# Patient Record
Sex: FEMALE | Race: BLACK OR AFRICAN AMERICAN | Employment: OTHER | URBAN - METROPOLITAN AREA
[De-identification: names, ages, dates, MRNs, and addresses within clinical notes are randomized per-mention and may not be internally consistent; named-entity substitution may affect disease eponyms.]

---

## 2017-02-06 ENCOUNTER — HOSPITAL ENCOUNTER (INPATIENT)
Age: 62
LOS: 8 days | Discharge: HOME | DRG: 885 | End: 2017-02-14
Attending: EMERGENCY MEDICINE | Admitting: PSYCHIATRY & NEUROLOGY
Payer: MEDICARE

## 2017-02-06 DIAGNOSIS — R44.0 AUDITORY HALLUCINATIONS: ICD-10-CM

## 2017-02-06 DIAGNOSIS — R44.1 HALLUCINATION, VISUAL: Primary | ICD-10-CM

## 2017-02-06 LAB
CREAT SERPL-MCNC: 0.97 MG/DL (ref 0.5–0.9)
GFR AFRICAN AMERICAN: >60 ML/MIN
GFR NON-AFRICAN AMERICAN: 58 ML/MIN
GFR SERPL CREATININE-BSD FRML MDRD: ABNORMAL ML/MIN/{1.73_M2}
GFR SERPL CREATININE-BSD FRML MDRD: ABNORMAL ML/MIN/{1.73_M2}
GLUCOSE BLD-MCNC: 106 MG/DL (ref 65–105)
GLUCOSE BLD-MCNC: 108 MG/DL (ref 65–105)
GLUCOSE BLD-MCNC: 119 MG/DL (ref 65–105)
GLUCOSE BLD-MCNC: 184 MG/DL (ref 65–105)

## 2017-02-06 PROCEDURE — 6370000000 HC RX 637 (ALT 250 FOR IP): Performed by: PSYCHIATRY & NEUROLOGY

## 2017-02-06 PROCEDURE — 36415 COLL VENOUS BLD VENIPUNCTURE: CPT

## 2017-02-06 PROCEDURE — 99285 EMERGENCY DEPT VISIT HI MDM: CPT

## 2017-02-06 PROCEDURE — 94664 DEMO&/EVAL PT USE INHALER: CPT

## 2017-02-06 PROCEDURE — 82565 ASSAY OF CREATININE: CPT

## 2017-02-06 PROCEDURE — 82947 ASSAY GLUCOSE BLOOD QUANT: CPT

## 2017-02-06 PROCEDURE — 1240000000 HC EMOTIONAL WELLNESS R&B

## 2017-02-06 RX ORDER — IBUPROFEN 800 MG/1
800 TABLET ORAL EVERY 8 HOURS PRN
Status: DISCONTINUED | OUTPATIENT
Start: 2017-02-06 | End: 2017-02-14 | Stop reason: HOSPADM

## 2017-02-06 RX ORDER — ALBUTEROL SULFATE 90 UG/1
1 AEROSOL, METERED RESPIRATORY (INHALATION) 4 TIMES DAILY PRN
Status: DISCONTINUED | OUTPATIENT
Start: 2017-02-06 | End: 2017-02-14 | Stop reason: HOSPADM

## 2017-02-06 RX ORDER — NICOTINE 21 MG/24HR
1 PATCH, TRANSDERMAL 24 HOURS TRANSDERMAL DAILY
Status: DISCONTINUED | OUTPATIENT
Start: 2017-02-06 | End: 2017-02-06

## 2017-02-06 RX ORDER — HYDROCHLOROTHIAZIDE 25 MG/1
12.5 TABLET ORAL DAILY
Status: DISCONTINUED | OUTPATIENT
Start: 2017-02-06 | End: 2017-02-14 | Stop reason: HOSPADM

## 2017-02-06 RX ORDER — PANTOPRAZOLE SODIUM 40 MG/1
40 TABLET, DELAYED RELEASE ORAL
Status: DISCONTINUED | OUTPATIENT
Start: 2017-02-07 | End: 2017-02-08

## 2017-02-06 RX ORDER — OXYBUTYNIN CHLORIDE 5 MG/1
5 TABLET, EXTENDED RELEASE ORAL NIGHTLY
Status: DISCONTINUED | OUTPATIENT
Start: 2017-02-06 | End: 2017-02-07

## 2017-02-06 RX ORDER — POLYVINYL ALCOHOL 14 MG/ML
1 SOLUTION/ DROPS OPHTHALMIC
Status: DISCONTINUED | OUTPATIENT
Start: 2017-02-06 | End: 2017-02-14 | Stop reason: HOSPADM

## 2017-02-06 RX ORDER — QUETIAPINE FUMARATE 200 MG/1
200 TABLET, FILM COATED ORAL NIGHTLY
Status: DISCONTINUED | OUTPATIENT
Start: 2017-02-06 | End: 2017-02-09 | Stop reason: SDUPTHER

## 2017-02-06 RX ORDER — QUETIAPINE FUMARATE 300 MG/1
300 TABLET, FILM COATED ORAL NIGHTLY
Status: DISCONTINUED | OUTPATIENT
Start: 2017-02-06 | End: 2017-02-09

## 2017-02-06 RX ORDER — LISINOPRIL 20 MG/1
20 TABLET ORAL DAILY
Status: DISCONTINUED | OUTPATIENT
Start: 2017-02-06 | End: 2017-02-14 | Stop reason: HOSPADM

## 2017-02-06 RX ORDER — INSULIN GLARGINE 100 [IU]/ML
80 INJECTION, SOLUTION SUBCUTANEOUS NIGHTLY
Status: DISCONTINUED | OUTPATIENT
Start: 2017-02-06 | End: 2017-02-14 | Stop reason: HOSPADM

## 2017-02-06 RX ORDER — TRAZODONE HYDROCHLORIDE 50 MG/1
50 TABLET ORAL NIGHTLY PRN
Status: DISCONTINUED | OUTPATIENT
Start: 2017-02-06 | End: 2017-02-14 | Stop reason: HOSPADM

## 2017-02-06 RX ORDER — NICOTINE 21 MG/24HR
1 PATCH, TRANSDERMAL 24 HOURS TRANSDERMAL DAILY
Status: DISCONTINUED | OUTPATIENT
Start: 2017-02-06 | End: 2017-02-14 | Stop reason: HOSPADM

## 2017-02-06 RX ORDER — ASPIRIN 81 MG/1
81 TABLET ORAL DAILY
Status: DISCONTINUED | OUTPATIENT
Start: 2017-02-06 | End: 2017-02-14 | Stop reason: HOSPADM

## 2017-02-06 RX ORDER — SIMVASTATIN 10 MG
10 TABLET ORAL NIGHTLY
Status: DISCONTINUED | OUTPATIENT
Start: 2017-02-06 | End: 2017-02-14 | Stop reason: HOSPADM

## 2017-02-06 RX ORDER — ALPRAZOLAM 0.25 MG/1
0.25 TABLET ORAL 2 TIMES DAILY PRN
Status: DISCONTINUED | OUTPATIENT
Start: 2017-02-06 | End: 2017-02-14 | Stop reason: HOSPADM

## 2017-02-06 RX ORDER — QUETIAPINE FUMARATE 300 MG/1
300 TABLET, FILM COATED ORAL 2 TIMES DAILY
Status: DISCONTINUED | OUTPATIENT
Start: 2017-02-06 | End: 2017-02-14 | Stop reason: HOSPADM

## 2017-02-06 RX ORDER — LISINOPRIL AND HYDROCHLOROTHIAZIDE 20; 12.5 MG/1; MG/1
1 TABLET ORAL DAILY
Status: DISCONTINUED | OUTPATIENT
Start: 2017-02-06 | End: 2017-02-06 | Stop reason: CLARIF

## 2017-02-06 RX ORDER — CELECOXIB 200 MG/1
200 CAPSULE ORAL DAILY
Status: DISCONTINUED | OUTPATIENT
Start: 2017-02-06 | End: 2017-02-14 | Stop reason: HOSPADM

## 2017-02-06 RX ADMIN — ASPIRIN 81 MG: 81 TABLET, COATED ORAL at 13:46

## 2017-02-06 RX ADMIN — QUETIAPINE FUMARATE 300 MG: 300 TABLET, FILM COATED ORAL at 13:46

## 2017-02-06 RX ADMIN — ALPRAZOLAM 0.25 MG: 0.25 TABLET ORAL at 13:51

## 2017-02-06 RX ADMIN — LISINOPRIL 20 MG: 20 TABLET ORAL at 13:47

## 2017-02-06 RX ADMIN — HYDROCHLOROTHIAZIDE 12.5 MG: 25 TABLET ORAL at 13:46

## 2017-02-06 RX ADMIN — METFORMIN HYDROCHLORIDE 1000 MG: 500 TABLET, FILM COATED ORAL at 17:20

## 2017-02-06 RX ADMIN — SERTRALINE 150 MG: 50 TABLET, FILM COATED ORAL at 13:47

## 2017-02-06 RX ADMIN — QUETIAPINE FUMARATE 300 MG: 300 TABLET, FILM COATED ORAL at 21:21

## 2017-02-06 RX ADMIN — ALPRAZOLAM 0.25 MG: 0.25 TABLET ORAL at 21:22

## 2017-02-06 RX ADMIN — Medication 80 UNITS: at 21:19

## 2017-02-06 RX ADMIN — Medication: at 17:38

## 2017-02-06 RX ADMIN — CELECOXIB 200 MG: 200 CAPSULE ORAL at 13:46

## 2017-02-06 RX ADMIN — OXYBUTYNIN CHLORIDE 5 MG: 5 TABLET, EXTENDED RELEASE ORAL at 21:20

## 2017-02-06 RX ADMIN — Medication 2 PUFF: at 13:46

## 2017-02-06 RX ADMIN — Medication 5 ML: at 17:36

## 2017-02-06 RX ADMIN — Medication 2 PUFF: at 21:20

## 2017-02-06 RX ADMIN — QUETIAPINE FUMARATE 200 MG: 200 TABLET, FILM COATED ORAL at 21:21

## 2017-02-06 RX ADMIN — SIMVASTATIN 10 MG: 10 TABLET, FILM COATED ORAL at 21:21

## 2017-02-06 ASSESSMENT — LIFESTYLE VARIABLES
HISTORY_ALCOHOL_USE: NO
HISTORY_ALCOHOL_USE: NO

## 2017-02-06 ASSESSMENT — PAIN DESCRIPTION - PROGRESSION
CLINICAL_PROGRESSION: GRADUALLY WORSENING
CLINICAL_PROGRESSION: GRADUALLY WORSENING

## 2017-02-06 ASSESSMENT — PAIN SCALES - WONG BAKER
WONGBAKER_NUMERICALRESPONSE: 8
WONGBAKER_NUMERICALRESPONSE: 8

## 2017-02-06 ASSESSMENT — PAIN DESCRIPTION - DESCRIPTORS
DESCRIPTORS: SORE
DESCRIPTORS: SORE

## 2017-02-06 ASSESSMENT — ENCOUNTER SYMPTOMS
NAUSEA: 0
SHORTNESS OF BREATH: 0
DIARRHEA: 0
EYE PAIN: 0
VOMITING: 0
SORE THROAT: 0
ABDOMINAL PAIN: 0
EYE DISCHARGE: 0
TROUBLE SWALLOWING: 0
COUGH: 0
PHOTOPHOBIA: 0
RHINORRHEA: 0

## 2017-02-06 ASSESSMENT — PAIN SCALES - GENERAL
PAINLEVEL_OUTOF10: 8
PAINLEVEL_OUTOF10: 8
PAINLEVEL_OUTOF10: 0
PAINLEVEL_OUTOF10: 8
PAINLEVEL_OUTOF10: 8

## 2017-02-06 ASSESSMENT — PAIN DESCRIPTION - LOCATION
LOCATION: MOUTH

## 2017-02-06 ASSESSMENT — SLEEP AND FATIGUE QUESTIONNAIRES
RESTFUL SLEEP: NO
DIFFICULTY FALLING ASLEEP: YES
DO YOU USE A SLEEP AID: YES
DO YOU HAVE DIFFICULTY SLEEPING: NO
SLEEP PATTERN: DIFFICULTY FALLING ASLEEP;DISTURBED/INTERRUPTED SLEEP;INSOMNIA
DIFFICULTY STAYING ASLEEP: NO
DIFFICULTY ARISING: NO
AVERAGE NUMBER OF SLEEP HOURS: 6

## 2017-02-06 ASSESSMENT — PAIN DESCRIPTION - PAIN TYPE
TYPE: ACUTE PAIN

## 2017-02-06 ASSESSMENT — PAIN DESCRIPTION - ONSET
ONSET: ON-GOING
ONSET: ON-GOING

## 2017-02-06 ASSESSMENT — PAIN DESCRIPTION - FREQUENCY
FREQUENCY: INTERMITTENT
FREQUENCY: INTERMITTENT

## 2017-02-06 ASSESSMENT — PATIENT HEALTH QUESTIONNAIRE - PHQ9: SUM OF ALL RESPONSES TO PHQ QUESTIONS 1-9: 0

## 2017-02-07 LAB — GLUCOSE BLD-MCNC: 131 MG/DL (ref 65–105)

## 2017-02-07 PROCEDURE — 82947 ASSAY GLUCOSE BLOOD QUANT: CPT

## 2017-02-07 PROCEDURE — 1240000000 HC EMOTIONAL WELLNESS R&B

## 2017-02-07 PROCEDURE — 6370000000 HC RX 637 (ALT 250 FOR IP): Performed by: PSYCHIATRY & NEUROLOGY

## 2017-02-07 RX ADMIN — POLYVINYL ALCOHOL 1 DROP: 14 SOLUTION/ DROPS OPHTHALMIC at 07:05

## 2017-02-07 RX ADMIN — TIOTROPIUM BROMIDE 18 MCG: 18 CAPSULE ORAL; RESPIRATORY (INHALATION) at 09:40

## 2017-02-07 RX ADMIN — IBUPROFEN 800 MG: 800 TABLET, FILM COATED ORAL at 21:35

## 2017-02-07 RX ADMIN — QUETIAPINE FUMARATE 300 MG: 300 TABLET, FILM COATED ORAL at 14:00

## 2017-02-07 RX ADMIN — PANTOPRAZOLE SODIUM 40 MG: 40 TABLET, DELAYED RELEASE ORAL at 07:04

## 2017-02-07 RX ADMIN — IBUPROFEN 800 MG: 800 TABLET, FILM COATED ORAL at 13:59

## 2017-02-07 RX ADMIN — Medication 2 PUFF: at 09:38

## 2017-02-07 RX ADMIN — ALPRAZOLAM 0.25 MG: 0.25 TABLET ORAL at 09:43

## 2017-02-07 RX ADMIN — Medication 80 UNITS: at 22:01

## 2017-02-07 RX ADMIN — POLYVINYL ALCOHOL 1 DROP: 14 SOLUTION/ DROPS OPHTHALMIC at 13:09

## 2017-02-07 RX ADMIN — QUETIAPINE FUMARATE 300 MG: 300 TABLET, FILM COATED ORAL at 09:42

## 2017-02-07 RX ADMIN — SERTRALINE 150 MG: 50 TABLET, FILM COATED ORAL at 09:42

## 2017-02-07 RX ADMIN — Medication 2 PUFF: at 21:35

## 2017-02-07 RX ADMIN — SIMVASTATIN 10 MG: 10 TABLET, FILM COATED ORAL at 21:36

## 2017-02-07 RX ADMIN — ASPIRIN 81 MG: 81 TABLET, COATED ORAL at 09:43

## 2017-02-07 RX ADMIN — TRAZODONE HYDROCHLORIDE 50 MG: 50 TABLET ORAL at 21:37

## 2017-02-07 RX ADMIN — CELECOXIB 200 MG: 200 CAPSULE ORAL at 09:41

## 2017-02-07 RX ADMIN — ALPRAZOLAM 0.25 MG: 0.25 TABLET ORAL at 21:36

## 2017-02-07 RX ADMIN — METFORMIN HYDROCHLORIDE 1000 MG: 500 TABLET, FILM COATED ORAL at 17:23

## 2017-02-07 RX ADMIN — POLYVINYL ALCOHOL 1 DROP: 14 SOLUTION/ DROPS OPHTHALMIC at 09:44

## 2017-02-07 RX ADMIN — POLYVINYL ALCOHOL 1 DROP: 14 SOLUTION/ DROPS OPHTHALMIC at 21:34

## 2017-02-07 RX ADMIN — QUETIAPINE FUMARATE 200 MG: 200 TABLET, FILM COATED ORAL at 21:38

## 2017-02-07 RX ADMIN — QUETIAPINE FUMARATE 300 MG: 300 TABLET, FILM COATED ORAL at 21:38

## 2017-02-07 RX ADMIN — METFORMIN HYDROCHLORIDE 1000 MG: 500 TABLET, FILM COATED ORAL at 09:43

## 2017-02-07 ASSESSMENT — PAIN DESCRIPTION - DESCRIPTORS
DESCRIPTORS: ACHING;TENDER;THROBBING
DESCRIPTORS: ACHING;TENDER;THROBBING

## 2017-02-07 ASSESSMENT — PAIN DESCRIPTION - LOCATION
LOCATION: BACK
LOCATION: BACK;KNEE

## 2017-02-07 ASSESSMENT — PAIN DESCRIPTION - ORIENTATION
ORIENTATION: RIGHT;LOWER
ORIENTATION: LOWER

## 2017-02-07 ASSESSMENT — PAIN DESCRIPTION - PAIN TYPE
TYPE: CHRONIC PAIN
TYPE: CHRONIC PAIN

## 2017-02-07 ASSESSMENT — PAIN DESCRIPTION - FREQUENCY
FREQUENCY: CONTINUOUS
FREQUENCY: CONTINUOUS

## 2017-02-07 ASSESSMENT — PAIN DESCRIPTION - ONSET
ONSET: ON-GOING
ONSET: ON-GOING

## 2017-02-07 ASSESSMENT — PAIN SCALES - GENERAL
PAINLEVEL_OUTOF10: 6
PAINLEVEL_OUTOF10: 6
PAINLEVEL_OUTOF10: 2
PAINLEVEL_OUTOF10: 7

## 2017-02-07 ASSESSMENT — PAIN DESCRIPTION - PROGRESSION
CLINICAL_PROGRESSION: NOT CHANGED
CLINICAL_PROGRESSION: NOT CHANGED

## 2017-02-08 LAB — GLUCOSE BLD-MCNC: 94 MG/DL (ref 65–105)

## 2017-02-08 PROCEDURE — 82947 ASSAY GLUCOSE BLOOD QUANT: CPT

## 2017-02-08 PROCEDURE — 6370000000 HC RX 637 (ALT 250 FOR IP): Performed by: PSYCHIATRY & NEUROLOGY

## 2017-02-08 PROCEDURE — 1240000000 HC EMOTIONAL WELLNESS R&B

## 2017-02-08 RX ORDER — PANTOPRAZOLE SODIUM 40 MG/1
40 TABLET, DELAYED RELEASE ORAL
Status: DISCONTINUED | OUTPATIENT
Start: 2017-02-08 | End: 2017-02-14 | Stop reason: HOSPADM

## 2017-02-08 RX ADMIN — METFORMIN HYDROCHLORIDE 1000 MG: 500 TABLET, FILM COATED ORAL at 09:04

## 2017-02-08 RX ADMIN — QUETIAPINE FUMARATE 300 MG: 300 TABLET, FILM COATED ORAL at 14:50

## 2017-02-08 RX ADMIN — QUETIAPINE FUMARATE 200 MG: 200 TABLET, FILM COATED ORAL at 20:47

## 2017-02-08 RX ADMIN — CELECOXIB 200 MG: 200 CAPSULE ORAL at 09:05

## 2017-02-08 RX ADMIN — QUETIAPINE FUMARATE 300 MG: 300 TABLET, FILM COATED ORAL at 09:06

## 2017-02-08 RX ADMIN — PANTOPRAZOLE SODIUM 40 MG: 40 TABLET, DELAYED RELEASE ORAL at 09:07

## 2017-02-08 RX ADMIN — Medication 5 ML: at 09:07

## 2017-02-08 RX ADMIN — ASPIRIN 81 MG: 81 TABLET, COATED ORAL at 09:06

## 2017-02-08 RX ADMIN — POLYVINYL ALCOHOL 1 DROP: 14 SOLUTION/ DROPS OPHTHALMIC at 17:36

## 2017-02-08 RX ADMIN — Medication 2 PUFF: at 09:01

## 2017-02-08 RX ADMIN — Medication: at 09:08

## 2017-02-08 RX ADMIN — METFORMIN HYDROCHLORIDE 1000 MG: 500 TABLET, FILM COATED ORAL at 17:35

## 2017-02-08 RX ADMIN — POLYVINYL ALCOHOL 1 DROP: 14 SOLUTION/ DROPS OPHTHALMIC at 09:02

## 2017-02-08 RX ADMIN — HYDROCHLOROTHIAZIDE 12.5 MG: 25 TABLET ORAL at 09:06

## 2017-02-08 RX ADMIN — TRAZODONE HYDROCHLORIDE 50 MG: 50 TABLET ORAL at 20:47

## 2017-02-08 RX ADMIN — Medication 2 PUFF: at 20:47

## 2017-02-08 RX ADMIN — POLYVINYL ALCOHOL 1 DROP: 14 SOLUTION/ DROPS OPHTHALMIC at 13:10

## 2017-02-08 RX ADMIN — SIMVASTATIN 10 MG: 10 TABLET, FILM COATED ORAL at 20:47

## 2017-02-08 RX ADMIN — Medication 80 UNITS: at 20:44

## 2017-02-08 RX ADMIN — QUETIAPINE FUMARATE 300 MG: 300 TABLET, FILM COATED ORAL at 20:50

## 2017-02-08 RX ADMIN — LISINOPRIL 20 MG: 20 TABLET ORAL at 09:06

## 2017-02-08 RX ADMIN — POLYVINYL ALCOHOL 1 DROP: 14 SOLUTION/ DROPS OPHTHALMIC at 11:18

## 2017-02-08 RX ADMIN — ALPRAZOLAM 0.25 MG: 0.25 TABLET ORAL at 09:04

## 2017-02-08 RX ADMIN — IBUPROFEN 800 MG: 800 TABLET, FILM COATED ORAL at 09:07

## 2017-02-08 RX ADMIN — SERTRALINE 150 MG: 50 TABLET, FILM COATED ORAL at 09:06

## 2017-02-08 RX ADMIN — TIOTROPIUM BROMIDE 18 MCG: 18 CAPSULE ORAL; RESPIRATORY (INHALATION) at 09:02

## 2017-02-08 ASSESSMENT — PAIN SCALES - GENERAL
PAINLEVEL_OUTOF10: 2
PAINLEVEL_OUTOF10: 7
PAINLEVEL_OUTOF10: 7

## 2017-02-08 ASSESSMENT — PAIN DESCRIPTION - LOCATION: LOCATION: BACK;LEG

## 2017-02-08 ASSESSMENT — PAIN DESCRIPTION - DESCRIPTORS: DESCRIPTORS: ACHING;SORE;THROBBING

## 2017-02-08 ASSESSMENT — PAIN DESCRIPTION - PAIN TYPE: TYPE: CHRONIC PAIN

## 2017-02-08 ASSESSMENT — PAIN DESCRIPTION - FREQUENCY: FREQUENCY: CONTINUOUS

## 2017-02-08 ASSESSMENT — PAIN DESCRIPTION - PROGRESSION: CLINICAL_PROGRESSION: NOT CHANGED

## 2017-02-08 ASSESSMENT — PAIN DESCRIPTION - ORIENTATION: ORIENTATION: RIGHT;LOWER

## 2017-02-08 ASSESSMENT — PAIN DESCRIPTION - ONSET: ONSET: ON-GOING

## 2017-02-09 PROCEDURE — 1240000000 HC EMOTIONAL WELLNESS R&B

## 2017-02-09 PROCEDURE — 6370000000 HC RX 637 (ALT 250 FOR IP): Performed by: PSYCHIATRY & NEUROLOGY

## 2017-02-09 PROCEDURE — 82947 ASSAY GLUCOSE BLOOD QUANT: CPT

## 2017-02-09 RX ORDER — QUETIAPINE FUMARATE 200 MG/1
400 TABLET, FILM COATED ORAL NIGHTLY
Status: DISCONTINUED | OUTPATIENT
Start: 2017-02-09 | End: 2017-02-14 | Stop reason: HOSPADM

## 2017-02-09 RX ADMIN — METFORMIN HYDROCHLORIDE 1000 MG: 500 TABLET, FILM COATED ORAL at 08:52

## 2017-02-09 RX ADMIN — ASPIRIN 81 MG: 81 TABLET, COATED ORAL at 09:43

## 2017-02-09 RX ADMIN — QUETIAPINE FUMARATE 300 MG: 300 TABLET, FILM COATED ORAL at 09:42

## 2017-02-09 RX ADMIN — PANTOPRAZOLE SODIUM 40 MG: 40 TABLET, DELAYED RELEASE ORAL at 09:43

## 2017-02-09 RX ADMIN — POLYVINYL ALCOHOL 1 DROP: 14 SOLUTION/ DROPS OPHTHALMIC at 17:51

## 2017-02-09 RX ADMIN — QUETIAPINE FUMARATE 400 MG: 200 TABLET, FILM COATED ORAL at 21:14

## 2017-02-09 RX ADMIN — QUETIAPINE FUMARATE 300 MG: 300 TABLET, FILM COATED ORAL at 15:36

## 2017-02-09 RX ADMIN — POLYVINYL ALCOHOL 1 DROP: 14 SOLUTION/ DROPS OPHTHALMIC at 21:13

## 2017-02-09 RX ADMIN — CELECOXIB 200 MG: 200 CAPSULE ORAL at 08:52

## 2017-02-09 RX ADMIN — LISINOPRIL 20 MG: 20 TABLET ORAL at 08:52

## 2017-02-09 RX ADMIN — TIOTROPIUM BROMIDE 18 MCG: 18 CAPSULE ORAL; RESPIRATORY (INHALATION) at 09:43

## 2017-02-09 RX ADMIN — SERTRALINE 150 MG: 50 TABLET, FILM COATED ORAL at 09:42

## 2017-02-09 RX ADMIN — Medication 2 PUFF: at 08:51

## 2017-02-09 RX ADMIN — TRAZODONE HYDROCHLORIDE 50 MG: 50 TABLET ORAL at 21:14

## 2017-02-09 RX ADMIN — Medication 80 UNITS: at 22:40

## 2017-02-09 RX ADMIN — ALPRAZOLAM 0.25 MG: 0.25 TABLET ORAL at 21:14

## 2017-02-09 RX ADMIN — METFORMIN HYDROCHLORIDE 1000 MG: 500 TABLET, FILM COATED ORAL at 17:51

## 2017-02-09 RX ADMIN — IBUPROFEN 800 MG: 800 TABLET, FILM COATED ORAL at 08:52

## 2017-02-09 RX ADMIN — SIMVASTATIN 10 MG: 10 TABLET, FILM COATED ORAL at 21:14

## 2017-02-09 RX ADMIN — Medication: at 17:55

## 2017-02-09 RX ADMIN — HYDROCHLOROTHIAZIDE 12.5 MG: 25 TABLET ORAL at 09:42

## 2017-02-09 RX ADMIN — POLYVINYL ALCOHOL 1 DROP: 14 SOLUTION/ DROPS OPHTHALMIC at 10:11

## 2017-02-09 RX ADMIN — Medication 2 PUFF: at 21:13

## 2017-02-09 ASSESSMENT — PAIN SCALES - GENERAL: PAINLEVEL_OUTOF10: 6

## 2017-02-10 LAB
GLUCOSE BLD-MCNC: 362 MG/DL (ref 65–105)
GLUCOSE BLD-MCNC: 79 MG/DL (ref 65–105)

## 2017-02-10 PROCEDURE — 1240000000 HC EMOTIONAL WELLNESS R&B

## 2017-02-10 PROCEDURE — 82947 ASSAY GLUCOSE BLOOD QUANT: CPT

## 2017-02-10 PROCEDURE — 6370000000 HC RX 637 (ALT 250 FOR IP): Performed by: PSYCHIATRY & NEUROLOGY

## 2017-02-10 RX ADMIN — CELECOXIB 200 MG: 200 CAPSULE ORAL at 08:33

## 2017-02-10 RX ADMIN — Medication 2 PUFF: at 21:21

## 2017-02-10 RX ADMIN — HYDROCHLOROTHIAZIDE 12.5 MG: 25 TABLET ORAL at 08:33

## 2017-02-10 RX ADMIN — SIMVASTATIN 10 MG: 10 TABLET, FILM COATED ORAL at 21:21

## 2017-02-10 RX ADMIN — QUETIAPINE FUMARATE 300 MG: 300 TABLET, FILM COATED ORAL at 16:02

## 2017-02-10 RX ADMIN — QUETIAPINE FUMARATE 400 MG: 200 TABLET, FILM COATED ORAL at 21:21

## 2017-02-10 RX ADMIN — ALPRAZOLAM 0.25 MG: 0.25 TABLET ORAL at 21:22

## 2017-02-10 RX ADMIN — METFORMIN HYDROCHLORIDE 1000 MG: 500 TABLET, FILM COATED ORAL at 17:31

## 2017-02-10 RX ADMIN — IBUPROFEN 800 MG: 800 TABLET, FILM COATED ORAL at 08:32

## 2017-02-10 RX ADMIN — Medication 2 PUFF: at 08:34

## 2017-02-10 RX ADMIN — TRAZODONE HYDROCHLORIDE 50 MG: 50 TABLET ORAL at 21:21

## 2017-02-10 RX ADMIN — ASPIRIN 81 MG: 81 TABLET, COATED ORAL at 08:33

## 2017-02-10 RX ADMIN — POLYVINYL ALCOHOL 1 DROP: 14 SOLUTION/ DROPS OPHTHALMIC at 21:20

## 2017-02-10 RX ADMIN — METFORMIN HYDROCHLORIDE 1000 MG: 500 TABLET, FILM COATED ORAL at 08:33

## 2017-02-10 RX ADMIN — Medication 80 UNITS: at 21:21

## 2017-02-10 RX ADMIN — QUETIAPINE FUMARATE 300 MG: 300 TABLET, FILM COATED ORAL at 08:33

## 2017-02-10 RX ADMIN — PANTOPRAZOLE SODIUM 40 MG: 40 TABLET, DELAYED RELEASE ORAL at 08:33

## 2017-02-10 RX ADMIN — SERTRALINE 150 MG: 50 TABLET, FILM COATED ORAL at 08:32

## 2017-02-10 RX ADMIN — TIOTROPIUM BROMIDE 18 MCG: 18 CAPSULE ORAL; RESPIRATORY (INHALATION) at 08:33

## 2017-02-10 RX ADMIN — LISINOPRIL 20 MG: 20 TABLET ORAL at 09:37

## 2017-02-10 ASSESSMENT — PAIN SCALES - GENERAL: PAINLEVEL_OUTOF10: 8

## 2017-02-11 LAB — GLUCOSE BLD-MCNC: 99 MG/DL (ref 65–105)

## 2017-02-11 PROCEDURE — 6370000000 HC RX 637 (ALT 250 FOR IP): Performed by: PSYCHIATRY & NEUROLOGY

## 2017-02-11 PROCEDURE — 1240000000 HC EMOTIONAL WELLNESS R&B

## 2017-02-11 PROCEDURE — 82947 ASSAY GLUCOSE BLOOD QUANT: CPT

## 2017-02-11 RX ADMIN — TIOTROPIUM BROMIDE 18 MCG: 18 CAPSULE ORAL; RESPIRATORY (INHALATION) at 09:36

## 2017-02-11 RX ADMIN — IBUPROFEN 800 MG: 800 TABLET, FILM COATED ORAL at 09:33

## 2017-02-11 RX ADMIN — POLYVINYL ALCOHOL 1 DROP: 14 SOLUTION/ DROPS OPHTHALMIC at 21:34

## 2017-02-11 RX ADMIN — QUETIAPINE FUMARATE 300 MG: 300 TABLET, FILM COATED ORAL at 09:32

## 2017-02-11 RX ADMIN — POLYVINYL ALCOHOL 1 DROP: 14 SOLUTION/ DROPS OPHTHALMIC at 13:36

## 2017-02-11 RX ADMIN — SERTRALINE 150 MG: 50 TABLET, FILM COATED ORAL at 09:32

## 2017-02-11 RX ADMIN — POLYVINYL ALCOHOL 1 DROP: 14 SOLUTION/ DROPS OPHTHALMIC at 16:49

## 2017-02-11 RX ADMIN — IBUPROFEN 800 MG: 800 TABLET, FILM COATED ORAL at 16:21

## 2017-02-11 RX ADMIN — QUETIAPINE FUMARATE 400 MG: 200 TABLET, FILM COATED ORAL at 21:35

## 2017-02-11 RX ADMIN — METFORMIN HYDROCHLORIDE 1000 MG: 500 TABLET, FILM COATED ORAL at 16:21

## 2017-02-11 RX ADMIN — QUETIAPINE FUMARATE 300 MG: 300 TABLET, FILM COATED ORAL at 15:00

## 2017-02-11 RX ADMIN — TRAZODONE HYDROCHLORIDE 50 MG: 50 TABLET ORAL at 21:35

## 2017-02-11 RX ADMIN — CELECOXIB 200 MG: 200 CAPSULE ORAL at 09:33

## 2017-02-11 RX ADMIN — Medication 2 PUFF: at 09:32

## 2017-02-11 RX ADMIN — ALPRAZOLAM 0.25 MG: 0.25 TABLET ORAL at 21:35

## 2017-02-11 RX ADMIN — PANTOPRAZOLE SODIUM 40 MG: 40 TABLET, DELAYED RELEASE ORAL at 09:33

## 2017-02-11 RX ADMIN — METFORMIN HYDROCHLORIDE 1000 MG: 500 TABLET, FILM COATED ORAL at 09:33

## 2017-02-11 RX ADMIN — POLYVINYL ALCOHOL 1 DROP: 14 SOLUTION/ DROPS OPHTHALMIC at 09:35

## 2017-02-11 RX ADMIN — SIMVASTATIN 10 MG: 10 TABLET, FILM COATED ORAL at 21:35

## 2017-02-11 RX ADMIN — LISINOPRIL 20 MG: 20 TABLET ORAL at 09:33

## 2017-02-11 RX ADMIN — Medication 2 PUFF: at 21:34

## 2017-02-11 RX ADMIN — HYDROCHLOROTHIAZIDE 12.5 MG: 25 TABLET ORAL at 09:33

## 2017-02-11 RX ADMIN — ASPIRIN 81 MG: 81 TABLET, COATED ORAL at 09:33

## 2017-02-11 ASSESSMENT — PAIN SCALES - GENERAL
PAINLEVEL_OUTOF10: 8
PAINLEVEL_OUTOF10: 8

## 2017-02-12 PROCEDURE — 1240000000 HC EMOTIONAL WELLNESS R&B

## 2017-02-12 PROCEDURE — 82947 ASSAY GLUCOSE BLOOD QUANT: CPT

## 2017-02-12 PROCEDURE — 6370000000 HC RX 637 (ALT 250 FOR IP): Performed by: PSYCHIATRY & NEUROLOGY

## 2017-02-12 RX ADMIN — QUETIAPINE FUMARATE 400 MG: 200 TABLET, FILM COATED ORAL at 22:03

## 2017-02-12 RX ADMIN — ALPRAZOLAM 0.25 MG: 0.25 TABLET ORAL at 09:25

## 2017-02-12 RX ADMIN — POLYVINYL ALCOHOL 1 DROP: 14 SOLUTION/ DROPS OPHTHALMIC at 09:31

## 2017-02-12 RX ADMIN — TIOTROPIUM BROMIDE 18 MCG: 18 CAPSULE ORAL; RESPIRATORY (INHALATION) at 09:26

## 2017-02-12 RX ADMIN — Medication 2 PUFF: at 22:02

## 2017-02-12 RX ADMIN — PANTOPRAZOLE SODIUM 40 MG: 40 TABLET, DELAYED RELEASE ORAL at 09:24

## 2017-02-12 RX ADMIN — CELECOXIB 200 MG: 200 CAPSULE ORAL at 09:23

## 2017-02-12 RX ADMIN — SERTRALINE 150 MG: 50 TABLET, FILM COATED ORAL at 09:24

## 2017-02-12 RX ADMIN — QUETIAPINE FUMARATE 300 MG: 300 TABLET, FILM COATED ORAL at 09:23

## 2017-02-12 RX ADMIN — ALPRAZOLAM 0.25 MG: 0.25 TABLET ORAL at 22:03

## 2017-02-12 RX ADMIN — POLYVINYL ALCOHOL 1 DROP: 14 SOLUTION/ DROPS OPHTHALMIC at 07:30

## 2017-02-12 RX ADMIN — METFORMIN HYDROCHLORIDE 1000 MG: 500 TABLET, FILM COATED ORAL at 17:24

## 2017-02-12 RX ADMIN — Medication 2 PUFF: at 09:22

## 2017-02-12 RX ADMIN — QUETIAPINE FUMARATE 300 MG: 300 TABLET, FILM COATED ORAL at 14:27

## 2017-02-12 RX ADMIN — ASPIRIN 81 MG: 81 TABLET, COATED ORAL at 09:25

## 2017-02-12 RX ADMIN — TRAZODONE HYDROCHLORIDE 50 MG: 50 TABLET ORAL at 22:03

## 2017-02-12 RX ADMIN — METFORMIN HYDROCHLORIDE 1000 MG: 500 TABLET, FILM COATED ORAL at 09:23

## 2017-02-12 RX ADMIN — SIMVASTATIN 10 MG: 10 TABLET, FILM COATED ORAL at 22:03

## 2017-02-12 RX ADMIN — POLYVINYL ALCOHOL 1 DROP: 14 SOLUTION/ DROPS OPHTHALMIC at 22:02

## 2017-02-12 ASSESSMENT — PAIN SCALES - GENERAL: PAINLEVEL_OUTOF10: 5

## 2017-02-13 LAB — GLUCOSE BLD-MCNC: 93 MG/DL (ref 65–105)

## 2017-02-13 PROCEDURE — 1240000000 HC EMOTIONAL WELLNESS R&B

## 2017-02-13 PROCEDURE — 6370000000 HC RX 637 (ALT 250 FOR IP): Performed by: PSYCHIATRY & NEUROLOGY

## 2017-02-13 PROCEDURE — 82947 ASSAY GLUCOSE BLOOD QUANT: CPT

## 2017-02-13 RX ORDER — QUETIAPINE FUMARATE 400 MG/1
400 TABLET, FILM COATED ORAL NIGHTLY
Qty: 60 TABLET | Refills: 0 | Status: SHIPPED | OUTPATIENT
Start: 2017-02-13 | End: 2017-06-12

## 2017-02-13 RX ORDER — QUETIAPINE FUMARATE 300 MG/1
300 TABLET, FILM COATED ORAL 2 TIMES DAILY
Qty: 60 TABLET | Refills: 0 | Status: SHIPPED | OUTPATIENT
Start: 2017-02-13 | End: 2017-06-12

## 2017-02-13 RX ORDER — TRAZODONE HYDROCHLORIDE 50 MG/1
50 TABLET ORAL NIGHTLY PRN
Qty: 30 TABLET | Refills: 0 | Status: ON HOLD | OUTPATIENT
Start: 2017-02-13 | End: 2018-07-23

## 2017-02-13 RX ORDER — NICOTINE 21 MG/24HR
1 PATCH, TRANSDERMAL 24 HOURS TRANSDERMAL DAILY
Qty: 30 PATCH | Refills: 0
Start: 2017-02-13 | End: 2017-05-04

## 2017-02-13 RX ADMIN — Medication 2 PUFF: at 09:35

## 2017-02-13 RX ADMIN — TIOTROPIUM BROMIDE 18 MCG: 18 CAPSULE ORAL; RESPIRATORY (INHALATION) at 09:37

## 2017-02-13 RX ADMIN — ASPIRIN 81 MG: 81 TABLET, COATED ORAL at 09:36

## 2017-02-13 RX ADMIN — QUETIAPINE FUMARATE 400 MG: 200 TABLET, FILM COATED ORAL at 21:12

## 2017-02-13 RX ADMIN — METFORMIN HYDROCHLORIDE 1000 MG: 500 TABLET, FILM COATED ORAL at 18:16

## 2017-02-13 RX ADMIN — QUETIAPINE FUMARATE 300 MG: 300 TABLET, FILM COATED ORAL at 09:37

## 2017-02-13 RX ADMIN — CELECOXIB 200 MG: 200 CAPSULE ORAL at 09:36

## 2017-02-13 RX ADMIN — POLYVINYL ALCOHOL 1 DROP: 14 SOLUTION/ DROPS OPHTHALMIC at 13:41

## 2017-02-13 RX ADMIN — POLYVINYL ALCOHOL 1 DROP: 14 SOLUTION/ DROPS OPHTHALMIC at 21:58

## 2017-02-13 RX ADMIN — QUETIAPINE FUMARATE 300 MG: 300 TABLET, FILM COATED ORAL at 15:26

## 2017-02-13 RX ADMIN — SERTRALINE 150 MG: 50 TABLET, FILM COATED ORAL at 09:36

## 2017-02-13 RX ADMIN — POLYVINYL ALCOHOL 1 DROP: 14 SOLUTION/ DROPS OPHTHALMIC at 10:39

## 2017-02-13 RX ADMIN — LISINOPRIL 20 MG: 20 TABLET ORAL at 11:53

## 2017-02-13 RX ADMIN — IBUPROFEN 800 MG: 800 TABLET, FILM COATED ORAL at 18:16

## 2017-02-13 RX ADMIN — TRAZODONE HYDROCHLORIDE 50 MG: 50 TABLET ORAL at 21:12

## 2017-02-13 RX ADMIN — SIMVASTATIN 10 MG: 10 TABLET, FILM COATED ORAL at 21:12

## 2017-02-13 RX ADMIN — METFORMIN HYDROCHLORIDE 1000 MG: 500 TABLET, FILM COATED ORAL at 09:36

## 2017-02-13 RX ADMIN — ALPRAZOLAM 0.25 MG: 0.25 TABLET ORAL at 21:12

## 2017-02-13 RX ADMIN — HYDROCHLOROTHIAZIDE 12.5 MG: 25 TABLET ORAL at 09:49

## 2017-02-13 RX ADMIN — PANTOPRAZOLE SODIUM 40 MG: 40 TABLET, DELAYED RELEASE ORAL at 09:36

## 2017-02-13 RX ADMIN — Medication 2 PUFF: at 21:12

## 2017-02-13 ASSESSMENT — PAIN SCALES - GENERAL
PAINLEVEL_OUTOF10: 4
PAINLEVEL_OUTOF10: 3

## 2017-02-14 VITALS
HEART RATE: 83 BPM | OXYGEN SATURATION: 99 % | BODY MASS INDEX: 31.18 KG/M2 | DIASTOLIC BLOOD PRESSURE: 52 MMHG | TEMPERATURE: 98.8 F | RESPIRATION RATE: 14 BRPM | SYSTOLIC BLOOD PRESSURE: 113 MMHG | WEIGHT: 194 LBS | HEIGHT: 66 IN

## 2017-02-14 LAB — GLUCOSE BLD-MCNC: 116 MG/DL (ref 65–105)

## 2017-02-14 PROCEDURE — 6370000000 HC RX 637 (ALT 250 FOR IP): Performed by: PSYCHIATRY & NEUROLOGY

## 2017-02-14 RX ADMIN — LISINOPRIL 20 MG: 20 TABLET ORAL at 09:44

## 2017-02-14 RX ADMIN — Medication 5 ML: at 09:40

## 2017-02-14 RX ADMIN — METFORMIN HYDROCHLORIDE 1000 MG: 500 TABLET, FILM COATED ORAL at 09:44

## 2017-02-14 RX ADMIN — Medication: at 09:48

## 2017-02-14 RX ADMIN — CELECOXIB 200 MG: 200 CAPSULE ORAL at 09:43

## 2017-02-14 RX ADMIN — PANTOPRAZOLE SODIUM 40 MG: 40 TABLET, DELAYED RELEASE ORAL at 09:45

## 2017-02-14 RX ADMIN — SERTRALINE 150 MG: 50 TABLET, FILM COATED ORAL at 09:43

## 2017-02-14 RX ADMIN — TIOTROPIUM BROMIDE 18 MCG: 18 CAPSULE ORAL; RESPIRATORY (INHALATION) at 09:42

## 2017-02-14 RX ADMIN — POLYVINYL ALCOHOL 1 DROP: 14 SOLUTION/ DROPS OPHTHALMIC at 09:42

## 2017-02-14 RX ADMIN — ALPRAZOLAM 0.25 MG: 0.25 TABLET ORAL at 09:43

## 2017-02-14 RX ADMIN — HYDROCHLOROTHIAZIDE 12.5 MG: 25 TABLET ORAL at 09:45

## 2017-02-14 RX ADMIN — IBUPROFEN 800 MG: 800 TABLET, FILM COATED ORAL at 09:44

## 2017-02-14 RX ADMIN — ASPIRIN 81 MG: 81 TABLET, COATED ORAL at 09:44

## 2017-02-14 RX ADMIN — Medication 2 PUFF: at 09:40

## 2017-02-14 RX ADMIN — QUETIAPINE FUMARATE 300 MG: 300 TABLET, FILM COATED ORAL at 09:44

## 2017-02-14 ASSESSMENT — PAIN DESCRIPTION - ONSET: ONSET: ON-GOING

## 2017-02-14 ASSESSMENT — PAIN DESCRIPTION - DESCRIPTORS: DESCRIPTORS: ACHING;SORE;THROBBING

## 2017-02-14 ASSESSMENT — PAIN SCALES - GENERAL: PAINLEVEL_OUTOF10: 6

## 2017-02-14 ASSESSMENT — PAIN DESCRIPTION - PROGRESSION: CLINICAL_PROGRESSION: NOT CHANGED

## 2017-02-14 ASSESSMENT — PAIN DESCRIPTION - FREQUENCY: FREQUENCY: CONTINUOUS

## 2017-02-14 ASSESSMENT — PAIN DESCRIPTION - PAIN TYPE: TYPE: CHRONIC PAIN

## 2017-02-14 ASSESSMENT — PAIN DESCRIPTION - LOCATION: LOCATION: BACK;LEG

## 2017-02-14 ASSESSMENT — PAIN DESCRIPTION - ORIENTATION: ORIENTATION: LOWER;OTHER (COMMENT)

## 2017-05-04 ENCOUNTER — OFFICE VISIT (OUTPATIENT)
Dept: FAMILY MEDICINE CLINIC | Age: 62
End: 2017-05-04
Payer: MEDICARE

## 2017-05-04 VITALS
HEART RATE: 79 BPM | HEIGHT: 66 IN | BODY MASS INDEX: 30.31 KG/M2 | TEMPERATURE: 98.3 F | RESPIRATION RATE: 16 BRPM | WEIGHT: 188.6 LBS | DIASTOLIC BLOOD PRESSURE: 68 MMHG | SYSTOLIC BLOOD PRESSURE: 116 MMHG

## 2017-05-04 DIAGNOSIS — M54.41 CHRONIC BILATERAL LOW BACK PAIN WITH BILATERAL SCIATICA: Primary | ICD-10-CM

## 2017-05-04 DIAGNOSIS — M16.10 HIP ARTHRITIS: ICD-10-CM

## 2017-05-04 DIAGNOSIS — Z23 NEED FOR TETANUS BOOSTER: ICD-10-CM

## 2017-05-04 DIAGNOSIS — E11.9 TYPE 2 DIABETES MELLITUS WITHOUT COMPLICATION, WITH LONG-TERM CURRENT USE OF INSULIN (HCC): ICD-10-CM

## 2017-05-04 DIAGNOSIS — Z12.11 COLON CANCER SCREENING: ICD-10-CM

## 2017-05-04 DIAGNOSIS — Z23 NEED FOR PNEUMOCOCCAL VACCINATION: ICD-10-CM

## 2017-05-04 DIAGNOSIS — M54.42 CHRONIC BILATERAL LOW BACK PAIN WITH BILATERAL SCIATICA: Primary | ICD-10-CM

## 2017-05-04 DIAGNOSIS — G89.29 CHRONIC BILATERAL LOW BACK PAIN WITH BILATERAL SCIATICA: Primary | ICD-10-CM

## 2017-05-04 DIAGNOSIS — Z79.4 TYPE 2 DIABETES MELLITUS WITHOUT COMPLICATION, WITH LONG-TERM CURRENT USE OF INSULIN (HCC): ICD-10-CM

## 2017-05-04 DIAGNOSIS — I10 ESSENTIAL HYPERTENSION: ICD-10-CM

## 2017-05-04 LAB — HBA1C MFR BLD: 5.4 %

## 2017-05-04 PROCEDURE — 90732 PPSV23 VACC 2 YRS+ SUBQ/IM: CPT | Performed by: INTERNAL MEDICINE

## 2017-05-04 PROCEDURE — 99204 OFFICE O/P NEW MOD 45 MIN: CPT | Performed by: INTERNAL MEDICINE

## 2017-05-04 PROCEDURE — 83036 HEMOGLOBIN GLYCOSYLATED A1C: CPT | Performed by: INTERNAL MEDICINE

## 2017-05-04 PROCEDURE — 90715 TDAP VACCINE 7 YRS/> IM: CPT | Performed by: INTERNAL MEDICINE

## 2017-05-04 PROCEDURE — 90471 IMMUNIZATION ADMIN: CPT | Performed by: INTERNAL MEDICINE

## 2017-05-04 PROCEDURE — G0009 ADMIN PNEUMOCOCCAL VACCINE: HCPCS | Performed by: INTERNAL MEDICINE

## 2017-05-04 RX ORDER — IBUPROFEN 400 MG/1
400 TABLET ORAL EVERY 8 HOURS PRN
Qty: 90 TABLET | Refills: 1 | Status: SHIPPED | OUTPATIENT
Start: 2017-05-04 | End: 2017-07-24 | Stop reason: SDUPTHER

## 2017-05-04 RX ORDER — LISINOPRIL AND HYDROCHLOROTHIAZIDE 20; 12.5 MG/1; MG/1
1 TABLET ORAL DAILY
Qty: 30 TABLET | Refills: 2
Start: 2017-05-04 | End: 2017-09-21

## 2017-05-04 RX ORDER — INSULIN GLARGINE 100 [IU]/ML
74 INJECTION, SOLUTION SUBCUTANEOUS NIGHTLY
Qty: 1 VIAL | Refills: 2
Start: 2017-05-04 | End: 2017-05-04 | Stop reason: DRUGHIGH

## 2017-05-04 RX ORDER — INSULIN GLARGINE 100 [IU]/ML
68 INJECTION, SOLUTION SUBCUTANEOUS NIGHTLY
Qty: 1 VIAL | Refills: 2 | Status: SHIPPED
Start: 2017-05-04 | End: 2017-08-03

## 2017-05-04 ASSESSMENT — PATIENT HEALTH QUESTIONNAIRE - PHQ9
1. LITTLE INTEREST OR PLEASURE IN DOING THINGS: 1
SUM OF ALL RESPONSES TO PHQ9 QUESTIONS 1 & 2: 2
SUM OF ALL RESPONSES TO PHQ QUESTIONS 1-9: 2
2. FEELING DOWN, DEPRESSED OR HOPELESS: 1

## 2017-06-12 ENCOUNTER — APPOINTMENT (OUTPATIENT)
Dept: GENERAL RADIOLOGY | Age: 62
End: 2017-06-12
Payer: MEDICARE

## 2017-06-12 ENCOUNTER — HOSPITAL ENCOUNTER (EMERGENCY)
Age: 62
Discharge: HOME | End: 2017-06-12
Attending: EMERGENCY MEDICINE
Payer: MEDICARE

## 2017-06-12 VITALS
TEMPERATURE: 99.5 F | HEART RATE: 99 BPM | RESPIRATION RATE: 22 BRPM | OXYGEN SATURATION: 100 % | HEIGHT: 66 IN | DIASTOLIC BLOOD PRESSURE: 73 MMHG | SYSTOLIC BLOOD PRESSURE: 130 MMHG | BODY MASS INDEX: 31.18 KG/M2 | WEIGHT: 194 LBS

## 2017-06-12 DIAGNOSIS — R19.7 DIARRHEA, UNSPECIFIED TYPE: ICD-10-CM

## 2017-06-12 DIAGNOSIS — Z76.0 ENCOUNTER FOR MEDICATION REFILL: ICD-10-CM

## 2017-06-12 DIAGNOSIS — R10.84 GENERALIZED ABDOMINAL PAIN: Primary | ICD-10-CM

## 2017-06-12 DIAGNOSIS — M25.562 CHRONIC PAIN OF BOTH KNEES: ICD-10-CM

## 2017-06-12 DIAGNOSIS — R11.2 NON-INTRACTABLE VOMITING WITH NAUSEA, UNSPECIFIED VOMITING TYPE: ICD-10-CM

## 2017-06-12 DIAGNOSIS — M25.561 CHRONIC PAIN OF BOTH KNEES: ICD-10-CM

## 2017-06-12 DIAGNOSIS — G89.29 CHRONIC PAIN OF BOTH KNEES: ICD-10-CM

## 2017-06-12 LAB
ABSOLUTE EOS #: 0.2 K/UL (ref 0–0.4)
ABSOLUTE LYMPH #: 4.3 K/UL (ref 1–4.8)
ABSOLUTE MONO #: 0.6 K/UL (ref 0.1–1.2)
ALBUMIN SERPL-MCNC: 4 G/DL (ref 3.5–5.2)
ALBUMIN/GLOBULIN RATIO: 1.2 (ref 1–2.5)
ALP BLD-CCNC: 87 U/L (ref 35–104)
ALT SERPL-CCNC: 13 U/L (ref 5–33)
ANION GAP SERPL CALCULATED.3IONS-SCNC: 13 MMOL/L (ref 9–17)
AST SERPL-CCNC: 19 U/L
BASOPHILS # BLD: 2 %
BASOPHILS ABSOLUTE: 0.2 K/UL (ref 0–0.2)
BILIRUB SERPL-MCNC: 0.16 MG/DL (ref 0.3–1.2)
BUN BLDV-MCNC: 12 MG/DL (ref 8–23)
BUN/CREAT BLD: ABNORMAL (ref 9–20)
CALCIUM SERPL-MCNC: 9.1 MG/DL (ref 8.6–10.4)
CHLORIDE BLD-SCNC: 106 MMOL/L (ref 98–107)
CO2: 21 MMOL/L (ref 20–31)
CREAT SERPL-MCNC: 0.81 MG/DL (ref 0.5–0.9)
DIFFERENTIAL TYPE: NORMAL
EOSINOPHILS RELATIVE PERCENT: 3 %
GFR AFRICAN AMERICAN: >60 ML/MIN
GFR NON-AFRICAN AMERICAN: >60 ML/MIN
GFR SERPL CREATININE-BSD FRML MDRD: ABNORMAL ML/MIN/{1.73_M2}
GFR SERPL CREATININE-BSD FRML MDRD: ABNORMAL ML/MIN/{1.73_M2}
GLUCOSE BLD-MCNC: 88 MG/DL (ref 70–99)
HCT VFR BLD CALC: 39.2 % (ref 36–46)
HEMOGLOBIN: 12.8 G/DL (ref 12–16)
LACTIC ACID, WHOLE BLOOD: 1.8 MMOL/L (ref 0.7–2.1)
LIPASE: 22 U/L (ref 13–60)
LYMPHOCYTES # BLD: 50 %
MCH RBC QN AUTO: 29.9 PG (ref 26–34)
MCHC RBC AUTO-ENTMCNC: 32.7 G/DL (ref 31–37)
MCV RBC AUTO: 91.6 FL (ref 80–100)
MONOCYTES # BLD: 7 %
PDW BLD-RTO: 14.5 % (ref 12.5–15.4)
PLATELET # BLD: 190 K/UL (ref 140–450)
PLATELET ESTIMATE: NORMAL
PMV BLD AUTO: 8 FL (ref 6–12)
POTASSIUM SERPL-SCNC: 4.2 MMOL/L (ref 3.7–5.3)
RBC # BLD: 4.28 M/UL (ref 4–5.2)
RBC # BLD: NORMAL 10*6/UL
SEG NEUTROPHILS: 38 %
SEGMENTED NEUTROPHILS ABSOLUTE COUNT: 3.3 K/UL (ref 1.8–7.7)
SODIUM BLD-SCNC: 140 MMOL/L (ref 135–144)
TOTAL PROTEIN: 7.3 G/DL (ref 6.4–8.3)
WBC # BLD: 8.6 K/UL (ref 3.5–11)
WBC # BLD: NORMAL 10*3/UL

## 2017-06-12 PROCEDURE — 96374 THER/PROPH/DIAG INJ IV PUSH: CPT

## 2017-06-12 PROCEDURE — 6360000002 HC RX W HCPCS: Performed by: EMERGENCY MEDICINE

## 2017-06-12 PROCEDURE — 2580000003 HC RX 258: Performed by: EMERGENCY MEDICINE

## 2017-06-12 PROCEDURE — 80053 COMPREHEN METABOLIC PANEL: CPT

## 2017-06-12 PROCEDURE — 96361 HYDRATE IV INFUSION ADD-ON: CPT

## 2017-06-12 PROCEDURE — 99284 EMERGENCY DEPT VISIT MOD MDM: CPT

## 2017-06-12 PROCEDURE — 83605 ASSAY OF LACTIC ACID: CPT

## 2017-06-12 PROCEDURE — 73562 X-RAY EXAM OF KNEE 3: CPT

## 2017-06-12 PROCEDURE — 85025 COMPLETE CBC W/AUTO DIFF WBC: CPT

## 2017-06-12 PROCEDURE — 83690 ASSAY OF LIPASE: CPT

## 2017-06-12 RX ORDER — ONDANSETRON 2 MG/ML
4 INJECTION INTRAMUSCULAR; INTRAVENOUS ONCE
Status: COMPLETED | OUTPATIENT
Start: 2017-06-12 | End: 2017-06-12

## 2017-06-12 RX ORDER — QUETIAPINE FUMARATE 300 MG/1
300 TABLET, FILM COATED ORAL 2 TIMES DAILY
Qty: 60 TABLET | Refills: 0 | Status: SHIPPED | OUTPATIENT
Start: 2017-06-12 | End: 2017-09-13

## 2017-06-12 RX ORDER — QUETIAPINE FUMARATE 400 MG/1
400 TABLET, FILM COATED ORAL NIGHTLY
Qty: 30 TABLET | Refills: 0 | Status: SHIPPED | OUTPATIENT
Start: 2017-06-12 | End: 2018-02-17 | Stop reason: SDUPTHER

## 2017-06-12 RX ORDER — ACETAMINOPHEN 325 MG/1
650 TABLET ORAL EVERY 6 HOURS PRN
Qty: 50 TABLET | Refills: 0 | Status: SHIPPED | OUTPATIENT
Start: 2017-06-12 | End: 2017-10-02

## 2017-06-12 RX ORDER — ONDANSETRON 4 MG/1
4 TABLET, FILM COATED ORAL EVERY 8 HOURS PRN
Qty: 10 TABLET | Refills: 0 | Status: SHIPPED | OUTPATIENT
Start: 2017-06-12 | End: 2018-07-18

## 2017-06-12 RX ORDER — 0.9 % SODIUM CHLORIDE 0.9 %
1000 INTRAVENOUS SOLUTION INTRAVENOUS ONCE
Status: COMPLETED | OUTPATIENT
Start: 2017-06-12 | End: 2017-06-12

## 2017-06-12 RX ADMIN — ONDANSETRON 4 MG: 2 INJECTION INTRAMUSCULAR; INTRAVENOUS at 17:53

## 2017-06-12 RX ADMIN — SODIUM CHLORIDE 1000 ML: 9 INJECTION, SOLUTION INTRAVENOUS at 17:53

## 2017-06-12 ASSESSMENT — ENCOUNTER SYMPTOMS
CONSTIPATION: 0
BACK PAIN: 0
VOMITING: 1
BLOOD IN STOOL: 0
COUGH: 0
DIARRHEA: 1
NAUSEA: 1
ABDOMINAL PAIN: 1
SORE THROAT: 0
SHORTNESS OF BREATH: 0

## 2017-06-12 ASSESSMENT — PAIN DESCRIPTION - ORIENTATION: ORIENTATION: RIGHT;LEFT

## 2017-06-12 ASSESSMENT — PAIN DESCRIPTION - PAIN TYPE: TYPE: CHRONIC PAIN

## 2017-06-12 ASSESSMENT — PAIN DESCRIPTION - LOCATION: LOCATION: KNEE

## 2017-07-13 ENCOUNTER — OFFICE VISIT (OUTPATIENT)
Dept: FAMILY MEDICINE CLINIC | Age: 62
End: 2017-07-13
Payer: MEDICARE

## 2017-07-13 VITALS
SYSTOLIC BLOOD PRESSURE: 112 MMHG | OXYGEN SATURATION: 97 % | TEMPERATURE: 99.4 F | RESPIRATION RATE: 16 BRPM | DIASTOLIC BLOOD PRESSURE: 62 MMHG | WEIGHT: 194.8 LBS | BODY MASS INDEX: 31.31 KG/M2 | HEART RATE: 82 BPM | HEIGHT: 66 IN

## 2017-07-13 DIAGNOSIS — I10 ESSENTIAL HYPERTENSION: ICD-10-CM

## 2017-07-13 DIAGNOSIS — I20.8 ANGINA EFFORT: Primary | ICD-10-CM

## 2017-07-13 DIAGNOSIS — I20.8 ANGINA EFFORT: ICD-10-CM

## 2017-07-13 DIAGNOSIS — J44.9 CHRONIC OBSTRUCTIVE PULMONARY DISEASE, UNSPECIFIED COPD TYPE (HCC): ICD-10-CM

## 2017-07-13 DIAGNOSIS — E11.9 TYPE 2 DIABETES MELLITUS WITHOUT COMPLICATION, WITH LONG-TERM CURRENT USE OF INSULIN (HCC): ICD-10-CM

## 2017-07-13 DIAGNOSIS — Z79.4 TYPE 2 DIABETES MELLITUS WITHOUT COMPLICATION, WITH LONG-TERM CURRENT USE OF INSULIN (HCC): ICD-10-CM

## 2017-07-13 DIAGNOSIS — M17.0 PRIMARY OSTEOARTHRITIS OF BOTH KNEES: ICD-10-CM

## 2017-07-13 PROCEDURE — 93000 ELECTROCARDIOGRAM COMPLETE: CPT | Performed by: INTERNAL MEDICINE

## 2017-07-13 PROCEDURE — 99214 OFFICE O/P EST MOD 30 MIN: CPT | Performed by: INTERNAL MEDICINE

## 2017-07-13 RX ORDER — ATORVASTATIN CALCIUM 20 MG/1
40 TABLET, FILM COATED ORAL DAILY
Qty: 30 TABLET | Refills: 3 | Status: SHIPPED | OUTPATIENT
Start: 2017-07-13 | End: 2018-07-18

## 2017-07-14 ENCOUNTER — TELEPHONE (OUTPATIENT)
Dept: FAMILY MEDICINE CLINIC | Age: 62
End: 2017-07-14

## 2017-07-24 DIAGNOSIS — M16.10 HIP ARTHRITIS: ICD-10-CM

## 2017-07-24 DIAGNOSIS — M54.42 CHRONIC BILATERAL LOW BACK PAIN WITH BILATERAL SCIATICA: ICD-10-CM

## 2017-07-24 DIAGNOSIS — G89.29 CHRONIC BILATERAL LOW BACK PAIN WITH BILATERAL SCIATICA: ICD-10-CM

## 2017-07-24 DIAGNOSIS — M54.41 CHRONIC BILATERAL LOW BACK PAIN WITH BILATERAL SCIATICA: ICD-10-CM

## 2017-07-24 RX ORDER — IBUPROFEN 400 MG/1
TABLET ORAL
Qty: 90 TABLET | Refills: 0 | Status: SHIPPED | OUTPATIENT
Start: 2017-07-24 | End: 2017-09-08 | Stop reason: SDUPTHER

## 2017-07-25 ENCOUNTER — TELEPHONE (OUTPATIENT)
Dept: FAMILY MEDICINE CLINIC | Age: 62
End: 2017-07-25

## 2017-08-03 ENCOUNTER — OFFICE VISIT (OUTPATIENT)
Dept: FAMILY MEDICINE CLINIC | Age: 62
End: 2017-08-03
Payer: MEDICARE

## 2017-08-03 VITALS
DIASTOLIC BLOOD PRESSURE: 75 MMHG | TEMPERATURE: 99.8 F | RESPIRATION RATE: 16 BRPM | SYSTOLIC BLOOD PRESSURE: 123 MMHG | WEIGHT: 197 LBS | HEIGHT: 66 IN | BODY MASS INDEX: 31.66 KG/M2 | HEART RATE: 89 BPM

## 2017-08-03 DIAGNOSIS — Z13.220 LIPID SCREENING: ICD-10-CM

## 2017-08-03 DIAGNOSIS — M54.42 CHRONIC BILATERAL LOW BACK PAIN WITH BILATERAL SCIATICA: ICD-10-CM

## 2017-08-03 DIAGNOSIS — I10 ESSENTIAL HYPERTENSION: ICD-10-CM

## 2017-08-03 DIAGNOSIS — J44.9 CHRONIC OBSTRUCTIVE PULMONARY DISEASE, UNSPECIFIED COPD TYPE (HCC): ICD-10-CM

## 2017-08-03 DIAGNOSIS — M47.816 LUMBAR FACET ARTHROPATHY: ICD-10-CM

## 2017-08-03 DIAGNOSIS — M54.41 CHRONIC BILATERAL LOW BACK PAIN WITH BILATERAL SCIATICA: ICD-10-CM

## 2017-08-03 DIAGNOSIS — E11.9 TYPE 2 DIABETES MELLITUS WITHOUT COMPLICATION, WITH LONG-TERM CURRENT USE OF INSULIN (HCC): Primary | ICD-10-CM

## 2017-08-03 DIAGNOSIS — M17.12 ARTHRITIS OF KNEE, LEFT: ICD-10-CM

## 2017-08-03 DIAGNOSIS — Z79.4 TYPE 2 DIABETES MELLITUS WITHOUT COMPLICATION, WITH LONG-TERM CURRENT USE OF INSULIN (HCC): Primary | ICD-10-CM

## 2017-08-03 DIAGNOSIS — G89.29 CHRONIC BILATERAL LOW BACK PAIN WITH BILATERAL SCIATICA: ICD-10-CM

## 2017-08-03 DIAGNOSIS — T84.84XD PAIN DUE TO TOTAL RIGHT KNEE REPLACEMENT, SUBSEQUENT ENCOUNTER: ICD-10-CM

## 2017-08-03 DIAGNOSIS — Z12.11 SCREEN FOR COLON CANCER: ICD-10-CM

## 2017-08-03 DIAGNOSIS — R06.09 EXERTIONAL DYSPNEA: ICD-10-CM

## 2017-08-03 DIAGNOSIS — M17.12 ARTHRITIS OF LEFT KNEE: ICD-10-CM

## 2017-08-03 DIAGNOSIS — E55.9 VITAMIN D DEFICIENCY: ICD-10-CM

## 2017-08-03 DIAGNOSIS — Z96.651 PAIN DUE TO TOTAL RIGHT KNEE REPLACEMENT, SUBSEQUENT ENCOUNTER: ICD-10-CM

## 2017-08-03 LAB
GLUCOSE BLD-MCNC: 88 MG/DL
HBA1C MFR BLD: 5.5 %

## 2017-08-03 PROCEDURE — 99214 OFFICE O/P EST MOD 30 MIN: CPT | Performed by: INTERNAL MEDICINE

## 2017-08-03 PROCEDURE — 82962 GLUCOSE BLOOD TEST: CPT | Performed by: INTERNAL MEDICINE

## 2017-08-03 PROCEDURE — 83036 HEMOGLOBIN GLYCOSYLATED A1C: CPT | Performed by: INTERNAL MEDICINE

## 2017-08-17 ENCOUNTER — TELEPHONE (OUTPATIENT)
Dept: FAMILY MEDICINE CLINIC | Age: 62
End: 2017-08-17

## 2017-08-22 ENCOUNTER — TELEPHONE (OUTPATIENT)
Dept: FAMILY MEDICINE CLINIC | Age: 62
End: 2017-08-22

## 2017-08-22 DIAGNOSIS — K12.30 ORAL MUCOSITIS: Primary | ICD-10-CM

## 2017-09-05 DIAGNOSIS — Z12.11 COLON CANCER SCREENING: ICD-10-CM

## 2017-09-05 LAB
CONTROL: PRESENT
HEMOCCULT STL QL: NEGATIVE

## 2017-09-05 PROCEDURE — 82274 ASSAY TEST FOR BLOOD FECAL: CPT | Performed by: INTERNAL MEDICINE

## 2017-09-06 DIAGNOSIS — I10 ESSENTIAL HYPERTENSION: ICD-10-CM

## 2017-09-06 DIAGNOSIS — E55.9 VITAMIN D DEFICIENCY: ICD-10-CM

## 2017-09-06 DIAGNOSIS — Z79.4 TYPE 2 DIABETES MELLITUS WITHOUT COMPLICATION, WITH LONG-TERM CURRENT USE OF INSULIN (HCC): ICD-10-CM

## 2017-09-06 DIAGNOSIS — Z13.220 LIPID SCREENING: ICD-10-CM

## 2017-09-06 DIAGNOSIS — E11.9 TYPE 2 DIABETES MELLITUS WITHOUT COMPLICATION, WITH LONG-TERM CURRENT USE OF INSULIN (HCC): ICD-10-CM

## 2017-09-06 LAB
CHOLESTEROL, TOTAL: 137 MG/DL
CHOLESTEROL/HDL RATIO: 2.1
CREATININE URINE: 124.87 MG/DL
HDLC SERPL-MCNC: 65 MG/DL (ref 35–70)
LDL CHOLESTEROL CALCULATED: 52 MG/DL (ref 0–160)
MICROALBUMIN/CREAT 24H UR: <0.7 MG/G{CREAT}
TRIGL SERPL-MCNC: 102 MG/DL
TSH SERPL DL<=0.05 MIU/L-ACNC: 2.16 UIU/ML
VITAMIN D 25-HYDROXY: 20.4
VITAMIN D2, 25 HYDROXY: NORMAL
VITAMIN D3,25 HYDROXY: NORMAL
VLDLC SERPL CALC-MCNC: 20 MG/DL

## 2017-09-07 RX ORDER — DIPHENHYDRAMINE HYDROCHLORIDE AND LIDOCAINE HYDROCHLORIDE AND ALUMINUM HYDROXIDE AND MAGNESIUM HYDRO
5 KIT 4 TIMES DAILY PRN
Qty: 500 ML | Refills: 1 | Status: ON HOLD | OUTPATIENT
Start: 2017-09-07 | End: 2018-07-23 | Stop reason: HOSPADM

## 2017-09-07 RX ORDER — DIPHENHYDRAMINE HYDROCHLORIDE AND LIDOCAINE HYDROCHLORIDE AND ALUMINUM HYDROXIDE AND MAGNESIUM HYDRO
5 KIT 4 TIMES DAILY PRN
Qty: 250 ML | Refills: 2 | OUTPATIENT
Start: 2017-09-07 | End: 2017-09-07 | Stop reason: SDUPTHER

## 2017-09-08 DIAGNOSIS — M54.42 CHRONIC BILATERAL LOW BACK PAIN WITH BILATERAL SCIATICA: ICD-10-CM

## 2017-09-08 DIAGNOSIS — G89.29 CHRONIC BILATERAL LOW BACK PAIN WITH BILATERAL SCIATICA: ICD-10-CM

## 2017-09-08 DIAGNOSIS — M16.10 HIP ARTHRITIS: ICD-10-CM

## 2017-09-08 DIAGNOSIS — M54.41 CHRONIC BILATERAL LOW BACK PAIN WITH BILATERAL SCIATICA: ICD-10-CM

## 2017-09-11 RX ORDER — IBUPROFEN 400 MG/1
TABLET ORAL
Qty: 90 TABLET | Refills: 0 | Status: SHIPPED | OUTPATIENT
Start: 2017-09-11 | End: 2017-09-21

## 2017-09-13 ENCOUNTER — HOSPITAL ENCOUNTER (EMERGENCY)
Age: 62
Discharge: HOME | End: 2017-09-13
Attending: EMERGENCY MEDICINE
Payer: MEDICARE

## 2017-09-13 VITALS
HEART RATE: 94 BPM | WEIGHT: 197 LBS | TEMPERATURE: 99.5 F | DIASTOLIC BLOOD PRESSURE: 68 MMHG | RESPIRATION RATE: 16 BRPM | SYSTOLIC BLOOD PRESSURE: 131 MMHG | OXYGEN SATURATION: 97 % | BODY MASS INDEX: 31.66 KG/M2

## 2017-09-13 DIAGNOSIS — R42 LIGHTHEADEDNESS: ICD-10-CM

## 2017-09-13 DIAGNOSIS — Z76.0 ENCOUNTER FOR MEDICATION REFILL: Primary | ICD-10-CM

## 2017-09-13 DIAGNOSIS — J44.9 CHRONIC OBSTRUCTIVE PULMONARY DISEASE, UNSPECIFIED COPD TYPE (HCC): ICD-10-CM

## 2017-09-13 LAB
-: NORMAL
ABSOLUTE EOS #: 0.2 K/UL (ref 0–0.4)
ABSOLUTE LYMPH #: 4 K/UL (ref 1–4.8)
ABSOLUTE MONO #: 0.8 K/UL (ref 0.1–1.2)
AMORPHOUS: NORMAL
ANION GAP SERPL CALCULATED.3IONS-SCNC: 16 MMOL/L (ref 9–17)
BACTERIA: NORMAL
BASOPHILS # BLD: 1 %
BASOPHILS ABSOLUTE: 0.1 K/UL (ref 0–0.2)
BILIRUBIN URINE: NEGATIVE
BUN BLDV-MCNC: 20 MG/DL (ref 8–23)
BUN/CREAT BLD: ABNORMAL (ref 9–20)
CALCIUM SERPL-MCNC: 9.3 MG/DL (ref 8.6–10.4)
CASTS UA: NORMAL /LPF (ref 0–8)
CHLORIDE BLD-SCNC: 102 MMOL/L (ref 98–107)
CHP ED QC CHECK: YES
CO2: 23 MMOL/L (ref 20–31)
COLOR: YELLOW
COMMENT UA: ABNORMAL
CREAT SERPL-MCNC: 0.89 MG/DL (ref 0.5–0.9)
CRYSTALS, UA: NORMAL /HPF
DIFFERENTIAL TYPE: NORMAL
EOSINOPHILS RELATIVE PERCENT: 2 %
EPITHELIAL CELLS UA: NORMAL /HPF (ref 0–5)
GFR AFRICAN AMERICAN: >60 ML/MIN
GFR NON-AFRICAN AMERICAN: >60 ML/MIN
GFR SERPL CREATININE-BSD FRML MDRD: ABNORMAL ML/MIN/{1.73_M2}
GFR SERPL CREATININE-BSD FRML MDRD: ABNORMAL ML/MIN/{1.73_M2}
GLUCOSE BLD-MCNC: 101 MG/DL (ref 70–99)
GLUCOSE BLD-MCNC: 107 MG/DL
GLUCOSE BLD-MCNC: 107 MG/DL (ref 65–105)
GLUCOSE URINE: NEGATIVE
HCT VFR BLD CALC: 40.1 % (ref 36–46)
HEMOGLOBIN: 13.1 G/DL (ref 12–16)
KETONES, URINE: NEGATIVE
LEUKOCYTE ESTERASE, URINE: ABNORMAL
LYMPHOCYTES # BLD: 44 %
MCH RBC QN AUTO: 30.3 PG (ref 26–34)
MCHC RBC AUTO-ENTMCNC: 32.7 G/DL (ref 31–37)
MCV RBC AUTO: 92.6 FL (ref 80–100)
MONOCYTES # BLD: 8 %
MUCUS: NORMAL
NITRITE, URINE: NEGATIVE
OTHER OBSERVATIONS UA: NORMAL
PDW BLD-RTO: 15.2 % (ref 12.5–15.4)
PH UA: 6 (ref 5–8)
PLATELET # BLD: 196 K/UL (ref 140–450)
PLATELET ESTIMATE: NORMAL
PMV BLD AUTO: 8.4 FL (ref 6–12)
POTASSIUM SERPL-SCNC: 4.2 MMOL/L (ref 3.7–5.3)
PROTEIN UA: NEGATIVE
RBC # BLD: 4.33 M/UL (ref 4–5.2)
RBC # BLD: NORMAL 10*6/UL
RBC UA: NORMAL /HPF (ref 0–4)
RENAL EPITHELIAL, UA: NORMAL /HPF
SEG NEUTROPHILS: 45 %
SEGMENTED NEUTROPHILS ABSOLUTE COUNT: 4.2 K/UL (ref 1.8–7.7)
SODIUM BLD-SCNC: 141 MMOL/L (ref 135–144)
SPECIFIC GRAVITY UA: 1.02 (ref 1–1.03)
TRICHOMONAS: NORMAL
TURBIDITY: CLEAR
URINE HGB: NEGATIVE
UROBILINOGEN, URINE: NORMAL
WBC # BLD: 9.3 K/UL (ref 3.5–11)
WBC # BLD: NORMAL 10*3/UL
WBC UA: NORMAL /HPF (ref 0–5)
YEAST: NORMAL

## 2017-09-13 PROCEDURE — 93005 ELECTROCARDIOGRAM TRACING: CPT

## 2017-09-13 PROCEDURE — 2580000003 HC RX 258: Performed by: EMERGENCY MEDICINE

## 2017-09-13 PROCEDURE — 96361 HYDRATE IV INFUSION ADD-ON: CPT

## 2017-09-13 PROCEDURE — 6360000002 HC RX W HCPCS: Performed by: EMERGENCY MEDICINE

## 2017-09-13 PROCEDURE — 99285 EMERGENCY DEPT VISIT HI MDM: CPT

## 2017-09-13 PROCEDURE — 81001 URINALYSIS AUTO W/SCOPE: CPT

## 2017-09-13 PROCEDURE — 6370000000 HC RX 637 (ALT 250 FOR IP): Performed by: EMERGENCY MEDICINE

## 2017-09-13 PROCEDURE — 82947 ASSAY GLUCOSE BLOOD QUANT: CPT

## 2017-09-13 PROCEDURE — 96374 THER/PROPH/DIAG INJ IV PUSH: CPT

## 2017-09-13 PROCEDURE — 87086 URINE CULTURE/COLONY COUNT: CPT

## 2017-09-13 PROCEDURE — 85025 COMPLETE CBC W/AUTO DIFF WBC: CPT

## 2017-09-13 PROCEDURE — 80048 BASIC METABOLIC PNL TOTAL CA: CPT

## 2017-09-13 RX ORDER — ASPIRIN 81 MG/1
81 TABLET ORAL DAILY
Qty: 30 TABLET | Refills: 0 | Status: ON HOLD | OUTPATIENT
Start: 2017-09-13 | End: 2020-04-13 | Stop reason: HOSPADM

## 2017-09-13 RX ORDER — ONDANSETRON 2 MG/ML
4 INJECTION INTRAMUSCULAR; INTRAVENOUS ONCE
Status: COMPLETED | OUTPATIENT
Start: 2017-09-13 | End: 2017-09-13

## 2017-09-13 RX ORDER — QUETIAPINE 200 MG/1
200 TABLET, FILM COATED, EXTENDED RELEASE ORAL NIGHTLY
Status: DISCONTINUED | OUTPATIENT
Start: 2017-09-13 | End: 2017-09-13 | Stop reason: HOSPADM

## 2017-09-13 RX ORDER — QUETIAPINE FUMARATE 300 MG/1
300 TABLET, FILM COATED ORAL 2 TIMES DAILY
Qty: 60 TABLET | Refills: 0 | Status: SHIPPED | OUTPATIENT
Start: 2017-09-13 | End: 2018-02-17 | Stop reason: SDUPTHER

## 2017-09-13 RX ORDER — 0.9 % SODIUM CHLORIDE 0.9 %
1000 INTRAVENOUS SOLUTION INTRAVENOUS ONCE
Status: COMPLETED | OUTPATIENT
Start: 2017-09-13 | End: 2017-09-13

## 2017-09-13 RX ADMIN — ONDANSETRON 4 MG: 2 INJECTION INTRAMUSCULAR; INTRAVENOUS at 14:02

## 2017-09-13 RX ADMIN — QUETIAPINE 200 MG: 200 TABLET, EXTENDED RELEASE ORAL at 15:18

## 2017-09-13 RX ADMIN — SODIUM CHLORIDE 1000 ML: 9 INJECTION, SOLUTION INTRAVENOUS at 14:01

## 2017-09-13 ASSESSMENT — ENCOUNTER SYMPTOMS
ABDOMINAL PAIN: 0
CONSTIPATION: 0
NAUSEA: 1
DIARRHEA: 1
VOMITING: 1
BLOOD IN STOOL: 0
COLOR CHANGE: 0
SHORTNESS OF BREATH: 0
WHEEZING: 0

## 2017-09-14 LAB
CULTURE: NORMAL
CULTURE: NORMAL
EKG ATRIAL RATE: 78 BPM
EKG P AXIS: 64 DEGREES
EKG P-R INTERVAL: 144 MS
EKG Q-T INTERVAL: 376 MS
EKG QRS DURATION: 74 MS
EKG QTC CALCULATION (BAZETT): 428 MS
EKG R AXIS: 53 DEGREES
EKG T AXIS: 45 DEGREES
EKG VENTRICULAR RATE: 78 BPM
Lab: NORMAL
SPECIMEN DESCRIPTION: NORMAL
STATUS: NORMAL

## 2017-09-18 ENCOUNTER — HOSPITAL ENCOUNTER (EMERGENCY)
Age: 62
Discharge: HOME | End: 2017-09-18
Attending: EMERGENCY MEDICINE
Payer: MEDICARE

## 2017-09-18 VITALS
HEIGHT: 66 IN | TEMPERATURE: 99.1 F | HEART RATE: 90 BPM | OXYGEN SATURATION: 99 % | DIASTOLIC BLOOD PRESSURE: 60 MMHG | BODY MASS INDEX: 31.18 KG/M2 | SYSTOLIC BLOOD PRESSURE: 93 MMHG | WEIGHT: 194 LBS | RESPIRATION RATE: 16 BRPM

## 2017-09-18 DIAGNOSIS — N17.9 AKI (ACUTE KIDNEY INJURY) (HCC): Primary | ICD-10-CM

## 2017-09-18 DIAGNOSIS — R11.2 NAUSEA AND VOMITING, INTRACTABILITY OF VOMITING NOT SPECIFIED, UNSPECIFIED VOMITING TYPE: ICD-10-CM

## 2017-09-18 LAB
-: NORMAL
ABSOLUTE EOS #: 0.3 K/UL (ref 0–0.4)
ABSOLUTE LYMPH #: 3.1 K/UL (ref 1–4.8)
ABSOLUTE MONO #: 0.6 K/UL (ref 0.1–1.2)
ALBUMIN SERPL-MCNC: 4.3 G/DL (ref 3.5–5.2)
ALBUMIN/GLOBULIN RATIO: 1.4 (ref 1–2.5)
ALP BLD-CCNC: 85 U/L (ref 35–104)
ALT SERPL-CCNC: 15 U/L (ref 5–33)
AMORPHOUS: NORMAL
ANION GAP SERPL CALCULATED.3IONS-SCNC: 14 MMOL/L (ref 9–17)
AST SERPL-CCNC: 18 U/L
BACTERIA: NORMAL
BASOPHILS # BLD: 1 %
BASOPHILS ABSOLUTE: 0.1 K/UL (ref 0–0.2)
BILIRUB SERPL-MCNC: 0.16 MG/DL (ref 0.3–1.2)
BILIRUBIN URINE: NEGATIVE
BUN BLDV-MCNC: 29 MG/DL (ref 8–23)
BUN/CREAT BLD: ABNORMAL (ref 9–20)
CALCIUM SERPL-MCNC: 8.8 MG/DL (ref 8.6–10.4)
CASTS UA: NORMAL /LPF (ref 0–8)
CHLORIDE BLD-SCNC: 107 MMOL/L (ref 98–107)
CO2: 20 MMOL/L (ref 20–31)
COLOR: YELLOW
COMMENT UA: ABNORMAL
CREAT SERPL-MCNC: 1.21 MG/DL (ref 0.5–0.9)
CRYSTALS, UA: NORMAL /HPF
DIFFERENTIAL TYPE: NORMAL
EOSINOPHILS RELATIVE PERCENT: 4 %
EPITHELIAL CELLS UA: NORMAL /HPF (ref 0–5)
GFR AFRICAN AMERICAN: 54 ML/MIN
GFR NON-AFRICAN AMERICAN: 45 ML/MIN
GFR SERPL CREATININE-BSD FRML MDRD: ABNORMAL ML/MIN/{1.73_M2}
GFR SERPL CREATININE-BSD FRML MDRD: ABNORMAL ML/MIN/{1.73_M2}
GLUCOSE BLD-MCNC: 107 MG/DL (ref 70–99)
GLUCOSE URINE: NEGATIVE
HCT VFR BLD CALC: 37.3 % (ref 36–46)
HEMOGLOBIN: 12.2 G/DL (ref 12–16)
KETONES, URINE: NEGATIVE
LEUKOCYTE ESTERASE, URINE: ABNORMAL
LIPASE: 43 U/L (ref 13–60)
LYMPHOCYTES # BLD: 39 %
MCH RBC QN AUTO: 30.4 PG (ref 26–34)
MCHC RBC AUTO-ENTMCNC: 32.8 G/DL (ref 31–37)
MCV RBC AUTO: 92.9 FL (ref 80–100)
MONOCYTES # BLD: 8 %
MUCUS: NORMAL
NITRITE, URINE: NEGATIVE
OTHER OBSERVATIONS UA: NORMAL
PDW BLD-RTO: 15.4 % (ref 12.5–15.4)
PH UA: 5.5 (ref 5–8)
PLATELET # BLD: 189 K/UL (ref 140–450)
PLATELET ESTIMATE: NORMAL
PMV BLD AUTO: 7.9 FL (ref 6–12)
POTASSIUM SERPL-SCNC: 4.2 MMOL/L (ref 3.7–5.3)
PROTEIN UA: NEGATIVE
RBC # BLD: 4.01 M/UL (ref 4–5.2)
RBC # BLD: NORMAL 10*6/UL
RBC UA: NORMAL /HPF (ref 0–4)
RENAL EPITHELIAL, UA: NORMAL /HPF
SEG NEUTROPHILS: 48 %
SEGMENTED NEUTROPHILS ABSOLUTE COUNT: 3.9 K/UL (ref 1.8–7.7)
SODIUM BLD-SCNC: 141 MMOL/L (ref 135–144)
SPECIFIC GRAVITY UA: 1.02 (ref 1–1.03)
TOTAL PROTEIN: 7.3 G/DL (ref 6.4–8.3)
TRICHOMONAS: NORMAL
TURBIDITY: CLEAR
URINE HGB: NEGATIVE
UROBILINOGEN, URINE: NORMAL
WBC # BLD: 8.1 K/UL (ref 3.5–11)
WBC # BLD: NORMAL 10*3/UL
WBC UA: NORMAL /HPF (ref 0–5)
YEAST: NORMAL

## 2017-09-18 PROCEDURE — 99284 EMERGENCY DEPT VISIT MOD MDM: CPT

## 2017-09-18 PROCEDURE — 6360000002 HC RX W HCPCS: Performed by: EMERGENCY MEDICINE

## 2017-09-18 PROCEDURE — 96374 THER/PROPH/DIAG INJ IV PUSH: CPT

## 2017-09-18 PROCEDURE — 85025 COMPLETE CBC W/AUTO DIFF WBC: CPT

## 2017-09-18 PROCEDURE — 96361 HYDRATE IV INFUSION ADD-ON: CPT

## 2017-09-18 PROCEDURE — 81001 URINALYSIS AUTO W/SCOPE: CPT

## 2017-09-18 PROCEDURE — 87086 URINE CULTURE/COLONY COUNT: CPT

## 2017-09-18 PROCEDURE — 2580000003 HC RX 258: Performed by: EMERGENCY MEDICINE

## 2017-09-18 PROCEDURE — 80053 COMPREHEN METABOLIC PANEL: CPT

## 2017-09-18 PROCEDURE — 83690 ASSAY OF LIPASE: CPT

## 2017-09-18 RX ORDER — ONDANSETRON 2 MG/ML
4 INJECTION INTRAMUSCULAR; INTRAVENOUS ONCE
Status: COMPLETED | OUTPATIENT
Start: 2017-09-18 | End: 2017-09-18

## 2017-09-18 RX ORDER — 0.9 % SODIUM CHLORIDE 0.9 %
1000 INTRAVENOUS SOLUTION INTRAVENOUS ONCE
Status: COMPLETED | OUTPATIENT
Start: 2017-09-18 | End: 2017-09-18

## 2017-09-18 RX ADMIN — ONDANSETRON 4 MG: 2 INJECTION, SOLUTION INTRAMUSCULAR; INTRAVENOUS at 12:55

## 2017-09-18 RX ADMIN — SODIUM CHLORIDE 1000 ML: 9 INJECTION, SOLUTION INTRAVENOUS at 12:54

## 2017-09-18 ASSESSMENT — ENCOUNTER SYMPTOMS
SHORTNESS OF BREATH: 0
DIARRHEA: 0
NAUSEA: 1
COLOR CHANGE: 0
CONSTIPATION: 0
WHEEZING: 0
VOMITING: 1
CHOKING: 0
ABDOMINAL PAIN: 1
COUGH: 0
BACK PAIN: 0

## 2017-09-18 ASSESSMENT — PAIN SCALES - GENERAL: PAINLEVEL_OUTOF10: 9

## 2017-09-19 DIAGNOSIS — M17.12 ARTHRITIS OF KNEE, LEFT: ICD-10-CM

## 2017-09-19 DIAGNOSIS — M54.42 CHRONIC BILATERAL LOW BACK PAIN WITH BILATERAL SCIATICA: ICD-10-CM

## 2017-09-19 DIAGNOSIS — J44.9 CHRONIC OBSTRUCTIVE PULMONARY DISEASE, UNSPECIFIED COPD TYPE (HCC): ICD-10-CM

## 2017-09-19 DIAGNOSIS — M17.12 ARTHRITIS OF LEFT KNEE: ICD-10-CM

## 2017-09-19 DIAGNOSIS — Z96.651 PAIN DUE TO TOTAL RIGHT KNEE REPLACEMENT, SUBSEQUENT ENCOUNTER: ICD-10-CM

## 2017-09-19 DIAGNOSIS — M54.41 CHRONIC BILATERAL LOW BACK PAIN WITH BILATERAL SCIATICA: ICD-10-CM

## 2017-09-19 DIAGNOSIS — T84.84XD PAIN DUE TO TOTAL RIGHT KNEE REPLACEMENT, SUBSEQUENT ENCOUNTER: ICD-10-CM

## 2017-09-19 DIAGNOSIS — G89.29 CHRONIC BILATERAL LOW BACK PAIN WITH BILATERAL SCIATICA: ICD-10-CM

## 2017-09-19 LAB
CULTURE: NORMAL
CULTURE: NORMAL
Lab: NORMAL
SPECIMEN DESCRIPTION: NORMAL
STATUS: NORMAL

## 2017-09-19 RX ORDER — WHEELCHAIR
EACH MISCELLANEOUS
Qty: 1 EACH | Refills: 0 | Status: SHIPPED | OUTPATIENT
Start: 2017-09-19 | End: 2017-10-17

## 2017-09-21 ENCOUNTER — OFFICE VISIT (OUTPATIENT)
Dept: FAMILY MEDICINE CLINIC | Age: 62
End: 2017-09-21
Payer: MEDICARE

## 2017-09-21 VITALS
BODY MASS INDEX: 31.88 KG/M2 | TEMPERATURE: 99.5 F | SYSTOLIC BLOOD PRESSURE: 93 MMHG | WEIGHT: 198.4 LBS | HEART RATE: 97 BPM | RESPIRATION RATE: 16 BRPM | HEIGHT: 66 IN | DIASTOLIC BLOOD PRESSURE: 55 MMHG

## 2017-09-21 DIAGNOSIS — E11.9 TYPE 2 DIABETES MELLITUS WITHOUT COMPLICATION, WITH LONG-TERM CURRENT USE OF INSULIN (HCC): ICD-10-CM

## 2017-09-21 DIAGNOSIS — Z79.4 TYPE 2 DIABETES MELLITUS WITHOUT COMPLICATION, WITH LONG-TERM CURRENT USE OF INSULIN (HCC): ICD-10-CM

## 2017-09-21 DIAGNOSIS — I10 ESSENTIAL HYPERTENSION: ICD-10-CM

## 2017-09-21 DIAGNOSIS — N17.9 AKI (ACUTE KIDNEY INJURY) (HCC): Primary | ICD-10-CM

## 2017-09-21 DIAGNOSIS — R10.11 RIGHT UPPER QUADRANT ABDOMINAL PAIN: ICD-10-CM

## 2017-09-21 PROCEDURE — 99213 OFFICE O/P EST LOW 20 MIN: CPT | Performed by: INTERNAL MEDICINE

## 2017-09-21 RX ORDER — AMLODIPINE BESYLATE 5 MG/1
5 TABLET ORAL DAILY
Qty: 30 TABLET | Refills: 3 | Status: SHIPPED | OUTPATIENT
Start: 2017-09-21 | End: 2018-04-27

## 2017-09-21 RX ORDER — LANCETS 30 GAUGE
EACH MISCELLANEOUS
Qty: 100 EACH | Refills: 10 | Status: ON HOLD | OUTPATIENT
Start: 2017-09-21 | End: 2020-04-13 | Stop reason: HOSPADM

## 2017-09-21 RX ORDER — GLUCOSAMINE HCL/CHONDROITIN SU 500-400 MG
CAPSULE ORAL
Qty: 100 STRIP | Refills: 10 | Status: SHIPPED | OUTPATIENT
Start: 2017-09-21 | End: 2017-10-24 | Stop reason: SDUPTHER

## 2017-09-21 RX ORDER — BLOOD-GLUCOSE METER
KIT MISCELLANEOUS
Qty: 1 KIT | Refills: 0 | Status: ON HOLD | OUTPATIENT
Start: 2017-09-21 | End: 2020-04-13 | Stop reason: HOSPADM

## 2017-09-21 RX ORDER — SOLIFENACIN SUCCINATE 10 MG/1
10 TABLET, FILM COATED ORAL
COMMUNITY
End: 2017-10-24 | Stop reason: SDUPTHER

## 2017-09-21 RX ORDER — TRAMADOL HYDROCHLORIDE 50 MG/1
50 TABLET ORAL EVERY 6 HOURS PRN
Qty: 20 TABLET | Refills: 0 | Status: SHIPPED | OUTPATIENT
Start: 2017-09-21 | End: 2017-09-29 | Stop reason: SDUPTHER

## 2017-09-21 RX ORDER — BLOOD SUGAR DIAGNOSTIC
STRIP MISCELLANEOUS
Qty: 100 EACH | Refills: 6 | Status: SHIPPED | OUTPATIENT
Start: 2017-09-21 | End: 2019-02-04

## 2017-09-25 ENCOUNTER — TELEPHONE (OUTPATIENT)
Dept: FAMILY MEDICINE CLINIC | Age: 62
End: 2017-09-25

## 2017-09-25 DIAGNOSIS — I20.8 ANGINA EFFORT: ICD-10-CM

## 2017-09-25 NOTE — TELEPHONE ENCOUNTER
of knee, left     Arthritis of knee, right     Pain due to total right knee replacement (HCC)     Paranoid schizophrenia (formerly [facility])     S/P total knee arthroplasty     Chronic pain syndrome     Osteoarthritis, knee     Arthritis of knee     Lumbar facet arthropathy (formerly [facility])     Essential hypertension     Hallucination, visual     Chronic obstructive pulmonary disease ([facility] [address]

## 2017-09-29 ENCOUNTER — OFFICE VISIT (OUTPATIENT)
Dept: INTERNAL MEDICINE | Age: 62
End: 2017-09-29
Payer: MEDICARE

## 2017-09-29 VITALS
WEIGHT: 199 LBS | SYSTOLIC BLOOD PRESSURE: 142 MMHG | DIASTOLIC BLOOD PRESSURE: 79 MMHG | TEMPERATURE: 98 F | HEART RATE: 93 BPM | BODY MASS INDEX: 31.23 KG/M2 | HEIGHT: 67 IN

## 2017-09-29 DIAGNOSIS — R10.32 LEFT LOWER QUADRANT PAIN: Primary | ICD-10-CM

## 2017-09-29 DIAGNOSIS — J44.9 CHRONIC OBSTRUCTIVE PULMONARY DISEASE, UNSPECIFIED COPD TYPE (HCC): ICD-10-CM

## 2017-09-29 DIAGNOSIS — K57.32 DIVERTICULITIS OF LARGE INTESTINE WITHOUT PERFORATION OR ABSCESS WITHOUT BLEEDING: ICD-10-CM

## 2017-09-29 PROCEDURE — 99213 OFFICE O/P EST LOW 20 MIN: CPT | Performed by: STUDENT IN AN ORGANIZED HEALTH CARE EDUCATION/TRAINING PROGRAM

## 2017-09-29 RX ORDER — TRAMADOL HYDROCHLORIDE 50 MG/1
50 TABLET ORAL EVERY 6 HOURS PRN
Qty: 20 TABLET | Refills: 0 | Status: SHIPPED | OUTPATIENT
Start: 2017-09-29 | End: 2018-04-27

## 2017-10-02 ENCOUNTER — HOSPITAL ENCOUNTER (EMERGENCY)
Age: 62
Discharge: HOME | End: 2017-10-02
Attending: EMERGENCY MEDICINE
Payer: MEDICARE

## 2017-10-02 ENCOUNTER — APPOINTMENT (OUTPATIENT)
Dept: CT IMAGING | Age: 62
End: 2017-10-02
Payer: MEDICARE

## 2017-10-02 VITALS
HEART RATE: 86 BPM | TEMPERATURE: 99.1 F | HEIGHT: 66 IN | DIASTOLIC BLOOD PRESSURE: 88 MMHG | WEIGHT: 194 LBS | BODY MASS INDEX: 31.18 KG/M2 | OXYGEN SATURATION: 98 % | RESPIRATION RATE: 16 BRPM | SYSTOLIC BLOOD PRESSURE: 138 MMHG

## 2017-10-02 DIAGNOSIS — R10.9 ABDOMINAL PAIN, UNSPECIFIED LOCATION: Primary | ICD-10-CM

## 2017-10-02 LAB
-: ABNORMAL
ABSOLUTE EOS #: 0.4 K/UL (ref 0–0.4)
ABSOLUTE LYMPH #: 3.7 K/UL (ref 1–4.8)
ABSOLUTE MONO #: 0.6 K/UL (ref 0.1–1.3)
ALBUMIN SERPL-MCNC: 4.3 G/DL (ref 3.5–5.2)
ALBUMIN/GLOBULIN RATIO: ABNORMAL (ref 1–2.5)
ALP BLD-CCNC: 102 U/L (ref 35–104)
ALT SERPL-CCNC: 14 U/L (ref 5–33)
AMORPHOUS: ABNORMAL
ANION GAP SERPL CALCULATED.3IONS-SCNC: 14 MMOL/L (ref 9–17)
AST SERPL-CCNC: 19 U/L
BACTERIA: ABNORMAL
BASOPHILS # BLD: 1 %
BASOPHILS ABSOLUTE: 0.1 K/UL (ref 0–0.2)
BILIRUB SERPL-MCNC: 0.17 MG/DL (ref 0.3–1.2)
BILIRUBIN DIRECT: <0.08 MG/DL
BILIRUBIN URINE: NEGATIVE
BILIRUBIN, INDIRECT: ABNORMAL MG/DL (ref 0–1)
BUN BLDV-MCNC: 9 MG/DL (ref 8–23)
BUN/CREAT BLD: NORMAL (ref 9–20)
CALCIUM SERPL-MCNC: 9.4 MG/DL (ref 8.6–10.4)
CASTS UA: ABNORMAL /LPF
CHLORIDE BLD-SCNC: 104 MMOL/L (ref 98–107)
CO2: 23 MMOL/L (ref 20–31)
COLOR: YELLOW
COMMENT UA: ABNORMAL
CREAT SERPL-MCNC: 0.74 MG/DL (ref 0.5–0.9)
CRYSTALS, UA: ABNORMAL /HPF
DIFFERENTIAL TYPE: NORMAL
EOSINOPHILS RELATIVE PERCENT: 4 %
EPITHELIAL CELLS UA: ABNORMAL /HPF
GFR AFRICAN AMERICAN: >60 ML/MIN
GFR NON-AFRICAN AMERICAN: >60 ML/MIN
GFR SERPL CREATININE-BSD FRML MDRD: NORMAL ML/MIN/{1.73_M2}
GFR SERPL CREATININE-BSD FRML MDRD: NORMAL ML/MIN/{1.73_M2}
GLOBULIN: ABNORMAL G/DL (ref 1.5–3.8)
GLUCOSE BLD-MCNC: 86 MG/DL (ref 70–99)
GLUCOSE URINE: NEGATIVE
HCT VFR BLD CALC: 42.6 % (ref 36–46)
HEMOGLOBIN: 13.9 G/DL (ref 12–16)
KETONES, URINE: NEGATIVE
LEUKOCYTE ESTERASE, URINE: ABNORMAL
LIPASE: 20 U/L (ref 13–60)
LYMPHOCYTES # BLD: 44 %
MCH RBC QN AUTO: 30.6 PG (ref 26–34)
MCHC RBC AUTO-ENTMCNC: 32.7 G/DL (ref 31–37)
MCV RBC AUTO: 93.7 FL (ref 80–100)
MONOCYTES # BLD: 7 %
MUCUS: ABNORMAL
NITRITE, URINE: NEGATIVE
OTHER OBSERVATIONS UA: ABNORMAL
PDW BLD-RTO: 14.3 % (ref 11.5–14.9)
PH UA: 7 (ref 5–8)
PLATELET # BLD: 205 K/UL (ref 150–450)
PLATELET ESTIMATE: NORMAL
PMV BLD AUTO: 8.4 FL (ref 6–12)
POTASSIUM SERPL-SCNC: 4.2 MMOL/L (ref 3.7–5.3)
PROTEIN UA: NEGATIVE
RBC # BLD: 4.55 M/UL (ref 4–5.2)
RBC # BLD: NORMAL 10*6/UL
RBC UA: ABNORMAL /HPF
RENAL EPITHELIAL, UA: ABNORMAL /HPF
SEG NEUTROPHILS: 44 %
SEGMENTED NEUTROPHILS ABSOLUTE COUNT: 3.7 K/UL (ref 1.3–9.1)
SODIUM BLD-SCNC: 141 MMOL/L (ref 135–144)
SPECIFIC GRAVITY UA: 1.01 (ref 1–1.03)
TOTAL PROTEIN: 8 G/DL (ref 6.4–8.3)
TRICHOMONAS: ABNORMAL
TURBIDITY: CLEAR
URINE HGB: NEGATIVE
UROBILINOGEN, URINE: NORMAL
WBC # BLD: 8.5 K/UL (ref 3.5–11)
WBC # BLD: NORMAL 10*3/UL
WBC UA: ABNORMAL /HPF
YEAST: ABNORMAL

## 2017-10-02 PROCEDURE — 80076 HEPATIC FUNCTION PANEL: CPT

## 2017-10-02 PROCEDURE — 74176 CT ABD & PELVIS W/O CONTRAST: CPT

## 2017-10-02 PROCEDURE — 80048 BASIC METABOLIC PNL TOTAL CA: CPT

## 2017-10-02 PROCEDURE — 6360000002 HC RX W HCPCS: Performed by: EMERGENCY MEDICINE

## 2017-10-02 PROCEDURE — 85025 COMPLETE CBC W/AUTO DIFF WBC: CPT

## 2017-10-02 PROCEDURE — 83690 ASSAY OF LIPASE: CPT

## 2017-10-02 PROCEDURE — 99284 EMERGENCY DEPT VISIT MOD MDM: CPT

## 2017-10-02 PROCEDURE — 81001 URINALYSIS AUTO W/SCOPE: CPT

## 2017-10-02 PROCEDURE — 36415 COLL VENOUS BLD VENIPUNCTURE: CPT

## 2017-10-02 RX ORDER — 0.9 % SODIUM CHLORIDE 0.9 %
1000 INTRAVENOUS SOLUTION INTRAVENOUS ONCE
Status: DISCONTINUED | OUTPATIENT
Start: 2017-10-02 | End: 2017-10-03 | Stop reason: HOSPADM

## 2017-10-02 RX ORDER — ACETAMINOPHEN 325 MG/1
325 TABLET ORAL EVERY 6 HOURS PRN
Qty: 120 TABLET | Refills: 3 | Status: SHIPPED | OUTPATIENT
Start: 2017-10-02 | End: 2018-04-27

## 2017-10-02 RX ORDER — ONDANSETRON 2 MG/ML
4 INJECTION INTRAMUSCULAR; INTRAVENOUS ONCE
Status: DISCONTINUED | OUTPATIENT
Start: 2017-10-02 | End: 2017-10-02

## 2017-10-02 RX ORDER — ONDANSETRON 4 MG/1
4 TABLET, ORALLY DISINTEGRATING ORAL ONCE
Status: COMPLETED | OUTPATIENT
Start: 2017-10-02 | End: 2017-10-02

## 2017-10-02 RX ADMIN — ONDANSETRON 4 MG: 4 TABLET, ORALLY DISINTEGRATING ORAL at 23:11

## 2017-10-02 ASSESSMENT — ENCOUNTER SYMPTOMS
EYE DISCHARGE: 0
SHORTNESS OF BREATH: 0
VOMITING: 0
RHINORRHEA: 0
COUGH: 0
ABDOMINAL PAIN: 1
COLOR CHANGE: 0
NAUSEA: 0
EYE REDNESS: 0
SORE THROAT: 0
DIARRHEA: 0

## 2017-10-02 ASSESSMENT — PAIN DESCRIPTION - PAIN TYPE: TYPE: CHRONIC PAIN

## 2017-10-02 ASSESSMENT — PAIN DESCRIPTION - LOCATION: LOCATION: ABDOMEN

## 2017-10-02 ASSESSMENT — PAIN SCALES - GENERAL: PAINLEVEL_OUTOF10: 9

## 2017-10-02 NOTE — ED PROVIDER NOTES
knee surgery (Right); Dilation and curettage of uterus; and Nerve Block (Left, 12/8/14). CURRENT MEDICATIONS       Previous Medications    ACETAMINOPHEN (TYLENOL) 325 MG TABLET    Take 2 tablets by mouth every 6 hours as needed for Pain    ALBUTEROL (PROVENTIL HFA;VENTOLIN HFA) 108 (90 BASE) MCG/ACT INHALER    Inhale 1 puff into the lungs 4 times daily as needed for Wheezing     ALCOHOL SWABS (ALCOHOL PADS) 70 % PADS    Use as directed. Dx  Insulin dependent DM    AMLODIPINE (NORVASC) 5 MG TABLET    Take 1 tablet by mouth daily    ASPIRIN 81 MG EC TABLET    Take 1 tablet by mouth daily    ATORVASTATIN (LIPITOR) 20 MG TABLET    Take 2 tablets by mouth daily    BENZOCAINE (ORAL ANALGESIC MAX ST) 20 % GEL MUCOSAL GEL    Apply topically to affected area up to four times a day as needed for pain relief    DPH-LIDO-ALHYDR-MGHYDR-SIMETH (MAGIC MOUTHWASH) SUSP    Swish and spit 5 mLs 4 times daily as needed for Irritation    FLUTICASONE-SALMETEROL (ADVAIR) 250-50 MCG/DOSE AEPB    Inhale 1 puff into the lungs 2 times daily    GLUCOSE BLOOD (BLOOD GLUCOSE TEST STRIPS) STRP    Check Blood sugar 3 times/day , Dx: Insulin dependent DM Please substitute for brand compatible with patients glucometer    GLUCOSE MONITORING KIT (FREESTYLE) MONITORING KIT    Use as directed. Dx  Insulin dependent DM, Please substitute it with Glucometer covered by patients insurance or patients choice. INSULIN GLARGINE (LANTUS SOLOSTAR) 100 UNIT/ML INJECTION PEN    Inject 60 Units into the skin nightly    INSULIN PEN NEEDLE 31G X 5 MM MISC    Use as directed. Dx Insulin dependent DM    LANCETS MISC    Use as directed to test 2-3 times/day, Dx Insulin dependent DM    MAGIC MOUTHWASH (MIRACLE MOUTHWASH)    Swish and spit 5 mLs 4 times daily as needed for Irritation    METFORMIN (GLUCOPHAGE) 500 MG TABLET    Take 2 tablets by mouth 2 times daily (with meals)    MISC. DEVICES (ROLLER WALKER) MISC    Roller walker with seat.  Use as directed, Dx Back and reactive to light. Neck: Trachea normal and normal range of motion. Neck supple. Cardiovascular: Normal rate, regular rhythm, S1 normal and S2 normal.    No murmur heard. Pulmonary/Chest: Effort normal and breath sounds normal. No accessory muscle usage. No respiratory distress. She exhibits no tenderness and no deformity. Abdominal: Normal appearance and bowel sounds are normal. She exhibits distension. She exhibits no abdominal bruit and no ascites. There is tenderness in the left upper quadrant. There is no rigidity, no rebound, no guarding, no tenderness at McBurney's point and negative Patiño's sign. Neurological: She is alert and oriented to person, place, and time. GCS eye subscore is 4. GCS verbal subscore is 5. GCS motor subscore is 6. Skin: Skin is warm. No rash noted. Psychiatric: She has a normal mood and affect. Her speech is normal.       DIFFERENTIAL DIAGNOSIS/MDM:   78-year-old female presents with continued abdominal pain, plan is basic labs, CT scan and reevaluation. DIAGNOSTIC RESULTS     EKG: All EKG's are interpreted by the Emergency Department Physician who either signs or Co-signs this chart in the absence of a cardiologist.    Not indicated  RADIOLOGY:   I directly visualized the following  images and reviewed the radiologist interpretations:  CT ABDOMEN PELVIS WO IV CONTRAST Additional Contrast? None   Preliminary Result   1. Limited study due to lack of IV contrast.   2. No acute findings within the abdomen or pelvis. 3. Trace bilateral pleural effusions. 4. No nephrolithiasis or hydronephrosis.    5. No evidence of acute appendicitis                 LABS:  Labs Reviewed   HEPATIC FUNCTION PANEL - Abnormal; Notable for the following:        Result Value    Total Bilirubin 0.17 (*)     All other components within normal limits   URINALYSIS - Abnormal; Notable for the following:     Leukocyte Esterase, Urine TRACE (*)     All other components within normal limits MICROSCOPIC URINALYSIS - Abnormal; Notable for the following:     Bacteria, UA FEW (*)     All other components within normal limits   BASIC METABOLIC PANEL   CBC WITH AUTO DIFFERENTIAL   LIPASE       Unremarkable      EMERGENCY DEPARTMENT COURSE:   Vitals:    Vitals:    10/02/17 1714   BP: (!) 140/81   Pulse: 96   Resp: 14   Temp: 99.1 °F (37.3 °C)   TempSrc: Oral   SpO2: 96%   Weight: 194 lb (88 kg)   Height: 5' 6\" (1.676 m)     10:20 PM  Patient's laboratory studies unremarkable, patient has a normal CT scan with no evidence of acute surgical pathology. Plan is discharge with outpatient follow-up, return if symptoms worsen or change. CRITICAL CARE:      CONSULTS:  None      PROCEDURES:      FINAL IMPRESSION      1.  Abdominal pain, unspecified location          DISPOSITION/PLAN   DISPOSITION Decision to Discharge        PATIENT REFERRED TO:  Alondra Tobin MD  [address] [phone]    Schedule an appointment as soon as possible for a visit in 1 day        DISCHARGE MEDICATIONS:  New Prescriptions    No medications on file       (Please note that portions of this note were completed with a voice recognition program.  Efforts were made to edit the dictations but occasionally words are mis-transcribed.)    Justyn Banks  Emergency Medicine                 Justyn Banks MD  10/02/17 0405

## 2017-10-02 NOTE — ED AVS SNAPSHOT
Use as directed. Dx Insulin dependent DM       Lancets Misc   Use as directed to test 2-3 times/day, Dx Insulin dependent DM       Magic Mouthwash   Commonly known as:  Miracle Mouthwash   Swish and spit 5 mLs 4 times daily as needed for Irritation       magic mouthwash Susp   Swish and spit 5 mLs 4 times daily as needed for Irritation       metFORMIN 500 MG tablet   Commonly known as:  GLUCOPHAGE   Take 2 tablets by mouth 2 times daily (with meals)       omeprazole 20 MG delayed release capsule   Commonly known as:  PRILOSEC   Take 20 mg by mouth daily       ondansetron 4 MG tablet   Commonly known as:  ZOFRAN   Take 1 tablet by mouth every 8 hours as needed for Nausea       * QUEtiapine 400 MG tablet   Commonly known as:  SEROQUEL   Take 1 tablet by mouth nightly       * QUEtiapine 300 MG tablet   Commonly known as:  SEROQUEL   Take 1 tablet by mouth 2 times daily       * Roller [employer] walker with seat. Use as directed, Dx Back pain , COPD       * Wheelchair Misc   Power wheel chair , Use as directed       Scooter Misc   Use as directed       sertraline 50 MG tablet   Commonly known as:  ZOLOFT   Take 3 tablets by mouth daily       solifenacin 10 MG tablet   Commonly known as:  VESICARE   Take 10 mg by mouth       tiotropium 18 MCG inhalation capsule   Commonly known as:  SPIRIVA   Inhale 18 mcg into the lungs daily       traMADol 50 MG tablet   Commonly known as:  ULTRAM   Take 1 tablet by mouth every 6 hours as needed for Pain       traZODone 50 MG tablet   Commonly known as:  DESYREL   Take 1 tablet by mouth nightly as needed for Sleep       * Notice: This list has 4 medication(s) that are the same as other medications prescribed for you. Read the directions carefully, and ask your doctor or other care provider to review them with you.             Allergies as of 10/2/2017        Reactions    Flexeril [Cyclobenzaprine]     Prochlorperazine Edisylate     Thorazine [Chlorpromazine] Below is a list of your follow-up and future appointments. This may not be a complete list as you may have made appointments directly with providers that we are not aware of or your providers may have made some for you. Please call your providers to confirm appointments. It is important to keep your appointments. Please bring your current insurance card, photo ID, co-pay, and all medication bottles to your appointment. If self-pay, payment is expected at the time of service. Follow-up Information     Follow up with Maribel Groves MD. Schedule an appointment as soon as possible for a visit in 1 day. Specialty:  Internal Medicine    Contact information:    [address]  [phone]        Future Appointments     10/5/2017 2:30 PM     Appointment with Maribel Groves MD at [facility] ([phone])   Please arrive 15 minutes prior to appointment, bring photo ID and insurance card. [address] [facility]        Date Due    Eye Exam By An Eye Doctor 11/15/1963    Pap Smear 11/15/1974    Zoster Vaccine 11/15/2013    Yearly Flu Vaccine (1) 9/1/2017    Hepatitis C screening is recommended for all adults regardless of risk factors born between [facility] at least once (lifetime) who have never been tested. 5/4/2018 (Originally 1955)    HIV screening is recommended for all people regardless of risk factors  aged 15-65 years at least once (lifetime) who have never been HIV tested.  5/4/2018 (Originally 11/15/1968)    Diabetic Foot Exam 8/3/2018    Hemoglobin A1C (Test For Long-Term Glucose Control) 8/3/2018    Urine Check For Kidney Problems 8/31/2018    Cholesterol Screening 8/31/2018    Colon Cancer Stool Test 9/5/2018    Mammograms are recommended every 2 years for low/average risk patients aged 48 - 69, and every year for high risk patients per updated national

## 2017-10-03 NOTE — ED NOTES
Pt presents to ED c/o LUQ and LLQ pain x 2 weeks. Pt reports some nausea that occurs mostly at night. Pt denies any diarrhea or difficulty urinating. Pt Is A&O x4, PWD, eupneic, and no distress noted.       Eliel Peck RN  10/02/17 7526

## 2017-10-03 NOTE — ED NOTES
Writer to bedside to attempt IV. Blood obtained. IV attempt unsuccessful.       Jermaine Graf, JULIANO  10/02/17 2024

## 2017-10-03 NOTE — ED NOTES
Pt given instructions for discharge and follow-up. Pt given education on Tylenol. Pt verbalizes understanding. Pt is A&O x4, PWD, eupneic, and ambulatory with steady, even gait upon discharge. Pt to waiting room to call for cab.       Nisreen Werner RN  10/02/17 5731

## 2017-10-05 ENCOUNTER — OFFICE VISIT (OUTPATIENT)
Dept: FAMILY MEDICINE CLINIC | Age: 62
End: 2017-10-05
Payer: MEDICARE

## 2017-10-05 VITALS
SYSTOLIC BLOOD PRESSURE: 148 MMHG | DIASTOLIC BLOOD PRESSURE: 85 MMHG | BODY MASS INDEX: 32.43 KG/M2 | RESPIRATION RATE: 16 BRPM | HEIGHT: 66 IN | WEIGHT: 201.8 LBS | HEART RATE: 90 BPM | TEMPERATURE: 98.9 F

## 2017-10-05 DIAGNOSIS — R10.9 ABDOMINAL PAIN, LEFT LATERAL: ICD-10-CM

## 2017-10-05 DIAGNOSIS — Z79.4 TYPE 2 DIABETES MELLITUS WITHOUT COMPLICATION, WITH LONG-TERM CURRENT USE OF INSULIN (HCC): ICD-10-CM

## 2017-10-05 DIAGNOSIS — Z23 NEED FOR ZOSTAVAX ADMINISTRATION: ICD-10-CM

## 2017-10-05 DIAGNOSIS — Z23 NEEDS FLU SHOT: ICD-10-CM

## 2017-10-05 DIAGNOSIS — E11.9 TYPE 2 DIABETES MELLITUS WITHOUT COMPLICATION, WITH LONG-TERM CURRENT USE OF INSULIN (HCC): ICD-10-CM

## 2017-10-05 DIAGNOSIS — I10 ESSENTIAL HYPERTENSION: Primary | ICD-10-CM

## 2017-10-05 PROCEDURE — G0008 ADMIN INFLUENZA VIRUS VAC: HCPCS | Performed by: INTERNAL MEDICINE

## 2017-10-05 PROCEDURE — 90686 IIV4 VACC NO PRSV 0.5 ML IM: CPT | Performed by: INTERNAL MEDICINE

## 2017-10-05 PROCEDURE — 99214 OFFICE O/P EST MOD 30 MIN: CPT | Performed by: INTERNAL MEDICINE

## 2017-10-05 RX ORDER — OXYCODONE HYDROCHLORIDE AND ACETAMINOPHEN 5; 325 MG/1; MG/1
1 TABLET ORAL 2 TIMES DAILY PRN
Qty: 10 TABLET | Refills: 0 | Status: SHIPPED | OUTPATIENT
Start: 2017-10-05 | End: 2018-04-27

## 2017-10-05 RX ORDER — BLOOD-GLUCOSE METER
KIT MISCELLANEOUS
Refills: 0 | COMMUNITY
Start: 2017-09-21 | End: 2019-02-04 | Stop reason: SDUPTHER

## 2017-10-05 RX ORDER — LISINOPRIL 5 MG/1
5 TABLET ORAL DAILY
Qty: 30 TABLET | Refills: 3 | Status: SHIPPED | OUTPATIENT
Start: 2017-10-05 | End: 2021-05-25 | Stop reason: SDUPTHER

## 2017-10-05 NOTE — MR AVS SNAPSHOT
After Visit Summary             Gadiel Boykin   10/5/2017 2:30 PM   Office Visit    Description:  Female : 1955   Provider:  Ignacia Mckee MD   Department:  [facility] [address] and Future Appointments         Below is a list of your follow-up and future appointments. This may not be a complete list as you may have made appointments directly with providers that we are not aware of or your providers may have made some for you. Please call your providers to confirm appointments. It is important to keep your appointments. Please bring your current insurance card, photo ID, co-pay, and all medication bottles to your appointment. If self-pay, payment is expected at the time of service. Your To-Do List     Future Appointments Provider Department Dept Phone    2018 1:15 PM Chris Ling MD [facility] [place] [phone]    Please arrive 15 minutes prior to appointment, bring photo ID and insurance card. Follow-Up    Return in about 3 months (around 2018) for HTN, DM. Information from Your Visit        Department     Name Address Phone Fax    [facility] [address] [place] [id number] [phone]      You Were Seen for:         Comments    Essential hypertension   [387588]         Vital Signs     Blood Pressure Pulse Temperature Respirations Height Weight    148/85 (Site: Right Arm, Position: Sitting, Cuff Size: Large Adult) 90 98.9 °F (37.2 °C) (Oral) 16 5' 6.14\" (1.68 m) 201 lb 12.8 oz (91.5 kg)    Body Mass Index Smoking Status                32.43 kg/m2 Current Every Day Smoker          Additional Information about your Body Mass Index (BMI)           Your BMI as listed above is considered obese (30 or more). BMI is an estimate of body fat, calculated from your height and weight.   The higher your BMI, the greater your risk of heart disease, high blood pressure, type 2 diabetes, stroke, gallstones, arthritis, sleep apnea, and certain cancers. BMI is not perfect. It may overestimate body fat in athletes and people who are more muscular. Even a small weight loss (between 5 and 10 percent of your current weight) by decreasing your calorie intake and becoming more physically active will help lower your risk of developing or worsening diseases associated with obesity. Learn more at: [email or web address]             Today's Medication Changes          These changes are accurate as of: 10/5/17  3:18 PM.  If you have any questions, ask your nurse or doctor. START taking these medications           lisinopril 5 MG tablet   Commonly known as:  PRINIVIL;ZESTRIL   Instructions: Take 1 tablet by mouth daily   Quantity:  30 tablet   Refills:  3   Started by:  Sea Culver MD       oxyCODONE-acetaminophen 5-325 MG per tablet   Commonly known as:  PERCOCET   Instructions: Take 1 tablet by mouth 2 times daily as needed for Pain .    Quantity:  10 tablet   Refills:  0   Started by:  Sea Culver MD       zoster vaccine live (PF) 05721 UNT/0.65ML injection   Commonly known as:  ZOSTAVAX   Instructions:  Inject 0.65 mLs into the skin once for 1 dose   Quantity:  0.65 mL   Refills:  0   Started by:  Sea Culver MD         CHANGE how you take these medications           insulin glargine 100 UNIT/ML injection pen   Commonly known as:  LANTUS SOLOSTAR   Instructions:  Inject 55 Units into the skin nightly   Quantity:  10 Pen   Refills:  2   What changed:  how much to take   Changed by:  Sea Culver MD         STOP taking these medications           Magic Mouthwash   Commonly known as:  Miracle Mouthwash   Stopped by:  Sea Culver MD            Where to Get Your Medications Lancets MISC Use as directed to test 2-3 times/day, Dx Insulin dependent DM    Misc. Devices [facility] [address] wheel chair , Use as directed    QUEtiapine (SEROQUEL) 300 MG tablet Take 1 tablet by mouth 2 times daily    aspirin 81 MG EC tablet Take 1 tablet by mouth daily    DPH-Lido-AlHydr-MgHydr-Simeth (MAGIC MOUTHWASH) SUSP Swish and spit 5 mLs 4 times daily as needed for Irritation    Scooter MISC Use as directed    Insulin Pen Needle 31G X 5 MM MISC Use as directed. Dx Insulin dependent DM    Misc. Devices (ROLLER WALKER) MISC Roller walker with seat.  Use as directed, Dx Back pain , COPD    atorvastatin (LIPITOR) 20 MG tablet Take 2 tablets by mouth daily    QUEtiapine (SEROQUEL) 400 MG tablet Take 1 tablet by mouth nightly    ondansetron (ZOFRAN) 4 MG tablet Take 1 tablet by mouth every 8 hours as needed for Nausea    metFORMIN (GLUCOPHAGE) 500 MG tablet Take 2 tablets by mouth 2 times daily (with meals)    sertraline (ZOLOFT) 50 MG tablet Take 3 tablets by mouth daily    traZODone (DESYREL) 50 MG tablet Take 1 tablet by mouth nightly as needed for Sleep    benzocaine (ORAL ANALGESIC MAX ST) 20 % GEL mucosal gel Apply topically to affected area up to four times a day as needed for pain relief    omeprazole (PRILOSEC) 20 MG delayed release capsule Take 20 mg by mouth daily    tiotropium (SPIRIVA) 18 MCG inhalation capsule Inhale 18 mcg into the lungs daily    fluticasone-salmeterol (ADVAIR) 250-50 MCG/DOSE AEPB Inhale 1 puff into the lungs 2 times daily    albuterol (PROVENTIL HFA;VENTOLIN HFA) 108 (90 BASE) MCG/ACT inhaler Inhale 1 puff into the lungs 4 times daily as needed for Wheezing       Allergies              Flexeril [Cyclobenzaprine]     Prochlorperazine Edisylate     Thorazine [Chlorpromazine]       We Ordered/Performed the following           INFLUENZA, QUADV, 3 YRS AND OLDER, IM PF, PREFILL SYR OR SDV, 0.5ML (FLUARIX QUADV, PF)          Additional Information        Basic Information Date Of Birth Sex Race Ethnicity Preferred Language    1955 Female Black Non-/Non  English      Problem List as of 10/5/2017  Date Reviewed: 2/6/2017                Chronic obstructive pulmonary disease ([facility] [address]    Hallucination, visual    Essential hypertension    Arthritis of knee    Lumbar facet arthropathy (HCC)    Osteoarthritis, knee    S/P total knee arthroplasty    Chronic pain syndrome    Paranoid schizophrenia ([facility] [address]    Pain due to total right knee replacement ([facility] [address]    Arthritis of knee, right    Arthritis of knee, left      Immunizations as of 10/5/2017     Name Date    Pneumococcal Polysaccharide ([id number]) 5/4/2017    Tdap (Boostrix, Adacel) 5/4/2017      Preventive Care        Date Due    Eye Exam By An Eye Doctor 11/15/1963    Pap Smear 11/15/1974    Zoster Vaccine 11/15/2013    Yearly Flu Vaccine (1) 9/1/2017    Hepatitis C screening is recommended for all adults regardless of risk factors born between [facility] at least once (lifetime) who have never been tested. 5/4/2018 (Originally 1955)    HIV screening is recommended for all people regardless of risk factors  aged 15-65 years at least once (lifetime) who have never been HIV tested. 5/4/2018 (Originally 11/15/1968)    Diabetic Foot Exam 8/3/2018    Hemoglobin A1C (Test For Long-Term Glucose Control) 8/3/2018    Urine Check For Kidney Problems 8/31/2018    Cholesterol Screening 8/31/2018    Colon Cancer Stool Test 9/5/2018    Mammograms are recommended every 2 years for low/average risk patients aged 48 - 69, and every year for high risk patients per updated national guidelines. However these guidelines can be individualized by your provider. 4/18/2019    Tetanus Combination Vaccine (2 - Td) 5/4/2027            MyChart Signup           Our records indicate that you have declined MyChart signup.

## 2017-10-05 NOTE — PROGRESS NOTES
Diabetic foot exam  08/03/2018    Diabetic hemoglobin A1C test  08/03/2018    Diabetic microalbuminuria test  08/31/2018    Lipid screen  08/31/2018    Colon Cancer Screen FIT/FOBT  09/05/2018    Breast cancer screen  04/18/2019    DTaP/Tdap/Td vaccine (2 - Td) 05/04/2027    Pneumococcal med risk  Completed
fatigue. HENT: Negative for congestion and sore throat. Eyes: Negative for eye pain and visual disturbance. Respiratory: Negative for chest tightness and shortness of breath. Cardiovascular: Negative for chest pain and orthopnea . Gastrointestinal: Negative for vomiting,  constipation and diarrhea. Endocrine: Negative for cold intolerance, heat intolerance, polydipsia and polyuria. Genitourinary: Negative for dysuria and frequency. Musculoskeletal: Negative for  Myalgia,  Neurological: Negative for dizziness, weakness and headaches. PHYSICAL EXAM:        BP (!) 148/85 (Site: Right Arm, Position: Sitting, Cuff Size: Large Adult)  Pulse 90  Temp 98.9 °F (37.2 °C) (Oral)   Resp 16  Ht 5' 6.14\" (1.68 m)  Wt 201 lb 12.8 oz (91.5 kg)  BMI 32.43 kg/m2     General appearance - well appearing, not in  distress. Mental status - alert and cooperative . Head: Normocephalic, without obvious abnormality, atraumatic. Eye: PERRL, conjunctiva/corneas clear, EOM's intact. Neck - Supple, no significant adenopathy . Chest - Clear to auscultation, no wheezes, rales or rhonchi, symmetric air entry. Heart -  regular rhythm, normal S1, S2, no murmurs. Abdomen - Soft, nontender, nondistended, no masses or organomegaly. Neurological - Alert, oriented, normal speech, no focal findings or movement disorder noted. Musculoskeletal - No joint tenderness, deformity or swelling. Extremities -  No pedal edema, no clubbing or cyanosis.       Labs:       Chemistry        Component Value Date/Time     10/02/2017 2020    K 4.2 10/02/2017 2020     10/02/2017 2020    CO2 23 10/02/2017 2020    BUN 9 10/02/2017 2020    CREATININE 0.74 10/02/2017 2020        Component Value Date/Time    CALCIUM 9.4 10/02/2017 2020    ALKPHOS 102 10/02/2017 2020    AST 19 10/02/2017 2020    ALT 14 10/02/2017 2020    BILITOT 0.17 (L) 10/02/2017 2020          Recent Labs      10/02/17   2020   GLUCOSE  86     Lab Results

## 2017-10-11 DIAGNOSIS — G89.29 CHRONIC BILATERAL LOW BACK PAIN WITH BILATERAL SCIATICA: ICD-10-CM

## 2017-10-11 DIAGNOSIS — M54.42 CHRONIC BILATERAL LOW BACK PAIN WITH BILATERAL SCIATICA: ICD-10-CM

## 2017-10-11 DIAGNOSIS — M54.41 CHRONIC BILATERAL LOW BACK PAIN WITH BILATERAL SCIATICA: ICD-10-CM

## 2017-10-11 DIAGNOSIS — M16.10 HIP ARTHRITIS: ICD-10-CM

## 2017-10-11 NOTE — TELEPHONE ENCOUNTER
Electronic medication refill request. Pharmacy on file. Please advise. Health Maintenance   Topic Date Due    Diabetic retinal exam  11/15/1963    Cervical cancer screen  11/15/1974    Hepatitis C screen  05/04/2018 (Originally 1955)    HIV screen  05/04/2018 (Originally 11/15/1968)    Diabetic foot exam  08/03/2018    Diabetic hemoglobin A1C test  08/03/2018    Diabetic microalbuminuria test  08/31/2018    Lipid screen  08/31/2018    Colon Cancer Screen FIT/FOBT  09/05/2018    Breast cancer screen  04/18/2019    DTaP/Tdap/Td vaccine (2 - Td) 05/04/2027    Zostavax vaccine  Completed    Flu vaccine  Completed    Pneumococcal med risk  Completed             (applicable per patient's age: Cancer Screenings, Depression Screening, Fall Risk Screening, Immunizations)    Hemoglobin A1C (%)   Date Value   08/03/2017 5.5   05/04/2017 5.4   11/08/2016 5.4     Microalb/Crt. Ratio (mcg/mg creat)   Date Value   02/23/2013 29     LDL Cholesterol (mg/dL)   Date Value   02/22/2013 Unable to calc.  as TRIG>400     LDL Calculated (mg/dL)   Date Value   08/31/2017 52     AST (U/L)   Date Value   10/02/2017 19     ALT (U/L)   Date Value   10/02/2017 14     BUN (mg/dL)   Date Value   10/02/2017 9      (goal A1C is < 7)   (goal LDL is <100) need 30-50% reduction from baseline     BP Readings from Last 3 Encounters:   10/05/17 (!) 148/85   10/02/17 138/88   09/29/17 (!) 142/79    (goal /80)      All Future Testing planned in [facility]:  Lab Frequency Next Occurrence   CBC Auto Differential Once 10/01/2017   Comprehensive Metabolic Panel Once 44/72/7267   TSH with Reflex Once 10/01/2017   FULL PFT STUDY WITH PRE AND POST Once 09/13/2017   CT ABDOMEN PELVIS WO IV CONTRAST Additional Contrast? None Once 91/96/7319   Basic Metabolic Panel Once 60/71/1229   CT ABDOMEN WO IV CONTRAST Additional Contrast? None Once 09/29/2017   CT ABDOMEN WO IV CONTRAST Additional Contrast? None Once 09/29/2017       Next Visit Date:  Future Appointments  Date Time Provider Jeanine Rideri   1/11/2018 1:15 PM Serene Mcmullen MD [facility] [address] [facility]         last ov 10/5/17    Patient Active Problem List:     Arthritis of knee, left     Arthritis of knee, right     Pain due to total right knee replacement (HCC)     Paranoid schizophrenia (HCC)     S/P total knee arthroplasty     Chronic pain syndrome     Osteoarthritis, knee     Arthritis of knee     Lumbar facet arthropathy ([facility] [address]     Essential hypertension     Hallucination, visual     Chronic obstructive pulmonary disease ([facility] [address]

## 2017-10-12 RX ORDER — IBUPROFEN 400 MG/1
TABLET ORAL
Qty: 90 TABLET | Refills: 0 | Status: SHIPPED | OUTPATIENT
Start: 2017-10-12 | End: 2020-02-26 | Stop reason: SDUPTHER

## 2017-10-16 ENCOUNTER — TELEPHONE (OUTPATIENT)
Dept: INTERNAL MEDICINE | Age: 62
End: 2017-10-16

## 2017-10-16 NOTE — LETTER
[address]  [address] [facility] [address] [facility]  Phone: [phone]  Fax: [phone]    Tono Newberry MD        October 16, 2017    [address] [facility] [address] [facility] [address]      Dear Leonie Koehler: We are sending this letter because your PCP ordered CT for you to have done at your last visit here and they have not yet been completed. If you can please come to our office on the 2nd floor to  your orders and have your labs completed at our [facility] lab. If you do not have a follow-up appointment scheduled you can either contact the office to make an appointment with us or you can make one when you come in to pick-up your orders. If you have any questions or concerns, please don't hesitate to call.     Sincerely,        Tono Newberry MD

## 2017-10-16 NOTE — TELEPHONE ENCOUNTER
Letter to patient to inform them of CT that need to be completed before next visit. Orders reprinted and mailed to patient.

## 2017-10-16 NOTE — TELEPHONE ENCOUNTER
Received [employer] request for refill of patient's  medication. Medication pended for physician review, please advise. Thank you! Pt last OV 10/5/2017, pt has upcoming OV 1/11/2018. Health Maintenance   Topic Date Due    Diabetic retinal exam  11/15/1963    Cervical cancer screen  11/15/1974    Hepatitis C screen  05/04/2018 (Originally 1955)    HIV screen  05/04/2018 (Originally 11/15/1968)    Diabetic foot exam  08/03/2018    Diabetic hemoglobin A1C test  08/03/2018    Diabetic microalbuminuria test  08/31/2018    Lipid screen  08/31/2018    Colon Cancer Screen FIT/FOBT  09/05/2018    Breast cancer screen  04/18/2019    DTaP/Tdap/Td vaccine (2 - Td) 05/04/2027    Zostavax vaccine  Completed    Flu vaccine  Completed    Pneumococcal med risk  Completed             (applicable per patient's age: Cancer Screenings, Depression Screening, Fall Risk Screening, Immunizations)    Hemoglobin A1C (%)   Date Value   08/03/2017 5.5   05/04/2017 5.4   11/08/2016 5.4     Microalb/Crt. Ratio (mcg/mg creat)   Date Value   02/23/2013 29     LDL Cholesterol (mg/dL)   Date Value   02/22/2013 Unable to calc.  as TRIG>400     LDL Calculated (mg/dL)   Date Value   08/31/2017 52     AST (U/L)   Date Value   10/02/2017 19     ALT (U/L)   Date Value   10/02/2017 14     BUN (mg/dL)   Date Value   10/02/2017 9      (goal A1C is < 7)   (goal LDL is <100) need 30-50% reduction from baseline     BP Readings from Last 3 Encounters:   10/05/17 (!) 148/85   10/02/17 138/88   09/29/17 (!) 142/79    (goal /80)      All Future Testing planned in [facility]:  Lab Frequency Next Occurrence   CBC Auto Differential Once 10/01/2017   Comprehensive Metabolic Panel Once 88/50/4496   TSH with Reflex Once 10/01/2017   FULL PFT STUDY WITH PRE AND POST Once 09/13/2017   CT ABDOMEN PELVIS WO IV CONTRAST Additional Contrast? None Once 06/06/0095   Basic Metabolic Panel Once 39/28/0572   CT ABDOMEN WO IV CONTRAST Additional Contrast?

## 2017-10-24 DIAGNOSIS — Z79.4 TYPE 2 DIABETES MELLITUS WITHOUT COMPLICATION, WITH LONG-TERM CURRENT USE OF INSULIN (HCC): ICD-10-CM

## 2017-10-24 DIAGNOSIS — E11.9 TYPE 2 DIABETES MELLITUS WITHOUT COMPLICATION, WITH LONG-TERM CURRENT USE OF INSULIN (HCC): ICD-10-CM

## 2017-10-24 NOTE — TELEPHONE ENCOUNTER
Health Maintenance   Topic Date Due    Diabetic retinal exam  11/15/1963    Cervical cancer screen  11/15/1974    Hepatitis C screen  05/04/2018 (Originally 1955)    HIV screen  05/04/2018 (Originally 11/15/1968)    Diabetic foot exam  08/03/2018    Diabetic hemoglobin A1C test  08/03/2018    Diabetic microalbuminuria test  08/31/2018    Lipid screen  08/31/2018    Colon Cancer Screen FIT/FOBT  09/05/2018    Breast cancer screen  04/18/2019    DTaP/Tdap/Td vaccine (2 - Td) 05/04/2027    Zostavax vaccine  Completed    Flu vaccine  Completed    Pneumococcal med risk  Completed             (applicable per patient's age: Cancer Screenings, Depression Screening, Fall Risk Screening, Immunizations)    Hemoglobin A1C (%)   Date Value   08/03/2017 5.5   05/04/2017 5.4   11/08/2016 5.4     Microalb/Crt. Ratio (mcg/mg creat)   Date Value   02/23/2013 29     LDL Cholesterol (mg/dL)   Date Value   02/22/2013 Unable to calc.  as TRIG>400     LDL Calculated (mg/dL)   Date Value   08/31/2017 52     AST (U/L)   Date Value   10/02/2017 19     ALT (U/L)   Date Value   10/02/2017 14     BUN (mg/dL)   Date Value   10/02/2017 9      (goal A1C is < 7)   (goal LDL is <100) need 30-50% reduction from baseline     BP Readings from Last 3 Encounters:   10/05/17 (!) 148/85   10/02/17 138/88   09/29/17 (!) 142/79    (goal /80)      All Future Testing planned in [facility]:  Lab Frequency Next Occurrence   CBC Auto Differential Once 10/01/2017   Comprehensive Metabolic Panel Once 61/26/8309   TSH with Reflex Once 10/01/2017   FULL PFT STUDY WITH PRE AND POST Once 09/13/2017   CT ABDOMEN PELVIS WO IV CONTRAST Additional Contrast? None Once 02/68/1877   Basic Metabolic Panel Once 67/49/9846   CT ABDOMEN WO IV CONTRAST Additional Contrast? None Once 01/16/2018   CT ABDOMEN WO IV CONTRAST Additional Contrast? None Once 11/16/2017       Next Visit Date:  Future Appointments  Date Time Provider Jeanine Monahan   1/11/2018 1:15 PM MD Violetta Elder [facility]            Patient Active Problem List:     Arthritis of knee, left     Arthritis of knee, right     Pain due to total right knee replacement (HCC)     Paranoid schizophrenia (HCC)     S/P total knee arthroplasty     Chronic pain syndrome     Osteoarthritis, knee     Arthritis of knee     Lumbar facet arthropathy     Essential hypertension     Hallucination, visual     Chronic obstructive pulmonary disease ([facility] [address]

## 2017-10-25 RX ORDER — GLUCOSAMINE HCL/CHONDROITIN SU 500-400 MG
CAPSULE ORAL
Qty: 100 STRIP | Refills: 10 | Status: SHIPPED | OUTPATIENT
Start: 2017-10-25 | End: 2019-02-04 | Stop reason: SDUPTHER

## 2017-10-30 ENCOUNTER — TELEPHONE (OUTPATIENT)
Dept: FAMILY MEDICINE CLINIC | Age: 62
End: 2017-10-30

## 2017-10-30 NOTE — TELEPHONE ENCOUNTER
Received fax from [facility] requesting prescription change. (scanned into chart) please advise thank you! Next Visit Date:    Last ov 10/5/17    Future Appointments  Date Time Provider Jeanine Monahan   11/9/2017 3:00 PM MD Violetta Sagastume [facility]   1/11/2018 1:15 PM MD Violetta Cantrell Ped [address] Maintenance   Topic Date Due    Diabetic retinal exam  11/15/1963    Cervical cancer screen  11/15/1974    Hepatitis C screen  05/04/2018 (Originally 1955)    HIV screen  05/04/2018 (Originally 11/15/1968)    Diabetic foot exam  08/03/2018    Diabetic hemoglobin A1C test  08/03/2018    Diabetic microalbuminuria test  08/31/2018    Lipid screen  08/31/2018    Colon Cancer Screen FIT/FOBT  09/05/2018    Breast cancer screen  04/18/2019    DTaP/Tdap/Td vaccine (2 - Td) 05/04/2027    Zostavax vaccine  Completed    Flu vaccine  Completed    Pneumococcal med risk  Completed       Hemoglobin A1C (%)   Date Value   08/03/2017 5.5   05/04/2017 5.4   11/08/2016 5.4             ( goal A1C is < 7)   Microalb/Crt. Ratio (mcg/mg creat)   Date Value   02/23/2013 29     LDL Cholesterol (mg/dL)   Date Value   02/22/2013 Unable to calc.  as TRIG>400     LDL Calculated (mg/dL)   Date Value   08/31/2017 52       (goal LDL is <100)   AST (U/L)   Date Value   10/02/2017 19     ALT (U/L)   Date Value   10/02/2017 14     BUN (mg/dL)   Date Value   10/02/2017 9     BP Readings from Last 3 Encounters:   10/05/17 (!) 148/85   10/02/17 138/88   09/29/17 (!) 142/79          (goal 120/80)    All Future Testing planned in [facility]  Lab Frequency Next Occurrence   CBC Auto Differential Once 10/01/2017   Comprehensive Metabolic Panel Once 47/48/8463   TSH with Reflex Once 10/01/2017   FULL PFT STUDY WITH PRE AND POST Once 09/13/2017   CT ABDOMEN PELVIS WO IV CONTRAST Additional Contrast? None Once 99/80/2527   Basic Metabolic Panel Once 44/43/7114   CT ABDOMEN WO IV CONTRAST Additional

## 2017-11-20 ENCOUNTER — HOSPITAL ENCOUNTER (EMERGENCY)
Age: 62
Discharge: HOME | End: 2017-11-20
Attending: EMERGENCY MEDICINE
Payer: MEDICARE

## 2017-11-20 VITALS
BODY MASS INDEX: 32.79 KG/M2 | SYSTOLIC BLOOD PRESSURE: 158 MMHG | HEART RATE: 90 BPM | TEMPERATURE: 98.8 F | OXYGEN SATURATION: 98 % | DIASTOLIC BLOOD PRESSURE: 88 MMHG | WEIGHT: 204 LBS

## 2017-11-20 DIAGNOSIS — R44.0 AUDITORY HALLUCINATIONS: Primary | ICD-10-CM

## 2017-11-20 PROCEDURE — 99284 EMERGENCY DEPT VISIT MOD MDM: CPT

## 2017-11-20 RX ORDER — QUETIAPINE FUMARATE 300 MG/1
300 TABLET, FILM COATED ORAL 2 TIMES DAILY
Qty: 28 TABLET | Refills: 0 | Status: SHIPPED | OUTPATIENT
Start: 2017-11-20 | End: 2019-02-26

## 2017-11-20 RX ORDER — QUETIAPINE FUMARATE 400 MG/1
400 TABLET, FILM COATED ORAL NIGHTLY
Qty: 14 TABLET | Refills: 0 | Status: SHIPPED | OUTPATIENT
Start: 2017-11-20 | End: 2019-02-04

## 2017-11-20 ASSESSMENT — ENCOUNTER SYMPTOMS
ABDOMINAL DISTENTION: 0
COLOR CHANGE: 0
CONSTIPATION: 0
EYE REDNESS: 0
EYE DISCHARGE: 0
NAUSEA: 0
VOMITING: 0
CHEST TIGHTNESS: 0
PHOTOPHOBIA: 0
SHORTNESS OF BREATH: 0
COUGH: 0
RHINORRHEA: 0
EYE PAIN: 0
ABDOMINAL PAIN: 0
SINUS PRESSURE: 0
DIARRHEA: 0
ALLERGIC/IMMUNOLOGIC NEGATIVE: 1
BACK PAIN: 0

## 2017-11-20 NOTE — ED PROVIDER NOTES
[facility] ED  eMERGENCY dEPARTMENT eNCOUnter  Resident    Pt Name: Brown Vinson  MRN: [id number]  [place] 1955  Date of evaluation: 11/20/2017  PCP:  Nanda Martinez MD    [address] [place] [address]       Chief Complaint   Patient presents with    Hallucinations     hearing voices denies SI and HI    Medication Refill     out of meds for unknown time         HISTORY OF PRESENT ILLNESS      Brown Vinson is a 57 y.o. female who presents with auditory hallucinations that has been going on since Friday. She has a H/O paranoid schizophrenia and takes Seroquel for it. She states that she ran out of her Seroquel on Thursday, states she probably dropped her tablets somewhere. She is not clinically intoxicated. Denies suicidal or homicidal ideations. denies any visual hallucinations. Also states has been feeling lightheaded since Friday , her appetite is reduced since then and says that her medication usually helps with her appetite. No loss of consciousness, vertigo or fall. REVIEW OF SYSTEMS       Review of Systems   Constitutional: Positive for appetite change. Negative for activity change, chills, fatigue and fever. HENT: Negative for congestion, ear discharge, ear pain, hearing loss, rhinorrhea and sinus pressure. Eyes: Negative for photophobia, pain, discharge, redness and visual disturbance. Respiratory: Negative for cough, chest tightness and shortness of breath. Cardiovascular: Negative for chest pain and palpitations. Gastrointestinal: Negative for abdominal distention, abdominal pain, constipation, diarrhea, nausea and vomiting. Endocrine: Negative. Genitourinary: Negative for difficulty urinating, dysuria, frequency, pelvic pain and urgency. Musculoskeletal: Negative for arthralgias, back pain, joint swelling, myalgias, neck pain and neck stiffness. Skin: Negative for color change, pallor and rash. Allergic/Immunologic: Negative.     Neurological: Positive for light-headedness. Negative for dizziness, tremors, speech difficulty, weakness, numbness and headaches. Psychiatric/Behavioral: Positive for hallucinations. Negative for agitation, confusion, self-injury and suicidal ideas. The patient is not nervous/anxious. PAST MEDICAL HISTORY    has a past medical history of Bipolar 1 disorder ([facility] [address] Breast lump; Chronic obstructive pulmonary disease ([facility] [address] Depression; Hyperlipidemia; Hypertension; Osteoarthritis; and Type II or unspecified type diabetes mellitus without mention of complication, not stated as uncontrolled. SURGICAL HISTORY      has a past surgical history that includes Ectopic pregnancy surgery; knee surgery (Right); Dilation and curettage of uterus; and Nerve Block (Left, 12/8/14). CURRENT MEDICATIONS       Previous Medications    ACETAMINOPHEN (TYLENOL) 325 MG TABLET    Take 1 tablet by mouth every 6 hours as needed for Pain    ADVAIR DISKUS 250-50 MCG/DOSE AEPB    INHALE 1 PUFF TWICE A DAY    ALBUTEROL (PROVENTIL HFA;VENTOLIN HFA) 108 (90 BASE) MCG/ACT INHALER    Inhale 1 puff into the lungs 4 times daily as needed for Wheezing     ALCOHOL SWABS (ALCOHOL PADS) 70 % PADS    Use as directed. Dx  Insulin dependent DM    AMLODIPINE (NORVASC) 5 MG TABLET    Take 1 tablet by mouth daily    ASPIRIN 81 MG EC TABLET    Take 1 tablet by mouth daily    ATORVASTATIN (LIPITOR) 20 MG TABLET    Take 2 tablets by mouth daily    BENZOCAINE (ORAL ANALGESIC MAX ST) 20 % GEL MUCOSAL GEL    Apply topically to affected area up to four times a day as needed for pain relief    BLOOD GLUCOSE MONITORING SUPPL (ONE TOUCH ULTRA MINI) W/DEVICE KIT    use as directed    DPH-LIDO-ALHYDR-MGHYDR-SIMETH (MAGIC MOUTHWASH) SUSP    Swish and spit 5 mLs 4 times daily as needed for Irritation    GLUCOSE BLOOD (BLOOD GLUCOSE TEST STRIPS) STRP    Check Blood sugar 3 times/day , Dx:  Insulin dependent DM Please substitute for brand compatible with patients glucometer    GLUCOSE MONITORING KIT (FREESTYLE) MONITORING KIT    Use as directed. Dx  Insulin dependent DM, Please substitute it with Glucometer covered by patients insurance or patients choice. IBUPROFEN (ADVIL;MOTRIN) 400 MG TABLET    TAKE 1 TABLET EVERY 8 HOURS AS NEEDED FOR PAIN WITH FOOD    INSULIN GLARGINE (LANTUS SOLOSTAR) 100 UNIT/ML INJECTION PEN    Inject 55 Units into the skin nightly    INSULIN PEN NEEDLE 31G X 5 MM MISC    Use as directed. Dx Insulin dependent DM    LANCETS MISC    Use as directed to test 2-3 times/day, Dx Insulin dependent DM    LISINOPRIL (PRINIVIL;ZESTRIL) 5 MG TABLET    Take 1 tablet by mouth daily    METFORMIN (GLUCOPHAGE) 500 MG TABLET    Take 2 tablets by mouth 2 times daily (with meals)    OMEPRAZOLE (PRILOSEC) 20 MG DELAYED RELEASE CAPSULE    Take 20 mg by mouth daily    ONDANSETRON (ZOFRAN) 4 MG TABLET    Take 1 tablet by mouth every 8 hours as needed for Nausea    OXYCODONE-ACETAMINOPHEN (PERCOCET) 5-325 MG PER TABLET    Take 1 tablet by mouth 2 times daily as needed for Pain . QUETIAPINE (SEROQUEL) 300 MG TABLET    Take 1 tablet by mouth 2 times daily    QUETIAPINE (SEROQUEL) 400 MG TABLET    Take 1 tablet by mouth nightly    SERTRALINE (ZOLOFT) 50 MG TABLET    Take 3 tablets by mouth daily    TIOTROPIUM (SPIRIVA) 18 MCG INHALATION CAPSULE    Inhale 18 mcg into the lungs daily    TRAMADOL (ULTRAM) 50 MG TABLET    Take 1 tablet by mouth every 6 hours as needed for Pain    TRAZODONE (DESYREL) 50 MG TABLET    Take 1 tablet by mouth nightly as needed for Sleep    VESICARE 5 MG TABLET    TAKE 1 TABLET BY MOUTH DAILY       ALLERGIES     is allergic to flexeril [cyclobenzaprine]; prochlorperazine edisylate; and thorazine [chlorpromazine]. FAMILY HISTORY     indicated that her mother is . She indicated that her father is . family history includes Diabetes in her father; High Blood Pressure in her father; Stroke in her father.     SOCIAL HISTORY      reports that she has been smoking Cigarettes. She has a 2.50 pack-year smoking history. She has never used smokeless tobacco. She reports that she does not drink alcohol or use drugs. PHYSICAL EXAM     INITIAL VITALS:  weight is 204 lb (92.5 kg). Her oral temperature is 98.8 °F (37.1 °C). Her blood pressure is 158/88 (abnormal) and her pulse is 90. Her oxygen saturation is 98%. Physical Exam   Constitutional: She is oriented to person, place, and time. She appears well-developed and well-nourished. HENT:   Head: Normocephalic and atraumatic. Eyes: Conjunctivae and EOM are normal. Pupils are equal, round, and reactive to light. Neck: Normal range of motion. Neck supple. Cardiovascular: Normal rate, regular rhythm, normal heart sounds and intact distal pulses. Exam reveals no friction rub. No murmur heard. Pulmonary/Chest: Effort normal and breath sounds normal. No respiratory distress. Abdominal: Soft. Bowel sounds are normal. She exhibits no distension. There is no tenderness. Musculoskeletal: Normal range of motion. She exhibits no edema or deformity. Neurological: She is alert and oriented to person, place, and time. She has normal reflexes. No cranial nerve deficit. Coordination normal.   Skin: Skin is warm and dry. No rash noted. No erythema. Psychiatric: She has a normal mood and affect. Her speech is normal and behavior is normal. Her mood appears not anxious. Her affect is not blunt. She expresses no homicidal and no suicidal ideation. She expresses no suicidal plans and no homicidal plans. Has auditory hallucinations. DIFFERENTIAL DIAGNOSIS/MDM:     1.  Auditory hallucinations    DIAGNOSTIC RESULTS     EKG: All EKG's are interpreted by the Emergency Department Physician who either signs or Co-signs this chart in the absence of a cardiologist.        RADIOLOGY:   I directly visualized the following  images and reviewed the radiologist interpretations:  No orders to display           ED BEDSIDE

## 2017-11-20 NOTE — ED PROVIDER NOTES
I performed a history and physical examination of the patient and discussed management with the resident. I reviewed the residents note and agree with the documented findings and plan of care. Any areas of disagreement are noted on the chart. I was personally present for the key portions of any procedures. I have documented in the chart those procedures where I was not present during the key portions. I have reviewed the emergency nurses triage note. I agree with the chief complaint, past medical history, past surgical history, allergies, medications, social and family history as documented unless otherwise noted below. Documentation of the HPI, Physical Exam and Medical Decision Making performed by medical students or scribes is based on my personal performance of the HPI, PE and MDM. For Phys Assistant/ Nurse Practitioner cases/documentation I have personally evaluated this patient and have completed at least one if not all key elements of the E/M (history, physical exam, and MDM). I find the patient's history and physical exam are consistent with the NP/PA documentation. I agree with the care provided, treatment rendered, disposition and followup plan. Additional findings are as noted. Adalberto Lamar. Colette Monroy MD  Attending Emergency  Physician    C/O AUDITORY COMMAND HALLUCINATIONS FOR SEV DAYS SINCE RUNNING OUT OF SEROQUEL. NO SUICIDAL OR HOMICIDAL IDEATION OR COMMANDS. HX OF SCHIZOPHRENIA. NEXT APPT WITH [facility] FOR REFILL ON 11/30/17. AWAKE, ALERT, COOP, RESPONSIVE. ORIENTED X4, SPEECH AND COMPREHENSION NORMAL. GAIT NORMAL. GOOD EYE CONTACT. NORMAL AFFECT. IMP-RX REFILL  PLAN-RX SEROQUEL FOR SEV WEEKS. F/U WITH [facility] AS SCHEDULED. RETURN IF SX WORSEN OR PROGRESS.             Sheri Veloz MD  11/20/17 1707

## 2018-02-17 ENCOUNTER — HOSPITAL ENCOUNTER (EMERGENCY)
Age: 63
Discharge: HOME | End: 2018-02-17
Attending: EMERGENCY MEDICINE
Payer: MEDICARE

## 2018-02-17 VITALS
RESPIRATION RATE: 18 BRPM | SYSTOLIC BLOOD PRESSURE: 138 MMHG | OXYGEN SATURATION: 97 % | WEIGHT: 205 LBS | BODY MASS INDEX: 32.18 KG/M2 | TEMPERATURE: 99.9 F | HEIGHT: 67 IN | DIASTOLIC BLOOD PRESSURE: 84 MMHG | HEART RATE: 92 BPM

## 2018-02-17 DIAGNOSIS — F20.9 SCHIZOPHRENIA, UNSPECIFIED TYPE (HCC): Primary | ICD-10-CM

## 2018-02-17 PROCEDURE — 6370000000 HC RX 637 (ALT 250 FOR IP): Performed by: STUDENT IN AN ORGANIZED HEALTH CARE EDUCATION/TRAINING PROGRAM

## 2018-02-17 PROCEDURE — 99284 EMERGENCY DEPT VISIT MOD MDM: CPT

## 2018-02-17 RX ORDER — QUETIAPINE FUMARATE 300 MG/1
TABLET, FILM COATED ORAL
Qty: 20 TABLET | Refills: 0 | Status: SHIPPED | OUTPATIENT
Start: 2018-02-17 | End: 2019-05-11 | Stop reason: SDUPTHER

## 2018-02-17 RX ORDER — QUETIAPINE 200 MG/1
400 TABLET, FILM COATED, EXTENDED RELEASE ORAL ONCE
Status: COMPLETED | OUTPATIENT
Start: 2018-02-17 | End: 2018-02-17

## 2018-02-17 RX ORDER — QUETIAPINE FUMARATE 400 MG/1
400 TABLET, FILM COATED ORAL NIGHTLY
Qty: 10 TABLET | Refills: 0 | Status: ON HOLD | OUTPATIENT
Start: 2018-02-17 | End: 2018-07-23

## 2018-02-17 RX ADMIN — QUETIAPINE FUMARATE 400 MG: 200 TABLET, FILM COATED, EXTENDED RELEASE ORAL at 21:02

## 2018-02-17 ASSESSMENT — ENCOUNTER SYMPTOMS
VOMITING: 0
NAUSEA: 0
WHEEZING: 0
SHORTNESS OF BREATH: 0
PHOTOPHOBIA: 0
ABDOMINAL PAIN: 0

## 2018-02-18 NOTE — ED PROVIDER NOTES
[facility] ED     Emergency Department     Faculty Attestation    I performed a history and physical examination of the patient and discussed management with the resident. I reviewed the residents note and agree with the documented findings and plan of care. Any areas of disagreement are noted on the chart. I was personally present for the key portions of any procedures. I have documented in the chart those procedures where I was not present during the key portions. I have reviewed the emergency nurses triage note. I agree with the chief complaint, past medical history, past surgical history, allergies, medications, social and family history as documented unless otherwise noted below. For Physician Assistant/ Nurse Practitioner cases/documentation I have personally evaluated this patient and have completed at least one if not all key elements of the E/M (history, physical exam, and MDM). Additional findings are as noted. Attestation for 2 days, states her home health aide was cleaning and actually threw away her dentures in her Seroquel bottle. Won't be asif to get in until Monday. No suicidal homicidal thoughts.   Well-appearing reading magazine, watching t.v.  Will discharge with rx    Critical Care     none    Laura Coates MD, Tereso Barrios  Attending Emergency  Physician             Laura Coates MD  02/17/18 [id number]
occasionally words are mis-transcribed.)    Elizabeth Briggs  Emergency Medicine Resident             Elizabeth Briggs MD  Resident  02/17/18 [id number]       Elizabeth Briggs MD  Resident  02/17/18 [id number]

## 2018-04-23 RX ORDER — ONDANSETRON 4 MG/1
TABLET, FILM COATED ORAL
Qty: 30 TABLET | Refills: 0 | OUTPATIENT
Start: 2018-04-23

## 2018-04-27 ENCOUNTER — HOSPITAL ENCOUNTER (OUTPATIENT)
Dept: GENERAL RADIOLOGY | Age: 63
Discharge: HOME | End: 2018-04-29
Payer: MEDICARE

## 2018-04-27 ENCOUNTER — HOSPITAL ENCOUNTER (OUTPATIENT)
Age: 63
Discharge: HOME | End: 2018-04-29
Payer: MEDICARE

## 2018-04-27 ENCOUNTER — HOSPITAL ENCOUNTER (OUTPATIENT)
Dept: PAIN MANAGEMENT | Age: 63
Discharge: HOME | End: 2018-04-27
Payer: MEDICARE

## 2018-04-27 VITALS
HEIGHT: 66 IN | HEART RATE: 90 BPM | BODY MASS INDEX: 31.82 KG/M2 | RESPIRATION RATE: 16 BRPM | WEIGHT: 198 LBS | SYSTOLIC BLOOD PRESSURE: 113 MMHG | DIASTOLIC BLOOD PRESSURE: 82 MMHG | TEMPERATURE: 98.8 F

## 2018-04-27 DIAGNOSIS — M17.12 ARTHRITIS OF KNEE, LEFT: ICD-10-CM

## 2018-04-27 DIAGNOSIS — M47.816 LUMBAR FACET ARTHROPATHY: ICD-10-CM

## 2018-04-27 DIAGNOSIS — M17.11 ARTHRITIS OF KNEE, RIGHT: ICD-10-CM

## 2018-04-27 DIAGNOSIS — M17.0 PRIMARY OSTEOARTHRITIS OF BOTH KNEES: ICD-10-CM

## 2018-04-27 DIAGNOSIS — M47.816 LUMBAR FACET ARTHROPATHY: Primary | ICD-10-CM

## 2018-04-27 PROCEDURE — 73560 X-RAY EXAM OF KNEE 1 OR 2: CPT

## 2018-04-27 PROCEDURE — 99203 OFFICE O/P NEW LOW 30 MIN: CPT

## 2018-04-27 PROCEDURE — 99204 OFFICE O/P NEW MOD 45 MIN: CPT | Performed by: PAIN MEDICINE

## 2018-04-27 PROCEDURE — 72100 X-RAY EXAM L-S SPINE 2/3 VWS: CPT

## 2018-04-27 ASSESSMENT — ENCOUNTER SYMPTOMS
JAUNDICE: 0
STRIDOR: 0
BOWEL INCONTINENCE: 0
SUSPICIOUS LESIONS: 0
HEMOPTYSIS: 0
SPUTUM PRODUCTION: 0
UNUSUAL HAIR DISTRIBUTION: 0
EYE DISCHARGE: 0
BACK PAIN: 1

## 2018-05-02 ENCOUNTER — TELEPHONE (OUTPATIENT)
Dept: FAMILY MEDICINE CLINIC | Age: 63
End: 2018-05-02

## 2018-05-28 ENCOUNTER — HOSPITAL ENCOUNTER (EMERGENCY)
Age: 63
Discharge: HOME | End: 2018-05-28
Attending: EMERGENCY MEDICINE
Payer: MEDICARE

## 2018-05-28 VITALS
TEMPERATURE: 99 F | SYSTOLIC BLOOD PRESSURE: 125 MMHG | WEIGHT: 203 LBS | RESPIRATION RATE: 18 BRPM | DIASTOLIC BLOOD PRESSURE: 76 MMHG | OXYGEN SATURATION: 95 % | BODY MASS INDEX: 32.62 KG/M2 | HEIGHT: 66 IN | HEART RATE: 83 BPM

## 2018-05-28 DIAGNOSIS — Z78.9 HAS RUN OUT OF MEDICATIONS: Primary | ICD-10-CM

## 2018-05-28 DIAGNOSIS — B34.9 VIRAL SYNDROME: ICD-10-CM

## 2018-05-28 DIAGNOSIS — G24.01 TARDIVE DYSKINESIA: ICD-10-CM

## 2018-05-28 PROCEDURE — 99284 EMERGENCY DEPT VISIT MOD MDM: CPT

## 2018-05-28 PROCEDURE — 6370000000 HC RX 637 (ALT 250 FOR IP): Performed by: EMERGENCY MEDICINE

## 2018-05-28 RX ORDER — DIPHENHYDRAMINE HCL 25 MG
25 TABLET ORAL ONCE
Status: COMPLETED | OUTPATIENT
Start: 2018-05-28 | End: 2018-05-28

## 2018-05-28 RX ORDER — QUETIAPINE FUMARATE 300 MG/1
300 TABLET, FILM COATED ORAL ONCE
Status: COMPLETED | OUTPATIENT
Start: 2018-05-28 | End: 2018-05-28

## 2018-05-28 RX ORDER — QUETIAPINE FUMARATE 300 MG/1
300 TABLET, FILM COATED ORAL 2 TIMES DAILY
Qty: 10 TABLET | Refills: 0 | Status: ON HOLD | OUTPATIENT
Start: 2018-05-28 | End: 2018-07-23

## 2018-05-28 RX ORDER — DIPHENHYDRAMINE HCL 25 MG
25 CAPSULE ORAL EVERY 4 HOURS PRN
Qty: 30 CAPSULE | Refills: 0 | Status: SHIPPED | OUTPATIENT
Start: 2018-05-28 | End: 2018-06-07

## 2018-05-28 RX ORDER — BENZONATATE 100 MG/1
100 CAPSULE ORAL 3 TIMES DAILY PRN
Qty: 30 CAPSULE | Refills: 0 | Status: SHIPPED | OUTPATIENT
Start: 2018-05-28 | End: 2018-06-04

## 2018-05-28 RX ORDER — BENZONATATE 100 MG/1
100 CAPSULE ORAL ONCE
Status: COMPLETED | OUTPATIENT
Start: 2018-05-28 | End: 2018-05-28

## 2018-05-28 RX ADMIN — BENZONATATE 100 MG: 100 CAPSULE ORAL at 17:49

## 2018-05-28 RX ADMIN — DIPHENHYDRAMINE HCL 25 MG: 25 TABLET ORAL at 17:49

## 2018-05-28 RX ADMIN — QUETIAPINE FUMARATE 300 MG: 300 TABLET ORAL at 17:49

## 2018-05-28 ASSESSMENT — ENCOUNTER SYMPTOMS
VOMITING: 0
SHORTNESS OF BREATH: 0
CHEST TIGHTNESS: 0
COUGH: 1
EYE REDNESS: 0
DIARRHEA: 1
SORE THROAT: 0
ABDOMINAL PAIN: 0
RHINORRHEA: 0
BLOOD IN STOOL: 0
NAUSEA: 0
EYE DISCHARGE: 0

## 2018-07-10 ENCOUNTER — HOSPITAL ENCOUNTER (EMERGENCY)
Age: 63
Discharge: HOME | End: 2018-07-10
Attending: EMERGENCY MEDICINE
Payer: MEDICARE

## 2018-07-10 VITALS
OXYGEN SATURATION: 97 % | DIASTOLIC BLOOD PRESSURE: 83 MMHG | SYSTOLIC BLOOD PRESSURE: 138 MMHG | BODY MASS INDEX: 32.6 KG/M2 | WEIGHT: 202 LBS | RESPIRATION RATE: 14 BRPM | HEART RATE: 94 BPM | TEMPERATURE: 98.8 F

## 2018-07-10 DIAGNOSIS — M25.561 CHRONIC PAIN OF RIGHT KNEE: Primary | ICD-10-CM

## 2018-07-10 DIAGNOSIS — M54.40 BILATERAL LOW BACK PAIN WITH SCIATICA, SCIATICA LATERALITY UNSPECIFIED, UNSPECIFIED CHRONICITY: ICD-10-CM

## 2018-07-10 DIAGNOSIS — G89.29 CHRONIC PAIN OF RIGHT KNEE: Primary | ICD-10-CM

## 2018-07-10 PROCEDURE — 99283 EMERGENCY DEPT VISIT LOW MDM: CPT

## 2018-07-10 PROCEDURE — 6370000000 HC RX 637 (ALT 250 FOR IP): Performed by: STUDENT IN AN ORGANIZED HEALTH CARE EDUCATION/TRAINING PROGRAM

## 2018-07-10 RX ORDER — METAXALONE 800 MG/1
800 TABLET ORAL 3 TIMES DAILY PRN
Qty: 12 TABLET | Refills: 0 | Status: SHIPPED | OUTPATIENT
Start: 2018-07-10 | End: 2018-07-17

## 2018-07-10 RX ORDER — IBUPROFEN 800 MG/1
800 TABLET ORAL ONCE
Status: COMPLETED | OUTPATIENT
Start: 2018-07-10 | End: 2018-07-10

## 2018-07-10 RX ADMIN — IBUPROFEN 800 MG: 800 TABLET ORAL at 17:51

## 2018-07-10 ASSESSMENT — PAIN SCALES - GENERAL
PAINLEVEL_OUTOF10: 8
PAINLEVEL_OUTOF10: 8

## 2018-07-10 ASSESSMENT — PAIN DESCRIPTION - LOCATION: LOCATION: KNEE;BACK

## 2018-07-10 ASSESSMENT — PAIN DESCRIPTION - DESCRIPTORS: DESCRIPTORS: DISCOMFORT

## 2018-07-10 ASSESSMENT — ENCOUNTER SYMPTOMS
VOMITING: 0
NAUSEA: 0
BACK PAIN: 1
SHORTNESS OF BREATH: 0

## 2018-07-10 ASSESSMENT — PAIN DESCRIPTION - ORIENTATION: ORIENTATION: RIGHT

## 2018-07-10 ASSESSMENT — PAIN DESCRIPTION - PAIN TYPE: TYPE: CHRONIC PAIN

## 2018-07-10 NOTE — ED NOTES
Pt presents to ED w/ c/o right knee and low back chronic pain which pt rated at 8/10. Pt states she fell today, denies hitting her head or any LOC.  Speech is clear and appropriate, pt is A&OX4     Naty Becerra RN  07/10/18 1913

## 2018-07-10 NOTE — ED PROVIDER NOTES
[facility]  ED  Emergency Department Encounter  Emergency Medicine Resident     Pt Name: Ministerio Veloz  MRN: [id number]  [place] 1955  Date of evaluation: 7/10/18  PCP:  Ramez Verma MD    [address] [place] [address]       Chief Complaint   Patient presents with    Knee Pain     right chronic knee pain since 2012    Back Pain     low       HISTORY OF PRESENT ILLNESS  (Location/Symptom, Timing/Onset, Context/Setting, Quality, Duration, Modifying Factors, Severity.)      Ministerio Veloz is a 57 y.o. female who presents with Right knee pain, low back pain. Patient reports chronic history of bilateral knee and back pain. Does note that she fell again 3 days ago as she was leaning over onto her right knee. Denies head injury, loss of consciousness. He does have a history of hardware in the right knee. States that she's had worsening pain since her fall. Of note patient was able to ambulate with the assistance of a walker into the emergency department. She denies change in back pain, stating that she is at baseline. Denies fever, chills, nausea, vomiting, shortness of breath or chest pain or abdominal pain. Patient denies significant swelling or redness to the right knee. PAST MEDICAL / SURGICAL / SOCIAL / FAMILY HISTORY      has a past medical history of Bipolar 1 disorder ([address] Breast lump; Chronic obstructive pulmonary disease ([address] Depression; Hyperlipidemia; Hypertension; Osteoarthritis; and Type II or unspecified type diabetes mellitus without mention of complication, not stated as uncontrolled. has a past surgical history that includes Ectopic pregnancy surgery; knee surgery (Right); Dilation and curettage of uterus; and Nerve Block (Left, 12/8/14).     Social History     Social History    Marital status:      Spouse name: N/A    Number of children: N/A    Years of education: N/A     Occupational History     N/A     Social History Main Topics    Smoking status: Current

## 2018-07-18 ENCOUNTER — HOSPITAL ENCOUNTER (INPATIENT)
Age: 63
LOS: 6 days | Discharge: HOME | DRG: 885 | End: 2018-07-24
Attending: EMERGENCY MEDICINE | Admitting: PSYCHIATRY & NEUROLOGY
Payer: MEDICARE

## 2018-07-18 DIAGNOSIS — F31.9 BIPOLAR 1 DISORDER (HCC): Primary | ICD-10-CM

## 2018-07-18 DIAGNOSIS — F32.A DEPRESSION WITH SUICIDAL IDEATION: ICD-10-CM

## 2018-07-18 DIAGNOSIS — R45.851 SUICIDAL IDEATION: ICD-10-CM

## 2018-07-18 DIAGNOSIS — Z79.4 TYPE 2 DIABETES MELLITUS WITHOUT COMPLICATION, WITH LONG-TERM CURRENT USE OF INSULIN (HCC): ICD-10-CM

## 2018-07-18 DIAGNOSIS — R45.851 DEPRESSION WITH SUICIDAL IDEATION: ICD-10-CM

## 2018-07-18 DIAGNOSIS — E11.9 TYPE 2 DIABETES MELLITUS WITHOUT COMPLICATION, WITH LONG-TERM CURRENT USE OF INSULIN (HCC): ICD-10-CM

## 2018-07-18 PROBLEM — F20.9 SCHIZOPHRENIA (HCC): Status: ACTIVE | Noted: 2018-07-18

## 2018-07-18 LAB
ABSOLUTE EOS #: 0.5 K/UL (ref 0–0.4)
ABSOLUTE IMMATURE GRANULOCYTE: ABNORMAL K/UL (ref 0–0.3)
ABSOLUTE LYMPH #: 3.8 K/UL (ref 1–4.8)
ABSOLUTE MONO #: 0.6 K/UL (ref 0.1–1.3)
ALBUMIN SERPL-MCNC: 4.6 G/DL (ref 3.5–5.2)
ALBUMIN/GLOBULIN RATIO: ABNORMAL (ref 1–2.5)
ALP BLD-CCNC: 121 U/L (ref 35–104)
ALT SERPL-CCNC: 13 U/L (ref 5–33)
ANION GAP SERPL CALCULATED.3IONS-SCNC: 17 MMOL/L (ref 9–17)
AST SERPL-CCNC: 16 U/L
BASOPHILS # BLD: 1 % (ref 0–2)
BASOPHILS ABSOLUTE: 0.1 K/UL (ref 0–0.2)
BILIRUB SERPL-MCNC: 0.25 MG/DL (ref 0.3–1.2)
BUN BLDV-MCNC: 11 MG/DL (ref 8–23)
BUN/CREAT BLD: ABNORMAL (ref 9–20)
CALCIUM SERPL-MCNC: 9.7 MG/DL (ref 8.6–10.4)
CHLORIDE BLD-SCNC: 103 MMOL/L (ref 98–107)
CHP ED QC CHECK: YES
CO2: 19 MMOL/L (ref 20–31)
CREAT SERPL-MCNC: 0.98 MG/DL (ref 0.5–0.9)
DIFFERENTIAL TYPE: ABNORMAL
EOSINOPHILS RELATIVE PERCENT: 5 % (ref 0–4)
ETHANOL PERCENT: <0.01 %
ETHANOL: <10 MG/DL
GFR AFRICAN AMERICAN: >60 ML/MIN
GFR NON-AFRICAN AMERICAN: 57 ML/MIN
GFR SERPL CREATININE-BSD FRML MDRD: ABNORMAL ML/MIN/{1.73_M2}
GFR SERPL CREATININE-BSD FRML MDRD: ABNORMAL ML/MIN/{1.73_M2}
GLUCOSE BLD-MCNC: 65 MG/DL
GLUCOSE BLD-MCNC: 65 MG/DL (ref 65–105)
GLUCOSE BLD-MCNC: 72 MG/DL (ref 65–105)
GLUCOSE BLD-MCNC: 89 MG/DL (ref 70–99)
HCT VFR BLD CALC: 41.9 % (ref 36–46)
HEMOGLOBIN: 13.5 G/DL (ref 12–16)
IMMATURE GRANULOCYTES: ABNORMAL %
LYMPHOCYTES # BLD: 40 % (ref 24–44)
MCH RBC QN AUTO: 30.2 PG (ref 26–34)
MCHC RBC AUTO-ENTMCNC: 32.2 G/DL (ref 31–37)
MCV RBC AUTO: 93.8 FL (ref 80–100)
MONOCYTES # BLD: 7 % (ref 1–7)
NRBC AUTOMATED: ABNORMAL PER 100 WBC
PDW BLD-RTO: 14.3 % (ref 11.5–14.9)
PLATELET # BLD: 274 K/UL (ref 150–450)
PLATELET ESTIMATE: ABNORMAL
PMV BLD AUTO: 7.5 FL (ref 6–12)
POTASSIUM SERPL-SCNC: 4 MMOL/L (ref 3.7–5.3)
RBC # BLD: 4.46 M/UL (ref 4–5.2)
RBC # BLD: ABNORMAL 10*6/UL
SEG NEUTROPHILS: 47 % (ref 36–66)
SEGMENTED NEUTROPHILS ABSOLUTE COUNT: 4.6 K/UL (ref 1.3–9.1)
SODIUM BLD-SCNC: 139 MMOL/L (ref 135–144)
TOTAL PROTEIN: 8.8 G/DL (ref 6.4–8.3)
WBC # BLD: 9.6 K/UL (ref 3.5–11)
WBC # BLD: ABNORMAL 10*3/UL

## 2018-07-18 PROCEDURE — G0480 DRUG TEST DEF 1-7 CLASSES: HCPCS

## 2018-07-18 PROCEDURE — 6370000000 HC RX 637 (ALT 250 FOR IP): Performed by: EMERGENCY MEDICINE

## 2018-07-18 PROCEDURE — 36415 COLL VENOUS BLD VENIPUNCTURE: CPT

## 2018-07-18 PROCEDURE — 1240000000 HC EMOTIONAL WELLNESS R&B

## 2018-07-18 PROCEDURE — 85025 COMPLETE CBC W/AUTO DIFF WBC: CPT

## 2018-07-18 PROCEDURE — 82947 ASSAY GLUCOSE BLOOD QUANT: CPT

## 2018-07-18 PROCEDURE — 80053 COMPREHEN METABOLIC PANEL: CPT

## 2018-07-18 PROCEDURE — 99285 EMERGENCY DEPT VISIT HI MDM: CPT

## 2018-07-18 PROCEDURE — 80307 DRUG TEST PRSMV CHEM ANLYZR: CPT

## 2018-07-18 RX ORDER — ALPRAZOLAM 0.5 MG/1
0.25 TABLET ORAL 2 TIMES DAILY
Status: ON HOLD | COMMUNITY
End: 2021-10-10 | Stop reason: HOSPADM

## 2018-07-18 RX ORDER — ALBUTEROL SULFATE 90 UG/1
1 AEROSOL, METERED RESPIRATORY (INHALATION) 4 TIMES DAILY PRN
Status: CANCELLED | OUTPATIENT
Start: 2018-07-18

## 2018-07-18 RX ORDER — OMEPRAZOLE 20 MG/1
20 CAPSULE, DELAYED RELEASE ORAL DAILY
Status: CANCELLED | OUTPATIENT
Start: 2018-07-19

## 2018-07-18 RX ORDER — LISINOPRIL 5 MG/1
5 TABLET ORAL DAILY
Status: CANCELLED | OUTPATIENT
Start: 2018-07-19

## 2018-07-18 RX ORDER — ACETAMINOPHEN 325 MG/1
650 TABLET ORAL EVERY 4 HOURS PRN
Status: CANCELLED | OUTPATIENT
Start: 2018-07-18

## 2018-07-18 RX ORDER — HYDROXYZINE PAMOATE 50 MG/1
50 CAPSULE ORAL 3 TIMES DAILY PRN
Status: CANCELLED | OUTPATIENT
Start: 2018-07-18

## 2018-07-18 RX ORDER — QUETIAPINE FUMARATE 200 MG/1
400 TABLET, FILM COATED ORAL ONCE
Status: COMPLETED | OUTPATIENT
Start: 2018-07-18 | End: 2018-07-18

## 2018-07-18 RX ORDER — TRAZODONE HYDROCHLORIDE 50 MG/1
50 TABLET ORAL NIGHTLY PRN
Status: CANCELLED | OUTPATIENT
Start: 2018-07-19

## 2018-07-18 RX ORDER — ASPIRIN 81 MG/1
81 TABLET ORAL DAILY
Status: CANCELLED | OUTPATIENT
Start: 2018-07-19

## 2018-07-18 RX ORDER — BENZTROPINE MESYLATE 1 MG/ML
2 INJECTION INTRAMUSCULAR; INTRAVENOUS 2 TIMES DAILY PRN
Status: CANCELLED | OUTPATIENT
Start: 2018-07-18

## 2018-07-18 RX ORDER — NICOTINE 21 MG/24HR
1 PATCH, TRANSDERMAL 24 HOURS TRANSDERMAL DAILY
Status: CANCELLED | OUTPATIENT
Start: 2018-07-19

## 2018-07-18 RX ORDER — MAGNESIUM HYDROXIDE/ALUMINUM HYDROXICE/SIMETHICONE 120; 1200; 1200 MG/30ML; MG/30ML; MG/30ML
30 SUSPENSION ORAL EVERY 6 HOURS PRN
Status: CANCELLED | OUTPATIENT
Start: 2018-07-18

## 2018-07-18 RX ADMIN — QUETIAPINE FUMARATE 400 MG: 200 TABLET ORAL at 22:11

## 2018-07-18 ASSESSMENT — PAIN SCALES - GENERAL: PAINLEVEL_OUTOF10: 0

## 2018-07-18 ASSESSMENT — SLEEP AND FATIGUE QUESTIONNAIRES
AVERAGE NUMBER OF SLEEP HOURS: 4
DO YOU HAVE DIFFICULTY SLEEPING: YES
DIFFICULTY STAYING ASLEEP: YES
DIFFICULTY ARISING: NO
SLEEP PATTERN: RESTLESSNESS
DIFFICULTY FALLING ASLEEP: YES
RESTFUL SLEEP: NO
DO YOU USE A SLEEP AID: YES

## 2018-07-18 ASSESSMENT — LIFESTYLE VARIABLES: HISTORY_ALCOHOL_USE: NO

## 2018-07-19 LAB
GLUCOSE BLD-MCNC: 107 MG/DL (ref 65–105)
GLUCOSE BLD-MCNC: 111 MG/DL (ref 65–105)
GLUCOSE BLD-MCNC: 118 MG/DL (ref 65–105)
GLUCOSE BLD-MCNC: 71 MG/DL (ref 65–105)

## 2018-07-19 PROCEDURE — 94664 DEMO&/EVAL PT USE INHALER: CPT

## 2018-07-19 PROCEDURE — 82947 ASSAY GLUCOSE BLOOD QUANT: CPT

## 2018-07-19 PROCEDURE — 6370000000 HC RX 637 (ALT 250 FOR IP): Performed by: PSYCHIATRY & NEUROLOGY

## 2018-07-19 PROCEDURE — 1240000000 HC EMOTIONAL WELLNESS R&B

## 2018-07-19 RX ORDER — QUETIAPINE FUMARATE 200 MG/1
400 TABLET, FILM COATED ORAL NIGHTLY
Status: DISCONTINUED | OUTPATIENT
Start: 2018-07-19 | End: 2018-07-24 | Stop reason: HOSPADM

## 2018-07-19 RX ORDER — ALPRAZOLAM 0.5 MG/1
0.5 TABLET ORAL 2 TIMES DAILY
Status: DISCONTINUED | OUTPATIENT
Start: 2018-07-20 | End: 2018-07-19

## 2018-07-19 RX ORDER — INSULIN GLARGINE 100 [IU]/ML
50 INJECTION, SOLUTION SUBCUTANEOUS NIGHTLY
Status: DISCONTINUED | OUTPATIENT
Start: 2018-07-20 | End: 2018-07-20

## 2018-07-19 RX ORDER — ASPIRIN 81 MG/1
81 TABLET ORAL DAILY
Status: DISCONTINUED | OUTPATIENT
Start: 2018-07-19 | End: 2018-07-24 | Stop reason: HOSPADM

## 2018-07-19 RX ORDER — MAGNESIUM HYDROXIDE/ALUMINUM HYDROXICE/SIMETHICONE 120; 1200; 1200 MG/30ML; MG/30ML; MG/30ML
20 SUSPENSION ORAL 3 TIMES DAILY PRN
Status: DISCONTINUED | OUTPATIENT
Start: 2018-07-19 | End: 2018-07-24 | Stop reason: HOSPADM

## 2018-07-19 RX ORDER — ACETAMINOPHEN 325 MG/1
650 TABLET ORAL EVERY 4 HOURS PRN
Status: DISCONTINUED | OUTPATIENT
Start: 2018-07-19 | End: 2018-07-24 | Stop reason: HOSPADM

## 2018-07-19 RX ORDER — LISINOPRIL 5 MG/1
5 TABLET ORAL DAILY
Status: DISCONTINUED | OUTPATIENT
Start: 2018-07-19 | End: 2018-07-24 | Stop reason: HOSPADM

## 2018-07-19 RX ORDER — HYDROXYZINE HYDROCHLORIDE 25 MG/1
25 TABLET, FILM COATED ORAL 3 TIMES DAILY PRN
Status: DISCONTINUED | OUTPATIENT
Start: 2018-07-19 | End: 2018-07-24 | Stop reason: HOSPADM

## 2018-07-19 RX ORDER — TRAZODONE HYDROCHLORIDE 50 MG/1
50 TABLET ORAL NIGHTLY PRN
Status: DISCONTINUED | OUTPATIENT
Start: 2018-07-19 | End: 2018-07-24 | Stop reason: HOSPADM

## 2018-07-19 RX ORDER — BENZTROPINE MESYLATE 1 MG/ML
2 INJECTION INTRAMUSCULAR; INTRAVENOUS DAILY PRN
Status: DISCONTINUED | OUTPATIENT
Start: 2018-07-19 | End: 2018-07-24 | Stop reason: HOSPADM

## 2018-07-19 RX ORDER — OMEPRAZOLE 20 MG/1
20 CAPSULE, DELAYED RELEASE ORAL DAILY
Status: DISCONTINUED | OUTPATIENT
Start: 2018-07-19 | End: 2018-07-24 | Stop reason: HOSPADM

## 2018-07-19 RX ORDER — SOLIFENACIN SUCCINATE 5 MG/1
5 TABLET, FILM COATED ORAL DAILY
Status: DISCONTINUED | OUTPATIENT
Start: 2018-07-20 | End: 2018-07-19 | Stop reason: CLARIF

## 2018-07-19 RX ORDER — ALPRAZOLAM 0.5 MG/1
0.5 TABLET ORAL 2 TIMES DAILY
Status: DISCONTINUED | OUTPATIENT
Start: 2018-07-19 | End: 2018-07-19

## 2018-07-19 RX ORDER — QUETIAPINE FUMARATE 200 MG/1
400 TABLET, FILM COATED ORAL NIGHTLY
Status: DISCONTINUED | OUTPATIENT
Start: 2018-07-19 | End: 2018-07-19

## 2018-07-19 RX ORDER — IBUPROFEN 400 MG/1
400 TABLET ORAL 3 TIMES DAILY PRN
Status: DISCONTINUED | OUTPATIENT
Start: 2018-07-19 | End: 2018-07-24 | Stop reason: HOSPADM

## 2018-07-19 RX ORDER — TROSPIUM CHLORIDE 20 MG/1
20 TABLET, FILM COATED ORAL NIGHTLY
Status: DISCONTINUED | OUTPATIENT
Start: 2018-07-19 | End: 2018-07-24 | Stop reason: HOSPADM

## 2018-07-19 RX ORDER — QUETIAPINE FUMARATE 300 MG/1
300 TABLET, FILM COATED ORAL 2 TIMES DAILY
Status: DISCONTINUED | OUTPATIENT
Start: 2018-07-20 | End: 2018-07-19

## 2018-07-19 RX ORDER — NICOTINE 21 MG/24HR
1 PATCH, TRANSDERMAL 24 HOURS TRANSDERMAL DAILY
Status: DISCONTINUED | OUTPATIENT
Start: 2018-07-19 | End: 2018-07-24 | Stop reason: HOSPADM

## 2018-07-19 RX ORDER — SERTRALINE HYDROCHLORIDE 100 MG/1
200 TABLET, FILM COATED ORAL DAILY
Status: DISCONTINUED | OUTPATIENT
Start: 2018-07-20 | End: 2018-07-24 | Stop reason: HOSPADM

## 2018-07-19 RX ORDER — QUETIAPINE FUMARATE 300 MG/1
300 TABLET, FILM COATED ORAL 2 TIMES DAILY
Status: DISCONTINUED | OUTPATIENT
Start: 2018-07-19 | End: 2018-07-24 | Stop reason: HOSPADM

## 2018-07-19 RX ORDER — ALPRAZOLAM 0.5 MG/1
0.5 TABLET ORAL 2 TIMES DAILY PRN
Status: DISCONTINUED | OUTPATIENT
Start: 2018-07-19 | End: 2018-07-24 | Stop reason: HOSPADM

## 2018-07-19 RX ADMIN — QUETIAPINE FUMARATE 400 MG: 200 TABLET ORAL at 20:58

## 2018-07-19 RX ADMIN — SERTRALINE HYDROCHLORIDE 150 MG: 100 TABLET ORAL at 08:59

## 2018-07-19 RX ADMIN — MOMETASONE FUROATE AND FORMOTEROL FUMARATE DIHYDRATE 2 PUFF: 200; 5 AEROSOL RESPIRATORY (INHALATION) at 20:57

## 2018-07-19 RX ADMIN — OMEPRAZOLE 20 MG: 20 CAPSULE, DELAYED RELEASE ORAL at 08:59

## 2018-07-19 RX ADMIN — QUETIAPINE FUMARATE 300 MG: 300 TABLET ORAL at 10:34

## 2018-07-19 RX ADMIN — TRAZODONE HYDROCHLORIDE 50 MG: 50 TABLET ORAL at 20:58

## 2018-07-19 RX ADMIN — LISINOPRIL 5 MG: 5 TABLET ORAL at 08:59

## 2018-07-19 RX ADMIN — MOMETASONE FUROATE AND FORMOTEROL FUMARATE DIHYDRATE 2 PUFF: 200; 5 AEROSOL RESPIRATORY (INHALATION) at 08:57

## 2018-07-19 RX ADMIN — IBUPROFEN 400 MG: 400 TABLET ORAL at 09:05

## 2018-07-19 RX ADMIN — IBUPROFEN 400 MG: 400 TABLET ORAL at 20:58

## 2018-07-19 RX ADMIN — ASPIRIN 81 MG: 81 TABLET, COATED ORAL at 08:59

## 2018-07-19 RX ADMIN — TIOTROPIUM BROMIDE 18 MCG: 18 CAPSULE ORAL; RESPIRATORY (INHALATION) at 08:59

## 2018-07-19 RX ADMIN — TROSPIUM CHLORIDE 20 MG: 20 TABLET ORAL at 21:03

## 2018-07-19 RX ADMIN — TROSPIUM CHLORIDE 20 MG: 20 TABLET ORAL at 00:58

## 2018-07-19 ASSESSMENT — PAIN DESCRIPTION - LOCATION: LOCATION: BACK

## 2018-07-19 ASSESSMENT — PAIN SCALES - GENERAL
PAINLEVEL_OUTOF10: 8
PAINLEVEL_OUTOF10: 8
PAINLEVEL_OUTOF10: 6
PAINLEVEL_OUTOF10: 0

## 2018-07-19 NOTE — PLAN OF CARE
Problem: Altered Mood, Psychotic Behavior:  Goal: Able to demonstrate trust by eating, participating in treatment and following staff's direction  Able to demonstrate trust by eating, participating in treatment and following staff's direction   Outcome: Ongoing  Pt did not attend Therapeutic Recreation at 1430 d/t resting in room despite staff invitation to attend.

## 2018-07-19 NOTE — PLAN OF CARE
Problem: Altered Mood, Psychotic Behavior:  Goal: Able to demonstrate trust by eating, participating in treatment and following staff's direction  Able to demonstrate trust by eating, participating in treatment and following staff's direction   Outcome: Ongoing  Pt did not attend Community Meeting at 0900 d/t resting in room despite staff invitation to attend.

## 2018-07-19 NOTE — BH NOTE
[facility]  Admission Note     Admission Type:   Admission Type: Voluntary ([facility] AN AFFILIATE OF [facility])    Reason for admission:  Reason for Admission: patient having SI with no plan, hearing voices right outside of her apartment door stating \"go in and get her\"    PATIENT STRENGTHS:  Strengths:  (goes to [facility])    Patient Strengths and Limitations:  Limitations: Perceives need for assistance with self-care    Addictive Behavior:   Addictive Behavior  In the past 3 months, have you felt or has someone told you that you have a problem with:  : None  Do you have a history of Chemical Use?: No  Do you have a history of Alcohol Use?: No  Do you have a history of Street Drug Abuse?: No  Histroy of Prescripton Drug Abuse?: No    Medical Problems:   Past Medical History:   Diagnosis Date    [facility] 1 disorder ([facility] [address]     Breast lump     [facility] 8/3/2017    Depression     Hyperlipidemia     Hypertension     Osteoarthritis     Type II or unspecified type diabetes mellitus without mention of complication, not stated as uncontrolled        Status EXAM:  Status and Exam  Normal: No  Facial Expression: Brightened  Affect: Appropriate  Level of Consciousness: Alert  Mood:Normal: No  Mood: Anxious  Motor Activity:Normal: No  Motor Activity: Decreased  Interview Behavior: Cooperative  Preception: Terry to Person, Terry to Time, Terry to Place, Terry to Situation  Attention:Normal: No  Attention: Distractible  Thought Processes: Blocking  Thought Content:Normal: No  Thought Content: Paranoia  Hallucinations:  Auditory (Comment) (\"go in and get her\")  Delusions: No  Memory:Normal: Yes  Insight and Judgment: No  Insight and Judgment: Poor Judgment, Poor Insight  Present Suicidal Ideation: Yes (no plan stated at this time)  Present Homicidal Ideation: No    Tobacco Screening:  Practical Counseling, on admission, noa X, if applicable and completed (first 3 are required if patient

## 2018-07-19 NOTE — H&P
HISTORY and Nisha Landis [id number]       NAME:  Drew Fulton  MRN: [id number]   YOB: 1955   Date: 7/19/2018   Age: 57 y.o. Gender: female     COMPLAINT AND PRESENT HISTORY:      Drew Fulton is 57 y.o.,  female, admitted because of Schizoaffective Disorder/ Schizophrenia. Pt states she came to ED at [facility] via taxi after she had been hearing voices, chattering, but when she looked no one was there. Patient denies visual or tactile hallucinations. Pt denies current SI/HI. Pt denies hx of previous suicide attempts. Pt has fair insight. Pt has poor sleep, fair appetite, poor concentration and memory. Patient denies any current alcohol or substance abuse. Patient lives alone. Pt has been non compliant with her medications recently and takes her psych meds sporadically. She states she has arthritis and has back and right knee pain. Uses walker and/or motorized wheelchair at home.      DIAGNOSTIC RESULTS   Labs:  CBC:   Recent Labs      07/18/18 2007   WBC  9.6   HGB  13.5   PLT  274     BMP:    Recent Labs      07/18/18 1950 07/18/18 1959   NA  139   --    K  4.0   --    CL  103   --    CO2  19*   --    BUN  11   --    CREATININE  0.98*   --    GLUCOSE  89  65     Hepatic:   Recent Labs      07/18/18 1950   AST  16   ALT  13   BILITOT  0.25*   ALKPHOS  121*     PAST MEDICAL HISTORY     Past Medical History:   Diagnosis Date    B[facility] 1 [facility] ([facility]     Breast lump     Chronic obstructive pulmonary disease ([facility] 8/3/2017    Depression     GERD (gastroesophageal reflux disease)     Hyperlipidemia     Hypertension     Osteoarthritis     Type II or unspecified type diabetes mellitus without mention of complication, not stated as uncontrolled      Pt denies any history of stroke, heart disease, Asthma, Cancer, Seizures,Thyroid disease, Kidney Disease, Hepatitis, TB.    SURGICAL HISTORY       Past Surgical History:   Procedure Laterality Date    into the skin nightly (Patient taking differently: Inject 50 Units into the skin nightly ) 10 Pen 2    aspirin 81 MG EC tablet Take 1 tablet by mouth daily 30 tablet 0    sertraline (ZOLOFT) 50 MG tablet Take 3 tablets by mouth daily 90 tablet 0    traZODone (DESYREL) 50 MG tablet Take 1 tablet by mouth nightly as needed for Sleep 30 tablet 0    omeprazole (PRILOSEC) 20 MG delayed release capsule Take 20 mg by mouth daily      tiotropium (SPIRIVA) 18 MCG inhalation capsule Inhale 18 mcg into the lungs daily      QUEtiapine (SEROQUEL) 300 MG tablet Take 1 tablet by mouth 2 times daily for 14 days 28 tablet 0    QUEtiapine (SEROQUEL) 400 MG tablet Take 1 tablet by mouth nightly for 14 days 14 tablet 0    Glucose Blood (BLOOD GLUCOSE TEST STRIPS) STRP Check Blood sugar 3 times/day , Dx: Insulin dependent DM Please substitute for brand compatible with patients glucometer 100 strip 10    Blood Glucose Monitoring Suppl (ONE TOUCH ULTRA MINI) w/Device KIT use as directed  0    Alcohol Swabs (ALCOHOL PADS) 70 % PADS Use as directed. Dx  Insulin dependent  each 6    glucose monitoring kit (FREESTYLE) monitoring kit Use as directed. Dx  Insulin dependent DM, Please substitute it with Glucometer covered by patients insurance or patients choice. 1 kit 0    Lancets MISC Use as directed to test 2-3 times/day, Dx Insulin dependent  each 10    DPH-Lido-AlHydr-MgHydr-Simeth (MAGIC MOUTHWASH) SUSP Swish and spit 5 mLs 4 times daily as needed for Irritation 500 mL 1    Insulin Pen Needle 31G X 5 MM MISC Use as directed. Dx Insulin dependent  each 3    benzocaine (ORAL ANALGESIC MAX ST) 20 % GEL mucosal gel Apply topically to affected area up to four times a day as needed for pain relief 1 Bottle 0    albuterol (PROVENTIL HFA;VENTOLIN HFA) 108 (90 BASE) MCG/ACT inhaler Inhale 1 puff into the lungs 4 times daily as needed for Wheezing                       General health:  Fairly good.   No fever or

## 2018-07-19 NOTE — ED NOTES
Provisional Diagnosis:   Schizophrenia    Psychosocial and Contextual Factors: Pt has issues with social enviroment. Pt has issues with relationships. C-SSRS Summary:    Patient: X    Family:     Agency: X (EPIC)    Present Suicidal Behavior:     Verbal: Pt is suicidal. Pt has been thinking of ways that wouldn't physically hurt pt to kill self. Attempt:     Past Suicidal Behavior: Pt denies    Verbal:     Attempt:     Self- Injurious/ Self-Mutilation:  Pt denies    Trauma History: Pt denies    Protective Factors: Pt has housing. Pt has support. Pt has insurance. Pt has an income. Pt is compliant with medications. Risk Factors: Pt has poor judgement and coping skills. Clinical Summary:  Radha Bazan is a 57year old female who presents to the ED via a taxi cab. Pt is suicidal. Pt denies HI. Pt denies a history of suicide attempts. Pt has been \"hearing voices of people outside of pt's door saying to get her but when pt goes outside no one is there. \" Pt denies alcohol and illegal drugs. Pt has been diagnosed with Paranoid Schizophrenia. Pt reports pt is med compliant. Pt is linked with [facility]. Pt was last admitted to the [facility] 2/6/17. Pt reports poor sleep the past couple nights and a good appetite. Level of Care Disposition:.SW consulted Dr.S Ankit Vásquez. Pt accepted for an inpatient admission to the [facility] for safety and stabilization.

## 2018-07-19 NOTE — PLAN OF CARE
Problem: Altered Mood, Psychotic Behavior:  Goal: Able to demonstrate trust by eating, participating in treatment and following staff's direction  Able to demonstrate trust by eating, participating in treatment and following staff's direction   Outcome: Ongoing  [facility]  Initial Interdisciplinary Treatment Plan NO      Original treatment plan Date & Time: 7/19/2018 0738    Admission Type:  Admission Type: Voluntary ([facility])    Reason for admission:   Reason for Admission: patient having SI with no plan, hearing voices right outside of her apartment door stating \"go in and get her\"    Estimated Length of Stay:  5-7days  Estimated Discharge Date: to be determined by physician    PATIENT STRENGTHS:  Patient Strengths:Strengths:  (goes to [facility])  Patient Strengths and Limitations:Limitations: Perceives need for assistance with self-care  Addictive Behavior: Addictive Behavior  In the past 3 months, have you felt or has someone told you that you have a problem with:  : None  Do you have a history of Chemical Use?: No  Do you have a history of Alcohol Use?: No  Do you have a history of Street Drug Abuse?: No  Histroy of Prescripton Drug Abuse?: No  Medical Problems:  Past Medical History:   Diagnosis Date    Bipolar 1 disorder ([facility]     Breast lump     Chronic obstructive pulmonary disease ([facility] 8/3/2017    Depression     Hyperlipidemia     Hypertension     Osteoarthritis     Type II or unspecified type diabetes mellitus without mention of complication, not stated as uncontrolled      Status EXAM:Status and Exam  Normal: No  Facial Expression: Brightened  Affect: Appropriate  Level of Consciousness: Alert  Mood:Normal: No  Mood: Anxious  Motor Activity:Normal: No  Motor Activity: Decreased  Interview Behavior: Cooperative  Preception: Nixon to Person, Nixon to Time, Nixon to Place, Nixon to Situation  Attention:Normal: No  Attention: Distractible  Thought Processes: Blocking  Thought Content:Normal: No  Thought Content: Paranoia  Hallucinations:  Auditory (Comment) (\"go in and get her\")  Delusions: No  Memory:Normal: Yes  Insight and Judgment: No  Insight and Judgment: Poor Judgment, Poor Insight  Present Suicidal Ideation: Yes (no plan stated at this time)  Present Homicidal Ideation: No    EDUCATION:   Learner Progress Toward Treatment Goals: reviewed group plans and strategies for care    Method:group therapy, medication compliance, individualized assessments and care planning    Outcome: needs reinforcement    PATIENT GOALS: to be discussed with patient within 72 hours    PLAN/TREATMENT RECOMMENDATIONS:     continue group therapy , medications compliance, goal setting, individualized assessments and care, continue to monitor pt on unit      SHORT-TERM GOALS:   Time frame for Short-Term Goals: 5-7 days    LONG-TERM GOALS:  Time frame for Long-Term Goals: 6 months  Members Present in Team Meeting: See Signature Sheet    Alecia Davalos  Goal: Able to verbalize decrease in frequency and intensity of hallucinations  Able to verbalize decrease in frequency and intensity of hallucinations   Outcome: Ongoing

## 2018-07-19 NOTE — PLAN OF CARE
Problem: Altered Mood, Psychotic Behavior:  Goal: Able to demonstrate trust by eating, participating in treatment and following staff's direction  Able to demonstrate trust by eating, participating in treatment and following staff's direction   Outcome: Ongoing  Pt declined to attend psychotherapy at 1000 am despite encouragement. Pt offered 1:1 and accepted.

## 2018-07-20 LAB
GLUCOSE BLD-MCNC: 104 MG/DL (ref 65–105)
GLUCOSE BLD-MCNC: 110 MG/DL (ref 65–105)
GLUCOSE BLD-MCNC: 115 MG/DL (ref 65–105)
GLUCOSE BLD-MCNC: 95 MG/DL (ref 65–105)

## 2018-07-20 PROCEDURE — 6370000000 HC RX 637 (ALT 250 FOR IP): Performed by: PSYCHIATRY & NEUROLOGY

## 2018-07-20 PROCEDURE — 82947 ASSAY GLUCOSE BLOOD QUANT: CPT

## 2018-07-20 PROCEDURE — 1240000000 HC EMOTIONAL WELLNESS R&B

## 2018-07-20 PROCEDURE — 99222 1ST HOSP IP/OBS MODERATE 55: CPT | Performed by: INTERNAL MEDICINE

## 2018-07-20 RX ORDER — DEXTROSE MONOHYDRATE 25 G/50ML
12.5 INJECTION, SOLUTION INTRAVENOUS PRN
Status: DISCONTINUED | OUTPATIENT
Start: 2018-07-20 | End: 2018-07-24 | Stop reason: HOSPADM

## 2018-07-20 RX ORDER — INSULIN GLARGINE 100 [IU]/ML
40 INJECTION, SOLUTION SUBCUTANEOUS NIGHTLY
Status: DISCONTINUED | OUTPATIENT
Start: 2018-07-21 | End: 2018-07-24 | Stop reason: HOSPADM

## 2018-07-20 RX ORDER — NICOTINE POLACRILEX 4 MG
15 LOZENGE BUCCAL PRN
Status: DISCONTINUED | OUTPATIENT
Start: 2018-07-20 | End: 2018-07-24 | Stop reason: HOSPADM

## 2018-07-20 RX ORDER — DEXTROSE MONOHYDRATE 50 MG/ML
100 INJECTION, SOLUTION INTRAVENOUS PRN
Status: DISCONTINUED | OUTPATIENT
Start: 2018-07-20 | End: 2018-07-24 | Stop reason: HOSPADM

## 2018-07-20 RX ORDER — TIZANIDINE 4 MG/1
4 TABLET ORAL EVERY 6 HOURS PRN
Status: DISCONTINUED | OUTPATIENT
Start: 2018-07-20 | End: 2018-07-24 | Stop reason: HOSPADM

## 2018-07-20 RX ADMIN — TIZANIDINE 4 MG: 4 TABLET ORAL at 16:24

## 2018-07-20 RX ADMIN — ASPIRIN 81 MG: 81 TABLET, COATED ORAL at 09:38

## 2018-07-20 RX ADMIN — LISINOPRIL 5 MG: 5 TABLET ORAL at 09:39

## 2018-07-20 RX ADMIN — INSULIN GLARGINE 50 UNITS: 100 INJECTION, SOLUTION SUBCUTANEOUS at 21:13

## 2018-07-20 RX ADMIN — TRAZODONE HYDROCHLORIDE 50 MG: 50 TABLET ORAL at 21:12

## 2018-07-20 RX ADMIN — SERTRALINE HYDROCHLORIDE 200 MG: 100 TABLET ORAL at 09:38

## 2018-07-20 RX ADMIN — MOMETASONE FUROATE AND FORMOTEROL FUMARATE DIHYDRATE 2 PUFF: 200; 5 AEROSOL RESPIRATORY (INHALATION) at 09:37

## 2018-07-20 RX ADMIN — QUETIAPINE FUMARATE 400 MG: 200 TABLET ORAL at 21:12

## 2018-07-20 RX ADMIN — TIZANIDINE 4 MG: 4 TABLET ORAL at 21:12

## 2018-07-20 RX ADMIN — HYDROXYZINE HYDROCHLORIDE 25 MG: 25 TABLET, FILM COATED ORAL at 21:12

## 2018-07-20 RX ADMIN — IBUPROFEN 400 MG: 400 TABLET ORAL at 09:44

## 2018-07-20 RX ADMIN — QUETIAPINE FUMARATE 300 MG: 300 TABLET ORAL at 09:39

## 2018-07-20 RX ADMIN — QUETIAPINE FUMARATE 300 MG: 300 TABLET ORAL at 14:38

## 2018-07-20 RX ADMIN — OMEPRAZOLE 20 MG: 20 CAPSULE, DELAYED RELEASE ORAL at 09:38

## 2018-07-20 RX ADMIN — MOMETASONE FUROATE AND FORMOTEROL FUMARATE DIHYDRATE 2 PUFF: 200; 5 AEROSOL RESPIRATORY (INHALATION) at 21:12

## 2018-07-20 RX ADMIN — TIOTROPIUM BROMIDE 18 MCG: 18 CAPSULE ORAL; RESPIRATORY (INHALATION) at 09:38

## 2018-07-20 RX ADMIN — TROSPIUM CHLORIDE 20 MG: 20 TABLET ORAL at 21:12

## 2018-07-20 ASSESSMENT — LIFESTYLE VARIABLES: HISTORY_ALCOHOL_USE: NO

## 2018-07-20 ASSESSMENT — PAIN SCALES - GENERAL: PAINLEVEL_OUTOF10: 10

## 2018-07-20 ASSESSMENT — PAIN SCALES - WONG BAKER: WONGBAKER_NUMERICALRESPONSE: 4

## 2018-07-20 NOTE — PLAN OF CARE
Problem: Altered Mood, Psychotic Behavior:  Goal: Able to verbalize decrease in frequency and intensity of hallucinations  Able to verbalize decrease in frequency and intensity of hallucinations   Outcome: Ongoing  During my conversation with patient this evening patient admitted to hearing chatters, writer encouraged patient to continue taking prescribed medications and report any side effects to staff and go to all her community mental health appointments.

## 2018-07-20 NOTE — CARE COORDINATION
BHI Biopsychosocial Assessment    Current Level of Psychosocial Functioning     Independent: x  Dependent:    Minimal Assist:      Comments: Pt has own apartment in [address] [place] [address] High Risk Factors (check all that apply)    Unable to obtain meds: x  Chronic illness/pain:     Substance abuse:   Lack of Family Support:   Financial stress:  x  Isolation: x  Inadequate Community Resources: x  Suicide attempt(s):    Not taking medications: x   Victim of crime:    Developmental Delay:    Unable to manage personal needs: x  Age 72 or older:    Homeless:    No transportation:    Readmission within 30 days:    Unemployment:    Traumatic Event:      Comments:      Psychiatric Advanced Directives: None    Family to Rajinder Slaughter in Treatment: Son, Ric Falcon, Rich, Daughter in law,     Sexual Orientation: JAYLYN    Patient Strengths: Legal income, stable housing, linked to [place], no AoD use    Patient Barriers: Not medication compliant    Opiate Education: N/a    CMHC/mental health history: [facility]   medication management, group/individual therapies, family meetings, psycho -education, treatment team meetings to assist with stabilization    Initial Discharge Plan: to become stable on medications, return home, follow up with [facility]      Clinical Summary:      Pt is a 57year old anda American female who presented to the ED with SI, however pt did not have a plan. Pt states that she is not medication compliant at this time. Pt is linked to [address] [place] [address], [address] [place] [address] on file, and does have a CM. Pt states that she lives in her own apartment. Pt's son, Shae Marvin, is her emergency contact, [place] on file. Pt states that she does have a form of legal income and receives $1079/Month in SSDI. Pt states that she does not have a Hx of AoD use. Pt states that she does not have abuse issues from her past. Pt states that she does not have MI in her family.  Pt states that she has [employer] and [employer]. Pt states that she has graduated from [employer] and has some college. Pt states that she is currently having AH and SI, but denies VH and HI. Pt states that she has not attempted suicide in the past, but has been thinking about it lately.

## 2018-07-20 NOTE — PLAN OF CARE
Problem: Altered Mood, Psychotic Behavior:  Goal: Able to demonstrate trust by eating, participating in treatment and following staff's direction  Able to demonstrate trust by eating, participating in treatment and following staff's direction   Outcome: Ongoing  Pt did not attend RT group at 1100 d/t resting in room despite staff invitation to attend.

## 2018-07-20 NOTE — PLAN OF CARE
Problem: Altered Mood, Psychotic Behavior:  Goal: Able to demonstrate trust by eating, participating in treatment and following staff's direction  Able to demonstrate trust by eating, participating in treatment and following staff's direction   Outcome: Ongoing  Pt did not attend RT group at 1430 d/t resting in room despite staff invitation to attend.

## 2018-07-20 NOTE — PLAN OF CARE
Problem: Altered Mood, Psychotic Behavior:  Goal: Able to demonstrate trust by eating, participating in treatment and following staff's direction  Able to demonstrate trust by eating, participating in treatment and following staff's direction   Outcome: Ongoing  Patient has eaten breakfast and lunch in the day room area. Goal: Able to verbalize decrease in frequency and intensity of hallucinations  Able to verbalize decrease in frequency and intensity of hallucinations   Outcome: Ongoing  Patient reports hearing \"muffled voices\" all the time.

## 2018-07-21 LAB
GLUCOSE BLD-MCNC: 120 MG/DL (ref 65–105)
GLUCOSE BLD-MCNC: 87 MG/DL (ref 65–105)

## 2018-07-21 PROCEDURE — 82947 ASSAY GLUCOSE BLOOD QUANT: CPT

## 2018-07-21 PROCEDURE — 1240000000 HC EMOTIONAL WELLNESS R&B

## 2018-07-21 PROCEDURE — 6370000000 HC RX 637 (ALT 250 FOR IP): Performed by: INTERNAL MEDICINE

## 2018-07-21 PROCEDURE — 6370000000 HC RX 637 (ALT 250 FOR IP): Performed by: PSYCHIATRY & NEUROLOGY

## 2018-07-21 RX ADMIN — MOMETASONE FUROATE AND FORMOTEROL FUMARATE DIHYDRATE 2 PUFF: 200; 5 AEROSOL RESPIRATORY (INHALATION) at 21:41

## 2018-07-21 RX ADMIN — IBUPROFEN 400 MG: 400 TABLET ORAL at 15:50

## 2018-07-21 RX ADMIN — HYDROXYZINE HYDROCHLORIDE 25 MG: 25 TABLET, FILM COATED ORAL at 21:33

## 2018-07-21 RX ADMIN — TIZANIDINE 4 MG: 4 TABLET ORAL at 08:48

## 2018-07-21 RX ADMIN — MOMETASONE FUROATE AND FORMOTEROL FUMARATE DIHYDRATE 2 PUFF: 200; 5 AEROSOL RESPIRATORY (INHALATION) at 08:46

## 2018-07-21 RX ADMIN — QUETIAPINE FUMARATE 300 MG: 300 TABLET ORAL at 14:55

## 2018-07-21 RX ADMIN — QUETIAPINE FUMARATE 400 MG: 200 TABLET ORAL at 21:33

## 2018-07-21 RX ADMIN — TIZANIDINE 4 MG: 4 TABLET ORAL at 15:50

## 2018-07-21 RX ADMIN — OMEPRAZOLE 20 MG: 20 CAPSULE, DELAYED RELEASE ORAL at 08:49

## 2018-07-21 RX ADMIN — TROSPIUM CHLORIDE 20 MG: 20 TABLET ORAL at 21:33

## 2018-07-21 RX ADMIN — ASPIRIN 81 MG: 81 TABLET, COATED ORAL at 08:49

## 2018-07-21 RX ADMIN — SERTRALINE HYDROCHLORIDE 200 MG: 100 TABLET ORAL at 08:49

## 2018-07-21 RX ADMIN — INSULIN GLARGINE 40 UNITS: 100 INJECTION, SOLUTION SUBCUTANEOUS at 21:34

## 2018-07-21 RX ADMIN — TIOTROPIUM BROMIDE 18 MCG: 18 CAPSULE ORAL; RESPIRATORY (INHALATION) at 08:46

## 2018-07-21 RX ADMIN — QUETIAPINE FUMARATE 300 MG: 300 TABLET ORAL at 08:49

## 2018-07-21 RX ADMIN — LISINOPRIL 5 MG: 5 TABLET ORAL at 08:49

## 2018-07-21 RX ADMIN — TRAZODONE HYDROCHLORIDE 50 MG: 50 TABLET ORAL at 21:33

## 2018-07-21 RX ADMIN — IBUPROFEN 400 MG: 400 TABLET ORAL at 08:48

## 2018-07-21 ASSESSMENT — PAIN SCALES - GENERAL
PAINLEVEL_OUTOF10: 7
PAINLEVEL_OUTOF10: 0
PAINLEVEL_OUTOF10: 7
PAINLEVEL_OUTOF10: 7

## 2018-07-21 ASSESSMENT — PAIN DESCRIPTION - LOCATION: LOCATION: BACK

## 2018-07-21 NOTE — BH NOTE
PSYCHOEDUCATION GROUP NOTE       Date: 7/21/18             Start Time:    4:00                     End Time: 4:30      Number Participants in Group: 8      Name of group: [facility]         RT  SW  Nsg  LPN  x BHTII  Other       Participation Level:     None  Minimal   x Active Listener x Interactive    Monopolizing         Participation Quality:  x Appropriate  Inappropriate   x  Attentive   Intrusive   x  Sharing   Resistant     Supportive    Lethargic       Affective:   x Congruent  Incongruent  Blunted  Flat    Constricted  Anxious  Elated  Angry    Labile  Depressed  Other         Cognitive:  x Alert  Oriented PPTS     Concentration G x F  P    Attention Span G  F  P    Short-Term Memory G  F  P    Long-Term Memory G  F  P    ProblemSolving/  Decision Making G x F  P    Ability to Process  Information G  F  P       Contributing Factors             Delusional             Hallucinating             Flight of Ideas             Other: poor concentration       Modes of Intervention:  x Education x Support  Exploration    Clarifying x Problem Solving  Confrontation    Socialization  Limit Setting  Reality Testing    Activity  Movement  Media    Other:          Response to Learning:  x Able to verbalize current knowledge/experience    Able to verbalize/acknowledge new learning    Able to retain information   x Capable of insight    Able to change behavior    Progressing to goal    Other:        Comments:

## 2018-07-21 NOTE — CONSULTS
Yes Shai Rae DO   QUEtiapine (SEROQUEL) 300 MG tablet Take 1 tablet in the morning and 1 tablet in the afternoon as directed by [id number] [place] 2/17/18  Yes Rossy Hunt MD   QUEtiapine (SEROQUEL) 400 MG tablet Take 1 tablet by mouth nightly As directed by [id number] [place] 2/17/18  Yes Rossy Hunt MD   VESICARE 5 MG tablet TAKE 1 TABLET BY MOUTH DAILY 10/24/17  Yes Amos Chao MD   ADVAIR DISKUS 250-50 MCG/DOSE AEPB INHALE 1 PUFF TWICE A DAY 10/17/17  Yes Amos Chao MD   ibuprofen (ADVIL;MOTRIN) 400 MG tablet TAKE 1 TABLET EVERY 8 HOURS AS NEEDED FOR PAIN WITH FOOD 10/12/17  Yes Amos Chao MD   lisinopril (PRINIVIL;ZESTRIL) 5 MG tablet Take 1 tablet by mouth daily 10/5/17  Yes Amos Chao MD   insulin glargine (LANTUS SOLOSTAR) 100 UNIT/ML injection pen Inject 55 Units into the skin nightly  Patient taking differently: Inject 50 Units into the skin nightly  10/5/17  Yes Amos Chao MD   aspirin 81 MG EC tablet Take 1 tablet by mouth daily 9/13/17  Yes Audrey Hunt MD   sertraline (ZOLOFT) 50 MG tablet Take 3 tablets by mouth daily 2/13/17  Yes Daisha Murrieta MD   traZODone (DESYREL) 50 MG tablet Take 1 tablet by mouth nightly as needed for Sleep 2/13/17  Yes Daisha Murrieta MD   omeprazole (PRILOSEC) 20 MG delayed release capsule Take 20 mg by mouth daily   Yes Historical Provider, MD   tiotropium (SPIRIVA) 18 MCG inhalation capsule Inhale 18 mcg into the lungs daily   Yes Historical Provider, MD   QUEtiapine (SEROQUEL) 300 MG tablet Take 1 tablet by mouth 2 times daily for 14 days 11/20/17 12/4/17  Dot Rojas MD   QUEtiapine (SEROQUEL) 400 MG tablet Take 1 tablet by mouth nightly for 14 days 11/20/17 12/4/17  Dot Rojas MD   Glucose Blood (BLOOD GLUCOSE TEST STRIPS) STRP Check Blood sugar 3 times/day , Dx:  Insulin dependent DM Please substitute for brand compatible with patients glucometer 10/25/17   Amos Chao MD   Blood Glucose Monitoring Suppl (ONE TOUCH ULTRA MINI) w/Device KIT use as directed 9/21/17   Historical Provider, MD   Alcohol Swabs (ALCOHOL PADS) 70 % PADS Use as directed. Dx  Insulin dependent DM 9/21/17   Amos Chao MD   glucose monitoring kit (FREESTYLE) monitoring kit Use as directed. Dx  Insulin dependent DM, Please substitute it with Glucometer covered by patients insurance or patients choice. 9/21/17   Amos Chao MD   Lancets MISC Use as directed to test 2-3 times/day, Dx Insulin dependent DM 9/21/17   Amos Chao MD   DPH-Lido-AlHydr-MgHydr-Simeth (MAGIC MOUTHWASH) SUSP Swish and spit 5 mLs 4 times daily as needed for Irritation 9/7/17   Amos Chao MD   Insulin Pen Needle 31G X 5 MM MISC Use as directed. Dx Insulin dependent DM 8/3/17   Chris Ariza MD   benzocaine (ORAL ANALGESIC MAX ST) 20 % GEL mucosal gel Apply topically to affected area up to four times a day as needed for pain relief 12/5/16   Leonid Padron PA-C   albuterol (PROVENTIL HFA;VENTOLIN HFA) 108 (90 BASE) MCG/ACT inhaler Inhale 1 puff into the lungs 4 times daily as needed for Wheezing     Historical Provider, MD        Allergies:     Flexeril [cyclobenzaprine]; Prochlorperazine edisylate; and Thorazine [chlorpromazine]    Social History:     Tobacco:    reports that she has been smoking Cigarettes. She has a 2.50 pack-year smoking history. She has never used smokeless tobacco.  Alcohol:      reports that she does not drink alcohol. Drug Use:  reports that she does not use drugs. Family History:     Family History   Problem Relation Age of Onset    Diabetes Father     Stroke Father     High Blood Pressure Father        Review of Systems:     Positive and Negative as described in HPI. CONSTITUTIONAL:  negative for fevers, chills, sweats, fatigue, weight loss  HEENT:  negative for vision, hearing changes, runny nose, throat pain  RESPIRATORY:  negative for shortness of breath, cough, congestion, wheezing.   CARDIOVASCULAR:  negative for chest pain, palpitations. GASTROINTESTINAL:  negative for nausea, vomiting, diarrhea, constipation, change in bowel habits, abdominal pain   GENITOURINARY:  negative for difficulty of urination, burning with urination, frequency   INTEGUMENT:  negative for rash, skin lesions, easy bruising   HEMATOLOGIC/LYMPHATIC:  negative for swelling/edema   ALLERGIC/IMMUNOLOGIC:  negative for urticaria , itching  ENDOCRINE:  negative increase in drinking, increase in urination, hot or cold intolerance  MUSCULOSKELETAL:  negative joint pains, muscle aches, swelling of joints  NEUROLOGICAL:  negative for headaches, dizziness, lightheadedness, numbness, pain, tingling extremities      Physical Exam:     /62   Pulse 75   Temp 98.3 °F (36.8 °C) (Oral)   Resp 14   Ht 5' 6\" (1.676 m)   Wt 202 lb (91.6 kg)   SpO2 97%   BMI 32.60 kg/m²   Temp (24hrs), Av.9 °F (36.6 °C), Min:97.5 °F (36.4 °C), Max:98.3 °F (36.8 °C)    Recent Labs      18   0754  18   1147  18   1707  18   2035   POCGLU  95  115*  104  110*     No intake or output data in the 24 hours ending 18 2349    General Appearance:  alert, well appearing, and in no acute distress  Mental status: oriented to person, place, and time with normal affect  Head:  normocephalic, atraumatic. Eye: no icterus, redness, pupils equal and reactive, extraocular eye movements intact, conjunctiva clear  Ear: normal external ear, no discharge, hearing intact  Nose:  no drainage noted  Mouth: mucous membranes moist  Neck: supple, no carotid bruits, thyroid not palpable  Lungs: Bilateral equal air entry, clear to ausculation, no wheezing, rales or rhonchi, normal effort  Cardiovascular: normal rate, regular rhythm, no murmur, gallop, rub.   Abdomen: Soft, nontender, nondistended, normal bowel sounds, no hepatomegaly or splenomegaly  Neurologic: There are no new focal motor or sensory deficits, normal muscle tone and bulk, no abnormal sensation, normal speech, cranial nerves II through XII grossly intact  Skin: No gross lesions, rashes, bruising or bleeding on exposed skin area  Extremities:  peripheral pulses palpable, no pedal edema or calf pain with palpation      Investigations:      Laboratory Testing:  Recent Results (from the past 24 hour(s))   POC Glucose Fingerstick    Collection Time: 07/20/18  7:54 AM   Result Value Ref Range    POC Glucose 95 65 - 105 mg/dL   POC Glucose Fingerstick    Collection Time: 07/20/18 11:47 AM   Result Value Ref Range    POC Glucose 115 (H) 65 - 105 mg/dL   POC Glucose Fingerstick    Collection Time: 07/20/18  5:07 PM   Result Value Ref Range    POC Glucose 104 65 - 105 mg/dL   POC Glucose Fingerstick    Collection Time: 07/20/18  8:35 PM   Result Value Ref Range    POC Glucose 110 (H) 65 - 105 mg/dL       Imaging/Diagonstics:      Assessment :      Primary Problem  <principal problem not specified>    Active Hospital Problems    Diagnosis Date Noted    [facility] [F20.9[facility] 07/18/2018       Plan:   Dm ,htn  pnd antoinette do flonase  bordeline low sugar  antoinette cut back lantus to 40 form  50  Glucose log    1. Consultations:   IP CONSULT TO HISTORY AND PHYSICAL  IP CONSULT TO INTERNAL MEDICINE      Anne Alvarez MD  7/20/2018  11:49 PM    Copy sent to Dr. Dontae Martinez MD    Please note that this chart was generated using voice recognition Dragon dictation software. Although every effort was made to ensure the accuracy of this automated transcription, some errors in transcription may have occurred.

## 2018-07-21 NOTE — PLAN OF CARE
Problem: Altered Mood, Psychotic Behavior:  Goal: Able to demonstrate trust by eating, participating in treatment and following staff's direction  Able to demonstrate trust by eating, participating in treatment and following staff's direction   Outcome: Ongoing  PSYCHOEDUCATION GROUP NOTE    Date: 7/21/18  Start Time: 1000  End Time: 1045    Number Participants in Group:  7/14    Goal:  Patient will demonstrate increased interpersonal interaction   Topic: Leisure Awareness/Socialization/Problem Solving    Discipline Responsible:   OT  AT  [facility]. x RT MHP Other       Participation Level:     None  Minimal   x Active Listener x Interactive    Monopolizing         Participation Quality:  x Appropriate  Inappropriate   x       Attentive        Intrusive          Sharing        Resistant          Supportive        Lethargic       Affective:    Congruent  Incongruent  Blunted  Flat   x Constricted  Anxious  Elated  Angry    Labile  Depressed  Other         Cognitive:  x Alert x Oriented PPTP     Concentration x G  F  P   Attention Span x G  F  P   Short-Term Memory x G  F  P   Long-Term Memory x G  F  P   ProblemSolving/  Decision Making x G  F  P   Ability to Process  Information x G  F  P      Contributing Factors             Delusional             Hallucinating             Flight of Ideas             Other:       Modes of Intervention:   Education x Support x Exploration    Clarifying x Problem Solving  Confrontation   x Socialization  Limit Setting x Reality Testing   x Activity  Movement  Media    Other:            Response to Learning:   Able to verbalize current knowledge/experience    Able to verbalize/acknowledge new learning   x Able to retain information    Capable of insight    Able to change behavior   x Progressing to goal    Other:        Comments:

## 2018-07-21 NOTE — PLAN OF CARE
Problem: Altered Mood, Psychotic Behavior:  Goal: Able to demonstrate trust by eating, participating in treatment and following staff's direction  Able to demonstrate trust by eating, participating in treatment and following staff's direction   Outcome: Ongoing  Patient ate snack in dayroom with other patient. Patient took all medications as prescribed. Patient was behavior compliant this shift. Goal: Able to verbalize decrease in frequency and intensity of hallucinations  Able to verbalize decrease in frequency and intensity of hallucinations   Outcome: Ongoing  Patient reports hearing muffled voices all the time. Patient does not report any other hallucinations. Patient is fully oriented.

## 2018-07-22 LAB — GLUCOSE BLD-MCNC: 77 MG/DL (ref 65–105)

## 2018-07-22 PROCEDURE — 1240000000 HC EMOTIONAL WELLNESS R&B

## 2018-07-22 PROCEDURE — 82947 ASSAY GLUCOSE BLOOD QUANT: CPT

## 2018-07-22 PROCEDURE — 6370000000 HC RX 637 (ALT 250 FOR IP): Performed by: INTERNAL MEDICINE

## 2018-07-22 PROCEDURE — 6370000000 HC RX 637 (ALT 250 FOR IP): Performed by: PSYCHIATRY & NEUROLOGY

## 2018-07-22 RX ADMIN — QUETIAPINE FUMARATE 400 MG: 200 TABLET ORAL at 21:07

## 2018-07-22 RX ADMIN — TROSPIUM CHLORIDE 20 MG: 20 TABLET ORAL at 21:08

## 2018-07-22 RX ADMIN — HYDROXYZINE HYDROCHLORIDE 25 MG: 25 TABLET, FILM COATED ORAL at 21:07

## 2018-07-22 RX ADMIN — TIOTROPIUM BROMIDE 18 MCG: 18 CAPSULE ORAL; RESPIRATORY (INHALATION) at 08:33

## 2018-07-22 RX ADMIN — ASPIRIN 81 MG: 81 TABLET, COATED ORAL at 08:35

## 2018-07-22 RX ADMIN — IBUPROFEN 400 MG: 400 TABLET ORAL at 22:58

## 2018-07-22 RX ADMIN — MAGNESIUM HYDROXIDE 30 ML: 400 SUSPENSION ORAL at 21:35

## 2018-07-22 RX ADMIN — LISINOPRIL 5 MG: 5 TABLET ORAL at 08:35

## 2018-07-22 RX ADMIN — OMEPRAZOLE 20 MG: 20 CAPSULE, DELAYED RELEASE ORAL at 08:35

## 2018-07-22 RX ADMIN — MOMETASONE FUROATE AND FORMOTEROL FUMARATE DIHYDRATE 2 PUFF: 200; 5 AEROSOL RESPIRATORY (INHALATION) at 08:33

## 2018-07-22 RX ADMIN — TIZANIDINE 4 MG: 4 TABLET ORAL at 21:07

## 2018-07-22 RX ADMIN — TRAZODONE HYDROCHLORIDE 50 MG: 50 TABLET ORAL at 21:07

## 2018-07-22 RX ADMIN — INSULIN GLARGINE 40 UNITS: 100 INJECTION, SOLUTION SUBCUTANEOUS at 21:09

## 2018-07-22 RX ADMIN — MOMETASONE FUROATE AND FORMOTEROL FUMARATE DIHYDRATE 2 PUFF: 200; 5 AEROSOL RESPIRATORY (INHALATION) at 21:08

## 2018-07-22 RX ADMIN — TIZANIDINE 4 MG: 4 TABLET ORAL at 11:51

## 2018-07-22 RX ADMIN — IBUPROFEN 400 MG: 400 TABLET ORAL at 11:51

## 2018-07-22 RX ADMIN — QUETIAPINE FUMARATE 300 MG: 300 TABLET ORAL at 15:17

## 2018-07-22 RX ADMIN — QUETIAPINE FUMARATE 300 MG: 300 TABLET ORAL at 08:35

## 2018-07-22 RX ADMIN — SERTRALINE HYDROCHLORIDE 200 MG: 100 TABLET ORAL at 08:35

## 2018-07-22 ASSESSMENT — PAIN SCALES - GENERAL
PAINLEVEL_OUTOF10: 8
PAINLEVEL_OUTOF10: 6

## 2018-07-22 NOTE — BH NOTE
All assessments and charting done by LPN reviewed.     Electronically signed by Gus Henriquez RN on 7/22/2018 at 5:44 AM

## 2018-07-22 NOTE — PLAN OF CARE
Problem: Falls - Risk of:  Goal: Will remain free from falls  Will remain free from falls   Outcome: Ongoing  Pt remains free of falls and verbalizes understanding of individual fall risks. Pt wearing non skid footwear and encouraged to seek out staff for any assistance needed. Goal: Absence of physical injury  Absence of physical injury   Outcome: Ongoing      Problem: Altered Mood, Psychotic Behavior:  Goal: Able to demonstrate trust by eating, participating in treatment and following staff's direction  Able to demonstrate trust by eating, participating in treatment and following staff's direction   Outcome: Ongoing  Pt continues to have auditory hallucinations. Denies suicidal or homicidal ideations. No self harm . Pt is compliant with medications. Attends select groups. Social with select peers. Goal: Able to verbalize decrease in frequency and intensity of hallucinations  Able to verbalize decrease in frequency and intensity of hallucinations   Outcome: Ongoing      Problem: Pain:  Goal: Pain level will decrease  Pain level will decrease   Outcome: Ongoing  Utilizing ordered medications for complaints of chronic pain.    Goal: Control of chronic pain  Control of chronic pain   Outcome: Ongoing

## 2018-07-22 NOTE — PLAN OF CARE
Psychoeducation Group Note    Date: 7/22/18  Start Time: 10am  End Time: 1045am    Number Participants in Group:  6    Goal:  Patient will demonstrate increased interpersonal interaction   Topic: Healthy Boundaries    Discipline Responsible:   OT  AT X SW  Nsg.  RT  Other       Participation Level:     None  Minimal   X Active Listener  Interactive    Monopolizing         Participation Quality:  X Appropriate  Inappropriate   X       Attentive        Intrusive   X       Sharing        Resistant   X       Supportive        Lethargic       Affective:   X Congruent  Incongruent  Blunted  Flat    Constricted  Anxious  Elated  Angry    Labile  Depressed  Other         Cognitive:  X Alert X Oriented PPTP     Concentration X G  F  P   Attention Span X G  F  P   Short-Term Memory X G  F  P   Long-Term Memory X G  F  P   ProblemSolving/  Decision Making X G  F  P   Ability to Process  Information X G  F  P     N/A Contributing Factors             Delusional             Hallucinating             Flight of Ideas             Other:       Modes of Intervention:  X Education X Support X Exploration   X Clarifying X Problem Solving X Confrontation   X Socialization X Limit Setting  Reality Testing    Activity  Movement  Media    Other:            Response to Learning:  X Able to verbalize current knowledge/experience   X Able to verbalize/acknowledge new learning   X Able to retain information   X Capable of insight   X Able to change behavior   X Progressing to goal    Other:        Comments:

## 2018-07-22 NOTE — PLAN OF CARE
Problem: Altered Mood, Psychotic Behavior:  Goal: Able to demonstrate trust by eating, participating in treatment and following staff's direction  Able to demonstrate trust by eating, participating in treatment and following staff's direction   Outcome: Ongoing  [id number] [address]  Day 3 Interdisciplinary Treatment Plan NOTE    Review Date & Time: 7/21/18 0930    Admission Type:   Admission Type: Voluntary ([facility] AN AFFILIATE OF [facility])    Reason for admission:  Reason for Admission: patient having SI with no plan, hearing voices right outside of her apartment door stating \"go in and get her\"  Estimated Length of Stay: 5-7 days  Estimated Discharge Date Update: to be determined by physician    PATIENT STRENGTHS:  Patient Strengths Strengths: Connection to output provider  Patient Strengths and Limitations:Limitations: Perceives need for assistance with self-care  Addictive Behavior:Addictive Behavior  In the past 3 months, have you felt or has someone told you that you have a problem with:  : None  Do you have a history of Chemical Use?: No  Do you have a history of Alcohol Use?: No  Do you have a history of Street Drug Abuse?: No  Histroy of Prescripton Drug Abuse?: No  Medical Problems:  Past Medical History:   Diagnosis Date    [facility] [address] [facility]     Breast lump     Chronic obstructive pulmonary disease ([facility] [address] 8/3/2017    Depression     GERD (gastroesophageal reflux disease)     Hyperlipidemia     Hypertension     Osteoarthritis     Type II or unspecified type diabetes mellitus without mention of complication, not stated as uncontrolled        Risk:  Fall RiskTotal: 99  Pollo Scale Pollo Scale Score: 19  BVC Total: 0  Change in scores no Changes to plan of Care no    Status EXAM:   Status and Exam  Normal: No  Facial Expression: Flat  Affect: Blunt  Level of Consciousness: Alert  Mood:Normal: No  Mood: Depressed  Motor Activity:Normal: No  Motor Activity: Decreased  Interview Behavior: Cooperative  Preception: Liberty to Person, Liberty to Time, Liberty to Place, Liberty to Situation  Attention:Normal: No  Attention: Distractible  Thought Processes: Blocking  Thought Content:Normal: No  Thought Content: Preoccupations, Poverty of Content  Hallucinations: Auditory (Comment)  Delusions: No  Memory:Normal: No  Memory: Poor Recent  Insight and Judgment: No  Insight and Judgment: Poor Insight, Unmotivated  Present Suicidal Ideation: No  Present Homicidal Ideation: No    Daily Assessment Last Entry:   Daily Sleep (WDL): Within Defined Limits         Patient Currently in Pain: Denies  Daily Nutrition (WDL): Within Defined Limits    Patient Monitoring:  Frequency of Checks: 4 times per hour, close    Psychiatric Symptoms:   Depression Symptoms  Depression Symptoms: Loss of interest, Isolative  Anxiety Symptoms  Anxiety Symptoms: Generalized  Bernadette Symptoms  Bernadette Symptoms: Poor judgment     Psychosis Symptoms  Delusion Type: No problems reported or observed. Suicide Risk CSSR-S:  1) Within the past month, have you wished you were dead or wished you could go to sleep and not wake up? : YES  2) Within the past month, have you actually had any thoughts of killing yourself? : YES  3) Within the past month, have you been thinking about how you might kill yourself? : NO  5) Within the past month, have you started to work out or worked out the details of how to kill yourself?  Do you intend to carry out this plan? : NO  6)  Have you ever done anything, started to do anything, or prepared to do anything to end your life?: NO  Change in Result no Change in Plan of care no      EDUCATION:   EDUCATION:   Learner Progress Toward Treatment Goals: Reviewed results and recommendations of this team, Reviewed group plan and strategies, Reviewed signs, symptoms and risk of self harm and violent behavior, Reviewed goals and plan of care    Method:small group, individual verbal education    Outcome:verbalized by patient, but needs reinforcement to obtain goals    PATIENT GOALS:  Short term: To continue to talk to the doctor about her medications   Long term: To follow up with [facility] and continue to take medications     PLAN/TREATMENT RECOMMENDATIONS UPDATE: continue with group therapies, increased socialization, continue planning for after discharge goals, continue with medication compliance    SHORT-TERM GOALS UPDATE:   Time frame for Short-Term Goals: 5-7 days    LONG-TERM GOALS UPDATE:   Time frame for Long-Term Goals: 6 months  Members Present in Team Meeting: See Signature Sheet    ALEXUS Harmon  Goal: Able to verbalize decrease in frequency and intensity of hallucinations  Able to verbalize decrease in frequency and intensity of hallucinations   Outcome: Ongoing

## 2018-07-23 LAB
GLUCOSE BLD-MCNC: 88 MG/DL (ref 65–105)
GLUCOSE BLD-MCNC: 97 MG/DL (ref 65–105)

## 2018-07-23 PROCEDURE — 6370000000 HC RX 637 (ALT 250 FOR IP): Performed by: INTERNAL MEDICINE

## 2018-07-23 PROCEDURE — 1240000000 HC EMOTIONAL WELLNESS R&B

## 2018-07-23 PROCEDURE — 82947 ASSAY GLUCOSE BLOOD QUANT: CPT

## 2018-07-23 PROCEDURE — 97535 SELF CARE MNGMENT TRAINING: CPT

## 2018-07-23 PROCEDURE — 97165 OT EVAL LOW COMPLEX 30 MIN: CPT

## 2018-07-23 PROCEDURE — 6370000000 HC RX 637 (ALT 250 FOR IP): Performed by: PSYCHIATRY & NEUROLOGY

## 2018-07-23 RX ORDER — QUETIAPINE FUMARATE 300 MG/1
300 TABLET, FILM COATED ORAL 2 TIMES DAILY
Qty: 60 TABLET | Refills: 3 | Status: SHIPPED | OUTPATIENT
Start: 2018-07-24 | End: 2019-02-26

## 2018-07-23 RX ORDER — TRAZODONE HYDROCHLORIDE 50 MG/1
50 TABLET ORAL NIGHTLY PRN
Qty: 30 TABLET | Refills: 0 | Status: SHIPPED | OUTPATIENT
Start: 2018-07-23 | End: 2019-02-04

## 2018-07-23 RX ORDER — SERTRALINE HYDROCHLORIDE 100 MG/1
200 TABLET, FILM COATED ORAL DAILY
Qty: 60 TABLET | Refills: 0 | Status: SHIPPED | OUTPATIENT
Start: 2018-07-23 | End: 2019-02-04

## 2018-07-23 RX ORDER — NICOTINE 21 MG/24HR
1 PATCH, TRANSDERMAL 24 HOURS TRANSDERMAL DAILY
Qty: 30 PATCH | Refills: 0 | Status: SHIPPED | OUTPATIENT
Start: 2018-07-24 | End: 2019-02-04

## 2018-07-23 RX ORDER — QUETIAPINE FUMARATE 400 MG/1
400 TABLET, FILM COATED ORAL NIGHTLY
Qty: 30 TABLET | Refills: 0 | Status: SHIPPED | OUTPATIENT
Start: 2018-07-23 | End: 2019-02-04

## 2018-07-23 RX ORDER — HYDROXYZINE HYDROCHLORIDE 25 MG/1
25 TABLET, FILM COATED ORAL 2 TIMES DAILY PRN
Qty: 60 TABLET | Refills: 0 | Status: SHIPPED | OUTPATIENT
Start: 2018-07-23 | End: 2019-02-04

## 2018-07-23 RX ORDER — TIZANIDINE 4 MG/1
4 TABLET ORAL 2 TIMES DAILY PRN
Qty: 60 TABLET | Refills: 0 | Status: SHIPPED | OUTPATIENT
Start: 2018-07-23 | End: 2019-02-04

## 2018-07-23 RX ORDER — QUETIAPINE FUMARATE 300 MG/1
300 TABLET, FILM COATED ORAL 2 TIMES DAILY
Qty: 60 TABLET | Refills: 0 | Status: SHIPPED | OUTPATIENT
Start: 2018-07-23 | End: 2019-02-04

## 2018-07-23 RX ADMIN — TIZANIDINE 4 MG: 4 TABLET ORAL at 08:36

## 2018-07-23 RX ADMIN — IBUPROFEN 400 MG: 400 TABLET ORAL at 08:36

## 2018-07-23 RX ADMIN — HYDROXYZINE HYDROCHLORIDE 25 MG: 25 TABLET, FILM COATED ORAL at 21:18

## 2018-07-23 RX ADMIN — OMEPRAZOLE 20 MG: 20 CAPSULE, DELAYED RELEASE ORAL at 08:33

## 2018-07-23 RX ADMIN — INSULIN GLARGINE 40 UNITS: 100 INJECTION, SOLUTION SUBCUTANEOUS at 21:19

## 2018-07-23 RX ADMIN — QUETIAPINE FUMARATE 300 MG: 300 TABLET ORAL at 14:11

## 2018-07-23 RX ADMIN — MOMETASONE FUROATE AND FORMOTEROL FUMARATE DIHYDRATE 2 PUFF: 200; 5 AEROSOL RESPIRATORY (INHALATION) at 08:31

## 2018-07-23 RX ADMIN — QUETIAPINE FUMARATE 400 MG: 200 TABLET ORAL at 21:18

## 2018-07-23 RX ADMIN — SERTRALINE HYDROCHLORIDE 200 MG: 100 TABLET ORAL at 08:33

## 2018-07-23 RX ADMIN — TROSPIUM CHLORIDE 20 MG: 20 TABLET ORAL at 21:18

## 2018-07-23 RX ADMIN — TIZANIDINE 4 MG: 4 TABLET ORAL at 21:18

## 2018-07-23 RX ADMIN — QUETIAPINE FUMARATE 300 MG: 300 TABLET ORAL at 08:33

## 2018-07-23 RX ADMIN — TIOTROPIUM BROMIDE 18 MCG: 18 CAPSULE ORAL; RESPIRATORY (INHALATION) at 08:32

## 2018-07-23 RX ADMIN — ASPIRIN 81 MG: 81 TABLET, COATED ORAL at 08:33

## 2018-07-23 RX ADMIN — TRAZODONE HYDROCHLORIDE 50 MG: 50 TABLET ORAL at 21:18

## 2018-07-23 RX ADMIN — IBUPROFEN 400 MG: 400 TABLET ORAL at 21:18

## 2018-07-23 RX ADMIN — MOMETASONE FUROATE AND FORMOTEROL FUMARATE DIHYDRATE 2 PUFF: 200; 5 AEROSOL RESPIRATORY (INHALATION) at 21:17

## 2018-07-23 RX ADMIN — LISINOPRIL 5 MG: 5 TABLET ORAL at 08:33

## 2018-07-23 ASSESSMENT — PAIN SCALES - GENERAL
PAINLEVEL_OUTOF10: 6
PAINLEVEL_OUTOF10: 3

## 2018-07-23 NOTE — BH NOTE
All assessments and charting done by LPN reviewed.     Electronically signed by Dmitri Pablo RN on 7/23/2018 at 6:36 AM

## 2018-07-23 NOTE — PLAN OF CARE
Problem: Altered Mood, Psychotic Behavior:  Goal: Able to demonstrate trust by eating, participating in treatment and following staff's direction  Able to demonstrate trust by eating, participating in treatment and following staff's direction   Outcome: Ongoing  PSYCHOEDUCATION GROUP NOTE    Date: July 23, 2018  Start Time: 0900  End Time: 0920    Number Participants in Group:  6/14    Goal:  Patient will demonstrate increased interpersonal interaction   Topic: [employer] and Goal Setting     Discipline Responsible:   OT  AT  [facility]. x RT MHP Other       Participation Level:     None  Minimal   x Active Listener x Interactive    Monopolizing         Participation Quality:  x Appropriate  Inappropriate   x       Attentive        Intrusive   x       Sharing        Resistant          Supportive        Lethargic       Affective:   x Congruent  Incongruent  Blunted  Flat    Constricted  Anxious  Elated  Angry    Labile  Depressed  Other         Cognitive:  x Alert x Oriented PPTP     Concentration x G  F  P   Attention Span x G  F  P   Short-Term Memory x G  F  P   Long-Term Memory x G  F  P   ProblemSolving/  Decision Making x G  F  P   Ability to Process  Information x G  F  P      Contributing Factors             Delusional             Hallucinating             Flight of Ideas             Other:       Modes of Intervention:  x Education  Support  Exploration   x Clarifying x Problem Solving  Confrontation   x Socialization  Limit Setting x Reality Testing   x Activity  Movement  Media    Other:            Response to Learning:   Able to verbalize current knowledge/experience    Able to verbalize/acknowledge new learning    Able to retain information    Capable of insight    Able to change behavior   x Progressing to goal    Other:        Comments:

## 2018-07-23 NOTE — BH NOTE
Wellness Group Note    Date: 6/23/18  Start Time: 1600  End Time: 8787    Number Participants in Group:  6/13    Goal:  Patient will demonstrate increased interpersonal interaction   Topic:     Discipline Responsible: BHT      Participation Level:      X Interactive   Monopolizing    Minimal   None    Active Listener  Left Early       Participation Quality:    X Appropriate  Inappropriate     X       Attentive        Intrusive     X       Sharing        Resistant     X       Supportive        Lethargic       Affective:     X Congruent  Incongruent  Blunted  Flat    Constricted  Anxious  Elated  Angry    Labile  Depressed  Other         Cognitive:    X  Alert   X  Oriented PPTP     Concentration   X   G    F    P   Attention Span   X   G    F    P   Short-Term Memory   X   G    F    P   Long-Term Memory   X   G    F    P   ProblemSolving   X   G    F    P   Ability to Process Info   X   G    F    P      Contributing Factors             Delusional             Hallucinating             Flight of Ideas             Other:       Modes of Intervention:    X Education    Support   X Exploration    Clarifying  Problem Solving  Confrontation     X Socialization  Limit Setting  Reality Testing     X Activity  Movement  Media    Other:            Response to Learning:    X  Able to verbalize current knowledge/experience     Able to verbalize/acknowledge new learning     X  Able to retain information     X  Capable of insight     Able to change behavior     X  Progressing to goal     Other:        Comments:

## 2018-07-23 NOTE — PLAN OF CARE
Problem: Altered Mood, Psychotic Behavior:  Goal: Able to demonstrate trust by eating, participating in treatment and following staff's direction  Able to demonstrate trust by eating, participating in treatment and following staff's direction   Outcome: Ongoing  PSYCHOEDUCATION GROUP NOTE    Date: 7/23/18  Start Time: 1100  End Time: 1130    Number Participants in Group:  7/14    Goal:  Patient will demonstrate increased interpersonal interaction   Topic: Leisure Awareness/Problem Solving/Socialization    Discipline Responsible:   OT  AT  [facility]. x RT MHP Other       Participation Level:     None  Minimal   x Active Listener x Interactive    Monopolizing         Participation Quality:  x Appropriate  Inappropriate   x       Attentive        Intrusive          Sharing        Resistant          Supportive        Lethargic       Affective:   x Congruent  Incongruent  Blunted  Flat    Constricted  Anxious  Elated  Angry    Labile  Depressed  Other         Cognitive:  x Alert x Oriented PPTP     Concentration x G  F  P   Attention Span x G  F  P   Short-Term Memory x G  F  P   Long-Term Memory x G  F  P   ProblemSolving/  Decision Making x G  F  P   Ability to Process  Information x G  F  P      Contributing Factors             Delusional             Hallucinating             Flight of Ideas             Other:       Modes of Intervention:   Education x Support x Exploration    Clarifying x Problem Solving  Confrontation   x Socialization  Limit Setting x Reality Testing   x Activity  Movement  Media    Other:            Response to Learning:   Able to verbalize current knowledge/experience   x Able to verbalize/acknowledge new learning   x Able to retain information    Capable of insight    Able to change behavior   x Progressing to goal    Other:        Comments:

## 2018-07-23 NOTE — PLAN OF CARE
Problem: Altered Mood, Psychotic Behavior:  Goal: Able to demonstrate trust by eating, participating in treatment and following staff's direction  Able to demonstrate trust by eating, participating in treatment and following staff's direction   Outcome: Ongoing  Patient denies any thoughts of self harm. Denies hallucinations. Takes medications as prescribed. Attends select groups. 15 minute checks maintained at irregular intervals for safety.

## 2018-07-23 NOTE — PLAN OF CARE
Problem: Altered Mood, Psychotic Behavior:  Goal: Able to demonstrate trust by eating, participating in treatment and following staff's direction  Able to demonstrate trust by eating, participating in treatment and following staff's direction   Outcome: Ongoing  PSYCHOEDUCATION GROUP NOTE    Date: July 23, 2018  Start Time: 1330  End Time: 1415    Number Participants in Group:  3/14    Goal:  Patient will demonstrate increased interpersonal interaction   Topic: Cognitive Skills Group     Discipline Responsible:   OT  AT  [facility]. x RT MHP Other       Participation Level:     None  Minimal   x Active Listener x Interactive    Monopolizing         Participation Quality:  x Appropriate  Inappropriate   x       Attentive        Intrusive   x       Sharing        Resistant          Supportive        Lethargic       Affective:   x Congruent  Incongruent  Blunted  Flat    Constricted  Anxious  Elated  Angry    Labile  Depressed  Other         Cognitive:  x Alert x Oriented PPTP     Concentration x G  F  P   Attention Span x G  F  P   Short-Term Memory x G  F  P   Long-Term Memory x G  F  P   ProblemSolving/  Decision Making x G  F  P   Ability to Process  Information x G  F  P      Contributing Factors             Delusional             Hallucinating             Flight of Ideas             Other:       Modes of Intervention:  x Education  Support  Exploration    Clarifying x Problem Solving  Confrontation   x Socialization  Limit Setting x Reality Testing   x Activity  Movement  Media    Other:            Response to Learning:   Able to verbalize current knowledge/experience    Able to verbalize/acknowledge new learning    Able to retain information    Capable of insight    Able to change behavior   x Progressing to goal    Other:        Comments:

## 2018-07-23 NOTE — BH NOTE
Date:  7/22/18 Start Time: 8:00pm  End Time: 8:45pm    Number Participants in Group:  6/13    Goal:  Patient will demonstrate increased interpersonal interaction   Topic:  Wrap Up and Relaxation Groups    Discipline Responsible:   OT  AT   x Nsg.  RT  Other       Participation Level:     None  Minimal   x Active Listener x Interactive    Monopolizing         Participation Quality:  x Appropriate  Inappropriate   x       Attentive        Intrusive   x       Sharing        Resistant   x       Supportive        Lethargic       Affective:   x Congruent  Incongruent  Blunted  Flat    Constricted  Anxious  Elated  Angry    Labile  Depressed  Other         Cognitive:  x Alert  Oriented PPTP     Concentration x G  F  P   Attention Span x G  F  P   Short-Term Memory x G  F  P   Long-Term Memory x G  F  P   ProblemSolving/  Decision Making x G  F  P   Ability to Process  Information x G  F  P      Contributing Factors             Delusional             Hallucinating             Flight of Ideas             Other:       Modes of Intervention:  x Education  Support  Exploration   x Clarifying  Problem Solving  Confrontation    Socialization  Limit Setting  Reality Testing    Activity  Movement  Media    Other:            Response to Learning:  x Able to verbalize current knowledge/experience   x Able to verbalize/acknowledge new learning    Able to retain information    Capable of insight    Able to change behavior    Progressing to goal    Other:        Comments:  Pt attended and participated in  Wrap Up and Relaxation Groups this evening.

## 2018-07-23 NOTE — PLAN OF CARE
Problem: Altered Mood, Psychotic Behavior:  Goal: Able to demonstrate trust by eating, participating in treatment and following staff's direction  Able to demonstrate trust by eating, participating in treatment and following staff's direction   Outcome: Ongoing  PSYCHOEDUCATION GROUP NOTE    Date: 07/23/1813  Start Time: 1330  End Time: 4535    Number Participants in Group:  2/13    Goal:  Patient will demonstrate increased interpersonal interaction   Topic: Self-Awareness/Socialization/Problem Solving    Discipline Responsible:   OT  AT  [facility]. x RT MHP Other       Participation Level:     None  Minimal   x Active Listener x Interactive    Monopolizing         Participation Quality:  x Appropriate  Inappropriate   x       Attentive        Intrusive   x       Sharing        Resistant          Supportive        Lethargic       Affective:   x Congruent  Incongruent  Blunted  Flat    Constricted  Anxious  Elated  Angry    Labile  Depressed  Other         Cognitive:  x Alert x Oriented PPTP     Concentration x G  F  P   Attention Span x G  F  P   Short-Term Memory x G  F  P   Long-Term Memory x G  F  P   ProblemSolving/  Decision Making x G  F  P   Ability to Process  Information x G  F  P      Contributing Factors             Delusional             Hallucinating             Flight of Ideas             Other:       Modes of Intervention:   Education  Support x Exploration    Clarifying x Problem Solving  Confrontation   x Socialization  Limit Setting x Reality Testing   x Activity  Movement  Media    Other:            Response to Learning:   Able to verbalize current knowledge/experience    Able to verbalize/acknowledge new learning    Able to retain information    Capable of insight    Able to change behavior   x Progressing to goal    Other:        Comments:

## 2018-07-24 VITALS
HEART RATE: 78 BPM | BODY MASS INDEX: 32.47 KG/M2 | DIASTOLIC BLOOD PRESSURE: 72 MMHG | RESPIRATION RATE: 14 BRPM | WEIGHT: 202 LBS | HEIGHT: 66 IN | SYSTOLIC BLOOD PRESSURE: 131 MMHG | OXYGEN SATURATION: 95 % | TEMPERATURE: 97.6 F

## 2018-07-24 PROCEDURE — 5130000000 HC BRIDGE APPOINTMENT

## 2018-07-24 PROCEDURE — 6370000000 HC RX 637 (ALT 250 FOR IP): Performed by: PSYCHIATRY & NEUROLOGY

## 2018-07-24 RX ADMIN — TIOTROPIUM BROMIDE 18 MCG: 18 CAPSULE ORAL; RESPIRATORY (INHALATION) at 08:22

## 2018-07-24 RX ADMIN — SERTRALINE HYDROCHLORIDE 200 MG: 100 TABLET ORAL at 08:23

## 2018-07-24 RX ADMIN — IBUPROFEN 400 MG: 400 TABLET ORAL at 08:27

## 2018-07-24 RX ADMIN — MOMETASONE FUROATE AND FORMOTEROL FUMARATE DIHYDRATE 2 PUFF: 200; 5 AEROSOL RESPIRATORY (INHALATION) at 08:22

## 2018-07-24 RX ADMIN — QUETIAPINE FUMARATE 300 MG: 300 TABLET ORAL at 08:23

## 2018-07-24 RX ADMIN — LISINOPRIL 5 MG: 5 TABLET ORAL at 08:23

## 2018-07-24 RX ADMIN — ASPIRIN 81 MG: 81 TABLET, COATED ORAL at 08:23

## 2018-07-24 RX ADMIN — OMEPRAZOLE 20 MG: 20 CAPSULE, DELAYED RELEASE ORAL at 08:23

## 2018-07-24 RX ADMIN — HYDROXYZINE HYDROCHLORIDE 25 MG: 25 TABLET, FILM COATED ORAL at 08:27

## 2018-07-24 RX ADMIN — TIZANIDINE 4 MG: 4 TABLET ORAL at 08:27

## 2018-07-24 ASSESSMENT — PAIN SCALES - GENERAL
PAINLEVEL_OUTOF10: 7
PAINLEVEL_OUTOF10: 0
PAINLEVEL_OUTOF10: 4

## 2018-07-24 ASSESSMENT — PAIN DESCRIPTION - LOCATION: LOCATION: BACK

## 2018-07-24 NOTE — CARE COORDINATION
· QUEtiapine 300 MG tablet     You can get these medications from any pharmacy    Bring a paper prescription for each of these medications  · hydrOXYzine 25 MG tablet  · nicotine 21 MG/24HR  · QUEtiapine 300 MG tablet  · QUEtiapine 400 MG tablet  · sertraline 100 MG tablet  · tiZANidine 4 MG tablet  · traZODone 50 MG tablet     Information about where to get these medications is not yet available    Ask your nurse or doctor about these medications  · insulin glargine 100 UNIT/ML injection pen         Follow Up Appointment: Dontae Martinez MD  [facility] [address]  [phone]          [address] [place] [address]  [phone]    On 7/26/2018  Kenrick Sinha @ 2:40 pm for [facility] management

## 2018-07-24 NOTE — PROGRESS NOTES
Inhaler instruct.
Patient reports reduction in intensity and frequency of auditory hallucinations. She does appear to be less paranoid today. She reports better control of thought process, less racing thoughts causing feelings of anxiety. She reports improvement in subjective ability to initiate and maintain sleep. She does display improved energy, has been out of room more, attending to ADLs. She denies any adverse side effects to medication. She reports some improvement in mood with time back on medications. Charting and medications reviewed. Therapeutic support provided. Will continue Seroquel, Zoloft.
Patient states that she  is feeling much better in terms of her mood. Patient reports that she has not been experiencing any feelings of self-harm or thoughts of suicide. Affect is brighter and more reactive. Denies any side effects to medication regimen. Explored her  concerns and feelings. Reassurance and support provided. Charting and medications reviewed. Spent time discharge planning. Plan will be for discharge tomorrow as long as patient feels better overnight and there are no safety concerns.
RT ASSESSMENT TREATMENT GOALS    [x]Pt Goal:  Pt will identify 1-2 positive coping skills by time of discharge. []Pt Goal:  Pt will identify 1-2 positive aspects of self by time of discharge. []Pt Goal:  Pt will remain on task/topic for 15-30 minutes during group by time of discharge. [x]Pt Goal:  Pt will identify 1-2 aspects of relapse prevention plan by time of discharge. []Pt Goal:  Pt will join in conversation with peers 1-2 times per group by time of discharge. []Pt Goal:  Pt will identify 1-2 new leisure interests by time of discharge. []Pt Goal:  Pt will not voice any delusional content by time of discharge.
[employer] mcleod  Receives Help From: Home health  ADL Assistance: Independent  Homemaking Assistance: Needs assistance  Homemaking Responsibilities: Yes  Ambulation Assistance: Independent  Transfer Assistance: Independent  Active : No  Mode of Transportation: Bus, Cab  IADL Comments:  knee and low back pain limits stand juan a to 4 min, pt uses scooter and RW in apt and scooter out of apt  Additional Comments: pt plans to move this fall to be near son in [place], pt has aide 4 x / week x 3 hr / day, they are present when pt showers, assist with laundry , clean and pack, pt indep cooks and has meals delivered, pt  does some laundry, goes to store with friend, uses medical cab for MD appts, has decon from her [employer] assist with finances    Objective      Cognition  Overall Cognitive Status: [facility]  Cognition Comment: pt demo good thought processes, verbal expression of thoughts with good attention to topic, problem solving and memory       ADL  Feeding: Independent  Grooming: Independent  UE Bathing: Independent  LE Bathing: Independent  UE Dressing: Independent  LE Dressing: Independent  Toileting: Independent  Additional Comments: pt demo indep cross to reach BLE for self care, toileting, toilet transfer    UE Function           LUE Strength  Gross LUE Strength: WFL           LUE AROM (degrees)  LUE AROM : WFL  LUE General AROM: B shld AROM 140 due to pain        RUE Strength  Gross RUE Strength: WFL            RUE AROM (degrees)  RUE AROM : WFL  RUE General AROM: B shld AROM 140 due to pain          Fine Motor Skills  Coordination  Movements Are Fluid And Coordinated: Yes                           Mobility          Balance  Sitting Balance: Independent  Standing Balance: Independent  Standing Balance  Time: 2-4 min x 3  Activity: amb chair by tv to room, access bathroom x 2, amb back to chair (40 ft)  Sit to stand:  (from bed, chair, wheelchair and toilet)  Stand to sit:  Independent  Functional
functions were impaired. Insight and judgement were poor. Both recent and remote memory were impaired. Psychomotor status was slowed     Diagnostic Impression    Schizoaffective disorder, bipolar type. Medications   lisinopril  5 mg Oral Daily    omeprazole  20 mg Oral Daily    tiotropium  18 mcg Inhalation Daily    aspirin  81 mg Oral Daily    insulin glargine  50 Units Subcutaneous Nightly    trospium  20 mg Oral Nightly    mometasone-formoterol  2 puff Inhalation BID    nicotine  1 patch Transdermal Daily    nicotine  1 patch Transdermal Daily    QUEtiapine  400 mg Oral Nightly    QUEtiapine  300 mg Oral BID- 8&2    sertraline  200 mg Oral Daily     traZODone, ibuprofen, acetaminophen, magnesium hydroxide, aluminum & magnesium hydroxide-simethicone, benztropine mesylate, hydrOXYzine, ALPRAZolam    Treatment Plan:    1. Admit to inpatient psychiatric treatment  2. Supportive therapy with medication management. Reviewed risks and benefits as well as potential side effects with patient. Will restart Seroquel, Zoloft  3. Therapeutic activities and groups  4.  Follow up at [facility] after symptoms stabilized    Estimated length of stay: 5-7 days    Katelyn Rangel MD  Psychiatrist.

## 2018-07-24 NOTE — BH NOTE
Patient given tobacco quitline number 39496001756 at this time, refusing to call at this time, states \" I just dont want to quit now\"- patient given information as to the dangers of long term tobacco use. Continue to reinforce the importance of tobacco cessation.

## 2018-07-24 NOTE — BH NOTE
EVENING GROUP NOTES    Date:  7/23/18 Start Time: 8:00pm  End Time: 8:45pm    Number Participants in Group:  8/14    Goal:  Patient will demonstrate increased interpersonal interaction   Topic:  Wrap Up and Relaxation Groups    Discipline Responsible:   OT  AT   x Nsg.  RT  Other       Participation Level:     None  Minimal   x Active Listener x Interactive    Monopolizing         Participation Quality:  x Appropriate  Inappropriate   x       Attentive        Intrusive   x       Sharing        Resistant   x       Supportive        Lethargic       Affective:   x Congruent  Incongruent  Blunted  Flat    Constricted  Anxious  Elated  Angry    Labile  Depressed  Other         Cognitive:  x Alert  Oriented PPTP     Concentration x G  F  P   Attention Span x G  F  P   Short-Term Memory x G  F  P   Long-Term Memory x G  F  P   ProblemSolving/  Decision Making x G  F  P   Ability to Process  Information x G  F  P      Contributing Factors             Delusional             Hallucinating             Flight of Ideas             Other:       Modes of Intervention:  x Education  Support  Exploration   x Clarifying  Problem Solving  Confrontation    Socialization  Limit Setting  Reality Testing    Activity  Movement  Media    Other:            Response to Learning:  x Able to verbalize current knowledge/experience   x Able to verbalize/acknowledge new learning    Able to retain information    Capable of insight    Able to change behavior    Progressing to goal    Other:        Comments:  Pt attended and participated in  Wrap Up and Relaxation Groups this evening.

## 2018-08-21 NOTE — DISCHARGE SUMMARY
Presenting Evaluation:  Matthew Lepe is a 57 y.o. female who was admitted   From the Emergency Department where she was brought by cab. Patient reports she is feeling suicidal with a plan to walk into traffic. Patient reports she has been noncompliant with medications for the most part. She states she has been experiencing auditory hallucinations of a negative nature. She reports strong paranoid feelings. She reports poor sleep and appetite. She reports feelings of depression secondary his psychotic symptoms. Affect is flat and poorly reactive. Patient reports struggling with physical pain as well. Discussed her previous experience with psychiatric medications.      Diagnostic Impression     Schizoaffective disorder, bipolar type      After discussion with patient about potential risks, benefits, side effects, decided to restart Seroquel, Zoloft titrated to final dosing listed below. She was doing fairly well at the time of discharge and was not in any distress. Thought process was organized and showed insight into compliance with treatment. Patient had been making rational and realistic plans so she was discharged. Patient had been bright, reactive, and interacting appropriately with staff and peers. There had been multiple consecutive days without any thoughts of suicide or other safety concerns. Patient was tolerating medication changes without any adverse side effects. She will follow up at [facility].        Medication List      START taking these medications    hydrOXYzine 25 MG tablet  Commonly known as:  ATARAX  Take 1 tablet by mouth 2 times daily as needed for Itching or Anxiety  Notes to patient:  Anxiety      nicotine 21 MG/24HR  Commonly known as:  [address] [facility] 1 patch onto the skin daily  Notes to patient:  Nicotine replacement      tiZANidine 4 MG tablet  Commonly known as:  ZANAFLEX  Take 1 tablet by mouth 2 times daily as needed (Muscle spasms)  Notes to patient: same name, and this prescription was added. Make sure you understand how and when to take each. Notes to patient:  Clears thoughts      sertraline 100 MG tablet  Commonly known as:  ZOLOFT  Take 2 tablets by mouth daily  What changed:  · medication strength  · how much to take  Notes to patient:  Improves mood         * This list has 6 medication(s) that are the same as other medications prescribed for you. Read the directions carefully, and ask your doctor or other care provider to review them with you. CONTINUE taking these medications    ADVAIR DISKUS 250-50 MCG/DOSE Aepb  Generic drug:  fluticasone-salmeterol  INHALE 1 PUFF TWICE A DAY  Notes to patient:  Respiratory health      albuterol sulfate  (90 Base) MCG/ACT inhaler  Notes to patient:  Wheezing      Alcohol Pads 70 % Pads  Use as directed. Dx  Insulin dependent DM  Notes to patient:  Blood sugar control      ALPRAZolam 0.5 MG tablet  Commonly known as:  Margret Ortiz  Notes to patient:  Anxiety control      aspirin 81 MG EC tablet  Take 1 tablet by mouth daily  Notes to patient:  Heart healthy dose     blood glucose test strips  Check Blood sugar 3 times/day , Dx: Insulin dependent DM Please substitute for brand compatible with patients glucometer  Notes to patient:  Blood sugar control      * glucose monitoring kit monitoring kit  Use as directed. Dx  Insulin dependent DM, Please substitute it with Glucometer covered by patients insurance or patients choice. Notes to patient:  Blood sugar control      * ONE TOUCH ULTRA MINI w/Device Kit  Notes to patient:  Blood sugar control      ibuprofen 400 MG tablet  Commonly known as:  ADVIL;MOTRIN  TAKE 1 TABLET EVERY 8 HOURS AS NEEDED FOR PAIN WITH FOOD  Notes to patient:  Pain control      Insulin Pen Needle 31G X 5 MM Misc  Use as directed.  Dx Insulin dependent DM  Notes to patient:  Blood sugar control      Lancets Misc  Use as directed to test 2-3 times/day, Dx Insulin dependent DM  Notes to

## 2018-09-25 ENCOUNTER — HOSPITAL ENCOUNTER (OUTPATIENT)
Dept: PHYSICAL THERAPY | Age: 63
Setting detail: THERAPIES SERIES
Discharge: HOME | End: 2018-09-25
Payer: MEDICARE

## 2018-09-25 NOTE — FLOWSHEET NOTE
[x] Judi Gilbert        Outpatient Physical                Therapy       [address]       Phone: [phone]       Fax: [phone]        Physical Therapy Cancel/No Show note    Date: 2018  Patient: Jocelin Bueno  : 1955  MRN: [id number]    Cancels/No Shows to date:     For today's appointment patient:  [x]  Cancelled  []  Rescheduled appointment  []  No-show     Reason given by patient:  []  Patient ill  []  Conflicting appointment  []  No transportation    []  Conflict with work  []  No reason given  []  Weather related  [x]  Other:      Comments: This clinic cancelled Pt's appt as this facility is not a provider for this Pt's insurance.      []  Next appointment was confirmed    Electronically signed by: Saba Garza, PT

## 2019-01-17 ENCOUNTER — APPOINTMENT (OUTPATIENT)
Dept: GENERAL RADIOLOGY | Age: 64
End: 2019-01-17
Payer: MEDICARE

## 2019-01-17 ENCOUNTER — HOSPITAL ENCOUNTER (EMERGENCY)
Age: 64
Discharge: HOME | End: 2019-01-17
Attending: EMERGENCY MEDICINE
Payer: MEDICARE

## 2019-01-17 VITALS
HEART RATE: 95 BPM | OXYGEN SATURATION: 95 % | TEMPERATURE: 98.5 F | RESPIRATION RATE: 18 BRPM | WEIGHT: 202 LBS | BODY MASS INDEX: 32.47 KG/M2 | DIASTOLIC BLOOD PRESSURE: 101 MMHG | SYSTOLIC BLOOD PRESSURE: 145 MMHG | HEIGHT: 66 IN

## 2019-01-17 DIAGNOSIS — M25.562 CHRONIC PAIN OF BOTH KNEES: Primary | ICD-10-CM

## 2019-01-17 DIAGNOSIS — G89.29 CHRONIC PAIN OF BOTH KNEES: Primary | ICD-10-CM

## 2019-01-17 DIAGNOSIS — M25.561 CHRONIC PAIN OF BOTH KNEES: Primary | ICD-10-CM

## 2019-01-17 PROCEDURE — 73562 X-RAY EXAM OF KNEE 3: CPT

## 2019-01-17 PROCEDURE — 6360000002 HC RX W HCPCS: Performed by: EMERGENCY MEDICINE

## 2019-01-17 PROCEDURE — 99283 EMERGENCY DEPT VISIT LOW MDM: CPT

## 2019-01-17 PROCEDURE — 96372 THER/PROPH/DIAG INJ SC/IM: CPT

## 2019-01-17 RX ORDER — KETOROLAC TROMETHAMINE 15 MG/ML
30 INJECTION, SOLUTION INTRAMUSCULAR; INTRAVENOUS ONCE
Status: COMPLETED | OUTPATIENT
Start: 2019-01-17 | End: 2019-01-17

## 2019-01-17 RX ORDER — KETOROLAC TROMETHAMINE 15 MG/ML
15 INJECTION, SOLUTION INTRAMUSCULAR; INTRAVENOUS ONCE
Status: DISCONTINUED | OUTPATIENT
Start: 2019-01-17 | End: 2019-01-17

## 2019-01-17 RX ADMIN — KETOROLAC TROMETHAMINE 30 MG: 15 INJECTION, SOLUTION INTRAMUSCULAR; INTRAVENOUS at 11:11

## 2019-01-17 ASSESSMENT — PAIN SCALES - GENERAL: PAINLEVEL_OUTOF10: 10

## 2019-01-17 ASSESSMENT — ENCOUNTER SYMPTOMS
BACK PAIN: 1
COLOR CHANGE: 0
NAUSEA: 0
TROUBLE SWALLOWING: 0
BLOOD IN STOOL: 0
DIARRHEA: 0
FACIAL SWELLING: 0
CHOKING: 0
WHEEZING: 0
VOMITING: 0
PHOTOPHOBIA: 0
ABDOMINAL PAIN: 0
SHORTNESS OF BREATH: 0
STRIDOR: 0
CHEST TIGHTNESS: 0

## 2019-01-17 ASSESSMENT — PAIN DESCRIPTION - PAIN TYPE: TYPE: CHRONIC PAIN

## 2019-01-17 ASSESSMENT — PAIN DESCRIPTION - ORIENTATION: ORIENTATION: RIGHT;LEFT

## 2019-01-17 ASSESSMENT — PAIN DESCRIPTION - LOCATION: LOCATION: BACK;LEG

## 2019-02-04 PROBLEM — Z79.4 TYPE 2 DIABETES MELLITUS WITHOUT COMPLICATION, WITH LONG-TERM CURRENT USE OF INSULIN (HCC): Status: ACTIVE | Noted: 2019-02-04

## 2019-02-04 PROBLEM — E11.9 TYPE 2 DIABETES MELLITUS WITHOUT COMPLICATION, WITH LONG-TERM CURRENT USE OF INSULIN (HCC): Status: ACTIVE | Noted: 2019-02-04

## 2019-02-04 PROBLEM — Z72.0 TOBACCO ABUSE DISORDER: Status: ACTIVE | Noted: 2019-02-04

## 2019-02-04 PROBLEM — F20.9 SCHIZOPHRENIA (HCC): Status: RESOLVED | Noted: 2018-07-18 | Resolved: 2019-02-04

## 2019-02-18 ENCOUNTER — TELEPHONE (OUTPATIENT)
Dept: OTHER | Age: 64
End: 2019-02-18

## 2019-02-19 RX ORDER — INSULIN GLARGINE 100 [IU]/ML
50 INJECTION, SOLUTION SUBCUTANEOUS NIGHTLY
Qty: 1 VIAL | Refills: 5 | Status: SHIPPED | OUTPATIENT
Start: 2019-02-19 | End: 2020-01-03

## 2019-03-12 DIAGNOSIS — M17.0 PRIMARY OSTEOARTHRITIS OF BOTH KNEES: ICD-10-CM

## 2019-03-12 DIAGNOSIS — M47.816 LUMBAR FACET ARTHROPATHY: ICD-10-CM

## 2019-03-13 RX ORDER — HYDROCODONE BITARTRATE AND ACETAMINOPHEN 5; 325 MG/1; MG/1
1 TABLET ORAL EVERY 8 HOURS PRN
Qty: 20 TABLET | Refills: 0 | Status: SHIPPED | OUTPATIENT
Start: 2019-03-13 | End: 2019-03-13 | Stop reason: SDUPTHER

## 2019-03-27 ENCOUNTER — HOSPITAL ENCOUNTER (EMERGENCY)
Age: 64
Discharge: HOME | End: 2019-03-27
Attending: EMERGENCY MEDICINE
Payer: MEDICARE

## 2019-03-27 VITALS
HEIGHT: 67 IN | OXYGEN SATURATION: 97 % | RESPIRATION RATE: 14 BRPM | TEMPERATURE: 100 F | DIASTOLIC BLOOD PRESSURE: 83 MMHG | SYSTOLIC BLOOD PRESSURE: 142 MMHG | HEART RATE: 95 BPM | WEIGHT: 205 LBS | BODY MASS INDEX: 32.18 KG/M2

## 2019-03-27 DIAGNOSIS — M79.605 BILATERAL LEG PAIN: Primary | ICD-10-CM

## 2019-03-27 DIAGNOSIS — M79.604 BILATERAL LEG PAIN: Primary | ICD-10-CM

## 2019-03-27 PROCEDURE — 6370000000 HC RX 637 (ALT 250 FOR IP): Performed by: STUDENT IN AN ORGANIZED HEALTH CARE EDUCATION/TRAINING PROGRAM

## 2019-03-27 PROCEDURE — 99282 EMERGENCY DEPT VISIT SF MDM: CPT

## 2019-03-27 RX ORDER — IBUPROFEN 800 MG/1
800 TABLET ORAL ONCE
Status: COMPLETED | OUTPATIENT
Start: 2019-03-27 | End: 2019-03-27

## 2019-03-27 RX ADMIN — IBUPROFEN 800 MG: 800 TABLET, FILM COATED ORAL at 17:10

## 2019-03-27 ASSESSMENT — ENCOUNTER SYMPTOMS
ABDOMINAL PAIN: 0
NAUSEA: 0
BACK PAIN: 0
DIARRHEA: 0
VOMITING: 0
RHINORRHEA: 0
SHORTNESS OF BREATH: 0
SORE THROAT: 0
EYE DISCHARGE: 0
COUGH: 0

## 2019-03-27 ASSESSMENT — PAIN SCALES - GENERAL: PAINLEVEL_OUTOF10: 9

## 2019-03-27 ASSESSMENT — PAIN DESCRIPTION - ORIENTATION: ORIENTATION: RIGHT;LEFT;POSTERIOR;LOWER

## 2019-03-27 ASSESSMENT — PAIN DESCRIPTION - DESCRIPTORS: DESCRIPTORS: ACHING

## 2019-03-27 ASSESSMENT — PAIN DESCRIPTION - LOCATION: LOCATION: LEG

## 2019-03-27 ASSESSMENT — PAIN DESCRIPTION - PAIN TYPE: TYPE: CHRONIC PAIN;ACUTE PAIN

## 2019-03-27 ASSESSMENT — PAIN DESCRIPTION - FREQUENCY: FREQUENCY: CONTINUOUS

## 2019-03-27 NOTE — ED PROVIDER NOTES
[facility]     Emergency Department     Faculty Attestation    I performed a history and physical examination of the patient and discussed management with the resident. I reviewed the residents note and agree with the documented findings and plan of care. Any areas of disagreement are noted on the chart. I was personally present for the key portions of any procedures. I have documented in the chart those procedures where I was not present during the key portions. I have reviewed the emergency nurses triage note. I agree with the chief complaint, past medical history, past surgical history, allergies, medications, social and family history as documented unless otherwise noted below. Documentation of the HPI, Physical Exam and Medical Decision Making performed by medical students or scribes is based on my personal performance of the HPI, PE and MDM. For Physician Assistant/ Nurse Practitioner cases/documentation I have personally evaluated this patient and have completed at least one if not all key elements of the E/M (history, physical exam, and MDM). Additional findings are as noted. Vital signs:   Vitals:    03/27/19 1612   BP: (!) 142/83   Pulse: 95   Resp: 14   Temp: 100 °F (37.8 °C)   SpO2: 80      68-year-old female here with chronic leg pain. Her pain is completely unchanged from her usual presentation. No acute injury. No evidence of any deformity. No contusions or abrasions. Distal pulses are intact. I discussed with the patient that we do not give prescription is for chronic pain in the emergency Department. We will treat her pain here with Motrin and discharge her.             Cora Gilman M.D,  Attending Emergency  Physician           Emy Whiting MD  03/27/19 1406

## 2019-03-27 NOTE — ED PROVIDER NOTES
[facility]  ED  Emergency Department Encounter  Emergency Medicine Attending     Pt Name: Cari Xie  GUQ:[id number]  Carmengfdana 1955  Date of evaluation: 3/27/19  PCP:  Lula Monteiro [address] [place] [address] [place]       Chief Complaint   Patient presents with    Leg Pain     pt c/o bilateral lower leg pain, pain in calves also       HISTORY OF PRESENT ILLNESS  (Location/Symptom, Timing/Onset, Context/Setting, Quality, Duration, ModifyingFactors, Severity.)      Cari Xie is a 70 y.o. female  with PMH of COPD, hypertension, type 2 diabetes, paranoid schizophrenia, chronic pain, DJD, presents with Diffuse bilateral leg pain that has been ongoing for several years. Pt states pain feels like a throbbing pain with intermittent shooting and lightening sensation down both of her legs, starting in lower back and hips and going down diffusely to b/l feet. She denies change with pain with walking. Patient denies any new changes with pain, has not taken any medications for. Recently was seen by PCP and referred to pain clinic which she has not seen yet, appointment scheduled for next month. Patient denies fever, chills, headache, dizziness, lightheadedness, chest pain, shortness of breath, abdominal pain, nausea, vomiting, urinary or vaginal complaints, diarrhea, loss of bowel or bladder, numbness, weakness. Patient denies new trauma    PAST MEDICAL / SURGICAL / SOCIAL /FAMILY HISTORY      has a past medical history of Bipolar 1 disorder ([address] Breast lump, Chronic obstructive pulmonary disease ([address] Depression, GERD (gastroesophageal reflux disease), Hyperlipidemia, Hypertension, Osteoarthritis, Type 2 diabetes mellitus without complication, with long-term current use of insulin ([address] and Type II or unspecified type diabetes mellitus without mention of complication, not stated as uncontrolled.      has a past surgical history that includes Ectopic pregnancy surgery; knee surgery [chlorpromazine]    Home Medications:  Prior to Admission medications    Medication Sig Start Date End Date Taking? Authorizing Provider   HYDROcodone-acetaminophen (LORTAB) 5-325 MG per tablet Take 1 tablet by mouth every 12 hours as needed for Pain for up to 10 days. 3/22/19 4/1/19 Yes JEANNETTE Sanchez CNP   Insulin Syringes, Disposable, U-100 1 ML MISC 1 each by Does not apply route daily 2/26/19  Yes JEANNETTE Sanchez CNP   hydrOXYzine (ATARAX) 10 MG tablet 1 to 2 po TID prn anxiety 2/26/19  Yes JEANNETTE Sanchez CNP   nicotine (NICODERM CQ) 14 MG/24HR Place 1 patch onto the skin daily for 14 days 2/26/19 3/27/19 Yes JEANNETTE Sanchez CNP   insulin glargine (LANTUS) 100 UNIT/ML injection vial Inject 50 Units into the skin nightly 2/19/19  Yes JEANNETTE Sanchez CNP   blood glucose monitor strips Check Blood sugar 3 times/day , Dx: Insulin dependent DM Please substitute for brand compatible with patients glucometer 2/4/19  Yes JEANNETTE Sanchez CNP   Blood Glucose Monitoring Suppl (ONE TOUCH ULTRA MINI) w/Device KIT use as directed 2/4/19  Yes JEANNETTE Sanchez CNP   ALPRAZolam Brandi Bard) 0.5 MG tablet Take 0.5 mg by mouth 2 times daily. .   Yes Historical Provider, MD   QUEtiapine (SEROQUEL) 300 MG tablet Take 1 tablet in the morning and 1 tablet in the afternoon as directed by Jose Angel 2/17/18  Yes Lori Cartagena MD   lisinopril (PRINIVIL;ZESTRIL) 5 MG tablet Take 1 tablet by mouth daily 10/5/17  Yes Jann Moore MD   glucose monitoring kit (FREESTYLE) monitoring kit Use as directed. Dx  Insulin dependent DM, Please substitute it with Glucometer covered by patients insurance or patients choice.  9/21/17  Yes Jann Moore MD   Lancets MISC Use as directed to test 2-3 times/day, Dx Insulin dependent DM 9/21/17  Yes Jann Moore MD   aspirin 81 MG EC tablet Take 1 tablet by mouth daily 9/13/17  Yes Elizabeth Mari MD   Insulin Pen Needle 31G X 5 MM MISC Use as directed. Dx Insulin dependent DM 8/3/17  Yes Christiane Farooq MD   omeprazole (PRILOSEC) 20 MG delayed release capsule Take 20 mg by mouth daily   Yes Historical Provider, MD   tiotropium (SPIRIVA) 18 MCG inhalation capsule Inhale 18 mcg into the lungs daily   Yes Historical Provider, MD   albuterol (PROVENTIL HFA;VENTOLIN HFA) 108 (90 BASE) MCG/ACT inhaler Inhale 1 puff into the lungs 4 times daily as needed for Wheezing    Yes Historical Provider, MD   pregabalin (LYRICA) 50 MG capsule Take 1 capsule by mouth 2 times daily for 30 days. . 2/8/19 3/10/19  Jeanne Chavezr, APRN - CNP   ibuprofen (ADVIL;MOTRIN) 400 MG tablet TAKE 1 TABLET EVERY 8 HOURS AS NEEDED FOR PAIN WITH FOOD 10/12/17   Christiane Farooq MD       REVIEW OF SYSTEMS    (2-9 systems for level 4, 10 ormore for level 5)      Review of Systems   Constitutional: Negative for chills and fever. HENT: Negative for rhinorrhea and sore throat. Eyes: Negative for discharge. Respiratory: Negative for cough and shortness of breath. Cardiovascular: Negative for chest pain, palpitations and leg swelling. Gastrointestinal: Negative for abdominal pain, diarrhea, nausea and vomiting. Genitourinary: Negative for dysuria, hematuria, vaginal bleeding and vaginal discharge. Musculoskeletal: Positive for arthralgias. Negative for back pain and neck pain. Bilateral leg pain diffusely   Skin: Negative for rash. Neurological: Negative for dizziness, weakness, light-headedness, numbness and headaches. Hematological: Does not bruise/bleed easily. PHYSICAL EXAM   (up to 7 for level 4, 8 or more for level 5)      INITIAL VITALS:   BP (!) 142/83   Pulse 95   Temp 100 °F (37.8 °C) (Oral)   Resp 14   Ht 5' 6.75\" (1.695 m)   Wt 205 lb (93 kg)   LMP  (LMP Unknown)   SpO2 97%   BMI 32.35 kg/m²     Physical Exam   Constitutional: She is oriented to person, place, and time. She appears well-developed and well-nourished. No distress.    HENT: Head: Normocephalic and atraumatic. Mouth/Throat: Oropharynx is clear and moist. No oropharyngeal exudate. Eyes: Conjunctivae are normal. Right eye exhibits no discharge. Left eye exhibits no discharge. Neck: Normal range of motion. Neck supple. Cardiovascular: Normal rate, regular rhythm, normal heart sounds and intact distal pulses. Exam reveals no gallop and no friction rub. No murmur heard. Pulmonary/Chest: Effort normal and breath sounds normal. She has no wheezes. She has no rales. Abdominal: Soft. There is no tenderness. There is no rebound and no guarding. Musculoskeletal: Normal range of motion. She exhibits no edema. Positive straight leg raise bilaterally, pain with leg flexion to 45° bilaterally. No midline spinal tenderness. No calf pain bilaterally, no calf swelling. 2+ DP and PT pulses bilaterally. Pelvis stable   Neurological: She is alert and oriented to person, place, and time. Skin: Skin is warm and dry. No rash noted. DIFFERENTIAL  DIAGNOSIS     PLAN (LABS / IMAGING / EKG):  No orders of the defined types were placed in this encounter. MEDICATIONS ORDERED:  Orders Placed This Encounter   Medications    ibuprofen (ADVIL;MOTRIN) tablet 800 mg       DDX: Vertebral fracture, epidural abscess, sprain, strain, contusion, muscle spasm, osteomyelitis, cauda equina, herniated disc, AAA rupture, pyelonephritis, kidney stone, pancreatitis, PUD, aortic dissection    DIAGNOSTIC RESULTS / EMERGENCY DEPARTMENT COURSE / MDM     LABS:  No results found for this visit on 03/27/19. IMPRESSION:  70 y.o. female presented with bilateral lower extremity pain that has been ongoing for several years, described as a throbbing and shooting pain to her posterior legs. Patient denies any new trauma, she has not taken any medications PTA.   On exam vital signs are normal, patient with positive straight leg raise test bilaterally, no signs or symptoms concerning for PE or peripheral

## 2019-04-11 ENCOUNTER — APPOINTMENT (OUTPATIENT)
Dept: GENERAL RADIOLOGY | Age: 64
End: 2019-04-11
Payer: MEDICARE

## 2019-04-11 ENCOUNTER — HOSPITAL ENCOUNTER (EMERGENCY)
Age: 64
Discharge: HOME | End: 2019-04-11
Attending: EMERGENCY MEDICINE
Payer: MEDICARE

## 2019-04-11 VITALS
HEART RATE: 94 BPM | DIASTOLIC BLOOD PRESSURE: 64 MMHG | RESPIRATION RATE: 18 BRPM | OXYGEN SATURATION: 93 % | TEMPERATURE: 98.4 F | SYSTOLIC BLOOD PRESSURE: 134 MMHG

## 2019-04-11 DIAGNOSIS — M54.50 CHRONIC BILATERAL LOW BACK PAIN WITHOUT SCIATICA: Primary | ICD-10-CM

## 2019-04-11 DIAGNOSIS — G89.29 CHRONIC BILATERAL LOW BACK PAIN WITHOUT SCIATICA: Primary | ICD-10-CM

## 2019-04-11 PROCEDURE — 6370000000 HC RX 637 (ALT 250 FOR IP): Performed by: STUDENT IN AN ORGANIZED HEALTH CARE EDUCATION/TRAINING PROGRAM

## 2019-04-11 PROCEDURE — 73502 X-RAY EXAM HIP UNI 2-3 VIEWS: CPT

## 2019-04-11 PROCEDURE — 99283 EMERGENCY DEPT VISIT LOW MDM: CPT

## 2019-04-11 PROCEDURE — 72100 X-RAY EXAM L-S SPINE 2/3 VWS: CPT

## 2019-04-11 RX ORDER — IBUPROFEN 800 MG/1
800 TABLET ORAL ONCE
Status: COMPLETED | OUTPATIENT
Start: 2019-04-11 | End: 2019-04-11

## 2019-04-11 RX ADMIN — IBUPROFEN 800 MG: 800 TABLET, FILM COATED ORAL at 15:27

## 2019-04-11 ASSESSMENT — ENCOUNTER SYMPTOMS
CONSTIPATION: 0
VOMITING: 0
WHEEZING: 0
ABDOMINAL PAIN: 0
COUGH: 0
BACK PAIN: 1
DIARRHEA: 0
SHORTNESS OF BREATH: 0
NAUSEA: 0

## 2019-04-11 ASSESSMENT — PAIN DESCRIPTION - ORIENTATION: ORIENTATION: LOWER

## 2019-04-11 ASSESSMENT — PAIN SCALES - GENERAL
PAINLEVEL_OUTOF10: 10
PAINLEVEL_OUTOF10: 10

## 2019-04-11 ASSESSMENT — PAIN DESCRIPTION - DESCRIPTORS: DESCRIPTORS: DISCOMFORT;ACHING;SHOOTING;SHARP

## 2019-04-11 ASSESSMENT — PAIN DESCRIPTION - LOCATION: LOCATION: BACK;LEG

## 2019-04-11 NOTE — ED PROVIDER NOTES
[facility]  ED  Emergency Department Encounter  Emergency Medicine Resident     Pt Name: Inez Whitaker  MRN: [id number]  [place] 1955  Date of evaluation: 4/11/19  PCP:  JEANNETTE Velásquez [id number]       Chief Complaint   Patient presents with    Back Pain       HISTORY OFPRESENT ILLNESS  (Location/Symptom, Timing/Onset, Context/Setting, Quality, Duration, Modifying Katherene Medal.)      Inez Whitaker is a 70 y.o. female who presents with complaints of acute on chronic back pain. Patient states that she fell approximately 10 days ago began having worsening back pain approximately 2 days ago. When she fell patient denies hitting her head, loss of consciousness. She denies blood thinners except aspirin. Patient states that she has been able to ambulate with her walker since the episode. She is complaining of some bilateral lower extremity weakness, which she states is chronic but slightly worsened. She also states that she has been going to the bathroom on herself, because she cannot get to the bathroom in time due to pain not that she does not have control of her bladder. She denies any fevers, IV drug abuse. She denies any saddle anesthesia, numbness, neck pain, vision changes, headache, chest pain, shortness of breath, abdominal pain, nausea, vomiting, constipation, diarrhea. PAST MEDICAL / SURGICAL / SOCIAL / FAMILY HISTORY      has a past medical history of Bipolar 1 disorder ([address] Breast lump, Chronic obstructive pulmonary disease ([address] Depression, GERD (gastroesophageal reflux disease), Hyperlipidemia, Hypertension, Osteoarthritis, Type 2 diabetes mellitus without complication, with long-term current use of insulin ([address] and Type II or unspecified type diabetes mellitus without mention of complication, not stated as uncontrolled.      has a past surgical history that includes Ectopic pregnancy surgery; knee surgery (Right); Dilation and curettage of uterus; Nerve Block (Left, 12/8/14); and Knee Arthroplasty (2017).      Social History     Socioeconomic History    Marital status:      Spouse name: Not on file    Number of children: Not on file    Years of education: Not on file    Highest education level: Not on file   Occupational History     Employer: N/A   Social Needs    Financial resource strain: Not on file    Food insecurity:     Worry: Not on file     Inability: Not on file    Transportation needs:     Medical: Not on file     Non-medical: Not on file   Tobacco Use    Smoking status: Current Every Day Smoker     Packs/day: 0.25     Years: 10.00     Pack years: 2.50     Types: Cigarettes    Smokeless tobacco: Never Used    Tobacco comment: 3 cigarettes per day   Substance and Sexual Activity    Alcohol use: Yes     Comment: 02/06/17 patient denies    Drug use: No     Comment: 02/06/17 patient denies    Sexual activity: Never   Lifestyle    Physical activity:     Days per week: Not on file     Minutes per session: Not on file    Stress: Not on file   Relationships    Social connections:     Talks on phone: Not on file     Gets together: Not on file     Attends Christianity service: Not on file     Active member of club or organization: Not on file     Attends meetings of clubs or organizations: Not on file     Relationship status: Not on file    Intimate partner violence:     Fear of current or ex partner: Not on file     Emotionally abused: Not on file     Physically abused: Not on file     Forced sexual activity: Not on file   Other Topics Concern    Not on file   Social History Narrative    Not on file       Family History   Problem Relation Age of Onset    Diabetes Father     Stroke Father     High Blood Pressure Father         Allergies:  Flexeril [cyclobenzaprine]; Gabapentin; Prochlorperazine edisylate; Promethazine; and Thorazine [chlorpromazine]    Home Medications:  Prior to Admission medications    Medication Sig Start Date End Date Taking? Authorizing Provider   Insulin Syringes, Disposable, U-100 1 ML MISC 1 each by Does not apply route daily 2/26/19   JEANNETTE Claros CNP   hydrOXYzine (ATARAX) 10 MG tablet 1 to 2 po TID prn anxiety 2/26/19   JEANNETTE Claros CNP   nicotine (NICODERM CQ) 14 MG/24HR Place 1 patch onto the skin daily for 14 days 2/26/19 3/27/19  JEANNETTE Claros CNP   insulin glargine (LANTUS) 100 UNIT/ML injection vial Inject 50 Units into the skin nightly 2/19/19   JEANNETTE Claros CNP   pregabalin (LYRICA) 50 MG capsule Take 1 capsule by mouth 2 times daily for 30 days. . 2/8/19 3/10/19  JEANNETTE Claros CNP   blood glucose monitor strips Check Blood sugar 3 times/day , Dx: Insulin dependent DM Please substitute for brand compatible with patients glucometer 2/4/19   JEANNETTE Claros CNP   Blood Glucose Monitoring Suppl (ONE TOUCH ULTRA MINI) w/Device KIT use as directed 2/4/19   JEANNETTE Claros CNP   ALPRAZolam Ahmed Cordon) 0.5 MG tablet Take 0.5 mg by mouth 2 times daily. Lexie Valentin Historical Provider, MD   QUEtiapine (SEROQUEL) 300 MG tablet Take 1 tablet in the morning and 1 tablet in the afternoon as directed by [facility] 2/17/18   Yuli Berkowitz MD   ibuprofen (ADVIL;MOTRIN) 400 MG tablet TAKE 1 TABLET EVERY 8 HOURS AS NEEDED FOR PAIN WITH FOOD 10/12/17   Chris Bishop MD   lisinopril (PRINIVIL;ZESTRIL) 5 MG tablet Take 1 tablet by mouth daily 10/5/17   Laila Gaffney MD   glucose monitoring kit (FREESTYLE) monitoring kit Use as directed. Dx  Insulin dependent DM, Please substitute it with Glucometer covered by patients insurance or patients choice. 9/21/17   Laila Gaffney MD   Lancets MISC Use as directed to test 2-3 times/day, Dx Insulin dependent DM 9/21/17   Laila Gaffney MD   aspirin 81 MG EC tablet Take 1 tablet by mouth daily 9/13/17   Deepa Perez MD   Insulin Pen Needle 31G X 5 MM MISC Use as directed.  Dx Insulin dependent DM 8/3/17   Chris iGgi Hernandez MD   omeprazole (PRILOSEC) 20 MG delayed release capsule Take 20 mg by mouth daily    Historical Provider, MD   tiotropium (SPIRIVA) 18 MCG inhalation capsule Inhale 18 mcg into the lungs daily    Historical Provider, MD   albuterol (PROVENTIL HFA;VENTOLIN HFA) 108 (90 BASE) MCG/ACT inhaler Inhale 1 puff into the lungs 4 times daily as needed for Wheezing     Historical Provider, MD       REVIEW OFSYSTEMS    (2-9 systems for level 4, 10 or more for level 5)      Review of Systems   Constitutional: Negative for activity change, appetite change, chills, fatigue and fever. Respiratory: Negative for cough, shortness of breath and wheezing. Cardiovascular: Negative for chest pain and leg swelling. Gastrointestinal: Negative for abdominal pain, constipation, diarrhea, nausea and vomiting. Genitourinary: Negative for decreased urine volume, difficulty urinating, dysuria, flank pain, frequency, hematuria, urgency, vaginal bleeding and vaginal discharge. Musculoskeletal: Positive for arthralgias and back pain. Negative for gait problem, joint swelling, myalgias and neck pain. Skin: Negative for rash and wound. Neurological: Negative for dizziness, syncope, weakness, light-headedness, numbness and headaches. PHYSICAL EXAM   (up to 7 for level 4, 8 or more forlevel 5)      INITIAL VITALS:   ED Triage Vitals [04/11/19 1443]   BP Temp Temp Source Pulse Resp SpO2 Height Weight   -- 98.4 °F (36.9 °C) Oral 94 18 94 % -- --       Physical Exam   Constitutional: She is oriented to person, place, and time. She appears well-developed and well-nourished. No distress. HENT:   Head: Normocephalic and atraumatic. Nose: Nose normal.   Eyes: Pupils are equal, round, and reactive to light. Conjunctivae and EOM are normal.   Neck: Normal range of motion. Cardiovascular: Normal rate, regular rhythm, normal heart sounds and intact distal pulses. Exam reveals no gallop and no friction rub.    No murmur heard.  Pulmonary/Chest: Effort normal and breath sounds normal. No stridor. No respiratory distress. She has no wheezes. She has no rales. Abdominal: Soft. Bowel sounds are normal. She exhibits no distension and no mass. There is no tenderness. There is no rebound and no guarding. Musculoskeletal: Normal range of motion. She exhibits no edema, tenderness or deformity. Neurological: She is alert and oriented to person, place, and time. She displays normal reflexes. No sensory deficit. She exhibits normal muscle tone. Coordination normal.   Skin: Skin is warm and dry. Capillary refill takes less than 2 seconds. No rash noted. She is not diaphoretic. No erythema. No pallor. Psychiatric: She has a normal mood and affect. Her behavior is normal. Thought content normal.   Nursing note and vitals reviewed. DIFFERENTIAL  DIAGNOSIS     PLAN (LABS / IMAGING / EKG):  Orders Placed This Encounter   Procedures    XR LUMBAR SPINE (2-3 VIEWS)    XR HIP 2-3 VW W PELVIS LEFT       MEDICATIONS ORDERED:  Orders Placed This Encounter   Medications    ibuprofen (ADVIL;MOTRIN) tablet 800 mg       DDX: Vertebral fracture, epidural abscess, sprain, strain, contusion, muscle spasm, osteomyelitis, cauda equina, herniated disc, pyelonephritis, kidney stone      Initial MDM/Plan/ED COURSE:    70 y.o. female who presents with complaints of acute on chronic back pain and left hip pain. On exam patient is well-appearing, nontoxic. Vital signs are within normal limits. On physical exam abdomen is soft, nontender, nondistended. Lungs are clear to auscultation bilaterally. Cardiac regular rate and rhythm. 5 out of 5 strength in bilateral lower extremities, sensation intact in bilateral lower extremities, DTRs 2+ bilaterally. Midline cervical, thoracic, lumbar tenderness. Patient does have some paraspinal lumbar tenderness. Negative straight leg. Capillary refill less than 2 seconds.   DP and PT pulses 2+ bilaterally. Although patient states that she has been urinating on herself, she states that this is due to her inability to get to the bathroom secondary to back pain and not because she does not know that she is going to the bathroom. No red flags including fever, IV drug abuse, saddle anesthesia, decreased DTRs. We'll plan for lumbar x-ray, left hip and pelvis x-ray. If negative will plan to ambulate patient and discharge home with outpatient follow-up. We'll also give Motrin for pain. Low suspicion for epidural abscess/osteomyelitis as patient is afebrile, denies history of IV drug abuse. Patient for pyelonephritis, kidney stone, UTI as patient has no  complaints including dysuria, hematuria and no CVA tenderness. Low suspicion for herniated disc, given negative straight leg and no numbness going down the back of her leg. X-rays negative for any acute pathology. Patient ambulated without difficulty. Patient given strict return precautions including any worsening or new symptoms such as difficulty urinating, genital numbness, lower extremity numbness, weakness, telling, fevers. Instructed to call PCP and schedule a follow-up appointment in next 2-3 days for reevaluation. Okay to take Tylenol and Motrin as needed for pain. Patient agreeable for discharge at this time, all questions answered. Patient discharged home in stable condition.     ED Course as of Apr 11 1803   Thu Apr 11, 2019   1738 Will discharge if patient able to ambulate.    [LW]      ED Course User Index  [LW] Esteban Cea,         DIAGNOSTIC RESULTS / [facility] COURSE / MDM     LABS:  Labs Reviewed - No data to display        Xr Lumbar Spine (2-3 Views)    Result Date: 4/11/2019  EXAMINATION: 3 XRAY VIEWS OF THE LUMBAR SPINE; SINGLE XRAY VIEW OF THE PELVIS AND 2 XRAY VIEWS LEFT HIP 4/11/2019 4:06 pm COMPARISON: Lumbar spine 27 April 2018 HISTORY: ORDERING SYSTEM PROVIDED HISTORY: chronic back pain TECHNOLOGIST PROVIDED HISTORY: chronic back pain FINDINGS: Lumbar spine: Osteopenia is present. Alignment is satisfactory. No acute fracture or subluxation is seen. Severe degenerative disc disease at L5-S1 is noted. Mild spondylosis is present. Mild to moderate facet arthropathy is noted. Pedicles are intact. Mild to moderate degenerative changes are evident. SI joints are symmetrical. Pelvis and left hip: Both femoral heads are well circumscribed and situated within the acetabula. No acute fracture, dislocation, or effusion is noted. Mild degenerative changes in the SI joints, and symphysis pubis are noted. Lumbar spine: Degenerative and degenerative disc disease without significant interval difference from prior exam.  No acute fracture or subluxation. Pelvis and left hip: Mild degenerative changes. No acute osseous abnormality. Xr Hip 2-3 Vw W Pelvis Left    Result Date: 4/11/2019  EXAMINATION: 3 XRAY VIEWS OF THE LUMBAR SPINE; SINGLE XRAY VIEW OF THE PELVIS AND 2 XRAY VIEWS LEFT HIP 4/11/2019 4:06 pm COMPARISON: Lumbar spine 27 April 2018 HISTORY: ORDERING SYSTEM PROVIDED HISTORY: chronic back pain TECHNOLOGIST PROVIDED HISTORY: chronic back pain FINDINGS: Lumbar spine: Osteopenia is present. Alignment is satisfactory. No acute fracture or subluxation is seen. Severe degenerative disc disease at L5-S1 is noted. Mild spondylosis is present. Mild to moderate facet arthropathy is noted. Pedicles are intact. Mild to moderate degenerative changes are evident. SI joints are symmetrical. Pelvis and left hip: Both femoral heads are well circumscribed and situated within the acetabula. No acute fracture, dislocation, or effusion is noted. Mild degenerative changes in the SI joints, and symphysis pubis are noted. Lumbar spine: Degenerative and degenerative disc disease without significant interval difference from prior exam.  No acute fracture or subluxation.  Pelvis and left hip: Mild degenerative changes. No acute osseous abnormality. PROCEDURES:  None    CONSULTS:  None    CRITICAL CARE:  Please see attending note    FINAL IMPRESSION      1. Chronic bilateral low back pain without sciatica          DISPOSITION / PLAN     DISPOSITION Decision To Discharge 04/11/2019 05:30:26 PM      PATIENT REFERRED TO:  No follow-up provider specified.     DISCHARGE MEDICATIONS:  New Prescriptions    No medications on file       Dilcia Rick DO  Emergency Medicine Resident    (Please note that portions of this note were completed with a voice recognition program.Efforts were made to edit the dictations but occasionally words are mis-transcribed.)       Dilcia Rcik DO  Resident  04/11/19 0858

## 2019-04-11 NOTE — ED NOTES
Bed: 39  Expected date:   Expected time:   Means of arrival:   Comments:  Medic Scarlett7 Darrell Patel, JULIANO  04/11/19 [id number]

## 2019-04-11 NOTE — ED PROVIDER NOTES
[facility]     Emergency Department     Faculty Note/ Attestation      Pt Name: Dm Shook                                       MRN: [id number]  [place] 1955  Date of evaluation: 4/11/2019    Patients PCP:    JEANNETTE Valentine - [facility]      Attestation  I performed a history and physical examination of the patient and discussed management with the resident. I reviewed the residents note and agree with the documented findings and plan of care. Any areas of disagreement are noted on the chart. I was personally present for the key portions of any procedures. I have documented in the chart those procedures where I was not present during the key portions. I have reviewed the emergency nurses triage note. I agree with the chief complaint, past medical history, past surgical history, allergies, medications, social and family history as documented unless otherwise noted below. For Physician Assistant/ Nurse Practitioner cases/documentation I have personally evaluated this patient and have completed at least one if not all key elements of the E/M (history, physical exam, and MDM). Additional findings are as noted. Initial Screens:             Vitals:    Vitals:    04/11/19 1443 04/11/19 1445   BP:  134/64   Pulse: 94    Resp: 18    Temp: 98.4 °F (36.9 °C)    TempSrc: Oral    SpO2: 94% 93%       CHIEF COMPLAINT       Chief Complaint   Patient presents with    Back Pain             DIAGNOSTIC RESULTS             RADIOLOGY:   XR LUMBAR SPINE (2-3 VIEWS)   Final Result   Lumbar spine: Degenerative and degenerative disc disease without significant   interval difference from prior exam.  No acute fracture or subluxation. Pelvis and left hip: Mild degenerative changes. No acute osseous abnormality.          XR HIP 2-3 VW W PELVIS LEFT   Final Result   Lumbar spine: Degenerative and degenerative disc disease without significant   interval difference from prior exam.  No acute fracture or subluxation. Pelvis and left hip: Mild degenerative changes. No acute osseous abnormality.                LABS:  Labs Reviewed - No data to display      EMERGENCY DEPARTMENT COURSE:     -------------------------  BP: 134/64, Temp: 98.4 °F (36.9 °C), Pulse: 94, Resp: 18      Comments    Acute on chronic low back pain  No wkness or other neuro deficits  Imaging WNL  Ambulatory in ED  Will d/c with supportive care and f/u with PCP      (Please note that portions of this note were completed with a voice recognition program.  Efforts were made to edit the dictations but occasionally words are mis-transcribed.)      Woods MD  Attending Emergency Physician         Dante Charles MD  04/11/19 [id number]

## 2019-05-11 ENCOUNTER — HOSPITAL ENCOUNTER (EMERGENCY)
Age: 64
Discharge: HOME | End: 2019-05-11
Attending: EMERGENCY MEDICINE
Payer: MEDICARE

## 2019-05-11 VITALS
WEIGHT: 200 LBS | BODY MASS INDEX: 32.14 KG/M2 | DIASTOLIC BLOOD PRESSURE: 77 MMHG | SYSTOLIC BLOOD PRESSURE: 152 MMHG | OXYGEN SATURATION: 97 % | HEIGHT: 66 IN | HEART RATE: 86 BPM | TEMPERATURE: 99.2 F

## 2019-05-11 DIAGNOSIS — Z76.0 ENCOUNTER FOR MEDICATION REFILL: Primary | ICD-10-CM

## 2019-05-11 DIAGNOSIS — F31.9 BIPOLAR 1 DISORDER (HCC): ICD-10-CM

## 2019-05-11 PROCEDURE — 6370000000 HC RX 637 (ALT 250 FOR IP): Performed by: STUDENT IN AN ORGANIZED HEALTH CARE EDUCATION/TRAINING PROGRAM

## 2019-05-11 PROCEDURE — 99281 EMR DPT VST MAYX REQ PHY/QHP: CPT

## 2019-05-11 RX ORDER — QUETIAPINE FUMARATE 300 MG/1
300 TABLET, FILM COATED ORAL 2 TIMES DAILY
Qty: 20 TABLET | Refills: 0 | Status: SHIPPED | OUTPATIENT
Start: 2019-05-11 | End: 2021-07-24

## 2019-05-11 RX ORDER — QUETIAPINE 300 MG/1
300 TABLET, FILM COATED, EXTENDED RELEASE ORAL ONCE
Status: COMPLETED | OUTPATIENT
Start: 2019-05-11 | End: 2019-05-11

## 2019-05-11 RX ADMIN — QUETIAPINE FUMARATE 300 MG: 300 TABLET, EXTENDED RELEASE ORAL at 16:06

## 2019-05-11 ASSESSMENT — ENCOUNTER SYMPTOMS
EYE REDNESS: 0
SHORTNESS OF BREATH: 0
VOMITING: 0
CHEST TIGHTNESS: 0
ABDOMINAL PAIN: 0
COLOR CHANGE: 0
EYE DISCHARGE: 0
NAUSEA: 0

## 2019-05-11 NOTE — ED PROVIDER NOTES
[facility]  ED  Emergency Department Encounter  Emergency Medicine Resident     Pt Name: Adriano Rock  MRN: [id number]  [place] 1955  Date of evaluation: 5/11/19  PCP:  JEANNETTE Burton [id number]       Chief Complaint   Patient presents with    Medication Refill       HISTORY OFPRESENT ILLNESS  (Location/Symptom, Timing/Onset, Context/Setting, Quality, Duration, Modifying Venus Sinning.)      Adriano Rock is a 70 y.o. female who presents with voices and seeing shadows in the peripheral vision. Patient has a history of bipolar disorder and has been out of her Seroquel as she dropped her last 10 down the sink. She denies any chest pain, shortness of breath, abdominal pain, fever, nausea, vomiting, suicidal, homicidal ideations. She does smoke approximately one pack per day denies any alcohol or drug use. PAST MEDICAL / SURGICAL / SOCIAL / FAMILY HISTORY      has a past medical history of Bipolar 1 disorder ([address] Breast lump, Chronic obstructive pulmonary disease ([address] Depression, GERD (gastroesophageal reflux disease), Hyperlipidemia, Hypertension, Osteoarthritis, Type 2 diabetes mellitus without complication, with long-term current use of insulin ([address] and Type II or unspecified type diabetes mellitus without mention of complication, not stated as uncontrolled. has a past surgical history that includes Ectopic pregnancy surgery; knee surgery (Right); Dilation and curettage of uterus; Nerve Block (Left, 12/8/14); and Knee Arthroplasty (2017).     Social History     Socioeconomic History    Marital status:      Spouse name: Not on file    Number of children: Not on file    Years of education: Not on file    Highest education level: Not on file   Occupational History     Employer: N/A   Social Needs    Financial resource strain: Not on file    Food insecurity:     Worry: Not on file     Inability: Not on file    Transportation needs: Medical: Not on file     Non-medical: Not on file   Tobacco Use    Smoking status: Current Every Day Smoker     Packs/day: 0.25     Years: 10.00     Pack years: 2.50     Types: Cigarettes    Smokeless tobacco: Never Used    Tobacco comment: 3 cigarettes per day   Substance and Sexual Activity    Alcohol use: Yes     Comment: 02/06/17 patient denies    Drug use: No     Comment: 02/06/17 patient denies    Sexual activity: Never   Lifestyle    Physical activity:     Days per week: Not on file     Minutes per session: Not on file    Stress: Not on file   Relationships    Social connections:     Talks on phone: Not on file     Gets together: Not on file     Attends Jainism service: Not on file     Active member of club or organization: Not on file     Attends meetings of clubs or organizations: Not on file     Relationship status: Not on file    Intimate partner violence:     Fear of current or ex partner: Not on file     Emotionally abused: Not on file     Physically abused: Not on file     Forced sexual activity: Not on file   Other Topics Concern    Not on file   Social History Narrative    Not on file       Family History   Problem Relation Age of Onset    Diabetes Father     Stroke Father     High Blood Pressure Father        Allergies:  Flexeril [cyclobenzaprine]; Gabapentin; Prochlorperazine edisylate; Promethazine; and Thorazine [chlorpromazine]    Home Medications:  Prior to Admission medications    Medication Sig Start Date End Date Taking?  Authorizing Provider   QUEtiapine (SEROQUEL) 300 MG tablet Take 1 tablet by mouth 2 times daily 5/11/19  Yes Mary Crimes, DO   escitalopram (LEXAPRO) 20 MG tablet Take 1 tablet by mouth daily 3/26/19   Historical Provider, MD   Insulin Syringes, Disposable, U-100 1 ML MISC 1 each by Does not apply route daily 2/26/19   JEANNETTE Snell - CNP   hydrOXYzine (ATARAX) 10 MG tablet 1 to 2 po TID prn anxiety 2/26/19   JEANNETTE Snell CNP OF SYSTEMS    (2-9 systems for level 4, 10 or more for level 5)      Review of Systems   Constitutional: Negative for chills and fever. Eyes: Negative for discharge and redness. Respiratory: Negative for chest tightness and shortness of breath. Cardiovascular: Negative for chest pain and palpitations. Gastrointestinal: Negative for abdominal pain, nausea and vomiting. Genitourinary: Negative for dysuria and flank pain. Musculoskeletal: Negative for arthralgias and myalgias. Skin: Negative for color change and rash. Allergic/Immunologic: Positive for environmental allergies. Neurological: Negative for weakness and headaches. Psychiatric/Behavioral: Negative for agitation, confusion and suicidal ideas. PHYSICAL EXAM   (up to 7 for level 4, 8 or more for level 5)     INITIAL VITALS:    height is 5' 6\" (1.676 m) and weight is 200 lb (90.7 kg). Her oral temperature is 99.2 °F (37.3 °C). Her blood pressure is 152/77 (abnormal) and her pulse is 86. Her oxygen saturation is 97%. Physical Exam   Constitutional: She is oriented to person, place, and time. She appears well-developed and well-nourished. HENT:   Head: Normocephalic and atraumatic. Eyes: Pupils are equal, round, and reactive to light. Conjunctivae are normal. No scleral icterus. Cardiovascular: Normal rate, regular rhythm and normal heart sounds. Exam reveals no gallop and no friction rub. No murmur heard. Pulmonary/Chest: Effort normal and breath sounds normal. No respiratory distress. She has no wheezes. She has no rales. Abdominal: Soft. Bowel sounds are normal. She exhibits no distension and no mass. There is no tenderness. There is no rebound and no guarding. Musculoskeletal: Normal range of motion. Neurological: She is alert and oriented to person, place, and time. Skin: Skin is warm and dry. No rash noted. No erythema. Psychiatric: She has a normal mood and affect.  Her behavior is normal.   Nursing note and

## 2019-05-11 NOTE — ED TRIAGE NOTES
Patient states that she spilled her bottle of seroquel in the sink 4 days ago and today she started hearing voices and seeing things that are not there. States that its not time for a refill so  came her for meds.

## 2019-05-11 NOTE — ED PROVIDER NOTES
[facility]     Emergency Department     Faculty Attestation        I performed a history and physical examination of the patient and discussed management with the resident. I reviewed the residents note and agree with the documented findings and plan of care. Any areas of disagreement are noted on the chart. I was personally present for the key portions of any procedures. I have documented in the chart those procedures where I was not present during the key portions. I have reviewed the emergency nurses triage note. I agree with the chief complaint, past medical history, past surgical history, allergies, medications, social and family history as documented unless otherwise noted below. For Physician Assistant/ Nurse Practitioner cases/documentation I have personally evaluated this patient and have completed at least one if not all key elements of the E/M (history, physical exam, and MDM). Additional findings are as noted. PCP:  JEANNETTE Laura - NAS    Pertinent Comments:     Patient is a 49-year-old female with history of bipolar disease as well as schizophrenia in the past is been out of her Seroquel for the last 2 days and now beginning to have early signs of hallucinations which is typical of her stoppage of Seroquel in the past.   Patient denies any homicidal or suicidal ideation denies any chest pain/shortness breath/nausea/vomiting/fever/chills/abdominal pain. Plan: We will give brief refill of this and assure follow-up    Critical Care  None      (Please note that portions of this note were completed with a voice recognition program. Efforts were made to edit the dictations but occasionally words are mis-transcribed.  Whenever words are used in this note in any gender, they shall be construed as though they were used in the gender appropriate to the circumstances; and whenever words are used in this note in the singular or plural form, they shall be construed as though they were used in the form appropriate to the circumstances.)    MD Tung Waite  Attending Emergency Medicine Physician              Garry Stoner MD  05/11/19 4484 [facility], [place]  05/11/19 5817

## 2019-06-06 ENCOUNTER — HOSPITAL ENCOUNTER (EMERGENCY)
Age: 64
Discharge: HOME | End: 2019-06-06
Attending: EMERGENCY MEDICINE
Payer: MEDICARE

## 2019-06-06 VITALS
HEART RATE: 90 BPM | TEMPERATURE: 99.1 F | OXYGEN SATURATION: 96 % | SYSTOLIC BLOOD PRESSURE: 144 MMHG | RESPIRATION RATE: 12 BRPM | DIASTOLIC BLOOD PRESSURE: 93 MMHG

## 2019-06-06 DIAGNOSIS — Z76.0 ENCOUNTER FOR MEDICATION REFILL: Primary | ICD-10-CM

## 2019-06-06 PROCEDURE — 99281 EMR DPT VST MAYX REQ PHY/QHP: CPT

## 2019-06-06 ASSESSMENT — ENCOUNTER SYMPTOMS
DIARRHEA: 1
ABDOMINAL PAIN: 0
SHORTNESS OF BREATH: 0

## 2019-06-06 ASSESSMENT — PAIN SCALES - GENERAL: PAINLEVEL_OUTOF10: 5

## 2019-06-06 ASSESSMENT — PAIN DESCRIPTION - LOCATION: LOCATION: HEAD

## 2019-06-06 NOTE — ED NOTES
Received social work consult to meet with pt to discuss her misplacing her seroquel. Pt just recently seen in the ER for the same issue. Met with pt. She states she was in [place] visiting her son and thinks she left her medication on the bus. Writer explained to pt that the ER physicians do not typically write for psych medications and she would need to follow up at the [facility] or [place] walk in clinics for medication refills. Discussed options and pt prefers to go to [address]. Contacted [employer] and provided cab voucher to assist her in getting to [facility]. Provided her list of Walk in clinic times and locations at both [facility] and [place] for future reference. Pt states she has a follow up appointment on June 13th. Pt is seen one time a month and claims she is compliant in going to all appointments. Pt currently stating she is hearing mumbling voices but pt can not make out what voices are saying. Pt denies SI or HI. Cab at ER and pt to be transported to [facility] in hopes to get Seroquel filled.

## 2019-06-06 NOTE — ED NOTES
Pt came into ER via EMS in [facility] with steady gait, pt reports not taking her psych medications since Monday. Pt states today she started hearing mumbling voices in her head. Pt reports she wants to get checked out for this. Pt reports headache 5/10 because of the voices. pt denies CP or SOB. Pt resting in bed in NAD with even and unlabored rr.  Pt in JOSE, will continue to assess     Sharmila Aviles, JULIANO  06/06/19 [id number]

## 2019-06-06 NOTE — ED PROVIDER NOTES
[address] [facility]     Emergency Department     Faculty Attestation    I performed a history and physical examination of the patient and discussed management with the resident. I reviewed the residents note and agree with the documented findings including all diagnostic interpretations and plan of care. Any areas of disagreement are noted on the chart. I was personally present for the key portions of any procedures. I have documented in the chart those procedures where I was not present during the key portions. I have reviewed the emergency nurses triage note. I agree with the chief complaint, past medical history, past surgical history, allergies, medications, social and family history as documented unless otherwise noted below. Documentation of the HPI, Physical Exam and Medical Decision Making performed by scribstewatr is based on my personal performance of the HPI, PE and MDM. For Physician Assistant/ Nurse Practitioner cases/documentation I have personally evaluated this patient and have completed at least one if not all key elements of the E/M (history, physical exam, and MDM). Additional findings are as noted. Primary Care Physician: JEANNETTE Wiggins - CNP    History: This is a 70 y.o. female who presents to the Emergency Department with complaint of psychiatric complaint. Reports hearing voices. Reports that she lost her Seroquel prescription at that evening this week either in [place] or left at bus station. Has appointment with [facility] on the 13th, did not attempt to get in with them earlier. No suicidal or homicidal ideation. Physical:     oral temperature is 99.1 °F (37.3 °C). Her blood pressure is 144/93 (abnormal) and her pulse is 90.  Her respiration is 12 and oxygen saturation is 96%.    70 y.o. female no acute distress, easily directable and conversation, flat affect, some occasional lip smacking, cardiac exam regular rate and rhythm no

## 2019-09-02 ENCOUNTER — HOSPITAL ENCOUNTER (EMERGENCY)
Age: 64
Discharge: HOME | End: 2019-09-02
Attending: EMERGENCY MEDICINE
Payer: MEDICARE

## 2019-09-02 VITALS
OXYGEN SATURATION: 96 % | HEART RATE: 78 BPM | DIASTOLIC BLOOD PRESSURE: 76 MMHG | SYSTOLIC BLOOD PRESSURE: 142 MMHG | RESPIRATION RATE: 19 BRPM | TEMPERATURE: 98.8 F

## 2019-09-02 DIAGNOSIS — K13.79 MOUTH PAIN: Primary | ICD-10-CM

## 2019-09-02 PROCEDURE — 99283 EMERGENCY DEPT VISIT LOW MDM: CPT

## 2019-09-02 PROCEDURE — 6370000000 HC RX 637 (ALT 250 FOR IP): Performed by: STUDENT IN AN ORGANIZED HEALTH CARE EDUCATION/TRAINING PROGRAM

## 2019-09-02 RX ORDER — DEXAMETHASONE SODIUM PHOSPHATE 10 MG/ML
10 INJECTION INTRAMUSCULAR; INTRAVENOUS ONCE
Status: DISCONTINUED | OUTPATIENT
Start: 2019-09-02 | End: 2019-09-02

## 2019-09-02 RX ORDER — CETIRIZINE HYDROCHLORIDE 5 MG/1
10 TABLET ORAL DAILY
Status: DISCONTINUED | OUTPATIENT
Start: 2019-09-02 | End: 2019-09-02 | Stop reason: HOSPADM

## 2019-09-02 RX ADMIN — CETIRIZINE HYDROCHLORIDE 10 MG: 5 SOLUTION ORAL at 14:06

## 2019-09-02 RX ADMIN — Medication 5 ML: at 14:03

## 2019-09-02 ASSESSMENT — PAIN DESCRIPTION - LOCATION: LOCATION: MOUTH

## 2019-09-02 ASSESSMENT — PAIN DESCRIPTION - FREQUENCY: FREQUENCY: CONTINUOUS

## 2019-09-02 ASSESSMENT — PAIN DESCRIPTION - PAIN TYPE: TYPE: ACUTE PAIN

## 2019-09-02 ASSESSMENT — PAIN SCALES - GENERAL: PAINLEVEL_OUTOF10: 3

## 2019-09-02 ASSESSMENT — PAIN DESCRIPTION - DESCRIPTORS: DESCRIPTORS: SORE

## 2019-09-02 NOTE — ED PROVIDER NOTES
Sausaman, APRN - CNP   esomeprazole Magnesium (NEXIUM) 40 MG PACK Take 1 packet by mouth daily 7/26/19   JEANNETTE Da Silva CNP   hydrOXYzine (ATARAX) 10 MG tablet 1 to 2 po TID prn anxiety 5/31/19   JEANNETTE Da Silva CNP   solifenacin (VESICARE) 5 MG tablet Take 1 tablet by mouth daily 5/31/19   JEANNETTE Da Silva CNP   QUEtiapine (SEROQUEL) 300 MG tablet Take 1 tablet by mouth 2 times daily 5/11/19   Mcqueen Shines, DO   escitalopram (LEXAPRO) 20 MG tablet Take 1 tablet by mouth daily 3/26/19   Historical Provider, MD   Insulin Syringes, Disposable, U-100 1 ML MISC 1 each by Does not apply route daily 2/26/19   JEANNETTE Da Silva CNP   nicotine (NICODERM CQ) 14 MG/24HR Place 1 patch onto the skin daily for 14 days 2/26/19 3/27/19  JEANNETTE Da Silva CNP   insulin glargine (LANTUS) 100 UNIT/ML injection vial Inject 50 Units into the skin nightly 2/19/19   JEANNETTE Da Silva CNP   pregabalin (LYRICA) 50 MG capsule Take 1 capsule by mouth 2 times daily for 30 days. . 2/8/19 3/10/19  JEANNETTE Da Silva CNP   blood glucose monitor strips Check Blood sugar 3 times/day , Dx: Insulin dependent DM Please substitute for brand compatible with patients glucometer 2/4/19   JEANNETTE Da Silva CNP   Blood Glucose Monitoring Suppl (ONE TOUCH ULTRA MINI) w/Device KIT use as directed 2/4/19   JEANNETTE Da Silva CNP   ALPRAZolam Nancy Clements) 0.5 MG tablet Take 0.5 mg by mouth 2 times daily. Momo Leon Historical Provider, MD   ibuprofen (ADVIL;MOTRIN) 400 MG tablet TAKE 1 TABLET EVERY 8 HOURS AS NEEDED FOR PAIN WITH FOOD 10/12/17   Chris Ames MD   lisinopril (PRINIVIL;ZESTRIL) 5 MG tablet Take 1 tablet by mouth daily 10/5/17   Kinza Schofield MD   glucose monitoring kit (FREESTYLE) monitoring kit Use as directed. Dx  Insulin dependent DM, Please substitute it with Glucometer covered by patients insurance or patients choice.  9/21/17   Kinza Schofield MD   Lancets MISC Use as directed Capillary refill takes less than 2 seconds. Psychiatric: She has a normal mood and affect. Her behavior is normal.       DIFFERENTIAL  DIAGNOSIS     PLAN (LABS / IMAGING / EKG):  No orders of the defined types were placed in this encounter. MEDICATIONS ORDERED:  Orders Placed This Encounter   Medications    DISCONTD: magic (miracle) mouthwash    Magic Mouthwash (MIRACLE MOUTHWASH)     Sig: Swish and spit 5 mLs 4 times daily as needed for Irritation     Dispense:  240 mL     Refill:  0    DISCONTD: dexamethasone (DECADRON) injection 10 mg    DISCONTD: cetirizine HCl (ZYRTEC) 5 MG/5ML solution 10 mg       DDX: Hand-foot-and-mouth, canker sores, herpetic lesions, tardive dyskinesia, medication side effect    Initial MDM/Plan: 70 y.o. female who presents with concern for mouth pain and mouth sores. It is difficult to visualize any mouth sores on physical exam, however patient has a sensation that they are there and is persistently moving her tongue throughout her mouth to feel better. Patient does wear full seat at home however they are not in her mouth right now. Patient denies any other symptoms such as fever, nausea, vomiting. Patient states she has tried Orajel at home with no relief. Plan to give patient Magic mouthwash while here in the emergency department, discharge patient home with Magic mouthwash. Encourage patient to follow-up and have her dentures checked out for size fitting. Encourage patient to not wear dentures for a few days while the symptoms resolved. If patient persists with more symptoms and is unable to eat or drink encourage patient to return the emergency department and follow-up with primary care provider. DIAGNOSTIC RESULTS / EMERGENCYDEPARTMENT COURSE / MDM     LABS:  Labs Reviewed - No data to display      RADIOLOGY:  No results found.       EKG  NA     All EKG's are interpreted by the Emergency Department Physicianwho either signs or Co-signs this chart in the absence

## 2019-09-05 ENCOUNTER — HOSPITAL ENCOUNTER (EMERGENCY)
Age: 64
Discharge: HOME | End: 2019-09-05
Attending: EMERGENCY MEDICINE
Payer: MEDICARE

## 2019-09-05 VITALS
HEART RATE: 91 BPM | OXYGEN SATURATION: 99 % | TEMPERATURE: 98.2 F | DIASTOLIC BLOOD PRESSURE: 84 MMHG | SYSTOLIC BLOOD PRESSURE: 125 MMHG | RESPIRATION RATE: 18 BRPM

## 2019-09-05 DIAGNOSIS — B00.2 HERPES STOMATITIS: Primary | ICD-10-CM

## 2019-09-05 PROCEDURE — 99282 EMERGENCY DEPT VISIT SF MDM: CPT

## 2019-09-05 RX ORDER — ACYCLOVIR 400 MG/1
400 TABLET ORAL 3 TIMES DAILY
Qty: 30 TABLET | Refills: 0 | Status: SHIPPED | OUTPATIENT
Start: 2019-09-05 | End: 2019-09-15

## 2019-09-05 ASSESSMENT — PAIN DESCRIPTION - LOCATION: LOCATION: MOUTH

## 2019-09-05 ASSESSMENT — ENCOUNTER SYMPTOMS
VOICE CHANGE: 0
NAUSEA: 0
TROUBLE SWALLOWING: 0
SORE THROAT: 0
VOMITING: 0

## 2019-09-05 ASSESSMENT — PAIN DESCRIPTION - DESCRIPTORS: DESCRIPTORS: ACHING;DISCOMFORT

## 2019-09-05 ASSESSMENT — PAIN SCALES - GENERAL: PAINLEVEL_OUTOF10: 7

## 2019-09-05 NOTE — ED PROVIDER NOTES
ulcerations   Eyes: Pupils are equal, round, and reactive to light. Conjunctivae are normal.   Neck: Normal range of motion. Neck supple. Pulmonary/Chest: Effort normal. No respiratory distress. Musculoskeletal: Normal range of motion. Neurological: She is alert. Skin: Skin is warm and dry. No rash noted. She is not diaphoretic. Psychiatric: She has a normal mood and affect. Her behavior is normal.   Nursing note and vitals reviewed. DIFFERENTIAL  DIAGNOSIS   PLAN (LABS / IMAGING / EKG):  No orders of the defined types were placed in this encounter. MEDICATIONS ORDERED:  Orders Placed This Encounter   Medications    acyclovir (ZOVIRAX) 400 MG tablet     Sig: Take 1 tablet by mouth 3 times daily for 10 days     Dispense:  30 tablet     Refill:  0       DDX:   Candidiasis, kanker sores, abscess    DIAGNOSTIC RESULTS / EMERGENCYDEPARTMENT COURSE / MDM   LABS:  Labs Reviewed - No data to display    RADIOLOGY:  No results found. EMERGENCY DEPARTMENT COURSE:       MDM  Number of Diagnoses or Management Options  Herpes stomatitis:   Diagnosis management comments: Patient was in the emergency department with complaints of persistent mouth sores despite being recently seen with the same complaint and being prescribed Magic mouthwash. She states that the sores on her for mouth and make getting painful. On exam she has ulcerations on her palate that resemble herpes stomatitis. This is explained to patient and also explained that support in the shop with her primary care provider. Prescription for acyclovir was given and patient counseled how to take the medication appropriately. I encouraged patient to continue to take over-the-counter extra strength Tylenol for her pain as well as over-the-counter ibuprofen. Also counseled her on staying away from spicy and acidic foods as this would increase her pain.        Amount and/or Complexity of Data Reviewed  Review and summarize past medical records:

## 2019-09-14 ENCOUNTER — APPOINTMENT (OUTPATIENT)
Dept: GENERAL RADIOLOGY | Age: 64
End: 2019-09-14
Payer: MEDICARE

## 2019-09-14 ENCOUNTER — HOSPITAL ENCOUNTER (EMERGENCY)
Age: 64
Discharge: HOME | End: 2019-09-14
Attending: EMERGENCY MEDICINE
Payer: MEDICARE

## 2019-09-14 VITALS
BODY MASS INDEX: 31.56 KG/M2 | HEIGHT: 67 IN | HEART RATE: 91 BPM | SYSTOLIC BLOOD PRESSURE: 148 MMHG | DIASTOLIC BLOOD PRESSURE: 85 MMHG | RESPIRATION RATE: 16 BRPM | TEMPERATURE: 98.5 F | OXYGEN SATURATION: 98 %

## 2019-09-14 DIAGNOSIS — S46.812A STRAIN OF LEFT TRAPEZIUS MUSCLE, INITIAL ENCOUNTER: Primary | ICD-10-CM

## 2019-09-14 DIAGNOSIS — M25.512 ACUTE PAIN OF LEFT SHOULDER: ICD-10-CM

## 2019-09-14 PROCEDURE — 99283 EMERGENCY DEPT VISIT LOW MDM: CPT

## 2019-09-14 PROCEDURE — 73030 X-RAY EXAM OF SHOULDER: CPT

## 2019-09-14 RX ORDER — TIZANIDINE 4 MG/1
4 TABLET ORAL NIGHTLY PRN
Qty: 12 TABLET | Refills: 0 | Status: SHIPPED | OUTPATIENT
Start: 2019-09-14 | End: 2020-01-20 | Stop reason: ALTCHOICE

## 2019-09-14 RX ORDER — TIZANIDINE 4 MG/1
4 TABLET ORAL ONCE
Status: DISCONTINUED | OUTPATIENT
Start: 2019-09-14 | End: 2019-09-14 | Stop reason: HOSPADM

## 2019-09-14 ASSESSMENT — ENCOUNTER SYMPTOMS
WHEEZING: 0
DIARRHEA: 0
ABDOMINAL PAIN: 0
CONSTIPATION: 0
COUGH: 0
NAUSEA: 0
SHORTNESS OF BREATH: 0
VOMITING: 0

## 2019-09-14 ASSESSMENT — PAIN DESCRIPTION - FREQUENCY: FREQUENCY: CONTINUOUS

## 2019-09-14 ASSESSMENT — PAIN SCALES - GENERAL: PAINLEVEL_OUTOF10: 9

## 2019-09-14 ASSESSMENT — PAIN DESCRIPTION - DESCRIPTORS: DESCRIPTORS: SHARP

## 2019-09-14 ASSESSMENT — PAIN DESCRIPTION - ORIENTATION: ORIENTATION: LEFT

## 2019-09-14 ASSESSMENT — PAIN DESCRIPTION - LOCATION: LOCATION: SHOULDER

## 2019-09-14 NOTE — ED PROVIDER NOTES
[facility]  ED  Emergency Department Encounter  EmergencyMedicine Resident     Pt Verner Glance  MRN: [id number]  Carly 1955  Date of evaluation: 9/14/19  PCP:  JEANNETTE Webster [id number]       Chief Complaint   Patient presents with    Shoulder Pain     after fall two weeks ago       HISTORY OF PRESENT ILLNESS  (Location/Symptom, Timing/Onset, Context/Setting, Quality, Duration, Modifying Factors, Severity.)      Nunu Asif is a 70 y.o. female who presents with left shoulder pain after fall 2 weeks prior. Patient notes that she fell onto her left side and has been taking Motrin with little relief in her symptoms ever since. Patient notes the pain is worse with movement and deep inspiration and that it is a sharp moderate pain. Patient denies hitting her head denies any neck or back pain. Patient has no other concerns or issues at this time. PAST MEDICAL / SURGICAL / SOCIAL / FAMILY HISTORY      has a past medical history of Bipolar 1 disorder ([address] Breast lump, Chronic obstructive pulmonary disease ([address] Depression, GERD (gastroesophageal reflux disease), Hyperlipidemia, Hypertension, Osteoarthritis, Type 2 diabetes mellitus without complication, with long-term current use of insulin ([address] and Type II or unspecified type diabetes mellitus without mention of complication, not stated as uncontrolled. has a past surgical history that includes Ectopic pregnancy surgery; knee surgery (Right); Dilation and curettage of uterus; Nerve Block (Left, 12/8/14); and Knee Arthroplasty (2017).     Social History     Socioeconomic History    Marital status:      Spouse name: Not on file    Number of children: Not on file    Years of education: Not on file    Highest education level: Not on file   Occupational History     Employer: N/A   Social Needs    Financial resource strain: Not on file    Food insecurity:     Worry: Not on file     Inability: Not on file    Transportation needs:     Medical: Not on file     Non-medical: Not on file   Tobacco Use    Smoking status: Current Every Day Smoker     Packs/day: 0.25     Years: 10.00     Pack years: 2.50     Types: Cigarettes    Smokeless tobacco: Never Used    Tobacco comment: 3 cigarettes per day   Substance and Sexual Activity    Alcohol use: Yes     Comment: 02/06/17 patient denies    Drug use: No     Comment: 02/06/17 patient denies    Sexual activity: Never   Lifestyle    Physical activity:     Days per week: Not on file     Minutes per session: Not on file    Stress: Not on file   Relationships    Social connections:     Talks on phone: Not on file     Gets together: Not on file     Attends Latter day service: Not on file     Active member of club or organization: Not on file     Attends meetings of clubs or organizations: Not on file     Relationship status: Not on file    Intimate partner violence:     Fear of current or ex partner: Not on file     Emotionally abused: Not on file     Physically abused: Not on file     Forced sexual activity: Not on file   Other Topics Concern    Not on file   Social History Narrative    Not on file       Patient was advised to stop smoking or to avoid tobacco use    Family History   Problem Relation Age of Onset    Diabetes Father     Stroke Father     High Blood Pressure Father        Allergies:  Flexeril [cyclobenzaprine]; Gabapentin; Prochlorperazine edisylate; Promethazine; and Thorazine [chlorpromazine]    Home Medications:  Prior to Admission medications    Medication Sig Start Date End Date Taking?  Authorizing Provider   tiZANidine (ZANAFLEX) 4 MG tablet Take 1 tablet by mouth nightly as needed (pain) 9/14/19  Yes Lul Walters, DO   acyclovir (ZOVIRAX) 400 MG tablet Take 1 tablet by mouth 3 times daily for 10 days 9/5/19 9/15/19  Vicky Palafox, DO   Magic Mouthwash (MIRACLE MOUTHWASH) Swish and spit 5 mLs 4 times daily as needed for normal. There is no tenderness. There is no rebound. Musculoskeletal: She exhibits tenderness (Tenderness to palpation over the left trapezius muscle as well as clavicle and shoulder blade. Patient has full range of motion neurovascularly intact). Neurological: She is alert and oriented to person, place, and time. No cranial nerve deficit. Skin: Skin is warm and dry. She is not diaphoretic. Psychiatric: She has a normal mood and affect. Thought content normal.   Vitals reviewed. DIFFERENTIAL  DIAGNOSIS     PLAN (LABS / IMAGING / EKG):  Orders Placed This Encounter   Procedures    XR SHOULDER LEFT (MIN 2 VIEWS)       MEDICATIONS ORDERED:  Orders Placed This Encounter   Medications    tiZANidine (ZANAFLEX) tablet 4 mg    tiZANidine (ZANAFLEX) 4 MG tablet     Sig: Take 1 tablet by mouth nightly as needed (pain)     Dispense:  12 tablet     Refill:  0       DDX: Trapezius muscle strain, rotator cuff injury, fracture dislocation    DIAGNOSTIC RESULTS / DEPARTMENT COURSE / MDM     LABS:  No results found for this visit on 09/14/19. IMPRESSION: Patient is a 77-year-old female presents with shoulder pain as above will give tizanidine and obtain x-ray of the left shoulder. Likely discharge home. Patient requesting discharge. Patient was given written and verbalinstructions prior to discharge. Patient understood and agreed. The patient had no further questions. RADIOLOGY:  Xr Shoulder Left (min 2 Views)    Result Date: 9/14/2019  EXAMINATION: THREE XRAY VIEWS OF THE LEFT SHOULDER 9/14/2019 6:06 pm COMPARISON: None. HISTORY: ORDERING SYSTEM PROVIDED HISTORY: Left shoulder pain fall 2 weeks ago TECHNOLOGIST PROVIDED HISTORY: Left shoulder pain fall 2 weeks ago Reason for Exam: Genella Kolton onto left shoulder x2 days ago. Pain Acuity: Unknown Type of Exam: Unknown FINDINGS: Glenohumeral joint is normally aligned. No evidence of acute fracture or dislocation. No abnormal periarticular calcifications.

## 2019-12-13 ENCOUNTER — HOSPITAL ENCOUNTER (OUTPATIENT)
Age: 64
Setting detail: SPECIMEN
Discharge: HOME | End: 2019-12-13
Payer: MEDICARE

## 2019-12-13 DIAGNOSIS — I10 ESSENTIAL HYPERTENSION: ICD-10-CM

## 2019-12-13 DIAGNOSIS — Z00.00 ENCOUNTER FOR MEDICAL EXAMINATION TO ESTABLISH CARE: ICD-10-CM

## 2019-12-13 DIAGNOSIS — Z79.4 TYPE 2 DIABETES MELLITUS WITHOUT COMPLICATION, WITH LONG-TERM CURRENT USE OF INSULIN (HCC): ICD-10-CM

## 2019-12-13 DIAGNOSIS — E11.9 TYPE 2 DIABETES MELLITUS WITHOUT COMPLICATION, WITH LONG-TERM CURRENT USE OF INSULIN (HCC): ICD-10-CM

## 2019-12-13 LAB
ABSOLUTE EOS #: 0.22 K/UL (ref 0–0.44)
ABSOLUTE IMMATURE GRANULOCYTE: <0.03 K/UL (ref 0–0.3)
ABSOLUTE LYMPH #: 2.82 K/UL (ref 1.1–3.7)
ABSOLUTE MONO #: 0.72 K/UL (ref 0.1–1.2)
ALBUMIN SERPL-MCNC: 3.9 G/DL (ref 3.5–5.2)
ALBUMIN SERPL-MCNC: 3.9 G/DL (ref 3.5–5.2)
ALBUMIN/GLOBULIN RATIO: 1.1 (ref 1–2.5)
ALBUMIN/GLOBULIN RATIO: 1.1 (ref 1–2.5)
ALP BLD-CCNC: 84 U/L (ref 35–104)
ALP BLD-CCNC: 84 U/L (ref 35–104)
ALT SERPL-CCNC: 12 U/L (ref 5–33)
ALT SERPL-CCNC: 12 U/L (ref 5–33)
ANION GAP SERPL CALCULATED.3IONS-SCNC: 13 MMOL/L (ref 9–17)
ANION GAP SERPL CALCULATED.3IONS-SCNC: 13 MMOL/L (ref 9–17)
AST SERPL-CCNC: 14 U/L
AST SERPL-CCNC: 14 U/L
BASOPHILS # BLD: 1 % (ref 0–2)
BASOPHILS ABSOLUTE: 0.04 K/UL (ref 0–0.2)
BILIRUB SERPL-MCNC: 0.22 MG/DL (ref 0.3–1.2)
BILIRUB SERPL-MCNC: 0.22 MG/DL (ref 0.3–1.2)
BUN BLDV-MCNC: 14 MG/DL (ref 8–23)
BUN BLDV-MCNC: 14 MG/DL (ref 8–23)
BUN/CREAT BLD: ABNORMAL (ref 9–20)
BUN/CREAT BLD: ABNORMAL (ref 9–20)
CALCIUM SERPL-MCNC: 9.1 MG/DL (ref 8.6–10.4)
CALCIUM SERPL-MCNC: 9.1 MG/DL (ref 8.6–10.4)
CHLORIDE BLD-SCNC: 108 MMOL/L (ref 98–107)
CHLORIDE BLD-SCNC: 108 MMOL/L (ref 98–107)
CHOLESTEROL, FASTING: 155 MG/DL
CHOLESTEROL/HDL RATIO: 2.1
CHOLESTEROL/HDL RATIO: 2.1
CHOLESTEROL: 155 MG/DL
CO2: 22 MMOL/L (ref 20–31)
CO2: 22 MMOL/L (ref 20–31)
CREAT SERPL-MCNC: 1.06 MG/DL (ref 0.5–0.9)
CREAT SERPL-MCNC: 1.06 MG/DL (ref 0.5–0.9)
CREATININE URINE: 140.1 MG/DL (ref 28–217)
DIFFERENTIAL TYPE: ABNORMAL
EOSINOPHILS RELATIVE PERCENT: 3 % (ref 1–4)
GFR AFRICAN AMERICAN: >60 ML/MIN
GFR AFRICAN AMERICAN: >60 ML/MIN
GFR NON-AFRICAN AMERICAN: 52 ML/MIN
GFR NON-AFRICAN AMERICAN: 52 ML/MIN
GFR SERPL CREATININE-BSD FRML MDRD: ABNORMAL ML/MIN/{1.73_M2}
GLUCOSE BLD-MCNC: 123 MG/DL (ref 70–99)
GLUCOSE BLD-MCNC: 123 MG/DL (ref 70–99)
HCT VFR BLD CALC: 42.6 % (ref 36.3–47.1)
HDLC SERPL-MCNC: 74 MG/DL
HDLC SERPL-MCNC: 74 MG/DL
HEMOGLOBIN: 12.6 G/DL (ref 11.9–15.1)
IMMATURE GRANULOCYTES: 0 %
LDL CHOLESTEROL: 60 MG/DL (ref 0–130)
LDL CHOLESTEROL: 60 MG/DL (ref 0–130)
LYMPHOCYTES # BLD: 37 % (ref 24–43)
MCH RBC QN AUTO: 31 PG (ref 25.2–33.5)
MCHC RBC AUTO-ENTMCNC: 29.6 G/DL (ref 28.4–34.8)
MCV RBC AUTO: 104.7 FL (ref 82.6–102.9)
MICROALBUMIN/CREAT 24H UR: <12 MG/L
MICROALBUMIN/CREAT UR-RTO: NORMAL MCG/MG CREAT
MONOCYTES # BLD: 10 % (ref 3–12)
NRBC AUTOMATED: 0 PER 100 WBC
PDW BLD-RTO: 13.2 % (ref 11.8–14.4)
PLATELET # BLD: 253 K/UL (ref 138–453)
PLATELET ESTIMATE: ABNORMAL
PMV BLD AUTO: 9.9 FL (ref 8.1–13.5)
POTASSIUM SERPL-SCNC: 4.5 MMOL/L (ref 3.7–5.3)
POTASSIUM SERPL-SCNC: 4.5 MMOL/L (ref 3.7–5.3)
RBC # BLD: 4.07 M/UL (ref 3.95–5.11)
RBC # BLD: ABNORMAL 10*6/UL
SEG NEUTROPHILS: 49 % (ref 36–65)
SEGMENTED NEUTROPHILS ABSOLUTE COUNT: 3.75 K/UL (ref 1.5–8.1)
SODIUM BLD-SCNC: 143 MMOL/L (ref 135–144)
SODIUM BLD-SCNC: 143 MMOL/L (ref 135–144)
TOTAL PROTEIN: 7.3 G/DL (ref 6.4–8.3)
TOTAL PROTEIN: 7.3 G/DL (ref 6.4–8.3)
TRIGL SERPL-MCNC: 104 MG/DL
TRIGLYCERIDE, FASTING: 104 MG/DL
TSH SERPL DL<=0.05 MIU/L-ACNC: 0.94 MIU/L (ref 0.3–5)
VLDLC SERPL CALC-MCNC: NORMAL MG/DL (ref 1–30)
VLDLC SERPL CALC-MCNC: NORMAL MG/DL (ref 1–30)
WBC # BLD: 7.6 K/UL (ref 3.5–11.3)
WBC # BLD: ABNORMAL 10*3/UL

## 2019-12-16 LAB
ESTIMATED AVERAGE GLUCOSE: 111 MG/DL
ESTIMATED AVERAGE GLUCOSE: 117 MG/DL
HBA1C MFR BLD: 5.5 % (ref 4–6)
HBA1C MFR BLD: 5.7 % (ref 4–6)

## 2020-03-05 ENCOUNTER — NURSE TRIAGE (OUTPATIENT)
Dept: OTHER | Facility: CLINIC | Age: 65
End: 2020-03-05

## 2020-03-05 NOTE — TELEPHONE ENCOUNTER
Reason for Disposition   SEVERE pain (e.g., excruciating, unable to do any normal activities)    Protocols used: LEG PAIN-ADULT-[place]    Received call from [facility]. Pt calling c/o bilateral leg pain that has been going on \"forever\". She states she can feel the pain in her lower back all the way down to her toes in both legs. Rates the pain a 9/10. She doesn't want to walk due to the pain. States last week she fell a couple of times because of the pain and being weak. Has tried taking ibuprofen and using heat and it does not help. No chest pain, no difficulty breathing, no swelling. No history of blood clots. Recommend pt be seen in office today, pain med as needed, rest, elevate legs, call back if any new or worsening symptoms. Call soft transferred to [address] to schedule appointment. Please do not respond to the triage nurse through this encounter. Any subsequent communication should be directly with the patient.

## 2020-04-05 ENCOUNTER — HOSPITAL ENCOUNTER (INPATIENT)
Age: 65
LOS: 1 days | Discharge: HOME | DRG: 885 | End: 2020-04-06
Attending: EMERGENCY MEDICINE | Admitting: PSYCHIATRY & NEUROLOGY
Payer: MEDICARE

## 2020-04-05 ENCOUNTER — APPOINTMENT (OUTPATIENT)
Dept: GENERAL RADIOLOGY | Age: 65
DRG: 885 | End: 2020-04-05
Payer: MEDICARE

## 2020-04-05 PROBLEM — F25.9 SCHIZOAFFECTIVE DISORDER (HCC): Status: ACTIVE | Noted: 2020-04-05

## 2020-04-05 LAB
ABSOLUTE EOS #: 0.1 K/UL (ref 0–0.4)
ABSOLUTE IMMATURE GRANULOCYTE: ABNORMAL K/UL (ref 0–0.3)
ABSOLUTE LYMPH #: 3.1 K/UL (ref 1–4.8)
ABSOLUTE MONO #: 0.7 K/UL (ref 0.1–1.3)
ACETAMINOPHEN LEVEL: <5 UG/ML (ref 10–30)
ALBUMIN SERPL-MCNC: 4.2 G/DL (ref 3.5–5.2)
ALBUMIN/GLOBULIN RATIO: ABNORMAL (ref 1–2.5)
ALP BLD-CCNC: 87 U/L (ref 35–104)
ALT SERPL-CCNC: 13 U/L (ref 5–33)
AMPHETAMINE SCREEN URINE: NEGATIVE
ANION GAP SERPL CALCULATED.3IONS-SCNC: 15 MMOL/L (ref 9–17)
AST SERPL-CCNC: 20 U/L
BARBITURATE SCREEN URINE: NEGATIVE
BASOPHILS # BLD: 1 % (ref 0–2)
BASOPHILS ABSOLUTE: 0.1 K/UL (ref 0–0.2)
BENZODIAZEPINE SCREEN, URINE: NEGATIVE
BILIRUB SERPL-MCNC: 0.23 MG/DL (ref 0.3–1.2)
BUN BLDV-MCNC: 15 MG/DL (ref 8–23)
BUN/CREAT BLD: ABNORMAL (ref 9–20)
BUPRENORPHINE URINE: ABNORMAL
CALCIUM SERPL-MCNC: 9.2 MG/DL (ref 8.6–10.4)
CANNABINOID SCREEN URINE: NEGATIVE
CHLORIDE BLD-SCNC: 109 MMOL/L (ref 98–107)
CO2: 19 MMOL/L (ref 20–31)
COCAINE METABOLITE, URINE: POSITIVE
CREAT SERPL-MCNC: 1.18 MG/DL (ref 0.5–0.9)
DIFFERENTIAL TYPE: ABNORMAL
EOSINOPHILS RELATIVE PERCENT: 2 % (ref 0–4)
ETHANOL PERCENT: <0.01 %
ETHANOL: <10 MG/DL
GFR AFRICAN AMERICAN: 56 ML/MIN
GFR NON-AFRICAN AMERICAN: 46 ML/MIN
GFR SERPL CREATININE-BSD FRML MDRD: ABNORMAL ML/MIN/{1.73_M2}
GFR SERPL CREATININE-BSD FRML MDRD: ABNORMAL ML/MIN/{1.73_M2}
GLUCOSE BLD-MCNC: 115 MG/DL (ref 70–99)
GLUCOSE BLD-MCNC: 145 MG/DL (ref 65–105)
HCT VFR BLD CALC: 38.6 % (ref 36–46)
HEMOGLOBIN: 12.7 G/DL (ref 12–16)
IMMATURE GRANULOCYTES: ABNORMAL %
LACTATE DEHYDROGENASE: 215 U/L (ref 135–214)
LYMPHOCYTES # BLD: 41 % (ref 24–44)
MCH RBC QN AUTO: 31.6 PG (ref 26–34)
MCHC RBC AUTO-ENTMCNC: 32.8 G/DL (ref 31–37)
MCV RBC AUTO: 96.3 FL (ref 80–100)
MDMA URINE: ABNORMAL
METHADONE SCREEN, URINE: POSITIVE
METHAMPHETAMINE, URINE: ABNORMAL
MONOCYTES # BLD: 9 % (ref 1–7)
NRBC AUTOMATED: ABNORMAL PER 100 WBC
OPIATES, URINE: NEGATIVE
OXYCODONE SCREEN URINE: NEGATIVE
PDW BLD-RTO: 14.4 % (ref 11.5–14.9)
PHENCYCLIDINE, URINE: NEGATIVE
PLATELET # BLD: 219 K/UL (ref 150–450)
PLATELET ESTIMATE: ABNORMAL
PMV BLD AUTO: 7.7 FL (ref 6–12)
POTASSIUM SERPL-SCNC: 4.6 MMOL/L (ref 3.7–5.3)
PROCALCITONIN: 0.05 NG/ML
PROPOXYPHENE, URINE: ABNORMAL
RBC # BLD: 4.01 M/UL (ref 4–5.2)
RBC # BLD: ABNORMAL 10*6/UL
SALICYLATE LEVEL: <1 MG/DL (ref 3–10)
SEG NEUTROPHILS: 47 % (ref 36–66)
SEGMENTED NEUTROPHILS ABSOLUTE COUNT: 3.6 K/UL (ref 1.3–9.1)
SODIUM BLD-SCNC: 143 MMOL/L (ref 135–144)
TEST INFORMATION: ABNORMAL
TOTAL PROTEIN: 7.6 G/DL (ref 6.4–8.3)
TRICYCLIC ANTIDEPRESSANTS, UR: ABNORMAL
WBC # BLD: 7.6 K/UL (ref 3.5–11)
WBC # BLD: ABNORMAL 10*3/UL

## 2020-04-05 PROCEDURE — 80053 COMPREHEN METABOLIC PANEL: CPT

## 2020-04-05 PROCEDURE — 83615 LACTATE (LD) (LDH) ENZYME: CPT

## 2020-04-05 PROCEDURE — 1240000000 HC EMOTIONAL WELLNESS R&B

## 2020-04-05 PROCEDURE — 99285 EMERGENCY DEPT VISIT HI MDM: CPT

## 2020-04-05 PROCEDURE — 71045 X-RAY EXAM CHEST 1 VIEW: CPT

## 2020-04-05 PROCEDURE — G0480 DRUG TEST DEF 1-7 CLASSES: HCPCS

## 2020-04-05 PROCEDURE — 85025 COMPLETE CBC W/AUTO DIFF WBC: CPT

## 2020-04-05 PROCEDURE — 36415 COLL VENOUS BLD VENIPUNCTURE: CPT

## 2020-04-05 PROCEDURE — 82947 ASSAY GLUCOSE BLOOD QUANT: CPT

## 2020-04-05 PROCEDURE — 84145 PROCALCITONIN (PCT): CPT

## 2020-04-05 PROCEDURE — 80307 DRUG TEST PRSMV CHEM ANLYZR: CPT

## 2020-04-05 ASSESSMENT — ENCOUNTER SYMPTOMS
ABDOMINAL PAIN: 0
BACK PAIN: 0

## 2020-04-05 NOTE — ED NOTES
Mode of arrival (squad #, walk in, police, etc) : law enforcement          Chief complaint(s): suicidal, visual and auditory hallucinations         Arrival Note (brief scenario, treatment PTA, etc). : pt states she has been having auditory hallucinations for a while now ,but states over the past 2 days the voices have worsened. Pt states the voices are telling her to kill herself by overdosing or slitting her wrists. Pt denies any alcohol or drug abuse. Pt states she is also seeing shadows, but states she knows there is nothing there. Pt states she is also fearful that someone is going to donte her. Pt states she feels safe in her home. Pt is alert and oriented x4.         C= \"Have you ever felt that you should Cut down on your drinking? \"  No  A= \"Have people Annoyed you by criticizing your drinking? \"  No  G= \"Have you ever felt bad or Guilty about your drinking? \"  No  E= \"Have you ever had a drink as an Eye-opener first thing in the morning to steady your nerves or to help a hangover? \"  No      Deferred []      Reason for deferring: N/A    *If yes to two or more: probable alcohol abuse. Henrandez Penny RN  04/05/20 4010

## 2020-04-05 NOTE — ED PROVIDER NOTES
[address]      Pt Name: Marie Taylor  MRN: [id number]  [place]armeng[place]ana 1955  Date of evaluation: 4/5/20    CHIEF COMPLAINT       Chief Complaint   Patient presents with    Psychiatric Evaluation     HISTORY OF PRESENT ILLNESS   HPI 75 y.o. female presents with c/o hallucinations. The patient was brought to the emergency department by police. The patient called police because her hallucinations. She states that she is hearing voices telling her she is worthless and she should kill herself by cutting her wrist and overdosing. Pt has been off her medications because housing issues. These symptoms have been going on for about a week. She denies previous h/o suicide attempt. She does have a previous history of psychiatric admission, most recently in 2018. The patient reports alcohol use last night, but no drug use, no attempts at self harm to this point. The patient no medical complaints at this time. REVIEW OF SYSTEMS     Review of Systems   Constitutional: Negative for fever. HENT: Negative for congestion. Eyes: Negative for visual disturbance. Respiratory: Positive for cough (for the last few weeks. ). Cardiovascular: Negative for chest pain. Gastrointestinal: Negative for abdominal pain. Genitourinary: Negative for dysuria. Musculoskeletal: Negative for back pain. Skin: Negative for rash. Neurological: Negative for headaches. Psychiatric/Behavioral: Positive for hallucinations and suicidal ideas.        PAST MEDICAL HISTORY     Past Medical History:   Diagnosis Date    Bipolar 1 disorder ([address]     Breast lump     Chronic obstructive pulmonary disease ([address] 8/3/2017    Depression     GERD (gastroesophageal reflux disease)     Hyperlipidemia     Hypertension     Osteoarthritis     Type 2 diabetes mellitus without complication, with long-term current use of insulin ([address] 2/4/2019    Type II or unspecified type diabetes mellitus without mention of complication, not stated as uncontrolled        SURGICAL HISTORY       Past Surgical History:   Procedure Laterality Date    DILATION AND CURETTAGE OF UTERUS      ECTOPIC PREGNANCY SURGERY      KNEE ARTHROPLASTY  2017    KNEE SURGERY Right     NERVE BLOCK Left 12/8/14    left knee       CURRENT MEDICATIONS       Current Discharge Medication List      CONTINUE these medications which have NOT CHANGED    Details   nicotine (NICODERM CQ) 14 MG/24HR PLACE 1 PATCH ON THE SKIN DAILY FOR 14 DAYS  Qty: 14 patch, Refills: 5      ULTICARE INSULIN SYRINGE 30G X 1/2\" 1 ML MISC USE AS DIRECTED DAILY WITH LANTUS INSULIN  Qty: 100 each, Refills: 3    Associated Diagnoses: Type 2 diabetes mellitus without complication, with long-term current use of insulin ([facility])      mirabegron (MYRBETRIQ) 25 MG TB24 Take 1 tablet by mouth daily  Qty: 30 tablet, Refills: 1    Associated Diagnoses: Essential hypertension      pregabalin (LYRICA) 75 MG capsule Take 1 capsule by mouth 2 times daily for 30 days. Qty: 60 capsule, Refills: 2    Associated Diagnoses: Lumbar facet arthropathy; Primary osteoarthritis of both knees; Chronic pain syndrome      omeprazole (PRILOSEC) 20 MG delayed release capsule TAKE 1 CAPSULE BY MOUTH DAILY  Qty: 30 capsule, Refills: 5    Associated Diagnoses: Gastroesophageal reflux disease, esophagitis presence not specified      Blood Glucose Monitoring Suppl (ONE TOUCH ULTRA MINI) w/Device KIT use as directed  Qty: 1 kit, Refills: 0    Associated Diagnoses: Type 2 diabetes mellitus without complication, with long-term current use of insulin ([facility])      blood glucose monitor strips Check Blood sugar 3 times/day , Dx:  Insulin dependent DM Please substitute for brand compatible with patients glucometer  Qty: 100 strip, Refills: 10    Associated Diagnoses: Type 2 diabetes mellitus without complication, with long-term current use of insulin ([facility]      Lift Chair MISC by Does not apply route  Qty: 1 each, Refills: 0 Associated Diagnoses: Primary osteoarthritis of both knees; Chronic pain syndrome; Lumbar facet arthropathy      LANTUS 100 UNIT/ML injection vial INJECT 50 UNITS INTO THE SKIN NIGHTLY  Qty: 10 mL, Refills: 5    Associated Diagnoses: Type 2 diabetes mellitus without complication, with long-term current use of insulin ([facility])      hydrOXYzine (ATARAX) 10 MG tablet 1 to 2 po TID prn anxiety  Qty: 45 tablet, Refills: 0    Associated Diagnoses: Anxiety      QUEtiapine (SEROQUEL) 300 MG tablet Take 1 tablet by mouth 2 times daily  Qty: 20 tablet, Refills: 0      escitalopram (LEXAPRO) 20 MG tablet Take 1 tablet by mouth daily  Refills: 1      ALPRAZolam (XANAX) 0.5 MG tablet Take 0.5 mg by mouth 2 times daily. Radha Salvia lisinopril (PRINIVIL;ZESTRIL) 5 MG tablet Take 1 tablet by mouth daily  Qty: 30 tablet, Refills: 3    Comments: Please discontinue any previous script for this medication. Associated Diagnoses: Essential hypertension      glucose monitoring kit (FREESTYLE) monitoring kit Use as directed. Dx  Insulin dependent DM, Please substitute it with Glucometer covered by patients insurance or patients choice. Qty: 1 kit, Refills: 0    Associated Diagnoses: Type 2 diabetes mellitus without complication, with long-term current use of insulin ([facility])      Lancets MISC Use as directed to test 2-3 times/day, Dx Insulin dependent DM  Qty: 100 each, Refills: 10    Associated Diagnoses: Type 2 diabetes mellitus without complication, with long-term current use of insulin ([facility])      aspirin 81 MG EC tablet Take 1 tablet by mouth daily  Qty: 30 tablet, Refills: 0      Insulin Pen Needle 31G X 5 MM MISC Use as directed.  Dx Insulin dependent DM  Qty: 100 each, Refills: 3    Associated Diagnoses: Type 2 diabetes mellitus without complication, with long-term current use of insulin ([facility])      tiotropium (SPIRIVA) 18 MCG inhalation capsule Inhale 18 mcg into the lungs daily      albuterol (PROVENTIL HFA;VENTOLIN HFA) 108 (90 BASE) MCG/ACT Oral   SpO2: 97% 93% 97%    Weight:   200 lb (90.7 kg)    Height:   5' 6\" (1.676 m)        The patient was given the following medications while in the emergency department:  Orders Placed This Encounter   Medications    acetaminophen (TYLENOL) tablet 650 mg    aluminum & magnesium hydroxide-simethicone (MAALOX) 200-200-20 MG/5ML suspension 30 mL    benztropine mesylate (COGENTIN) injection 2 mg    hydrOXYzine (ATARAX) tablet 25 mg    magnesium hydroxide (MILK OF MAGNESIA) 400 MG/5ML suspension 30 mL    nicotine (NICODERM CQ) 14 MG/24HR 1 patch    traZODone (DESYREL) tablet 50 mg    QUEtiapine (SEROQUEL) tablet 200 mg    albuterol sulfate  (90 Base) MCG/ACT inhaler 2 puff    tiotropium (SPIRIVA RESPIMAT) 2.5 MCG/ACT inhaler 2 puff    glucose (GLUTOSE) 40 % oral gel 15 g    dextrose 50 % IV solution    glucagon (rDNA) injection 1 mg    dextrose 5 % solution     -------------------------  CRITICAL CARE:   CONSULTS: IP CONSULT TO HISTORY AND PHYSICAL  IP CONSULT TO INTERNAL MEDICINE  IP CONSULT TO INFECTIOUS DISEASES  IP CONSULT TO PULMONOLOGY  PROCEDURES: Procedures     FINAL IMPRESSION      1. Hallucinations    2. Suicidal ideations    3.  Cough          DISPOSITION/PLAN   DISPOSITION Admitted 04/05/2020 07:33:24 PM      PATIENT REFERRED TO:  JEANNETTE Ramos CNP  [address] [facility]  [address] [facility],[address]  [address] [facility] [address] [phone]            DISCHARGE MEDICATIONS:  Current Discharge Medication List            Donald Plasencia MD  Attending Emergency Physician                      Donald Plasencia MD  04/06/20 [id number]

## 2020-04-06 ENCOUNTER — HOSPITAL ENCOUNTER (OUTPATIENT)
Age: 65
Setting detail: OBSERVATION
Discharge: PSYCHIATRIC OR SUBSTANCE USE FACILITY | End: 2020-04-07
Attending: INTERNAL MEDICINE | Admitting: INTERNAL MEDICINE
Payer: MEDICARE

## 2020-04-06 VITALS
RESPIRATION RATE: 14 BRPM | WEIGHT: 200 LBS | HEART RATE: 66 BPM | DIASTOLIC BLOOD PRESSURE: 47 MMHG | HEIGHT: 66 IN | TEMPERATURE: 98 F | BODY MASS INDEX: 32.14 KG/M2 | SYSTOLIC BLOOD PRESSURE: 113 MMHG | OXYGEN SATURATION: 97 %

## 2020-04-06 PROBLEM — J44.1 COPD EXACERBATION (HCC): Status: ACTIVE | Noted: 2020-04-06

## 2020-04-06 PROBLEM — J44.1 COPD WITH ACUTE EXACERBATION (HCC): Status: ACTIVE | Noted: 2020-04-06

## 2020-04-06 LAB
ADENOVIRUS PCR: NOT DETECTED
BORDETELLA PARAPERTUSSIS: NOT DETECTED
BORDETELLA PERTUSSIS PCR: NOT DETECTED
C-REACTIVE PROTEIN: 5.6 MG/L (ref 0–5)
CHLAMYDIA PNEUMONIAE BY PCR: NOT DETECTED
CORONAVIRUS 229E PCR: NOT DETECTED
CORONAVIRUS HKU1 PCR: NOT DETECTED
CORONAVIRUS NL63 PCR: NOT DETECTED
CORONAVIRUS OC43 PCR: NOT DETECTED
D-DIMER QUANTITATIVE: 0.4 MG/L FEU (ref 0–0.59)
FERRITIN: 88 UG/L (ref 13–150)
GLUCOSE BLD-MCNC: 119 MG/DL (ref 65–105)
GLUCOSE BLD-MCNC: 120 MG/DL (ref 65–105)
GLUCOSE BLD-MCNC: 120 MG/DL (ref 65–105)
GLUCOSE BLD-MCNC: 91 MG/DL (ref 65–105)
HUMAN METAPNEUMOVIRUS PCR: NOT DETECTED
INFLUENZA A BY PCR: NOT DETECTED
INFLUENZA A H1 (2009) PCR: NORMAL
INFLUENZA A H1 PCR: NORMAL
INFLUENZA A H3 PCR: NORMAL
INFLUENZA B BY PCR: NOT DETECTED
MYCOPLASMA PNEUMONIAE PCR: NOT DETECTED
PARAINFLUENZA 1 PCR: NOT DETECTED
PARAINFLUENZA 2 PCR: NOT DETECTED
PARAINFLUENZA 3 PCR: NOT DETECTED
PARAINFLUENZA 4 PCR: NOT DETECTED
RESP SYNCYTIAL VIRUS PCR: NOT DETECTED
RHINO/ENTEROVIRUS PCR: NOT DETECTED
SARS-COV-2, NAA: NORMAL
SARS-COV-2, PCR: NORMAL
SARS-COV-2: NOT DETECTED
SOURCE: NORMAL
SPECIMEN DESCRIPTION: NORMAL

## 2020-04-06 PROCEDURE — 6370000000 HC RX 637 (ALT 250 FOR IP): Performed by: INTERNAL MEDICINE

## 2020-04-06 PROCEDURE — 36415 COLL VENOUS BLD VENIPUNCTURE: CPT

## 2020-04-06 PROCEDURE — 86140 C-REACTIVE PROTEIN: CPT

## 2020-04-06 PROCEDURE — 99221 1ST HOSP IP/OBS SF/LOW 40: CPT | Performed by: INTERNAL MEDICINE

## 2020-04-06 PROCEDURE — G0378 HOSPITAL OBSERVATION PER HR: HCPCS

## 2020-04-06 PROCEDURE — 6360000002 HC RX W HCPCS: Performed by: INTERNAL MEDICINE

## 2020-04-06 PROCEDURE — 6370000000 HC RX 637 (ALT 250 FOR IP): Performed by: PSYCHIATRY & NEUROLOGY

## 2020-04-06 PROCEDURE — 99223 1ST HOSP IP/OBS HIGH 75: CPT | Performed by: INTERNAL MEDICINE

## 2020-04-06 PROCEDURE — 0100U HC RESPIRPTHGN MULT REV TRANS & AMP PRB TECH 21 TRGT: CPT

## 2020-04-06 PROCEDURE — 82947 ASSAY GLUCOSE BLOOD QUANT: CPT

## 2020-04-06 PROCEDURE — G0379 DIRECT REFER HOSPITAL OBSERV: HCPCS

## 2020-04-06 PROCEDURE — 82728 ASSAY OF FERRITIN: CPT

## 2020-04-06 PROCEDURE — 90792 PSYCH DIAG EVAL W/MED SRVCS: CPT | Performed by: NURSE PRACTITIONER

## 2020-04-06 PROCEDURE — 85379 FIBRIN DEGRADATION QUANT: CPT

## 2020-04-06 RX ORDER — DEXTROSE MONOHYDRATE 25 G/50ML
12.5 INJECTION, SOLUTION INTRAVENOUS PRN
Status: DISCONTINUED | OUTPATIENT
Start: 2020-04-06 | End: 2020-04-07 | Stop reason: HOSPADM

## 2020-04-06 RX ORDER — NICOTINE POLACRILEX 4 MG
15 LOZENGE BUCCAL PRN
Status: DISCONTINUED | OUTPATIENT
Start: 2020-04-06 | End: 2020-04-06 | Stop reason: HOSPADM

## 2020-04-06 RX ORDER — NICOTINE POLACRILEX 4 MG
15 LOZENGE BUCCAL PRN
Status: DISCONTINUED | OUTPATIENT
Start: 2020-04-06 | End: 2020-04-07 | Stop reason: HOSPADM

## 2020-04-06 RX ORDER — HYDROXYZINE HYDROCHLORIDE 25 MG/1
25 TABLET, FILM COATED ORAL 3 TIMES DAILY PRN
Status: DISCONTINUED | OUTPATIENT
Start: 2020-04-06 | End: 2020-04-07 | Stop reason: HOSPADM

## 2020-04-06 RX ORDER — DEXTROSE MONOHYDRATE 50 MG/ML
100 INJECTION, SOLUTION INTRAVENOUS PRN
Status: DISCONTINUED | OUTPATIENT
Start: 2020-04-06 | End: 2020-04-07 | Stop reason: HOSPADM

## 2020-04-06 RX ORDER — QUETIAPINE FUMARATE 300 MG/1
400 TABLET, FILM COATED ORAL NIGHTLY
Status: ON HOLD | COMMUNITY
End: 2020-04-13 | Stop reason: HOSPADM

## 2020-04-06 RX ORDER — LOPERAMIDE HYDROCHLORIDE 2 MG/1
2 CAPSULE ORAL 4 TIMES DAILY PRN
Status: DISCONTINUED | OUTPATIENT
Start: 2020-04-06 | End: 2020-04-06 | Stop reason: HOSPADM

## 2020-04-06 RX ORDER — QUETIAPINE FUMARATE 200 MG/1
200 TABLET, FILM COATED ORAL ONCE
Status: COMPLETED | OUTPATIENT
Start: 2020-04-06 | End: 2020-04-06

## 2020-04-06 RX ORDER — QUETIAPINE FUMARATE 200 MG/1
400 TABLET, FILM COATED ORAL NIGHTLY
Status: DISCONTINUED | OUTPATIENT
Start: 2020-04-06 | End: 2020-04-07 | Stop reason: HOSPADM

## 2020-04-06 RX ORDER — TRAZODONE HYDROCHLORIDE 50 MG/1
50 TABLET ORAL NIGHTLY
Status: DISCONTINUED | OUTPATIENT
Start: 2020-04-06 | End: 2020-04-07 | Stop reason: HOSPADM

## 2020-04-06 RX ORDER — ACETAMINOPHEN 325 MG/1
650 TABLET ORAL EVERY 4 HOURS PRN
Status: DISCONTINUED | OUTPATIENT
Start: 2020-04-06 | End: 2020-04-06 | Stop reason: HOSPADM

## 2020-04-06 RX ORDER — DEXTROSE MONOHYDRATE 50 MG/ML
100 INJECTION, SOLUTION INTRAVENOUS PRN
Status: DISCONTINUED | OUTPATIENT
Start: 2020-04-06 | End: 2020-04-06 | Stop reason: HOSPADM

## 2020-04-06 RX ORDER — PANTOPRAZOLE SODIUM 40 MG/1
40 TABLET, DELAYED RELEASE ORAL
Status: DISCONTINUED | OUTPATIENT
Start: 2020-04-07 | End: 2020-04-07 | Stop reason: HOSPADM

## 2020-04-06 RX ORDER — DEXTROSE MONOHYDRATE 25 G/50ML
12.5 INJECTION, SOLUTION INTRAVENOUS PRN
Status: DISCONTINUED | OUTPATIENT
Start: 2020-04-06 | End: 2020-04-06 | Stop reason: HOSPADM

## 2020-04-06 RX ORDER — BENZONATATE 100 MG/1
200 CAPSULE ORAL 3 TIMES DAILY
Status: DISCONTINUED | OUTPATIENT
Start: 2020-04-06 | End: 2020-04-06 | Stop reason: HOSPADM

## 2020-04-06 RX ORDER — ACETAMINOPHEN 650 MG/1
650 SUPPOSITORY RECTAL EVERY 6 HOURS PRN
Status: DISCONTINUED | OUTPATIENT
Start: 2020-04-06 | End: 2020-04-07 | Stop reason: HOSPADM

## 2020-04-06 RX ORDER — ASPIRIN 81 MG/1
81 TABLET ORAL DAILY
Status: DISCONTINUED | OUTPATIENT
Start: 2020-04-06 | End: 2020-04-07 | Stop reason: HOSPADM

## 2020-04-06 RX ORDER — TRAZODONE HYDROCHLORIDE 50 MG/1
50 TABLET ORAL NIGHTLY
COMMUNITY
End: 2021-07-24 | Stop reason: SDUPTHER

## 2020-04-06 RX ORDER — ALPRAZOLAM 0.25 MG/1
0.25 TABLET ORAL 2 TIMES DAILY
Status: DISCONTINUED | OUTPATIENT
Start: 2020-04-06 | End: 2020-04-07 | Stop reason: HOSPADM

## 2020-04-06 RX ORDER — PREGABALIN 75 MG/1
75 CAPSULE ORAL 2 TIMES DAILY
Status: DISCONTINUED | OUTPATIENT
Start: 2020-04-06 | End: 2020-04-07 | Stop reason: HOSPADM

## 2020-04-06 RX ORDER — TRAZODONE HYDROCHLORIDE 50 MG/1
50 TABLET ORAL NIGHTLY PRN
Status: DISCONTINUED | OUTPATIENT
Start: 2020-04-06 | End: 2020-04-06 | Stop reason: HOSPADM

## 2020-04-06 RX ORDER — LISINOPRIL 5 MG/1
5 TABLET ORAL DAILY
Status: DISCONTINUED | OUTPATIENT
Start: 2020-04-06 | End: 2020-04-07 | Stop reason: HOSPADM

## 2020-04-06 RX ORDER — NICOTINE 21 MG/24HR
1 PATCH, TRANSDERMAL 24 HOURS TRANSDERMAL DAILY
Status: DISCONTINUED | OUTPATIENT
Start: 2020-04-06 | End: 2020-04-06 | Stop reason: HOSPADM

## 2020-04-06 RX ORDER — ALBUTEROL SULFATE 90 UG/1
2 AEROSOL, METERED RESPIRATORY (INHALATION) 4 TIMES DAILY
Status: DISCONTINUED | OUTPATIENT
Start: 2020-04-06 | End: 2020-04-06 | Stop reason: HOSPADM

## 2020-04-06 RX ORDER — ALBUTEROL SULFATE 90 UG/1
2 AEROSOL, METERED RESPIRATORY (INHALATION) EVERY 6 HOURS PRN
Status: DISCONTINUED | OUTPATIENT
Start: 2020-04-06 | End: 2020-04-06

## 2020-04-06 RX ORDER — HYDROXYZINE HYDROCHLORIDE 25 MG/1
25 TABLET, FILM COATED ORAL 3 TIMES DAILY PRN
Qty: 30 TABLET | Refills: 0 | Status: SHIPPED | OUTPATIENT
Start: 2020-04-06 | End: 2020-04-16

## 2020-04-06 RX ORDER — MAGNESIUM HYDROXIDE/ALUMINUM HYDROXICE/SIMETHICONE 120; 1200; 1200 MG/30ML; MG/30ML; MG/30ML
30 SUSPENSION ORAL 3 TIMES DAILY PRN
Status: DISCONTINUED | OUTPATIENT
Start: 2020-04-06 | End: 2020-04-06 | Stop reason: HOSPADM

## 2020-04-06 RX ORDER — INSULIN GLARGINE 100 [IU]/ML
20 INJECTION, SOLUTION SUBCUTANEOUS 2 TIMES DAILY
Status: DISCONTINUED | OUTPATIENT
Start: 2020-04-06 | End: 2020-04-07 | Stop reason: HOSPADM

## 2020-04-06 RX ORDER — HYDROXYZINE HYDROCHLORIDE 25 MG/1
25 TABLET, FILM COATED ORAL 3 TIMES DAILY PRN
Status: DISCONTINUED | OUTPATIENT
Start: 2020-04-06 | End: 2020-04-06 | Stop reason: HOSPADM

## 2020-04-06 RX ORDER — DEXTROMETHORPHAN POLISTIREX 30 MG/5ML
30 SUSPENSION ORAL 2 TIMES DAILY
Status: DISCONTINUED | OUTPATIENT
Start: 2020-04-06 | End: 2020-04-06 | Stop reason: HOSPADM

## 2020-04-06 RX ORDER — ACETAMINOPHEN 325 MG/1
650 TABLET ORAL EVERY 6 HOURS PRN
Status: DISCONTINUED | OUTPATIENT
Start: 2020-04-06 | End: 2020-04-07 | Stop reason: HOSPADM

## 2020-04-06 RX ORDER — BENZTROPINE MESYLATE 1 MG/ML
2 INJECTION INTRAMUSCULAR; INTRAVENOUS DAILY PRN
Status: DISCONTINUED | OUTPATIENT
Start: 2020-04-06 | End: 2020-04-06 | Stop reason: HOSPADM

## 2020-04-06 RX ADMIN — LISINOPRIL 5 MG: 5 TABLET ORAL at 15:59

## 2020-04-06 RX ADMIN — PREGABALIN 75 MG: 75 CAPSULE ORAL at 20:15

## 2020-04-06 RX ADMIN — ENOXAPARIN SODIUM 40 MG: 40 INJECTION SUBCUTANEOUS at 11:30

## 2020-04-06 RX ADMIN — QUETIAPINE FUMARATE 200 MG: 200 TABLET ORAL at 02:38

## 2020-04-06 RX ADMIN — ALPRAZOLAM 0.25 MG: 0.25 TABLET ORAL at 20:15

## 2020-04-06 RX ADMIN — BENZONATATE 200 MG: 100 CAPSULE ORAL at 11:30

## 2020-04-06 RX ADMIN — ESCITALOPRAM OXALATE 25 MG: 20 TABLET ORAL at 15:59

## 2020-04-06 RX ADMIN — LOPERAMIDE HYDROCHLORIDE 2 MG: 2 CAPSULE ORAL at 13:33

## 2020-04-06 RX ADMIN — QUETIAPINE FUMARATE 400 MG: 200 TABLET, FILM COATED ORAL at 20:15

## 2020-04-06 RX ADMIN — INSULIN GLARGINE 20 UNITS: 100 INJECTION, SOLUTION SUBCUTANEOUS at 20:15

## 2020-04-06 RX ADMIN — TIOTROPIUM BROMIDE INHALATION SPRAY 2 PUFF: 3.12 SPRAY, METERED RESPIRATORY (INHALATION) at 08:46

## 2020-04-06 RX ADMIN — DEXTROMETHORPHAN POLISTIREX 30 MG: 30 SUSPENSION ORAL at 11:29

## 2020-04-06 RX ADMIN — TRAZODONE HYDROCHLORIDE 50 MG: 50 TABLET ORAL at 20:15

## 2020-04-06 RX ADMIN — ASPIRIN 81 MG: 81 TABLET, COATED ORAL at 15:59

## 2020-04-06 RX ADMIN — ALBUTEROL SULFATE 2 PUFF: 90 AEROSOL, METERED RESPIRATORY (INHALATION) at 11:30

## 2020-04-06 ASSESSMENT — LIFESTYLE VARIABLES
HISTORY_ALCOHOL_USE: NO
HISTORY_ALCOHOL_USE: NO

## 2020-04-06 ASSESSMENT — PAIN SCALES - GENERAL
PAINLEVEL_OUTOF10: 8
PAINLEVEL_OUTOF10: 8
PAINLEVEL_OUTOF10: 6

## 2020-04-06 ASSESSMENT — SLEEP AND FATIGUE QUESTIONNAIRES
DO YOU USE A SLEEP AID: YES
AVERAGE NUMBER OF SLEEP HOURS: 5
DO YOU HAVE DIFFICULTY SLEEPING: YES
RESTFUL SLEEP: NO
SLEEP PATTERN: DIFFICULTY FALLING ASLEEP;RESTLESSNESS;DISTURBED/INTERRUPTED SLEEP
DIFFICULTY FALLING ASLEEP: YES
DIFFICULTY ARISING: NO
DIFFICULTY STAYING ASLEEP: YES

## 2020-04-06 ASSESSMENT — PAIN DESCRIPTION - PAIN TYPE
TYPE: ACUTE PAIN
TYPE: CHRONIC PAIN

## 2020-04-06 ASSESSMENT — PAIN DESCRIPTION - PROGRESSION
CLINICAL_PROGRESSION: NOT CHANGED

## 2020-04-06 ASSESSMENT — PAIN DESCRIPTION - DIRECTION
RADIATING_TOWARDS: LEG
RADIATING_TOWARDS: LEG

## 2020-04-06 ASSESSMENT — PAIN DESCRIPTION - DESCRIPTORS
DESCRIPTORS: THROBBING
DESCRIPTORS: ACHING

## 2020-04-06 ASSESSMENT — ENCOUNTER SYMPTOMS: COUGH: 1

## 2020-04-06 ASSESSMENT — PAIN DESCRIPTION - ONSET: ONSET: ON-GOING

## 2020-04-06 ASSESSMENT — PAIN DESCRIPTION - LOCATION
LOCATION: BACK
LOCATION: BACK

## 2020-04-06 ASSESSMENT — PATIENT HEALTH QUESTIONNAIRE - PHQ9: SUM OF ALL RESPONSES TO PHQ QUESTIONS 1-9: 17

## 2020-04-06 ASSESSMENT — PAIN DESCRIPTION - FREQUENCY: FREQUENCY: INTERMITTENT

## 2020-04-06 ASSESSMENT — PAIN DESCRIPTION - ORIENTATION
ORIENTATION: RIGHT
ORIENTATION: RIGHT

## 2020-04-06 NOTE — H&P
monitoring kit Use as directed. Dx  Insulin dependent DM, Please substitute it with Glucometer covered by patients insurance or patients choice. 9/21/17   Heber Reid MD   Lancets MISC Use as directed to test 2-3 times/day, Dx Insulin dependent DM 9/21/17   Heber Reid MD   Insulin Pen Needle 31G X 5 MM MISC Use as directed. Dx Insulin dependent DM 8/3/17   Heber Reid MD        Allergies:     Flexeril [cyclobenzaprine]; Gabapentin; Prochlorperazine edisylate; Promethazine; Thorazine [chlorpromazine]; and Chantix [varenicline tartrate]    Social History:     Tobacco:    reports that she has been smoking cigarettes. She has a 2.50 pack-year smoking history. She has never used smokeless tobacco.  Alcohol:      reports current alcohol use. Drug Use:  reports no history of drug use. Family History:     Family History   Problem Relation Age of Onset    Diabetes Father     Stroke Father     High Blood Pressure Father        Review of Systems:     Positive and Negative as described in HPI. CONSTITUTIONAL:  negative for fevers, chills, sweats, fatigue, weight loss  HEENT:  negative for vision, hearing changes, runny nose, throat pain  RESPIRATORY: cough wheezing    CARDIOVASCULAR:  negative for chest pain, palpitations.   GASTROINTESTINAL:  negative for nausea, vomiting, diarrhea, constipation, change in bowel habits, abdominal pain   GENITOURINARY:  negative for difficulty of urination, burning with urination, frequency   INTEGUMENT:  negative for rash, skin lesions, easy bruising   HEMATOLOGIC/LYMPHATIC:  negative for swelling/edema   ALLERGIC/IMMUNOLOGIC:  negative for urticaria , itching  ENDOCRINE:  negative increase in drinking, increase in urination, hot or cold intolerance  MUSCULOSKELETAL:  negative joint pains, muscle aches, swelling of joints  NEUROLOGICAL:  negative for headaches, dizziness, lightheadedness, numbness, pain, tingling extremities      Physical Exam:   LMP  (LMP Unknown)   Recent Labs     04/05/20  2241 04/06/20  0810 04/06/20  1137   POCGLU 145* 91 119*       General Appearance:  alert, well appearing, and in no acute distress  Mental status: oriented to person, place, and time with normal affect  Head:  normocephalic, atraumatic. Eye: no icterus, redness, pupils equal and reactive, extraocular eye movements intact, conjunctiva clear  Ear: normal external ear, no discharge, hearing intact  Nose:  no drainage noted  Mouth: mucous membranes moist  Neck: supple, no carotid bruits, thyroid not palpable  Lungs: Mild wheezing good air entry cardiovascular: normal rate, regular rhythm, no murmur, gallop, rub.   Abdomen: Soft, nontender, nondistended, normal bowel sounds, no hepatomegaly or splenomegaly  Neurologic: There are no new focal motor or sensory deficits, normal muscle tone and bulk, no abnormal sensation, normal speech, cranial nerves II through XII grossly intact  Skin: No gross lesions, rashes, bruising or bleeding on exposed skin area  Extremities:  peripheral pulses palpable, no pedal edema or calf pain with palpation    Investigations:      Laboratory Testing:  Recent Results (from the past 24 hour(s))   CBC with DIFF    Collection Time: 04/05/20  5:30 PM   Result Value Ref Range    WBC 7.6 3.5 - 11.0 k/uL    RBC 4.01 4.0 - 5.2 m/uL    Hemoglobin 12.7 12.0 - 16.0 g/dL    Hematocrit 38.6 36 - 46 %    MCV 96.3 80 - 100 fL    MCH 31.6 26 - 34 pg    MCHC 32.8 31 - 37 g/dL    RDW 14.4 11.5 - 14.9 %    Platelets 422 177 - 264 k/uL    MPV 7.7 6.0 - 12.0 fL    NRBC Automated NOT REPORTED per 100 WBC    Differential Type NOT REPORTED     Seg Neutrophils 47 36 - 66 %    Lymphocytes 41 24 - 44 %    Monocytes 9 (H) 1 - 7 %    Eosinophils % 2 0 - 4 %    Basophils 1 0 - 2 %    Immature Granulocytes NOT REPORTED 0 %    Segs Absolute 3.60 1.3 - 9.1 k/uL    Absolute Lymph # 3.10 1.0 - 4.8 k/uL    Absolute Mono # 0.70 0.1 - 1.3 k/uL    Absolute Eos # 0.10 0.0 - 0.4 k/uL    Basophils Absolute 0.10 0.0 - 0.2 k/uL    Absolute Immature Granulocyte NOT REPORTED 0.00 - 0.30 k/uL    WBC Morphology NOT REPORTED     RBC Morphology NOT REPORTED     Platelet Estimate NOT REPORTED    Comprehensive Metabolic Panel    Collection Time: 04/05/20  5:30 PM   Result Value Ref Range    Glucose 115 (H) 70 - 99 mg/dL    BUN 15 8 - 23 mg/dL    CREATININE 1.18 (H) 0.50 - 0.90 mg/dL    Bun/Cre Ratio NOT REPORTED 9 - 20    Calcium 9.2 8.6 - 10.4 mg/dL    Sodium 143 135 - 144 mmol/L    Potassium 4.6 3.7 - 5.3 mmol/L    Chloride 109 (H) 98 - 107 mmol/L    CO2 19 (L) 20 - 31 mmol/L    Anion Gap 15 9 - 17 mmol/L    Alkaline Phosphatase 87 35 - 104 U/L    ALT 13 5 - 33 U/L    AST 20 <32 U/L    Total Bilirubin 0.23 (L) 0.3 - 1.2 mg/dL    Total Protein 7.6 6.4 - 8.3 g/dL    Alb 4.2 3.5 - 5.2 g/dL    Albumin/Globulin Ratio NOT REPORTED 1.0 - 2.5    GFR Non- 46 (L) >60 mL/min    GFR  56 (L) >60 mL/min    GFR Comment          GFR Staging NOT REPORTED    Ethanol    Collection Time: 04/05/20  5:30 PM   Result Value Ref Range    Ethanol <10 <10 mg/dL    Ethanol percent <0.010 %   Acetaminophen level    Collection Time: 04/05/20  5:30 PM   Result Value Ref Range    Acetaminophen Level <5 (L) 10 - 30 ug/mL   Salicylate    Collection Time: 04/05/20  5:30 PM   Result Value Ref Range    Salicylate Lvl <1 (L) 3 - 10 mg/dL   Procalcitonin    Collection Time: 04/05/20  5:30 PM   Result Value Ref Range    Procalcitonin 0.05 <0.09 ng/mL   LACTATE DEHYDROGENASE    Collection Time: 04/05/20  5:30 PM   Result Value Ref Range     (H) 135 - 214 U/L   Drug screen multi urine    Collection Time: 04/05/20  7:37 PM   Result Value Ref Range    Amphetamine Screen, Ur NEGATIVE NEGATIVE    Barbiturate Screen, Ur NEGATIVE NEGATIVE    Benzodiazepine Screen, Urine NEGATIVE NEGATIVE    Cocaine Metabolite, Urine POSITIVE (A) NEGATIVE    Methadone Screen, Urine POSITIVE (A) NEGATIVE    Opiates, Urine NEGATIVE NEGATIVE

## 2020-04-06 NOTE — BH NOTE
[facility]  Admission Note     Admission Type:   Admission Type: Voluntary    Reason for admission:  Reason for Admission: Suicidal ideations to walk into traffic; Auditory/visual command hallucinations to kill self; +increased anx/dep.      PATIENT STRENGTHS:  Strengths: Communication, Connection to output provider, Positive Support([employer])    Patient Strengths and Limitations:  Limitations: Difficulty problem solving/relies on others to help solve problems, Tendency to isolate self, Hopeless about future, General negative or hopeless attitude about future/recovery, Perceives need for assistance with self-care    Addictive Behavior:   Addictive Behavior  In the past 3 months, have you felt or has someone told you that you have a problem with:  : None  Do you have a history of Chemical Use?: No  Do you have a history of Alcohol Use?: No  Do you have a history of Street Drug Abuse?: Yes(denied but had positive tox screen for coke and methadone )  Histroy of Prescripton Drug Abuse?: No    Medical Problems:   Past Medical History:   Diagnosis Date    Bipolar 1 disorder ([facility]     Breast lump     Chronic obstructive pulmonary disease ([facility] 8/3/2017    Depression     GERD (gastroesophageal reflux disease)     Hyperlipidemia     Hypertension     Osteoarthritis     Type 2 diabetes mellitus without complication, with long-term current use of insulin ([facility] 2/4/2019    Type II or unspecified type diabetes mellitus without mention of complication, not stated as uncontrolled        Status EXAM:  Status and Exam  Normal: No  Facial Expression: Flat  Affect: Appropriate  Level of Consciousness: Alert  Mood:Normal: No  Mood: Anxious, Depressed, Helpless, Suspicious  Motor Activity:Normal: No  Motor Activity: Decreased  Interview Behavior: Cooperative  Preception: Commodore to Person, Commodore to Time, Commodore to Place, Commodore to Situation  Attention:Normal: Yes  Thought Processes: Circumstantial  Thought Content:Normal: No  Thought Content: Preoccupations, Paranoia  Hallucinations: Command(Comment), Auditory (Comment), Visual (Comment)  Delusions: No  Memory:Normal: No  Memory: Poor Recent, Poor Remote  Insight and Judgment: No  Insight and Judgment: Poor Judgment, Poor Insight  Present Suicidal Ideation: No(Denied current SI; stated she feels safe on unit, contracted if this would change)  Present Homicidal Ideation: No(denied)    Tobacco Screening:  Practical Counseling, on admission, noa X, if applicable and completed (first 3 are required if patient doesn't refuse):            (x  Recognizing danger situations (included triggers and roadblocks)                    ( x)  Coping skills (new ways to manage stress, exercise, relaxation techniques, changing routine, distraction)                                                           (x )  Basic information about quitting (benefits of quitting, techniques in how to quit, available resources  ( x) Referral for counseling faxed to [facility]                                           (x) Patient refused counseling  ( ) Patient has not smoked in the last 30 days      Pt admitted with followings belongings:  Dentures: None  Vision - Corrective Lenses: Glasses  Hearing Aid: None  Jewelry: Ring, Necklace, Bracelet  Body Piercings Removed: N/A  Clothing: Dress, Footwear, Pants, Shirt, Undergarments (Comment)  Were All Patient Medications Collected?: Yes  Other Valuables: Money (Comment), Purse, Wallet, Other (Comment)($0.61;[place] Direction [id number]; [id number]; Social Security Card; Copy of DrivingPermit)     Valuables placed in safe in security envelope, number:  [id number]. Patient's home medications were Collected/sent to [facility]. Patient oriented to surroundings and program expectations and copy of patient rights given. Received admission packet:  yes. Consents reviewed, signed yes. Refused no. Patient verbalize understanding:  yes.

## 2020-04-06 NOTE — CONSULTS
surgery. MEDICATIONS:  Her medication list has been reviewed. Pulmonary wise,  she is on Spiriva and albuterol. SOCIAL HISTORY:  She said that currently she is smoking 3-4 cigarettes a  day, but previously smoked about half a pack a day for approximately 40  years. Denies any alcohol use. She says that she does not use drugs;  however, her tox screen was positive for cocaine and methadone. FAMILY HISTORY:  Noncontributory. REVIEW OF SYSTEMS:  As in present illness, otherwise all other systems  reviewed and are otherwise negative. PHYSICAL EXAMINATION:  GENERAL:  Elderly female looking younger than her stated age, coughing,  harsh dry cough. VITAL SIGNS:  Temperature 98, respiratory rate 16, pulse 66, blood  pressure 113/47, oxygen saturation on room air is 97%. HEENT:  Oral cavity is clear. LUNGS:  Notable for bilateral scattered wheezes and crackles. CARDIAC:  S1, S2.  ABDOMEN:  Soft. EXTREMITIES:  Show no edema. NEUROLOGIC:  There are no focal deficits. LABORATORY DATA:  Sodium 143, potassium 4.6, chloride 109, bicarb 19,  BUN of 15, creatinine 1.18. White count is 7.6 with an H and H of 12.7  and 38.6, platelet count of 021. Acetaminophen and salicylate level  were normal.  Chest x-ray shows some minimal basilar atelectasis. COVID-19 panel is pending. We do not have any respiratory viral panel  ordered, which I have ordered stat. Procalcitonin level is 0.05. IMPRESSION:  Given the patient's cough that has been persistent over a  week accompanied by dyspnea on exertion and nausea, I am concerned that  she may have a mild form of COVID-19 disease. I think she should be  transferred to a COVID unit and monitored until her test is negative. We will put her on cough suppressant including Delsym cough syrup and  Tessalon Perles schedule. I will make her albuterol inhaler scheduled. Continue the tiotropium, put her on DVT prophylaxis.   Given her clear  chest x-ray and the fact

## 2020-04-06 NOTE — GROUP NOTE
Group Therapy Note    Date: 4/6/2020    Group Start Time: 1020  Group End Time: 0418  Group Topic: Psychoeducation    SKY GIANG    MELISSA Vázquez, LSW        Group Therapy Note    Attendees: 8         Patient's Goal:  Pt will demonstrate increased interpersonal interaction. Notes:  Topic was distress tolerance.     Status After Intervention:  Improved    Participation Level: Interactive    Participation Quality: Appropriate      Speech:  normal      Thought Process/Content: Logical      Affective Functioning: Congruent      Mood: depressed      Level of consciousness:  Alert      Response to Learning: Progressing to goal      Endings: None Reported    Modes of Intervention: Education      Discipline Responsible: /Counselor      Signature:  MELISSA Vázquez, KASSIDYW

## 2020-04-06 NOTE — PLAN OF CARE
Problem: Breathing Pattern - Ineffective:  Goal: Ability to achieve and maintain a regular respiratory rate will improve  Description: Ability to achieve and maintain a regular respiratory rate will improve  Outcome: Ongoing  Note: Respiratory assessment as charted. No signs or symptoms of distress.

## 2020-04-06 NOTE — BH NOTE
traZODone, glucose, dextrose, glucagon (rDNA), dextrose    Chart was reviewed and the patient has been interviewed   Continue the same medications as prescribed above  Patient was discussed with the  and the treatment team.   Provided Supportive and insight-oriented psychotherapy  Provided empathic listening, validation and support  Provided support & education of purpose, risks vs. benefits and side effects of treatment with psychotropics  Provided support and education about risk of not receiving treatment  Patient acknowledged and mutually decided to continue with current treatment plan  Provided education about stress management, exercise and healthy diet  Provided support and encouragement to contact office sooner if needed - verbally acknowledged  Provided support and encouragement to continue working with other practitioners  Provided support and encouragement to consider (continue) working with counselor,   Recommended follow up with [facility] center or private practice psychiatry upon discharge    Tena Bush MD  Board Certified Psychiatrist  4/6/2020 11:24 AM

## 2020-04-06 NOTE — VIRTUAL HEALTH
Consults  Patient Location:  [address] [facility] Med Surg    Provider Location ([facility]):   [place], [place]  Department of Psychiatry  Consult Service  Advanced Practice Nurse Psychiatric Assessment      Thank you very much for allowing us to participate in the care of this patient. Reason for Consult:  Suicidal thoughts, patient came from [facility]. History obtained from:  patient, electronic medical record    HISTORY OF PRESENT ILLNESS:          The patient is a 75 y.o. female with significant past medical history of COPD, type 2 diabetes, hypertension, hyperlipidemia who is admitted medically from the [facility] for treatment of cough, shortness of breath, low-grade temperature. She has been tested for Covid-19. The record indicates she initially presented to the ED reporting voices for the past week that had worsened over the past 2 days in the context of being without her psychiatric medication for a few days. She reported that the voices were telling her to cut her wrist or overdose and she was having suicidal thoughts. She also reported occasional visual hallucinations. She reported that she was staying with a friend, had to return to her own apartment and left her medications at the friend's house. When interviewed, the patient is pleasant and forthcoming with information. She states she does not feel actively suicidal currently, feels \"very depressed\" and feels very stressed about the social isolation associated with coronavirus. She states she always feels the need to be \"strong for everybody,\" and recently has felt very overwhelmed and helpless. States the suicidal thoughts come and go, and states the voices are still present, although less loud. She states she has been experiencing visual hallucinations of \"a shadow outside my door, I don't know who it is. \"  She states she has had poor sleep recently, difficulty concentrating, low energy, and lack of motivation as well as 8/3/2017    Depression     GERD (gastroesophageal reflux disease)     Hyperlipidemia     Hypertension     Osteoarthritis     Type 2 diabetes mellitus without complication, with long-term current use of insulin ([facility] [address] 2019    Type II or unspecified type diabetes mellitus without mention of complication, not stated as uncontrolled        Past Surgical History:        Procedure Laterality Date    DILATION AND CURETTAGE OF UTERUS      ECTOPIC PREGNANCY SURGERY      KNEE ARTHROPLASTY      KNEE SURGERY Right     NERVE BLOCK Left 14    left knee       Allergies: Flexeril [cyclobenzaprine]; Gabapentin; Prochlorperazine edisylate; Promethazine; Thorazine [chlorpromazine]; and Chantix [varenicline tartrate]      Social History:  Patient has one son. States she attended [employer]. Her work history is mostly in [employer]. She grew up [place]. Her parents are . States she has some friends who are supportive. He denies any use of alcohol or illicit drugs, however her drug screen was positive for cocaine and methadone.       Family Psychiatric History:  Patient reports her brother had a history of auditory hallucinations    Family History:       Problem Relation Age of Onset    Diabetes Father     Stroke Father     High Blood Pressure Father          Physical  /68   Pulse 79   Temp 99 °F (37.2 °C) (Oral)   Resp 16   Ht 5' 6\" (1.676 m)   Wt 198 lb 13.7 oz (90.2 kg)   LMP  (LMP Unknown)   SpO2 94%   BMI 32.10 kg/m²     MENTAL STATUS EXAM:  Level of consciousness: Awake, alert  Appearance:  Sitting in bed, in hospital attire, adequate grooming  Behavior/Motor:  No abnormalities  Attitude toward examiner:  Cooperative, attentive  Speech:  Spontaneous, normal rate, volume, tone  Mood:  \"Depressed\"  Affect:  Euthymic  Thought processes:  Linear, goal directed, coherent  Thought content:  Homocidal ideation -denies  Suicidal Ideation:  Reports intermittent

## 2020-04-06 NOTE — BH NOTE
Patient was informed of transfer and accepting of it. Patient accepted medications.  Report was given to [facility] over the phone, awaiting [facility] staff for transfer to Room 2035

## 2020-04-06 NOTE — PROGRESS NOTES
BHI Biopsychosocial Assessment    Current Level of Psychosocial Functioning     Independent X  Dependent    Minimal Assist     Comments:    Psychosocial High Risk Factors (check all that apply)    Unable to obtain meds   Chronic illness/pain    Substance abuse    Lack of Family Support   Financial stress   Isolation X  Inadequate Community Resources  Suicide attempt(s)  Not taking medications X    Victim of crime   Developmental Delay  Unable to manage personal needs    Age 72 or older X   Homeless  No transportation   Readmission within 30 days  Unemployment  Traumatic Event    Comments:   Psychiatric Advanced Directives:  N/A   Family to Involve in Treatment:   No DONNIE for family signed  Sexual Orientation:    Heterosexual  Patient Strengths:  Pt is independent in self-care activities. Patient Barriers:   Pt is elderly and potentially has COVID-19, pt non-compliant with medications. Opiate Education Provided:  N/A   CMHC/mental health history:  [facility]  Plan of Care   medication management, group/individual therapies, family meetings, psycho -education, treatment team meetings to assist with stabilization    Initial Discharge Plan:    Pt is planning to return home and continue outpatient care with [facility]. Clinical Summary:    Pt is a 75 y.o.  woman who presented to the ED after being off her medications, then hearing voices telling her to kill herself via cutting her wrist and overdosing. Pt was oriented to time, place, person, and situation. Pt was cooperative during assessment. Pt is linked with [facility]. Pt receives SSDI for income. Pt lives alone in her apartment. Pt denied hx of trauma. Pt denied AOD abuse. Pt has an adult son in the area for support. Pt also has a sister out of state. Pt's parents are . Pt is planning to return home and [facility].

## 2020-04-06 NOTE — BH NOTE
Patient given tobacco quitline number 45115043743 at this time, refusing to call at this time, states \" I just dont want to quit now\"- patient given information as to the dangers of long term tobacco use. Continue to reinforce the importance of tobacco cessation.

## 2020-04-06 NOTE — GROUP NOTE
Group Therapy Note    Date: 4/6/2020    Group Start Time: 0900  Group End Time: 0915  Group Topic: Community Meeting    SKY Wolfe, [address]    PT did not attend community meeting d/t being on isolation precautions.

## 2020-04-07 ENCOUNTER — HOSPITAL ENCOUNTER (INPATIENT)
Age: 65
LOS: 6 days | Discharge: HOME | DRG: 885 | End: 2020-04-13
Attending: PSYCHIATRY & NEUROLOGY | Admitting: PSYCHIATRY & NEUROLOGY
Payer: MEDICARE

## 2020-04-07 VITALS
HEIGHT: 66 IN | OXYGEN SATURATION: 92 % | WEIGHT: 198.85 LBS | DIASTOLIC BLOOD PRESSURE: 58 MMHG | BODY MASS INDEX: 31.96 KG/M2 | HEART RATE: 76 BPM | SYSTOLIC BLOOD PRESSURE: 104 MMHG | TEMPERATURE: 97.7 F | RESPIRATION RATE: 16 BRPM

## 2020-04-07 LAB
GLUCOSE BLD-MCNC: 121 MG/DL (ref 65–105)
GLUCOSE BLD-MCNC: 129 MG/DL (ref 65–105)
GLUCOSE BLD-MCNC: 89 MG/DL (ref 65–105)

## 2020-04-07 PROCEDURE — 6370000000 HC RX 637 (ALT 250 FOR IP): Performed by: INTERNAL MEDICINE

## 2020-04-07 PROCEDURE — 6360000002 HC RX W HCPCS: Performed by: INTERNAL MEDICINE

## 2020-04-07 PROCEDURE — 1240000000 HC EMOTIONAL WELLNESS R&B

## 2020-04-07 PROCEDURE — 99239 HOSP IP/OBS DSCHRG MGMT >30: CPT | Performed by: INTERNAL MEDICINE

## 2020-04-07 PROCEDURE — 82947 ASSAY GLUCOSE BLOOD QUANT: CPT

## 2020-04-07 PROCEDURE — G0378 HOSPITAL OBSERVATION PER HR: HCPCS

## 2020-04-07 PROCEDURE — 96372 THER/PROPH/DIAG INJ SC/IM: CPT

## 2020-04-07 RX ORDER — ACETAMINOPHEN 325 MG/1
650 TABLET ORAL EVERY 6 HOURS PRN
Status: DISCONTINUED | OUTPATIENT
Start: 2020-04-07 | End: 2020-04-13 | Stop reason: HOSPADM

## 2020-04-07 RX ORDER — HYDROXYZINE HYDROCHLORIDE 25 MG/1
25 TABLET, FILM COATED ORAL 3 TIMES DAILY PRN
Status: CANCELLED | OUTPATIENT
Start: 2020-04-07

## 2020-04-07 RX ORDER — AZITHROMYCIN 250 MG/1
250 TABLET, FILM COATED ORAL DAILY
Status: COMPLETED | OUTPATIENT
Start: 2020-04-08 | End: 2020-04-11

## 2020-04-07 RX ORDER — PANTOPRAZOLE SODIUM 40 MG/1
40 TABLET, DELAYED RELEASE ORAL
Status: DISCONTINUED | OUTPATIENT
Start: 2020-04-08 | End: 2020-04-13 | Stop reason: HOSPADM

## 2020-04-07 RX ORDER — HYDROXYZINE HYDROCHLORIDE 25 MG/1
25 TABLET, FILM COATED ORAL 3 TIMES DAILY PRN
Status: DISCONTINUED | OUTPATIENT
Start: 2020-04-07 | End: 2020-04-07

## 2020-04-07 RX ORDER — ASPIRIN 81 MG/1
81 TABLET ORAL DAILY
Status: CANCELLED | OUTPATIENT
Start: 2020-04-08

## 2020-04-07 RX ORDER — QUETIAPINE FUMARATE 200 MG/1
400 TABLET, FILM COATED ORAL NIGHTLY
Status: DISCONTINUED | OUTPATIENT
Start: 2020-04-07 | End: 2020-04-07 | Stop reason: SDUPTHER

## 2020-04-07 RX ORDER — BENZTROPINE MESYLATE 1 MG/ML
2 INJECTION INTRAMUSCULAR; INTRAVENOUS DAILY PRN
Status: DISCONTINUED | OUTPATIENT
Start: 2020-04-07 | End: 2020-04-13 | Stop reason: HOSPADM

## 2020-04-07 RX ORDER — ALBUTEROL SULFATE 2.5 MG/3ML
2.5 SOLUTION RESPIRATORY (INHALATION) EVERY 4 HOURS PRN
Status: DISCONTINUED | OUTPATIENT
Start: 2020-04-07 | End: 2020-04-07 | Stop reason: HOSPADM

## 2020-04-07 RX ORDER — QUETIAPINE FUMARATE 200 MG/1
400 TABLET, FILM COATED ORAL NIGHTLY
Status: CANCELLED | OUTPATIENT
Start: 2020-04-07

## 2020-04-07 RX ORDER — ACETAMINOPHEN 325 MG/1
650 TABLET ORAL EVERY 6 HOURS PRN
Status: CANCELLED | OUTPATIENT
Start: 2020-04-07

## 2020-04-07 RX ORDER — TRAZODONE HYDROCHLORIDE 50 MG/1
50 TABLET ORAL NIGHTLY
Status: CANCELLED | OUTPATIENT
Start: 2020-04-07

## 2020-04-07 RX ORDER — INSULIN GLARGINE 100 [IU]/ML
20 INJECTION, SOLUTION SUBCUTANEOUS 2 TIMES DAILY
Status: DISCONTINUED | OUTPATIENT
Start: 2020-04-07 | End: 2020-04-07 | Stop reason: SDUPTHER

## 2020-04-07 RX ORDER — NICOTINE 21 MG/24HR
1 PATCH, TRANSDERMAL 24 HOURS TRANSDERMAL DAILY
Status: DISCONTINUED | OUTPATIENT
Start: 2020-04-08 | End: 2020-04-13 | Stop reason: HOSPADM

## 2020-04-07 RX ORDER — ALPRAZOLAM 0.25 MG/1
0.25 TABLET ORAL 2 TIMES DAILY
Status: CANCELLED | OUTPATIENT
Start: 2020-04-07

## 2020-04-07 RX ORDER — AZITHROMYCIN 250 MG/1
500 TABLET, FILM COATED ORAL DAILY
Status: COMPLETED | OUTPATIENT
Start: 2020-04-07 | End: 2020-04-07

## 2020-04-07 RX ORDER — ACETAMINOPHEN 650 MG/1
650 SUPPOSITORY RECTAL EVERY 6 HOURS PRN
Status: CANCELLED | OUTPATIENT
Start: 2020-04-07

## 2020-04-07 RX ORDER — TRAZODONE HYDROCHLORIDE 50 MG/1
50 TABLET ORAL NIGHTLY PRN
Status: DISCONTINUED | OUTPATIENT
Start: 2020-04-08 | End: 2020-04-13 | Stop reason: HOSPADM

## 2020-04-07 RX ORDER — QUETIAPINE FUMARATE 300 MG/1
300 TABLET, FILM COATED ORAL 2 TIMES DAILY
Status: DISCONTINUED | OUTPATIENT
Start: 2020-04-08 | End: 2020-04-10

## 2020-04-07 RX ORDER — INSULIN GLARGINE 100 [IU]/ML
20 INJECTION, SOLUTION SUBCUTANEOUS 2 TIMES DAILY
Status: CANCELLED | OUTPATIENT
Start: 2020-04-07

## 2020-04-07 RX ORDER — BENZONATATE 100 MG/1
200 CAPSULE ORAL 3 TIMES DAILY
Status: DISCONTINUED | OUTPATIENT
Start: 2020-04-08 | End: 2020-04-13 | Stop reason: HOSPADM

## 2020-04-07 RX ORDER — TRAZODONE HYDROCHLORIDE 50 MG/1
50 TABLET ORAL NIGHTLY
Status: DISCONTINUED | OUTPATIENT
Start: 2020-04-07 | End: 2020-04-07 | Stop reason: SDUPTHER

## 2020-04-07 RX ORDER — ALPRAZOLAM 0.25 MG/1
0.25 TABLET ORAL 2 TIMES DAILY
Status: DISCONTINUED | OUTPATIENT
Start: 2020-04-07 | End: 2020-04-07 | Stop reason: SDUPTHER

## 2020-04-07 RX ORDER — LISINOPRIL 5 MG/1
5 TABLET ORAL DAILY
Status: DISCONTINUED | OUTPATIENT
Start: 2020-04-07 | End: 2020-04-07 | Stop reason: SDUPTHER

## 2020-04-07 RX ORDER — PANTOPRAZOLE SODIUM 40 MG/1
40 TABLET, DELAYED RELEASE ORAL
Status: CANCELLED | OUTPATIENT
Start: 2020-04-08

## 2020-04-07 RX ORDER — AZITHROMYCIN 250 MG/1
250 TABLET, FILM COATED ORAL DAILY
Status: CANCELLED | OUTPATIENT
Start: 2020-04-08 | End: 2020-04-12

## 2020-04-07 RX ORDER — LISINOPRIL 5 MG/1
5 TABLET ORAL DAILY
Status: CANCELLED | OUTPATIENT
Start: 2020-04-08

## 2020-04-07 RX ORDER — PREGABALIN 75 MG/1
75 CAPSULE ORAL 2 TIMES DAILY
Status: DISCONTINUED | OUTPATIENT
Start: 2020-04-07 | End: 2020-04-13 | Stop reason: HOSPADM

## 2020-04-07 RX ORDER — ALBUTEROL SULFATE 90 UG/1
1 AEROSOL, METERED RESPIRATORY (INHALATION) 4 TIMES DAILY PRN
Status: DISCONTINUED | OUTPATIENT
Start: 2020-04-07 | End: 2020-04-13 | Stop reason: HOSPADM

## 2020-04-07 RX ORDER — ASPIRIN 81 MG/1
81 TABLET ORAL DAILY
Status: DISCONTINUED | OUTPATIENT
Start: 2020-04-08 | End: 2020-04-13 | Stop reason: HOSPADM

## 2020-04-07 RX ORDER — ALBUTEROL SULFATE 2.5 MG/3ML
2.5 SOLUTION RESPIRATORY (INHALATION) EVERY 4 HOURS PRN
Status: DISCONTINUED | OUTPATIENT
Start: 2020-04-07 | End: 2020-04-13 | Stop reason: HOSPADM

## 2020-04-07 RX ORDER — TRAZODONE HYDROCHLORIDE 50 MG/1
50 TABLET ORAL NIGHTLY
Status: DISCONTINUED | OUTPATIENT
Start: 2020-04-07 | End: 2020-04-13 | Stop reason: HOSPADM

## 2020-04-07 RX ORDER — QUETIAPINE FUMARATE 200 MG/1
400 TABLET, FILM COATED ORAL NIGHTLY
Status: DISCONTINUED | OUTPATIENT
Start: 2020-04-07 | End: 2020-04-10

## 2020-04-07 RX ORDER — PREGABALIN 75 MG/1
75 CAPSULE ORAL 2 TIMES DAILY
Status: CANCELLED | OUTPATIENT
Start: 2020-04-07

## 2020-04-07 RX ORDER — LISINOPRIL 5 MG/1
5 TABLET ORAL DAILY
Status: DISCONTINUED | OUTPATIENT
Start: 2020-04-08 | End: 2020-04-13 | Stop reason: HOSPADM

## 2020-04-07 RX ORDER — ALBUTEROL SULFATE 90 UG/1
1 AEROSOL, METERED RESPIRATORY (INHALATION) 4 TIMES DAILY PRN
Status: CANCELLED | OUTPATIENT
Start: 2020-04-07

## 2020-04-07 RX ORDER — PREGABALIN 75 MG/1
75 CAPSULE ORAL 2 TIMES DAILY
Status: DISCONTINUED | OUTPATIENT
Start: 2020-04-07 | End: 2020-04-07 | Stop reason: SDUPTHER

## 2020-04-07 RX ORDER — ALBUTEROL SULFATE 2.5 MG/3ML
2.5 SOLUTION RESPIRATORY (INHALATION) EVERY 4 HOURS PRN
Status: CANCELLED | OUTPATIENT
Start: 2020-04-07

## 2020-04-07 RX ORDER — ALPRAZOLAM 0.25 MG/1
0.25 TABLET ORAL 2 TIMES DAILY
Status: DISCONTINUED | OUTPATIENT
Start: 2020-04-07 | End: 2020-04-10

## 2020-04-07 RX ORDER — DEXTROMETHORPHAN POLISTIREX 30 MG/5ML
30 SUSPENSION ORAL 2 TIMES DAILY
Status: DISCONTINUED | OUTPATIENT
Start: 2020-04-07 | End: 2020-04-07 | Stop reason: HOSPADM

## 2020-04-07 RX ORDER — INSULIN GLARGINE 100 [IU]/ML
20 INJECTION, SOLUTION SUBCUTANEOUS 2 TIMES DAILY
Status: DISCONTINUED | OUTPATIENT
Start: 2020-04-07 | End: 2020-04-13 | Stop reason: HOSPADM

## 2020-04-07 RX ORDER — ACETAMINOPHEN 650 MG/1
650 SUPPOSITORY RECTAL EVERY 6 HOURS PRN
Status: DISCONTINUED | OUTPATIENT
Start: 2020-04-07 | End: 2020-04-13 | Stop reason: HOSPADM

## 2020-04-07 RX ORDER — MAGNESIUM HYDROXIDE/ALUMINUM HYDROXICE/SIMETHICONE 120; 1200; 1200 MG/30ML; MG/30ML; MG/30ML
30 SUSPENSION ORAL 3 TIMES DAILY PRN
Status: DISCONTINUED | OUTPATIENT
Start: 2020-04-07 | End: 2020-04-13 | Stop reason: HOSPADM

## 2020-04-07 RX ORDER — QUETIAPINE FUMARATE 300 MG/1
300 TABLET, FILM COATED ORAL 2 TIMES DAILY
Status: DISCONTINUED | OUTPATIENT
Start: 2020-04-07 | End: 2020-04-07 | Stop reason: SDUPTHER

## 2020-04-07 RX ORDER — AZITHROMYCIN 250 MG/1
250 TABLET, FILM COATED ORAL DAILY
Status: DISCONTINUED | OUTPATIENT
Start: 2020-04-08 | End: 2020-04-07 | Stop reason: HOSPADM

## 2020-04-07 RX ORDER — BENZONATATE 100 MG/1
200 CAPSULE ORAL 3 TIMES DAILY
Status: CANCELLED | OUTPATIENT
Start: 2020-04-07

## 2020-04-07 RX ORDER — LISINOPRIL 5 MG/1
5 TABLET ORAL DAILY
Status: CANCELLED | OUTPATIENT
Start: 2020-04-07

## 2020-04-07 RX ORDER — HYDROXYZINE HYDROCHLORIDE 25 MG/1
25 TABLET, FILM COATED ORAL 3 TIMES DAILY PRN
Status: DISCONTINUED | OUTPATIENT
Start: 2020-04-07 | End: 2020-04-13 | Stop reason: HOSPADM

## 2020-04-07 RX ORDER — BENZONATATE 100 MG/1
200 CAPSULE ORAL 3 TIMES DAILY
Status: DISCONTINUED | OUTPATIENT
Start: 2020-04-07 | End: 2020-04-07 | Stop reason: HOSPADM

## 2020-04-07 RX ADMIN — PREGABALIN 75 MG: 75 CAPSULE ORAL at 22:19

## 2020-04-07 RX ADMIN — ASPIRIN 81 MG: 81 TABLET, COATED ORAL at 07:42

## 2020-04-07 RX ADMIN — QUETIAPINE FUMARATE 300 MG: 200 TABLET ORAL at 07:42

## 2020-04-07 RX ADMIN — ACETAMINOPHEN 650 MG: 325 TABLET, FILM COATED ORAL at 22:21

## 2020-04-07 RX ADMIN — INSULIN GLARGINE 20 UNITS: 100 INJECTION, SOLUTION SUBCUTANEOUS at 22:18

## 2020-04-07 RX ADMIN — INSULIN GLARGINE 20 UNITS: 100 INJECTION, SOLUTION SUBCUTANEOUS at 09:17

## 2020-04-07 RX ADMIN — QUETIAPINE FUMARATE 300 MG: 200 TABLET ORAL at 14:39

## 2020-04-07 RX ADMIN — ALPRAZOLAM 0.25 MG: 0.25 TABLET ORAL at 22:19

## 2020-04-07 RX ADMIN — QUETIAPINE FUMARATE 400 MG: 200 TABLET ORAL at 22:18

## 2020-04-07 RX ADMIN — TIOTROPIUM BROMIDE INHALATION SPRAY 2 PUFF: 3.12 SPRAY, METERED RESPIRATORY (INHALATION) at 11:06

## 2020-04-07 RX ADMIN — PREGABALIN 75 MG: 75 CAPSULE ORAL at 07:43

## 2020-04-07 RX ADMIN — ESCITALOPRAM OXALATE 25 MG: 20 TABLET ORAL at 07:42

## 2020-04-07 RX ADMIN — BENZONATATE 200 MG: 100 CAPSULE ORAL at 14:40

## 2020-04-07 RX ADMIN — AZITHROMYCIN 500 MG: 250 TABLET, FILM COATED ORAL at 11:06

## 2020-04-07 RX ADMIN — LISINOPRIL 5 MG: 5 TABLET ORAL at 07:42

## 2020-04-07 RX ADMIN — PANTOPRAZOLE SODIUM 40 MG: 40 TABLET, DELAYED RELEASE ORAL at 07:42

## 2020-04-07 RX ADMIN — ALPRAZOLAM 0.25 MG: 0.25 TABLET ORAL at 07:42

## 2020-04-07 RX ADMIN — TRAZODONE HYDROCHLORIDE 50 MG: 50 TABLET ORAL at 22:19

## 2020-04-07 RX ADMIN — Medication 30 MG: at 11:06

## 2020-04-07 RX ADMIN — ENOXAPARIN SODIUM 40 MG: 40 INJECTION SUBCUTANEOUS at 07:43

## 2020-04-07 ASSESSMENT — SLEEP AND FATIGUE QUESTIONNAIRES
DO YOU USE A SLEEP AID: YES
DIFFICULTY ARISING: NO
AVERAGE NUMBER OF SLEEP HOURS: 5
DIFFICULTY STAYING ASLEEP: YES
RESTFUL SLEEP: NO
DIFFICULTY FALLING ASLEEP: YES
SLEEP PATTERN: DIFFICULTY FALLING ASLEEP;RESTLESSNESS;DISTURBED/INTERRUPTED SLEEP
DO YOU HAVE DIFFICULTY SLEEPING: YES

## 2020-04-07 ASSESSMENT — PAIN DESCRIPTION - DESCRIPTORS: DESCRIPTORS: ACHING

## 2020-04-07 ASSESSMENT — PAIN DESCRIPTION - PAIN TYPE: TYPE: CHRONIC PAIN

## 2020-04-07 ASSESSMENT — LIFESTYLE VARIABLES: HISTORY_ALCOHOL_USE: NO

## 2020-04-07 ASSESSMENT — PAIN SCALES - GENERAL
PAINLEVEL_OUTOF10: 4
PAINLEVEL_OUTOF10: 4
PAINLEVEL_OUTOF10: 0

## 2020-04-07 ASSESSMENT — PAIN DESCRIPTION - LOCATION: LOCATION: KNEE

## 2020-04-07 ASSESSMENT — PATIENT HEALTH QUESTIONNAIRE - PHQ9: SUM OF ALL RESPONSES TO PHQ QUESTIONS 1-9: 17

## 2020-04-07 NOTE — FLOWSHEET NOTE
RN Spoke to Dr Ariella Damon and informed him that the patient was cleared medically to return to the [facility] and Dr Ariella Damon stated he agrred the patient should return. RN notified HUB and STAT perfect serve to psych for bed.

## 2020-04-07 NOTE — PLAN OF CARE
No falls or injury this shift. Gets up to BR per self without difficulty. Alert/oriented and pleasantly cooperative. Continued c/o right lower back and buttock pain with radiation the to the right leg. Encouraged out of bed movement hourly. Lungs are diminished but there has been no coughing or shortness of breath noted. No stated suicidal ideation or attempts to harm self these four hours. Continues to have 1:1 aide for suicide watch.

## 2020-04-07 NOTE — PROGRESS NOTES
[facility] -   Infectious diseases evaluation  admission date 4/6/2020        Impression :   Current:  · Nonproductive cough and shortness of breath likely secondary to COPD, covid 19 PCR and respiratory virus PCR  Sent  · COPD  · Smoking  · Drug abuse with positive drug screen for cocaine and methadone  · Diabetes mellitus  · Hypertension  · GERD      Recommendations    COVID 19 PCR pending, respiratory virus PCR panel pending  Monitor off antibiotics  Droplet plus isolation for now  Follow CBC renal function         History of Present Illness:   Initial history:  Debra Keane is a 75y.o.-year-old female was initially admitted to [facility] psychiatric unit for suicidal ideation. The patient states that she had a nonproductive cough for 1 week associated with shortness of breath worse with exertion, denied any abdominal pain no vomiting or diarrhea, she does have some nausea. Chest x-ray showed no infiltrate  LD was 215, procalcitonin level 0.05, WBC normal with 41% lymphocytes, covid 19 PCR pending, respiratory virus PCR panel pending  She denied any fever or chills. No hypoxia, O2 sat 97% on room air  History of COPD, diabetes, bipolar disorder, hypertension and GERD. Interval changes  4/6/2020           I have personally reviewed the past medical history, past surgical history, medications, social history, and family history, and I haveupdated the database accordingly.   Past Medical History:     Past Medical History:   Diagnosis Date    Bipolar 1 disorder ([address]     Breast lump     Chronic obstructive pulmonary disease ([address] 8/3/2017    Depression     GERD (gastroesophageal reflux disease)     Hyperlipidemia     Hypertension     Osteoarthritis     Type 2 diabetes mellitus without complication, with long-term current use of insulin ([address] 2/4/2019    Type II or unspecified type diabetes mellitus without mention of complication, not stated as Relationship status: Not on file    Intimate partner violence     Fear of current or ex partner: Not on file     Emotionally abused: Not on file     Physically abused: Not on file     Forced sexual activity: Not on file   Other Topics Concern    Not on file   Social History Narrative    Not on file       Family History:     Family History   Problem Relation Age of Onset    Diabetes Father     Stroke Father     High Blood Pressure Father         Allergies:   Flexeril [cyclobenzaprine]; Gabapentin; Prochlorperazine edisylate; Promethazine; Thorazine [chlorpromazine]; and Chantix [varenicline tartrate]     Review of Systems:     As per history present illness other than above 14 system reviewed were negative    Physical Examination :     Patient Vitals for the past 8 hrs:   BP Temp Temp src Pulse Resp SpO2 Height Weight   04/06/20 2000 (!) 144/70 99.7 °F (37.6 °C) -- 81 16 95 % -- --   04/06/20 1430 128/68 99 °F (37.2 °C) Oral 79 16 94 % 5' 6\" (1.676 m) 198 lb 13.7 oz (90.2 kg)     General alert oriented x3 not under distress  Neck supple no JVD  Head atraumatic normocephalic  Lungs decreased breath sounds bilaterally, wheezing and rhonchi's  Heart regular rate and rhythm S1-S2 normal no murmur or gallops  Abdomen soft nontender  Lower extremity no edema  Neuro exam no focal deficit      Medical Decision Making:   I have independently reviewed/ordered the following labs:    CBC with Differential:   Recent Labs     04/05/20  1730   WBC 7.6   HGB 12.7   HCT 38.6      LYMPHOPCT 41   MONOPCT 9*     BMP:  Recent Labs     04/05/20  1730      K 4.6   *   CO2 19*   BUN 15   CREATININE 1.18*     Hepatic Function Panel:   Recent Labs     04/05/20  1730   PROT 7.6   LABALBU 4.2   BILITOT 0.23*   ALKPHOS 87   ALT 13   AST 20     No results for input(s): RPR in the last 72 hours. No results for input(s): HIV in the last 72 hours. No results for input(s): BC in the last 72 hours.   Lab Results

## 2020-04-07 NOTE — PROGRESS NOTES
There are no focal motor or sensory deficits  Skin - No bruising or bleeding  Extremities - No clubbing, cyanosis, edema    MEDS      ALPRAZolam  0.25 mg Oral BID    aspirin  81 mg Oral Daily    escitalopram  25 mg Oral Daily    insulin glargine  20 Units Subcutaneous BID    lisinopril  5 mg Oral Daily    pantoprazole  40 mg Oral QAM AC    pregabalin  75 mg Oral BID    QUEtiapine  300 mg Oral BID    QUEtiapine  400 mg Oral Nightly    tiotropium  2 puff Inhalation Daily    traZODone  50 mg Oral Nightly    enoxaparin  40 mg Subcutaneous Daily      dextrose       acetaminophen **OR** acetaminophen, hydrOXYzine, glucose, dextrose, glucagon (rDNA), dextrose    LABS   CBC   Recent Labs     04/05/20  1730   WBC 7.6   HGB 12.7   HCT 38.6   MCV 96.3        BMP:   Lab Results   Component Value Date     04/05/2020    K 4.6 04/05/2020     04/05/2020    CO2 19 04/05/2020    BUN 15 04/05/2020    LABALBU 4.2 04/05/2020    LABALBU 3.1 02/26/2012    CREATININE 1.18 04/05/2020    CALCIUM 9.2 04/05/2020    GFRAA 56 04/05/2020    LABGLOM 46 04/05/2020     ABGs:No results found for: PHART, PO2ART, BGW5JHO No results found for: IFIO2, MODE, SETTIDVOL, SETPEEP  Ionized Calcium:  No results found for: IONCA  Magnesium:    Lab Results   Component Value Date    MG 1.8 02/25/2013     Phosphorus:    Lab Results   Component Value Date    PHOS 4.0 02/25/2013        LIVER PROFILE   Recent Labs     04/05/20  1730   AST 20   ALT 13   BILITOT 0.23*   ALKPHOS 87     INR No results for input(s): INR in the last 72 hours.   PTT   Lab Results   Component Value Date    APTT 29.4 01/12/2013         RADIOLOGY     (See actual reports for details)    ASSESSMENT/PLAN   Active Problems:    Essential hypertension    Chronic obstructive pulmonary disease (HCC)    Type 2 diabetes mellitus without complication, with long-term current use of insulin (HCC)    COPD with acute exacerbation (HCC)    COPD exacerbation (HCC)  Resolved

## 2020-04-07 NOTE — DISCHARGE SUMMARY
PCR Not Detected Not Detected    Influenza A by PCR Not Detected Not Detected    Influenza A H1 PCR NOT REPORTED Not Detected    Influenza A H1 (2009) PCR NOT REPORTED Not Detected    Influenza A H3 PCR NOT REPORTED Not Detected    Influenza B by PCR Not Detected Not Detected    Parainfluenza 1 PCR Not Detected Not Detected    Parainfluenza 2 PCR Not Detected Not Detected    Parainfluenza 3 PCR Not Detected Not Detected    Parainfluenza 4 PCR Not Detected Not Detected    Resp Syncytial Virus PCR Not Detected Not Detected    Bordetella Parapertussis Not Detected Not Detected    B Pertussis by PCR Not Detected Not Detected    Chlamydia pneumoniae By PCR Not Detected Not Detected    Mycoplasma pneumo by PCR Not Detected Not Detected   POC Glucose Fingerstick    Collection Time: 04/06/20 11:37 AM   Result Value Ref Range    POC Glucose 119 (H) 65 - 105 mg/dL   POC Glucose Fingerstick    Collection Time: 04/06/20  4:13 PM   Result Value Ref Range    POC Glucose 120 (H) 65 - 105 mg/dL   POC Glucose Fingerstick    Collection Time: 04/06/20  8:04 PM   Result Value Ref Range    POC Glucose 120 (H) 65 - 105 mg/dL         DISCHARGE INSTRUCTIONS:  Disposition: Discharge to :  HOME/SELF CARE  Condition on discharge:  GOOD  Regular diet  Activities as tolerated      DISCHARGE MEDS:     Medication List      CHANGE how you take these medications    hydrOXYzine 25 MG tablet  Commonly known as:  ATARAX  Take 1 tablet by mouth 3 times daily as needed for Anxiety  What changed:    · medication strength  · how much to take  · how to take this  · when to take this  · reasons to take this  · additional instructions  Notes to patient:  anxiety        CONTINUE taking these medications    albuterol sulfate  (90 Base) MCG/ACT inhaler  Notes to patient:  Shortness of Breath     ALPRAZolam 0.5 MG tablet  Commonly known as:  XANAX  Notes to patient:  Anxiety      aspirin 81 MG EC tablet  Take 1 tablet by mouth daily  Notes to patient:

## 2020-04-08 LAB — GLUCOSE BLD-MCNC: 78 MG/DL (ref 65–105)

## 2020-04-08 PROCEDURE — 6370000000 HC RX 637 (ALT 250 FOR IP): Performed by: PSYCHIATRY & NEUROLOGY

## 2020-04-08 PROCEDURE — 82947 ASSAY GLUCOSE BLOOD QUANT: CPT

## 2020-04-08 PROCEDURE — 1240000000 HC EMOTIONAL WELLNESS R&B

## 2020-04-08 PROCEDURE — 6370000000 HC RX 637 (ALT 250 FOR IP): Performed by: INTERNAL MEDICINE

## 2020-04-08 RX ADMIN — ESCITALOPRAM OXALATE 25 MG: 20 TABLET ORAL at 08:26

## 2020-04-08 RX ADMIN — BENZONATATE 200 MG: 100 CAPSULE ORAL at 21:28

## 2020-04-08 RX ADMIN — BENZONATATE 200 MG: 100 CAPSULE ORAL at 08:24

## 2020-04-08 RX ADMIN — QUETIAPINE FUMARATE 300 MG: 300 TABLET ORAL at 13:59

## 2020-04-08 RX ADMIN — LISINOPRIL 5 MG: 5 TABLET ORAL at 08:28

## 2020-04-08 RX ADMIN — PANTOPRAZOLE SODIUM 40 MG: 40 TABLET, DELAYED RELEASE ORAL at 08:28

## 2020-04-08 RX ADMIN — AZITHROMYCIN MONOHYDRATE 250 MG: 250 TABLET ORAL at 08:26

## 2020-04-08 RX ADMIN — PREGABALIN 75 MG: 75 CAPSULE ORAL at 21:28

## 2020-04-08 RX ADMIN — ACETAMINOPHEN 650 MG: 325 TABLET, FILM COATED ORAL at 16:14

## 2020-04-08 RX ADMIN — ALPRAZOLAM 0.25 MG: 0.25 TABLET ORAL at 21:28

## 2020-04-08 RX ADMIN — QUETIAPINE FUMARATE 400 MG: 200 TABLET ORAL at 21:28

## 2020-04-08 RX ADMIN — TRAZODONE HYDROCHLORIDE 50 MG: 50 TABLET ORAL at 21:28

## 2020-04-08 RX ADMIN — ALPRAZOLAM 0.25 MG: 0.25 TABLET ORAL at 08:26

## 2020-04-08 RX ADMIN — BENZONATATE 200 MG: 100 CAPSULE ORAL at 13:58

## 2020-04-08 RX ADMIN — ASPIRIN 81 MG: 81 TABLET, COATED ORAL at 08:28

## 2020-04-08 RX ADMIN — QUETIAPINE FUMARATE 300 MG: 300 TABLET ORAL at 08:24

## 2020-04-08 RX ADMIN — INSULIN GLARGINE 20 UNITS: 100 INJECTION, SOLUTION SUBCUTANEOUS at 08:29

## 2020-04-08 RX ADMIN — TIOTROPIUM BROMIDE INHALATION SPRAY 2 PUFF: 3.12 SPRAY, METERED RESPIRATORY (INHALATION) at 08:28

## 2020-04-08 RX ADMIN — PREGABALIN 75 MG: 75 CAPSULE ORAL at 08:27

## 2020-04-08 ASSESSMENT — LIFESTYLE VARIABLES: HISTORY_ALCOHOL_USE: NO

## 2020-04-08 ASSESSMENT — PAIN SCALES - GENERAL
PAINLEVEL_OUTOF10: 3
PAINLEVEL_OUTOF10: 3

## 2020-04-08 NOTE — BH NOTE
consulted and the call with 19 test is still pending. The pulmonology service recommendation is to transfer the patient to the medical floor as the test is still pending. The patient was discharged within a few hours after the admission.     PAST PSYCHIATRIC HISTORY:      Previous diagnoses including schizoaffective disorder bipolar type  Patient has history of substance abuse  Patient is a client of [facility]    MEDICAL HISTORY:     Past Medical History:   Diagnosis Date    B[facility] 1 disorder ([facility] [address]     Breast lump     Chronic obstructive pulmonary disease ([facility] [address] 8/3/2017    Depression     GERD (gastroesophageal reflux disease)     Hyperlipidemia     Hypertension     Osteoarthritis     Type 2 diabetes mellitus without complication, with long-term current use of insulin ([facility] [address] 2/4/2019    Type II or unspecified type diabetes mellitus without mention of complication, not stated as uncontrolled       has a past medical history of Bipolar 1 disorder ([address] Breast lump, Chronic obstructive pulmonary disease ([facility] [address] (8/3/2017), Depression, GERD (gastroesophageal reflux disease), Hyperlipidemia, Hypertension, Osteoarthritis, Type 2 diabetes mellitus without complication, with long-term current use of insulin ([address] (2/4/2019), and Type II or unspecified type diabetes mellitus without mention of complication, not stated as uncontrolled.   Past Surgical History:   Procedure Laterality Date    DILATION AND CURETTAGE OF UTERUS      ECTOPIC PREGNANCY SURGERY      KNEE ARTHROPLASTY  2017    KNEE SURGERY Right     NERVE BLOCK Left 12/8/14    left knee     Recent Results (from the past 72 hour(s))   CBC with DIFF    Collection Time: 04/05/20  5:30 PM   Result Value Ref Range    WBC 7.6 3.5 - 11.0 k/uL    RBC 4.01 4.0 - 5.2 m/uL    Hemoglobin 12.7 12.0 - 16.0 g/dL    Hematocrit 38.6 36 - 46 %    MCV 96.3 80 - 100 fL    MCH 31.6 26 - 34 pg    MCHC 32.8 31 - 37 g/dL    RDW 14.4 11.5 - 14.9 %    Platelets 328 DEHYDROGENASE    Collection Time: 04/05/20  5:30 PM   Result Value Ref Range     (H) 135 - 214 U/L   Drug screen multi urine    Collection Time: 04/05/20  7:37 PM   Result Value Ref Range    Amphetamine Screen, Ur NEGATIVE NEGATIVE    Barbiturate Screen, Ur NEGATIVE NEGATIVE    Benzodiazepine Screen, Urine NEGATIVE NEGATIVE    Cocaine Metabolite, Urine POSITIVE (A) NEGATIVE    Methadone Screen, Urine POSITIVE (A) NEGATIVE    Opiates, Urine NEGATIVE NEGATIVE    Phencyclidine, Urine NEGATIVE NEGATIVE    Propoxyphene, Urine NOT REPORTED NEGATIVE    Cannabinoid Scrn, Ur NEGATIVE NEGATIVE    Oxycodone Screen, Ur NEGATIVE NEGATIVE    Methamphetamine, Urine NOT REPORTED NEGATIVE    Tricyclic Antidepressants, Urine NOT REPORTED NEGATIVE    MDMA, Urine NOT REPORTED NEGATIVE    Buprenorphine Urine NOT REPORTED NEGATIVE    Test Information       Assay provides medical screening only. The absence of expected drug(s) and/or metabolite(s) may indicate diluted or adulterated urine, limitations of testing or timing of collection. COVID-19    Collection Time: 04/05/20 10:07 PM   Result Value Ref Range    SARS-CoV-2 Not Detected Not Detected    Source . NASOPHARYNGEAL SWAB     SARS-CoV-2, PCR      Not Detected    SARS-CoV-2, BALDEV         POC Glucose Fingerstick    Collection Time: 04/05/20 10:41 PM   Result Value Ref Range    POC Glucose 145 (H) 65 - 105 mg/dL   POC Glucose Fingerstick    Collection Time: 04/06/20  8:10 AM   Result Value Ref Range    POC Glucose 91 65 - 105 mg/dL   Ferritin    Collection Time: 04/06/20 10:41 AM   Result Value Ref Range    Ferritin 88 13 - 150 ug/L   D-Dimer Test    Collection Time: 04/06/20 10:41 AM   Result Value Ref Range    D-Dimer, Quant 0.40 0.00 - 0.59 mg/L FEU   C-Reactive Protein    Collection Time: 04/06/20 10:41 AM   Result Value Ref Range    CRP 5.6 (H) 0.0 - 5.0 mg/L   Respiratory Virus PCR Panel    Collection Time: 04/06/20 10:50 AM   Result Value Ref Range    Specimen Description Sarah Banda NASOPHARYNGEAL SWAB     Adenovirus PCR Not Detected Not Detected    Coronavirus 229E PCR Not Detected Not Detected    Coronavirus HKU1 PCR Not Detected Not Detected    Coronavirus NL63 PCR Not Detected Not Detected    Coronavirus OC43 PCR Not Detected Not Detected    Human Metapneumovirus PCR Not Detected Not Detected    Rhino/Enterovirus PCR Not Detected Not Detected    Influenza A by PCR Not Detected Not Detected    Influenza A H1 PCR NOT REPORTED Not Detected    Influenza A H1 (2009) PCR NOT REPORTED Not Detected    Influenza A H3 PCR NOT REPORTED Not Detected    Influenza B by PCR Not Detected Not Detected    Parainfluenza 1 PCR Not Detected Not Detected    Parainfluenza 2 PCR Not Detected Not Detected    Parainfluenza 3 PCR Not Detected Not Detected    Parainfluenza 4 PCR Not Detected Not Detected    Resp Syncytial Virus PCR Not Detected Not Detected    Bordetella Parapertussis Not Detected Not Detected    B Pertussis by PCR Not Detected Not Detected    Chlamydia pneumoniae By PCR Not Detected Not Detected    Mycoplasma pneumo by PCR Not Detected Not Detected   POC Glucose Fingerstick    Collection Time: 04/06/20 11:37 AM   Result Value Ref Range    POC Glucose 119 (H) 65 - 105 mg/dL   POC Glucose Fingerstick    Collection Time: 04/06/20  4:13 PM   Result Value Ref Range    POC Glucose 120 (H) 65 - 105 mg/dL   POC Glucose Fingerstick    Collection Time: 04/06/20  8:04 PM   Result Value Ref Range    POC Glucose 120 (H) 65 - 105 mg/dL   POC Glucose Fingerstick    Collection Time: 04/07/20  7:52 AM   Result Value Ref Range    POC Glucose 121 (H) 65 - 105 mg/dL   POC Glucose Fingerstick    Collection Time: 04/07/20 12:58 PM   Result Value Ref Range    POC Glucose 129 (H) 65 - 105 mg/dL   POC Glucose Fingerstick    Collection Time: 04/07/20  5:32 PM   Result Value Ref Range    POC Glucose 89 65 - 105 mg/dL   POC Glucose Fingerstick    Collection Time: 04/08/20  7:51 AM   Result Value Ref Range    POC Glucose 78 65 Psychiatrist  4/8/2020 12:27 PM

## 2020-04-08 NOTE — CARE COORDINATION
BHI Biopsychosocial Assessment    Current Level of Psychosocial Functioning     Independent: xx  Dependent:    Minimal Assist:       Psychosocial High Risk Factors (check all that apply)    Unable to obtain meds:    Chronic illness/pain: xx   Substance abuse:    Lack of Family Support:    Financial stress:    Isolation:  xx  Inadequate Community Resources:    Suicide attempt(s):    Not taking medications: xx   Victim of crime:    Developmental Delay:    Unable to manage personal needs:    Age 72 or older: xx  Homeless:    No transportation:    Readmission within 30 days:    Unemployment:    Traumatic Event:      Psychiatric Advanced Directives: None reported    Family to Involve in Treatment: None reported    Sexual Orientation: JAYLYN    Patient Strengths: Patient is connected with outpatient treatment at [facility]    Patient Barriers: Patient reports she has not been taking her medication    Opiate Education: Patient denies opiate use    CMHC/mental health history: Patient is linked with [facility]    Plan of Care   medication management, group/individual therapies, family meetings, psycho -education, treatment team meetings to assist with stabilization    Initial Discharge Plan: To be determined with provider and treatment team.      Clinical Summary:      Pt is a 75year old  female who originally presented to the [facility] via [facility] ED with reported auditory command hallucinations telling her to kill herself. Patient reported she was off her medications for a few days. Patient was transferred to [facility] to address physical health issues (COPD, Covid 19 rule out) and was then transferred back to [facility] for continued mental health treatment. Patient states that she is not medication compliant at this time. Patient is linked to [facility]. Patient states that she lives alone. Patient states that she does have a form of legal income, SSDI. Patient states that she does not have a history of substance use.  Patient states that she does not have abuse issues from her past. Patient states that she is currently having auditory hallucinations and suicidal thoughts dues to the command hallucinations. Patient denies homicidal ideations or visual hallucinations.

## 2020-04-08 NOTE — GROUP NOTE
Group Therapy Note    Date: 4/8/2020    Group Start Time: 5548  Group End Time: 0915  Group Topic: Community Meeting    [address] [place], [place]        Group Therapy Note    Attendees: 4/15      patient refused to attend community meeting and goal setting  group at  [id number] after encouragement from staff. 1:1 talk time provided as alternative to group session.        Signature:  Mateus Roa [place]

## 2020-04-08 NOTE — BH NOTE
Necklace, Bracelet, Ring  Body Piercings Removed: N/A  Clothing: Footwear, Pants, Shirt  Were All Patient Medications Collected?: Yes  Other Valuables: Purse, Money (Comment), Other (Comment)($0.61)     Valuables placed in safe in security envelope, number: \[id number]. Patient's home medications were locked in safe. Patient oriented to surroundings and program expectations and copy of patient rights given. Received admission packet. Consents reviewed, signed. Patient verbalized understanding. Auditory hallucinations of mumbles. Was admitted to [facility] 2 days ago for suicidal ideations, sent to medical for Covid-19 rule out (Negative, COPD exhacerbation). Denies suicidal ideation on arrival to unit.                   Cathleen Vasquez RN

## 2020-04-09 LAB
GLUCOSE BLD-MCNC: 105 MG/DL (ref 65–105)
GLUCOSE BLD-MCNC: 140 MG/DL (ref 65–105)
GLUCOSE BLD-MCNC: 71 MG/DL (ref 65–105)
GLUCOSE BLD-MCNC: 88 MG/DL (ref 65–105)
GLUCOSE BLD-MCNC: 91 MG/DL (ref 65–105)

## 2020-04-09 PROCEDURE — 6370000000 HC RX 637 (ALT 250 FOR IP): Performed by: INTERNAL MEDICINE

## 2020-04-09 PROCEDURE — 82947 ASSAY GLUCOSE BLOOD QUANT: CPT

## 2020-04-09 PROCEDURE — 6370000000 HC RX 637 (ALT 250 FOR IP): Performed by: PSYCHIATRY & NEUROLOGY

## 2020-04-09 PROCEDURE — 1240000000 HC EMOTIONAL WELLNESS R&B

## 2020-04-09 RX ADMIN — AZITHROMYCIN MONOHYDRATE 250 MG: 250 TABLET ORAL at 08:40

## 2020-04-09 RX ADMIN — BENZONATATE 200 MG: 100 CAPSULE ORAL at 08:40

## 2020-04-09 RX ADMIN — QUETIAPINE FUMARATE 300 MG: 300 TABLET ORAL at 14:28

## 2020-04-09 RX ADMIN — TRAZODONE HYDROCHLORIDE 50 MG: 50 TABLET ORAL at 21:15

## 2020-04-09 RX ADMIN — QUETIAPINE FUMARATE 300 MG: 300 TABLET ORAL at 08:40

## 2020-04-09 RX ADMIN — ALPRAZOLAM 0.25 MG: 0.25 TABLET ORAL at 08:40

## 2020-04-09 RX ADMIN — ACETAMINOPHEN 650 MG: 325 TABLET, FILM COATED ORAL at 17:07

## 2020-04-09 RX ADMIN — TIOTROPIUM BROMIDE INHALATION SPRAY 2 PUFF: 3.12 SPRAY, METERED RESPIRATORY (INHALATION) at 09:11

## 2020-04-09 RX ADMIN — LISINOPRIL 5 MG: 5 TABLET ORAL at 08:40

## 2020-04-09 RX ADMIN — PANTOPRAZOLE SODIUM 40 MG: 40 TABLET, DELAYED RELEASE ORAL at 08:40

## 2020-04-09 RX ADMIN — QUETIAPINE FUMARATE 400 MG: 200 TABLET ORAL at 21:14

## 2020-04-09 RX ADMIN — INSULIN GLARGINE 20 UNITS: 100 INJECTION, SOLUTION SUBCUTANEOUS at 08:39

## 2020-04-09 RX ADMIN — ALBUTEROL SULFATE 1 PUFF: 90 AEROSOL, METERED RESPIRATORY (INHALATION) at 00:55

## 2020-04-09 RX ADMIN — ACETAMINOPHEN 650 MG: 325 TABLET, FILM COATED ORAL at 09:14

## 2020-04-09 RX ADMIN — ALPRAZOLAM 0.25 MG: 0.25 TABLET ORAL at 21:15

## 2020-04-09 RX ADMIN — BENZONATATE 200 MG: 100 CAPSULE ORAL at 21:14

## 2020-04-09 RX ADMIN — INSULIN GLARGINE 20 UNITS: 100 INJECTION, SOLUTION SUBCUTANEOUS at 21:14

## 2020-04-09 RX ADMIN — HYDROXYZINE HYDROCHLORIDE 25 MG: 25 TABLET, FILM COATED ORAL at 21:14

## 2020-04-09 RX ADMIN — PREGABALIN 75 MG: 75 CAPSULE ORAL at 08:40

## 2020-04-09 RX ADMIN — PREGABALIN 75 MG: 75 CAPSULE ORAL at 21:14

## 2020-04-09 RX ADMIN — ESCITALOPRAM OXALATE 25 MG: 20 TABLET ORAL at 08:40

## 2020-04-09 RX ADMIN — HYDROXYZINE HYDROCHLORIDE 25 MG: 25 TABLET, FILM COATED ORAL at 01:02

## 2020-04-09 RX ADMIN — BENZONATATE 200 MG: 100 CAPSULE ORAL at 14:28

## 2020-04-09 RX ADMIN — ASPIRIN 81 MG: 81 TABLET, COATED ORAL at 08:40

## 2020-04-09 ASSESSMENT — PAIN - FUNCTIONAL ASSESSMENT: PAIN_FUNCTIONAL_ASSESSMENT: 0-10

## 2020-04-09 ASSESSMENT — PAIN SCALES - GENERAL
PAINLEVEL_OUTOF10: 0
PAINLEVEL_OUTOF10: 3
PAINLEVEL_OUTOF10: 3

## 2020-04-09 NOTE — GROUP NOTE
Group Therapy Note    Date: 4/9/2020    Group Start Time: 1000  Group End Time: 4283  Group Topic: Healthy Living/Wellness    [address] [facility]    Patient refused to attend health benefits of exercise group at 1000 after encouragement from staff. 1:1 talk time offered by staff as alternative to group session.

## 2020-04-09 NOTE — GROUP NOTE
Group Therapy Note    Date: 4/9/2020    Group Start Time: 1600  Group End Time: 36  Group Topic: Healthy Living/Wellness    SKY [facility] C    Penny Walker LPN        Group Therapy Note    Attendees: 8           Status After Intervention:  Improved    Participation Level:  Active Listener    Participation Quality: Appropriate      Speech:  normal      Thought Process/Content: Logical      Affective Functioning: Congruent      Mood: normal      Level of consciousness:  Alert      Response to Learning: Able to verbalize current knowledge/experience      Endings: None Reported    Modes of Intervention: Education      Discipline Responsible: Licensed Practical Nurse      Signature:  Mary Pond LPN

## 2020-04-09 NOTE — GROUP NOTE
Group Therapy Note    Date: 4/8/2020    Group Start Time: 2000  Group End Time: 2030  Group Topic: Wrap-Up    SKY TAMEZ    Rachel Tan LPN        Group Therapy Note    Attendees: 9         Patient's Goal:  Identifying steps to accomplish goals    Notes:      Status After Intervention:  Unchanged    Participation Level:  Active Listener and Interactive    Participation Quality: Appropriate, Attentive, Sharing and Supportive      Speech:  normal      Thought Process/Content: Logical      Affective Functioning: Congruent      Mood: normal      Level of consciousness:  Alert, Oriented x4 and Attentive      Response to Learning: Able to verbalize current knowledge/experience, Able to verbalize/acknowledge new learning, Able to retain information, Capable of insight and Progressing to goal      Endings: None Reported    Modes of Intervention: Education, Support, Socialization, Exploration, Clarifying and Problem-solving      Discipline Responsible: Licensed Practical Nurse      Signature:  Rachel Tan LPN

## 2020-04-10 LAB
GLUCOSE BLD-MCNC: 74 MG/DL (ref 65–105)
GLUCOSE BLD-MCNC: 94 MG/DL (ref 65–105)

## 2020-04-10 PROCEDURE — 6370000000 HC RX 637 (ALT 250 FOR IP): Performed by: PSYCHIATRY & NEUROLOGY

## 2020-04-10 PROCEDURE — 82947 ASSAY GLUCOSE BLOOD QUANT: CPT

## 2020-04-10 PROCEDURE — 6370000000 HC RX 637 (ALT 250 FOR IP): Performed by: INTERNAL MEDICINE

## 2020-04-10 PROCEDURE — 1240000000 HC EMOTIONAL WELLNESS R&B

## 2020-04-10 RX ORDER — QUETIAPINE FUMARATE 200 MG/1
200 TABLET, FILM COATED ORAL NIGHTLY
Status: DISCONTINUED | OUTPATIENT
Start: 2020-04-10 | End: 2020-04-13 | Stop reason: HOSPADM

## 2020-04-10 RX ORDER — ALPRAZOLAM 0.25 MG/1
0.25 TABLET ORAL 2 TIMES DAILY PRN
Status: DISCONTINUED | OUTPATIENT
Start: 2020-04-10 | End: 2020-04-13 | Stop reason: HOSPADM

## 2020-04-10 RX ADMIN — ALPRAZOLAM 0.25 MG: 0.25 TABLET ORAL at 21:03

## 2020-04-10 RX ADMIN — BENZONATATE 200 MG: 100 CAPSULE ORAL at 21:03

## 2020-04-10 RX ADMIN — AZITHROMYCIN MONOHYDRATE 250 MG: 250 TABLET ORAL at 08:24

## 2020-04-10 RX ADMIN — PANTOPRAZOLE SODIUM 40 MG: 40 TABLET, DELAYED RELEASE ORAL at 08:25

## 2020-04-10 RX ADMIN — ASPIRIN 81 MG: 81 TABLET, COATED ORAL at 08:25

## 2020-04-10 RX ADMIN — QUETIAPINE FUMARATE 300 MG: 300 TABLET ORAL at 08:25

## 2020-04-10 RX ADMIN — PREGABALIN 75 MG: 75 CAPSULE ORAL at 08:25

## 2020-04-10 RX ADMIN — LISINOPRIL 5 MG: 5 TABLET ORAL at 08:25

## 2020-04-10 RX ADMIN — INSULIN GLARGINE 20 UNITS: 100 INJECTION, SOLUTION SUBCUTANEOUS at 20:56

## 2020-04-10 RX ADMIN — ESCITALOPRAM OXALATE 25 MG: 20 TABLET ORAL at 08:25

## 2020-04-10 RX ADMIN — BENZONATATE 200 MG: 100 CAPSULE ORAL at 14:13

## 2020-04-10 RX ADMIN — QUETIAPINE FUMARATE 300 MG: 300 TABLET ORAL at 14:13

## 2020-04-10 RX ADMIN — INSULIN GLARGINE 20 UNITS: 100 INJECTION, SOLUTION SUBCUTANEOUS at 08:24

## 2020-04-10 RX ADMIN — BENZONATATE 200 MG: 100 CAPSULE ORAL at 08:25

## 2020-04-10 RX ADMIN — TRAZODONE HYDROCHLORIDE 50 MG: 50 TABLET ORAL at 20:57

## 2020-04-10 RX ADMIN — PREGABALIN 75 MG: 75 CAPSULE ORAL at 20:57

## 2020-04-10 RX ADMIN — ACETAMINOPHEN 650 MG: 325 TABLET, FILM COATED ORAL at 20:57

## 2020-04-10 RX ADMIN — ALPRAZOLAM 0.25 MG: 0.25 TABLET ORAL at 08:25

## 2020-04-10 RX ADMIN — QUETIAPINE FUMARATE 200 MG: 200 TABLET ORAL at 20:57

## 2020-04-10 RX ADMIN — TIOTROPIUM BROMIDE INHALATION SPRAY 2 PUFF: 3.12 SPRAY, METERED RESPIRATORY (INHALATION) at 08:24

## 2020-04-10 RX ADMIN — ACETAMINOPHEN 650 MG: 325 TABLET, FILM COATED ORAL at 08:24

## 2020-04-10 ASSESSMENT — PAIN DESCRIPTION - LOCATION: LOCATION: KNEE

## 2020-04-10 ASSESSMENT — PAIN SCALES - GENERAL
PAINLEVEL_OUTOF10: 7
PAINLEVEL_OUTOF10: 3
PAINLEVEL_OUTOF10: 3
PAINLEVEL_OUTOF10: 0
PAINLEVEL_OUTOF10: 0

## 2020-04-10 NOTE — GROUP NOTE
Group Therapy Note    Date: 4/10/2020    Group Start Time: 1330  Group End Time: 1744  Group Topic: Psychoeducation    SKY [place], CTRS    Patient refused to attend music MiArcha group at 1330 after encouragement from staff. 1:1 talk time offered by staff as alternative to group session.

## 2020-04-10 NOTE — PROGRESS NOTES
PROGRESS NOTE  Chart has been reviewed and the patient was interviewed at the bedside. The patient still guarded and socially withdrawn. She exhibited depressed mood and a flat affect. The patient isolative to self and spent most of the time in her room. She has poor interaction with others and she did not attend therapy groups in the unit. The patient denied side effects of her medications. She is still reporting auditory hallucinations commands in nature asking her to harm herself. Safety plan has been discussed with the patient. She denied current thoughts of harming self. We will continue monitoring for symptoms of depression and psychosis and suicidal behavior. We will adjust her medications as needed. MENTAL STATUS EXAMINATION:    BP (!) 114/59   Pulse 76   Temp 98.2 °F (36.8 °C) (Oral)   Resp 14   Ht 5' 6\" (1.676 m)   Wt 201 lb (91.2 kg)   LMP  (LMP Unknown)   BMI 32.44 kg/m²     MOOD-is dysphoric   Affect-is depressed  Patient feels helpless hopeless and worthless  Psychomotor activity : decreased. APPEARANCE:  Personal hygiene is poor and patient is neglecting his appearance. Patient is somewhat unkempt  PSYCHOSIS: She reported auditory but deniedvisual hallucinations. Denies paranoid delusions. ORIENTATION:  Oriented to time place and person. Recent and remote memory is grossly intact. Intelligence appears to be average  CONCENTRATION:  poor. SPEECH : goal directed , but slow . DIAGNOSIS:    Principal Problem:    Schizoaffective disorder ([facility] [address]  Resolved Problems:    * No resolved hospital problems.  *      LAB:    Recent Results (from the past 72 hour(s))   POC Glucose Fingerstick    Collection Time: 04/07/20  7:52 AM   Result Value Ref Range    POC Glucose 121 (H) 65 - 105 support  Patient acknowledged and mutually decided to continue with current treatment plan  Discharge planning was discussed with the patient. Joycelyn Lovell MD        This note was created with the assistance of a speech-recognition program.  Although the intention is to generate a document that actually reflects the content of the visit, no guarantees can be provided that every mistake has been identified and corrected by editing.

## 2020-04-10 NOTE — GROUP NOTE
Group Therapy Note    Date: 4/10/2020    Group Start Time: 1430  Group End Time: 5294  Group Topic: Psychoeducation    SKY Lipscomb    Patient refused to attend mental health education/ communication skills group at [phone] after encouragement from staff. 1:1 talk time provided by staff.       Signature:  Delon Lipscomb

## 2020-04-11 LAB
GLUCOSE BLD-MCNC: 100 MG/DL (ref 65–105)
GLUCOSE BLD-MCNC: 100 MG/DL (ref 65–105)
GLUCOSE BLD-MCNC: 109 MG/DL (ref 65–105)
GLUCOSE BLD-MCNC: 66 MG/DL (ref 65–105)

## 2020-04-11 PROCEDURE — 6370000000 HC RX 637 (ALT 250 FOR IP): Performed by: INTERNAL MEDICINE

## 2020-04-11 PROCEDURE — 82947 ASSAY GLUCOSE BLOOD QUANT: CPT

## 2020-04-11 PROCEDURE — 6370000000 HC RX 637 (ALT 250 FOR IP): Performed by: PSYCHIATRY & NEUROLOGY

## 2020-04-11 PROCEDURE — 1240000000 HC EMOTIONAL WELLNESS R&B

## 2020-04-11 RX ADMIN — ALUMINUM HYDROXIDE, MAGNESIUM HYDROXIDE, AND SIMETHICONE 30 ML: 200; 200; 20 SUSPENSION ORAL at 15:44

## 2020-04-11 RX ADMIN — TIOTROPIUM BROMIDE INHALATION SPRAY 2 PUFF: 3.12 SPRAY, METERED RESPIRATORY (INHALATION) at 08:44

## 2020-04-11 RX ADMIN — ALPRAZOLAM 0.25 MG: 0.25 TABLET ORAL at 20:52

## 2020-04-11 RX ADMIN — QUETIAPINE FUMARATE 200 MG: 200 TABLET ORAL at 20:52

## 2020-04-11 RX ADMIN — HYDROXYZINE HYDROCHLORIDE 25 MG: 25 TABLET, FILM COATED ORAL at 14:54

## 2020-04-11 RX ADMIN — ACETAMINOPHEN 650 MG: 325 TABLET, FILM COATED ORAL at 08:42

## 2020-04-11 RX ADMIN — AZITHROMYCIN MONOHYDRATE 250 MG: 250 TABLET ORAL at 08:43

## 2020-04-11 RX ADMIN — ASPIRIN 81 MG: 81 TABLET, COATED ORAL at 08:43

## 2020-04-11 RX ADMIN — BENZONATATE 200 MG: 100 CAPSULE ORAL at 21:38

## 2020-04-11 RX ADMIN — PREGABALIN 75 MG: 75 CAPSULE ORAL at 08:43

## 2020-04-11 RX ADMIN — ALPRAZOLAM 0.25 MG: 0.25 TABLET ORAL at 08:53

## 2020-04-11 RX ADMIN — INSULIN GLARGINE 20 UNITS: 100 INJECTION, SOLUTION SUBCUTANEOUS at 08:43

## 2020-04-11 RX ADMIN — BENZONATATE 200 MG: 100 CAPSULE ORAL at 08:44

## 2020-04-11 RX ADMIN — LISINOPRIL 5 MG: 5 TABLET ORAL at 08:43

## 2020-04-11 RX ADMIN — INSULIN GLARGINE 20 UNITS: 100 INJECTION, SOLUTION SUBCUTANEOUS at 20:52

## 2020-04-11 RX ADMIN — PANTOPRAZOLE SODIUM 40 MG: 40 TABLET, DELAYED RELEASE ORAL at 08:43

## 2020-04-11 RX ADMIN — ESCITALOPRAM OXALATE 25 MG: 20 TABLET ORAL at 08:42

## 2020-04-11 RX ADMIN — PREGABALIN 75 MG: 75 CAPSULE ORAL at 20:51

## 2020-04-11 RX ADMIN — TRAZODONE HYDROCHLORIDE 50 MG: 50 TABLET ORAL at 20:51

## 2020-04-11 ASSESSMENT — PAIN SCALES - GENERAL
PAINLEVEL_OUTOF10: 0
PAINLEVEL_OUTOF10: 1
PAINLEVEL_OUTOF10: 3

## 2020-04-11 NOTE — PROGRESS NOTES
Patient participated in 1:1 goal setting and developed a goal of \"stop my leg and back pain\". Patient was directed to speak with the doctor about her options for pain control.

## 2020-04-12 LAB
GLUCOSE BLD-MCNC: 104 MG/DL (ref 65–105)
GLUCOSE BLD-MCNC: 84 MG/DL (ref 65–105)

## 2020-04-12 PROCEDURE — 6370000000 HC RX 637 (ALT 250 FOR IP): Performed by: PSYCHIATRY & NEUROLOGY

## 2020-04-12 PROCEDURE — 6370000000 HC RX 637 (ALT 250 FOR IP): Performed by: INTERNAL MEDICINE

## 2020-04-12 PROCEDURE — 82947 ASSAY GLUCOSE BLOOD QUANT: CPT

## 2020-04-12 PROCEDURE — 1240000000 HC EMOTIONAL WELLNESS R&B

## 2020-04-12 RX ADMIN — LISINOPRIL 5 MG: 5 TABLET ORAL at 08:14

## 2020-04-12 RX ADMIN — PREGABALIN 75 MG: 75 CAPSULE ORAL at 21:27

## 2020-04-12 RX ADMIN — ESCITALOPRAM OXALATE 25 MG: 20 TABLET ORAL at 08:14

## 2020-04-12 RX ADMIN — BENZONATATE 200 MG: 100 CAPSULE ORAL at 21:27

## 2020-04-12 RX ADMIN — BENZONATATE 200 MG: 100 CAPSULE ORAL at 13:43

## 2020-04-12 RX ADMIN — HYDROXYZINE HYDROCHLORIDE 25 MG: 25 TABLET, FILM COATED ORAL at 13:49

## 2020-04-12 RX ADMIN — PREGABALIN 75 MG: 75 CAPSULE ORAL at 08:14

## 2020-04-12 RX ADMIN — PANTOPRAZOLE SODIUM 40 MG: 40 TABLET, DELAYED RELEASE ORAL at 08:14

## 2020-04-12 RX ADMIN — TRAZODONE HYDROCHLORIDE 50 MG: 50 TABLET ORAL at 21:27

## 2020-04-12 RX ADMIN — INSULIN GLARGINE 20 UNITS: 100 INJECTION, SOLUTION SUBCUTANEOUS at 21:29

## 2020-04-12 RX ADMIN — QUETIAPINE FUMARATE 200 MG: 200 TABLET ORAL at 21:27

## 2020-04-12 RX ADMIN — BENZONATATE 200 MG: 100 CAPSULE ORAL at 08:14

## 2020-04-12 RX ADMIN — INSULIN GLARGINE 20 UNITS: 100 INJECTION, SOLUTION SUBCUTANEOUS at 08:13

## 2020-04-12 RX ADMIN — ACETAMINOPHEN 650 MG: 325 TABLET, FILM COATED ORAL at 11:32

## 2020-04-12 RX ADMIN — ALPRAZOLAM 0.25 MG: 0.25 TABLET ORAL at 21:27

## 2020-04-12 RX ADMIN — TIOTROPIUM BROMIDE INHALATION SPRAY 2 PUFF: 3.12 SPRAY, METERED RESPIRATORY (INHALATION) at 08:17

## 2020-04-12 RX ADMIN — ASPIRIN 81 MG: 81 TABLET, COATED ORAL at 08:15

## 2020-04-12 RX ADMIN — ALPRAZOLAM 0.25 MG: 0.25 TABLET ORAL at 09:01

## 2020-04-12 ASSESSMENT — PAIN SCALES - GENERAL
PAINLEVEL_OUTOF10: 2
PAINLEVEL_OUTOF10: 7

## 2020-04-12 ASSESSMENT — PAIN DESCRIPTION - LOCATION: LOCATION: HIP;BACK

## 2020-04-12 NOTE — BH NOTE
Patient is anxious as evidenced by increased restlessness, increased internal stimuli and clenching of fist. Patient refused shower, one to one talk time and listening to music. Patient accepting of PRN. Will continue to monitor.

## 2020-04-12 NOTE — GROUP NOTE
Wellness Group Note  Group Topic: Positive Coping Skills    Date: April 12, 2020  Group Start Time: 1600  Group End Time: [address]  Attendees: 14/19    Patient's Goal: Increased understanding of methods and ways to cope. Notes: Patient participated in discussion and activity regarding coping skills. Status After Intervention: Improved    Participation Level:  Active Listener and Interactive    Participation Quality: Appropriate, attentive and supportive    Speech:  Normal    Thought Process/Content: Logical and linear    Affective Functioning: Congruent    Mood: WDL    Level of consciousness: Oriented x4    Response to Learning: Progressing to goal; able to verbalize current knowledge/experience, acknowledge new learning, retain information and change behavior    Endings: None reported    Modes of Intervention: Education and exploration    Discipline Responsible: Behavioral Health Tech    Signature:  LEXIE Jaramillo

## 2020-04-12 NOTE — GROUP NOTE
Wellness Group Note  Group Topic: Safety    Date: April 12, 2020  Group Start Time: [address] End Time: [address] Jen Soot; Wilmer Gao RN; Andrew Rich RN; Miriam Barbosa LPN  Attendees: 53/07    Patient's Goal: Increased understanding/knowledge of personal safety and safety on the unit. Notes:  Patient listened to safety presentation and cooperative with room/contraband check. Status After Intervention:  Unchanged    Participation Level:  Active Listener and Minimal    Participation Quality: Appropriate    Speech:  normal    Thought Process/Content: Logical    Affective Functioning: Congruent    Mood: WDL    Level of consciousness:  Alert and Oriented x4    Response to Learning: Able to verbalize current knowledge/experience, Able to verbalize/acknowledge new learning, Able to retain information and Progressing to goal    Endings: None Reported    Modes of Intervention: Education, Problem-solving and Limit-setting    Discipline Responsible: Behavioral Health Tech    Signature:  Roshni Madison

## 2020-04-13 VITALS
HEIGHT: 66 IN | RESPIRATION RATE: 14 BRPM | TEMPERATURE: 98 F | WEIGHT: 201 LBS | SYSTOLIC BLOOD PRESSURE: 109 MMHG | BODY MASS INDEX: 32.3 KG/M2 | DIASTOLIC BLOOD PRESSURE: 50 MMHG | HEART RATE: 74 BPM

## 2020-04-13 LAB — GLUCOSE BLD-MCNC: 71 MG/DL (ref 65–105)

## 2020-04-13 PROCEDURE — 82947 ASSAY GLUCOSE BLOOD QUANT: CPT

## 2020-04-13 PROCEDURE — 6370000000 HC RX 637 (ALT 250 FOR IP): Performed by: PSYCHIATRY & NEUROLOGY

## 2020-04-13 PROCEDURE — 6370000000 HC RX 637 (ALT 250 FOR IP): Performed by: INTERNAL MEDICINE

## 2020-04-13 RX ORDER — ESCITALOPRAM OXALATE 5 MG/1
25 TABLET ORAL DAILY
Qty: 30 TABLET | Refills: 3 | Status: ON HOLD | OUTPATIENT
Start: 2020-04-14 | End: 2021-10-11 | Stop reason: DRUGHIGH

## 2020-04-13 RX ORDER — QUETIAPINE FUMARATE 200 MG/1
200 TABLET, FILM COATED ORAL NIGHTLY
Qty: 60 TABLET | Refills: 3 | Status: SHIPPED | OUTPATIENT
Start: 2020-04-13 | End: 2021-07-24 | Stop reason: SDUPTHER

## 2020-04-13 RX ORDER — ASPIRIN 81 MG/1
81 TABLET ORAL DAILY
Qty: 30 TABLET | Refills: 3 | Status: SHIPPED | OUTPATIENT
Start: 2020-04-14

## 2020-04-13 RX ORDER — BENZONATATE 200 MG/1
200 CAPSULE ORAL 3 TIMES DAILY
Qty: 21 CAPSULE | Refills: 0 | Status: SHIPPED | OUTPATIENT
Start: 2020-04-13 | End: 2020-04-20

## 2020-04-13 RX ORDER — PANTOPRAZOLE SODIUM 40 MG/1
40 TABLET, DELAYED RELEASE ORAL
Qty: 30 TABLET | Refills: 3 | Status: ON HOLD | OUTPATIENT
Start: 2020-04-14 | End: 2020-10-01 | Stop reason: HOSPADM

## 2020-04-13 RX ORDER — TRAZODONE HYDROCHLORIDE 50 MG/1
50 TABLET ORAL NIGHTLY PRN
Qty: 30 TABLET | Refills: 0 | Status: SHIPPED | OUTPATIENT
Start: 2020-04-13 | End: 2020-04-14

## 2020-04-13 RX ADMIN — ASPIRIN 81 MG: 81 TABLET, COATED ORAL at 08:34

## 2020-04-13 RX ADMIN — LISINOPRIL 5 MG: 5 TABLET ORAL at 08:34

## 2020-04-13 RX ADMIN — PANTOPRAZOLE SODIUM 40 MG: 40 TABLET, DELAYED RELEASE ORAL at 08:34

## 2020-04-13 RX ADMIN — ACETAMINOPHEN 650 MG: 325 TABLET, FILM COATED ORAL at 09:49

## 2020-04-13 RX ADMIN — INSULIN GLARGINE 20 UNITS: 100 INJECTION, SOLUTION SUBCUTANEOUS at 08:33

## 2020-04-13 RX ADMIN — BENZONATATE 200 MG: 100 CAPSULE ORAL at 13:22

## 2020-04-13 RX ADMIN — HYDROXYZINE HYDROCHLORIDE 25 MG: 25 TABLET, FILM COATED ORAL at 11:45

## 2020-04-13 RX ADMIN — ESCITALOPRAM OXALATE 25 MG: 20 TABLET ORAL at 08:34

## 2020-04-13 RX ADMIN — ALPRAZOLAM 0.25 MG: 0.25 TABLET ORAL at 08:44

## 2020-04-13 RX ADMIN — PREGABALIN 75 MG: 75 CAPSULE ORAL at 08:34

## 2020-04-13 RX ADMIN — BENZONATATE 200 MG: 100 CAPSULE ORAL at 08:34

## 2020-04-13 RX ADMIN — TIOTROPIUM BROMIDE INHALATION SPRAY 2 PUFF: 3.12 SPRAY, METERED RESPIRATORY (INHALATION) at 08:33

## 2020-04-13 ASSESSMENT — PAIN SCALES - GENERAL
PAINLEVEL_OUTOF10: 0
PAINLEVEL_OUTOF10: 0
PAINLEVEL_OUTOF10: 3

## 2020-04-13 NOTE — PROGRESS NOTES
with the assistance of a speech-recognition program.  Although the intention is to generate a document that actually reflects the content of the visit, no guarantees can be provided that every mistake has been identified and corrected by editing.

## 2020-04-13 NOTE — GROUP NOTE
Group Therapy Note    Date: 4/13/2020    Group Start Time: 0900  Group End Time: 0930  Group Topic: Community Meeting    ALEXUS Pabon    Group Therapy Note    Attendees: 10    Patient's Goal:  Pt will identify personal goal and demonstrate understanding of unit schedule and guidelines    Notes:  Pt attended group and participated    Status After Intervention:  Improved    Participation Level:  Active Listener and Interactive    Participation Quality: Appropriate, Attentive, Sharing and Supportive      Speech:  normal      Thought Process/Content: Logical      Affective Functioning: Congruent      Mood: Dysphoric      Level of consciousness:  Alert, Oriented x4 and Attentive      Response to Learning: Able to verbalize current knowledge/experience, Able to verbalize/acknowledge new learning, Able to retain information, Capable of insight, Able to change behavior and Progressing to goal      Endings: None Reported    Modes of Intervention: Education, Support, Socialization, Exploration, Activity and Limit-setting      Discipline Responsible: Psychoeducational Specialist      Signature:  Sharon Gibbons [place]

## 2020-04-13 NOTE — FLOWSHEET NOTE
*Patient participated in the Spirituality Group       04/13/20 1522   Encounter Summary   Services provided to: Patient   Referral/Consult From: Rounding   Continue Visiting   (4/13/20)   Complexity of Encounter Low   Length of Encounter 30 minutes   Spiritual/Adventist   Type Spiritual support   Assessment Calm; Approachable   Intervention Active listening   Outcome Receptive;Engaged in conversation;Expressed gratitude

## 2020-04-13 NOTE — PLAN OF CARE
Problem: Pain:  Goal: Control of chronic pain  Description: Control of chronic pain  Outcome: Ongoing   When writer asks patient if chronic pain is controled patient states \"I always have pain\". Problem: Tobacco Use:  Goal: Inpatient tobacco use cessation counseling participation  Description: Inpatient tobacco use cessation counseling participation  Outcome: Ongoing   Patient refused      Problem: Risk of Harm:  Goal: Ability to remain free from injury will improve  Description: Ability to remain free from injury will improve  4/12/2020 0947 by Lazaro Link RN  Outcome: Ongoing   Patient is free from injury this shift and will continue to be provided a safe environment. Problem: Altered Mood, Psychotic Behavior:  Goal: Compliance with prescribed medication regimen will improve  Description: Compliance with prescribed medication regimen will improve  4/12/2020 0947 by Lazaro Link RN  Outcome: Ongoing   Patient was accepting of medication this shift. Patient denies suicidal ideation, homicidal ideation and visual hallucinations. When writer asks patient if she is hearing voices patient states \"Not no more\". Patient is accepting of nourishment from staff. Remains behavioral control and med compliant. Patient refused shower despite encouragement from staff. Q15 minute checks maintained.
Problem: Risk of Harm:  Goal: Ability to remain free from injury will improve  Description: Ability to remain free from injury will improve  4/10/2020 0939 by Paloma Fournier RN  Outcome: Ongoing, free from falls      Problem: Altered Mood, Psychotic Behavior:  Goal: Able to verbalize decrease in frequency and intensity of hallucinations  Description: Able to verbalize decrease in frequency and intensity of hallucinations  4/10/2020 0939 by Paloma Fournier RN  Outcome: Ongoing, denies issue        Problem: Tobacco Use:  Goal: Inpatient tobacco use cessation counseling participation  Description: Inpatient tobacco use cessation counseling participation  Outcome: Ongoing, denies issue uses patch      Problem: Pain:  Goal: Pain level will decrease  Description: Pain level will decrease  Outcome: Ongoing, denies issue at this time
Problem: Risk of Harm:  Goal: Ability to remain free from injury will improve  Description: Ability to remain free from injury will improve  4/13/2020 1528 by Clementine Felty, RN  Outcome: Completed  4/13/2020 1030 by Robin Mcgowan RN  Outcome: Ongoing     Problem: Altered Mood, Psychotic Behavior:  Goal: Able to verbalize decrease in frequency and intensity of hallucinations  Description: Able to verbalize decrease in frequency and intensity of hallucinations  4/13/2020 1528 by Clementine Felty, RN  Outcome: Completed  4/13/2020 1030 by Robin Mcgowan RN  Outcome: Ongoing  Goal: Able to verbalize reality based thinking  Description: Able to verbalize reality based thinking  4/13/2020 1528 by Clementine Felty, RN  Outcome: Completed  4/13/2020 1030 by Robin Mcgowan RN  Outcome: Ongoing  Goal: Compliance with prescribed medication regimen will improve  Description: Compliance with prescribed medication regimen will improve  4/13/2020 1528 by Clementine Felty, RN  Outcome: Completed  4/13/2020 1030 by Robin Mcgowan RN  Outcome: Ongoing     Problem: Tobacco Use:  Goal: Inpatient tobacco use cessation counseling participation  Description: Inpatient tobacco use cessation counseling participation  4/13/2020 1528 by Clementine Felty, RN  Outcome: Completed  4/13/2020 1030 by Robin Mcgowan RN  Outcome: Ongoing     Problem: Pain:  Goal: Pain level will decrease  Description: Pain level will decrease  4/13/2020 1528 by Clementine Felty, RN  Outcome: Completed  4/13/2020 1030 by Robin Mcgowan RN  Outcome: Ongoing  Goal: Control of acute pain  Description: Control of acute pain  4/13/2020 1528 by Clementine Felty, RN  Outcome: Completed  4/13/2020 1030 by Robin Mcgowan RN  Outcome: Ongoing  Goal: Control of chronic pain  Description: Control of chronic pain  4/13/2020 1528 by Clementine Felty, RN  Outcome: Completed  4/13/2020 1030 by Robin Mcgowan RN  Outcome: Ongoing
Problem: Risk of Harm:  Goal: Ability to remain free from injury will improve  Description: Ability to remain free from injury will improve  4/8/2020 1702 by Eric Rojas RN  Outcome: Ongoing   Patient is provided with a safe environment, q 15 minute checks for safety. Patient remains free from injury at this time. Staff will continue to monitor. Problem: Altered Mood, Psychotic Behavior:  Goal: Able to verbalize decrease in frequency and intensity of hallucinations  Description: Able to verbalize decrease in frequency and intensity of hallucinations  4/8/2020 1702 by Eric Rojas RN  Outcome: Ongoing   Patient admits to auditory hallucinations, but states a decline in frequency and intensity; patient denies command hallucinations. Staff will continue to monitor. Problem: Altered Mood, Psychotic Behavior:  Goal: Able to verbalize reality based thinking  Description: Able to verbalize reality based thinking  4/8/2020 1702 by Eric Rojas RN  Outcome: Ongoing   Patient has engaged staff in reality-based conversations, with logical thoughts; patient isolates self to room: opportunities to engage peers and staff on the unit is limited. Problem: Altered Mood, Psychotic Behavior:  Goal: Compliance with prescribed medication regimen will improve  Description: Compliance with prescribed medication regimen will improve  4/8/2020 1702 by Eric Rojas RN  Outcome: Ongoing   Patient has complied with current medication administration regimen; all meds taken, no med refusals. Problem: Tobacco Use:  Goal: Inpatient tobacco use cessation counseling participation  Description: Inpatient tobacco use cessation counseling participation  4/8/2020 1702 by Eric Rojas RN  Outcome: Ongoing   Patient is offered inpatient tobacco use cessation; patient refuses to participate at this time.    Problem: Pain:  Goal: Pain level will decrease  Description: Pain level will
Problem: Risk of Harm:  Goal: Ability to remain free from injury will improve  Description: Ability to remain free from injury will improve  Patient remains free from self harm  Outcome: Ongoing     Problem: Altered Mood, Psychotic Behavior:  Goal: Able to verbalize decrease in frequency and intensity of hallucinations  Description: Able to verbalize decrease in frequency and intensity of hallucinations  Patient states voices has decreased to mumbles  Outcome: Ongoing     Problem: Altered Mood, Psychotic Behavior:  Goal: Able to verbalize reality based thinking  Description: Able to verbalize reality based thinking  Outcome: Ongoing     Problem: Altered Mood, Psychotic Behavior:  Goal: Compliance with prescribed medication regimen will improve  Description: Compliance with prescribed medication regimen will improve  Patient has been medication compliant   Outcome: Ongoing   Patient states they are not having thoughts of self harm at this time and verbally agrees to seek staff if feelings of harming self arise. Patient behavior controlled and accepting of medications,patient eating and sleeping well. Staff will continue to monitor for safety and offer support as needed.
Pt tolerated PRN well.

## 2020-04-13 NOTE — BH NOTE
Patient given tobacco quitline number 38808336207 at this time, refusing to call at this time, states \" I just dont want to quit now\"- patient given information as to the dangers of long term tobacco use. Continue to reinforce the importance of tobacco cessation.

## 2020-04-13 NOTE — GROUP NOTE
Group Therapy Note    Date: 4/13/2020    Group Start Time: 1330  Group End Time: 8097  Group Topic: Psychoeducation    SKY BHANIA Hialrio, KATHRYNS    Group Therapy Note    Attendees: 6    Patient's Goal:  Pt will demonstrate improved interpersonal skills    Notes:  Pt attended group and participated    Status After Intervention:  Improved    Participation Level:  Active Listener and Interactive    Participation Quality: Appropriate, Attentive, Sharing and Supportive      Speech:  normal      Thought Process/Content: Logical  Linear      Affective Functioning: Constricted/Restricted      Mood: euthymic      Level of consciousness:  Alert, Oriented x4 and Attentive      Response to Learning: Able to verbalize current knowledge/experience, Able to verbalize/acknowledge new learning, Able to retain information, Capable of insight, Able to change behavior and Progressing to goal      Endings: None Reported    Modes of Intervention: Education, Support, Socialization, Exploration and Problem-solving      Discipline Responsible: Psychoeducational Specialist      Signature:  Juan Mccarthy, [address]

## 2020-04-13 NOTE — GROUP NOTE
Group Therapy Note    Date: 4/12/2020    Group Start Time: 2100  Group End Time: 2130  Group Topic: Wrap-Up    SKY BHI DASHAWN Gilliam RN        Group Therapy Note    Attendees: 8/18         Patient attended PM wrap up group. Signature:   Joce Gilliam RN

## 2020-04-14 NOTE — CARE COORDINATION
doctor or other care provider to review them with you. CONTINUE taking these medications    albuterol sulfate  (90 Base) MCG/ACT inhaler  Notes to patient:  Wheezing      ALPRAZolam 0.5 MG tablet  Commonly known as:  Radha Navarro  Notes to patient:  Take to stabilize mood, clear thoughts      aspirin 81 MG EC tablet  Take 1 tablet by mouth daily  Notes to patient:  Take to prevent blood clots      hydrOXYzine 25 MG tablet  Commonly known as:  ATARAX  Take 1 tablet by mouth 3 times daily as needed for Anxiety  Notes to patient:  For anxiety      Lantus 100 UNIT/ML injection vial  Generic drug:  insulin glargine  INJECT 50 UNITS INTO THE SKIN NIGHTLY  Notes to patient:  Take for diabetes      lisinopril 5 MG tablet  Commonly known as:  PRINIVIL;ZESTRIL  Take 1 tablet by mouth daily  Notes to patient:  Take for blood pressure      pregabalin 75 MG capsule  Commonly known as:  LYRICA  Take 1 capsule by mouth 2 times daily for 30 days.   Notes to patient:  Take to stabilize mood, clear thoughts         STOP taking these medications    blood glucose test strips     glucose monitoring kit monitoring kit     Insulin Pen Needle 31G X 5 MM Misc     Lancets Misc     Lift Chair Misc     mirabegron 25 MG Tb24  Commonly known as:  MYRBETRIQ     omeprazole 20 MG delayed release capsule  Commonly known as:  PRILOSEC  Replaced by:  pantoprazole 40 MG tablet     ONE TOUCH ULTRA MINI w/Device Kit     tiotropium 18 MCG inhalation capsule  Commonly known as:  SPIRIVA     UltiCare Insulin Syringe 30G X 1/2\" 1 ML Misc  Generic drug:  Insulin Syringe-Needle U-100           Where to Get Your Medications      These medications were sent to [facility], [address], [address] [facility] [address]    Phone:  [phone]   · aspirin 81 MG EC tablet  · benzonatate 200 MG capsule  · escitalopram 5 MG tablet  · pantoprazole 40 MG tablet  · QUEtiapine 200 MG

## 2020-04-16 LAB — GLUCOSE BLD-MCNC: 141 MG/DL (ref 65–105)

## 2020-04-16 NOTE — DISCHARGE SUMMARY
DISCHARGE      SUMMARY                                                               DEMOGRAPHICS          Nancy Linares is a 75 y.o.   female     DATE OF ADMISSION: 4/7/2020  DATE OF DISCHARGE: 04/15/20  DISCHARGE DIAGNOSES:   Principal Problem:    Schizoaffective disorder ([address]  Resolved Problems:    * No resolved hospital problems. Marie Castaneda is a 75 y.o. female who presents with Schizoaffective disorder [facility]  The patient has been transferred to from the emergency department where she presented complaining of worsening psychosis and suicidal ideation with a plan to overdose on medication or cut her wrists. The patient was primarily admitted to the psychiatric unit after the medical clearance. However upon the consultation of the medical service and the pending results of Tal Britt the patient was a transfer to the medical service for further medical clearance. The patient had been medically cleared and transferred again to the psychiatric unit for further evaluation and management. HOSPITAL COURSE    The patient was sleeping during the evaluation and difficult to arouse. All the information was taken from the previous note couple days ago.   The patient presented as a 20-year-old -American woman who is a known case of schizoaffective disorder bipolar type and receiving treatment of [facility].  The patient reported that she has been off her medications and started hearing voices commands in nature telling her to harm herself. Dwaine Painter reported feeling sad, depressed and unmotivated.  She reported having a problem in falling sustaining sleep.  The patient denied to drinking alcohol or using street drugs.  However, urine tox was positive for cocaine and benzos.  The patient was unreliable to give history and was evasive during the evaluation.  The patient reported having suicidal ideation with a plan

## 2020-09-29 ENCOUNTER — APPOINTMENT (OUTPATIENT)
Dept: ULTRASOUND IMAGING | Age: 65
DRG: 683 | End: 2020-09-29
Payer: MEDICARE

## 2020-09-29 ENCOUNTER — APPOINTMENT (OUTPATIENT)
Dept: GENERAL RADIOLOGY | Age: 65
DRG: 683 | End: 2020-09-29
Payer: MEDICARE

## 2020-09-29 ENCOUNTER — HOSPITAL ENCOUNTER (INPATIENT)
Age: 65
LOS: 2 days | Discharge: HOME HEALTH | DRG: 683 | End: 2020-10-01
Attending: EMERGENCY MEDICINE | Admitting: INTERNAL MEDICINE
Payer: MEDICARE

## 2020-09-29 PROBLEM — N17.9 AKI (ACUTE KIDNEY INJURY) (HCC): Status: ACTIVE | Noted: 2020-09-29

## 2020-09-29 LAB
-: NORMAL
ABSOLUTE EOS #: 0 K/UL (ref 0–0.4)
ABSOLUTE IMMATURE GRANULOCYTE: 0 K/UL (ref 0–0.3)
ABSOLUTE LYMPH #: 2.37 K/UL (ref 1–4.8)
ABSOLUTE MONO #: 1.34 K/UL (ref 0.1–0.8)
ALBUMIN SERPL-MCNC: 3.7 G/DL (ref 3.5–5.2)
ALBUMIN/GLOBULIN RATIO: 0.8 (ref 1–2.5)
ALP BLD-CCNC: 83 U/L (ref 35–104)
ALT SERPL-CCNC: 18 U/L (ref 5–33)
ANION GAP SERPL CALCULATED.3IONS-SCNC: 18 MMOL/L (ref 9–17)
ANION GAP SERPL CALCULATED.3IONS-SCNC: 19 MMOL/L (ref 9–17)
AST SERPL-CCNC: 27 U/L
BASOPHILS # BLD: 0 % (ref 0–2)
BASOPHILS ABSOLUTE: 0 K/UL (ref 0–0.2)
BILIRUB SERPL-MCNC: 0.3 MG/DL (ref 0.3–1.2)
BUN BLDV-MCNC: 31 MG/DL (ref 8–23)
BUN BLDV-MCNC: 32 MG/DL (ref 8–23)
BUN/CREAT BLD: ABNORMAL (ref 9–20)
BUN/CREAT BLD: ABNORMAL (ref 9–20)
CALCIUM SERPL-MCNC: 8.3 MG/DL (ref 8.6–10.4)
CALCIUM SERPL-MCNC: 8.7 MG/DL (ref 8.6–10.4)
CHLORIDE BLD-SCNC: 97 MMOL/L (ref 98–107)
CHLORIDE BLD-SCNC: 99 MMOL/L (ref 98–107)
CO2: 13 MMOL/L (ref 20–31)
CO2: 14 MMOL/L (ref 20–31)
CREAT SERPL-MCNC: 2.3 MG/DL (ref 0.5–0.9)
CREAT SERPL-MCNC: 2.65 MG/DL (ref 0.5–0.9)
DIFFERENTIAL TYPE: ABNORMAL
EOSINOPHILS RELATIVE PERCENT: 0 % (ref 1–4)
GFR AFRICAN AMERICAN: 22 ML/MIN
GFR AFRICAN AMERICAN: 26 ML/MIN
GFR NON-AFRICAN AMERICAN: 18 ML/MIN
GFR NON-AFRICAN AMERICAN: 21 ML/MIN
GFR SERPL CREATININE-BSD FRML MDRD: ABNORMAL ML/MIN/{1.73_M2}
GLUCOSE BLD-MCNC: 122 MG/DL (ref 65–105)
GLUCOSE BLD-MCNC: 159 MG/DL (ref 70–99)
GLUCOSE BLD-MCNC: 194 MG/DL (ref 70–99)
HCT VFR BLD CALC: 41.4 % (ref 36.3–47.1)
HEMOGLOBIN: 12.8 G/DL (ref 11.9–15.1)
IMMATURE GRANULOCYTES: 0 %
LIPASE: 40 U/L (ref 13–60)
LYMPHOCYTES # BLD: 23 % (ref 24–44)
MCH RBC QN AUTO: 31.4 PG (ref 25.2–33.5)
MCHC RBC AUTO-ENTMCNC: 30.9 G/DL (ref 28.4–34.8)
MCV RBC AUTO: 101.5 FL (ref 82.6–102.9)
MONOCYTES # BLD: 13 % (ref 1–7)
MORPHOLOGY: NORMAL
NRBC AUTOMATED: 0 PER 100 WBC
PDW BLD-RTO: 13 % (ref 11.8–14.4)
PLATELET # BLD: 269 K/UL (ref 138–453)
PLATELET ESTIMATE: ABNORMAL
PMV BLD AUTO: 10.1 FL (ref 8.1–13.5)
POTASSIUM SERPL-SCNC: 2.6 MMOL/L (ref 3.7–5.3)
POTASSIUM SERPL-SCNC: 2.9 MMOL/L (ref 3.7–5.3)
RBC # BLD: 4.08 M/UL (ref 3.95–5.11)
RBC # BLD: ABNORMAL 10*6/UL
REASON FOR REJECTION: NORMAL
SEG NEUTROPHILS: 64 % (ref 36–66)
SEGMENTED NEUTROPHILS ABSOLUTE COUNT: 6.59 K/UL (ref 1.8–7.7)
SODIUM BLD-SCNC: 129 MMOL/L (ref 135–144)
SODIUM BLD-SCNC: 131 MMOL/L (ref 135–144)
TOTAL PROTEIN: 8.1 G/DL (ref 6.4–8.3)
TROPONIN INTERP: ABNORMAL
TROPONIN INTERP: ABNORMAL
TROPONIN T: ABNORMAL NG/ML
TROPONIN T: ABNORMAL NG/ML
TROPONIN, HIGH SENSITIVITY: 15 NG/L (ref 0–14)
TROPONIN, HIGH SENSITIVITY: 19 NG/L (ref 0–14)
WBC # BLD: 10.3 K/UL (ref 3.5–11.3)
WBC # BLD: ABNORMAL 10*3/UL
ZZ NTE CLEAN UP: ORDERED TEST: NORMAL
ZZ NTE WITH NAME CLEAN UP: SPECIMEN SOURCE: NORMAL

## 2020-09-29 PROCEDURE — 1200000000 HC SEMI PRIVATE

## 2020-09-29 PROCEDURE — 99222 1ST HOSP IP/OBS MODERATE 55: CPT | Performed by: INTERNAL MEDICINE

## 2020-09-29 PROCEDURE — 6370000000 HC RX 637 (ALT 250 FOR IP): Performed by: CLINICAL NURSE SPECIALIST

## 2020-09-29 PROCEDURE — 6360000002 HC RX W HCPCS: Performed by: STUDENT IN AN ORGANIZED HEALTH CARE EDUCATION/TRAINING PROGRAM

## 2020-09-29 PROCEDURE — 96365 THER/PROPH/DIAG IV INF INIT: CPT

## 2020-09-29 PROCEDURE — 85025 COMPLETE CBC W/AUTO DIFF WBC: CPT

## 2020-09-29 PROCEDURE — 82947 ASSAY GLUCOSE BLOOD QUANT: CPT

## 2020-09-29 PROCEDURE — 2580000003 HC RX 258: Performed by: CLINICAL NURSE SPECIALIST

## 2020-09-29 PROCEDURE — 6360000002 HC RX W HCPCS: Performed by: CLINICAL NURSE SPECIALIST

## 2020-09-29 PROCEDURE — 6370000000 HC RX 637 (ALT 250 FOR IP): Performed by: INTERNAL MEDICINE

## 2020-09-29 PROCEDURE — 80048 BASIC METABOLIC PNL TOTAL CA: CPT

## 2020-09-29 PROCEDURE — 80053 COMPREHEN METABOLIC PANEL: CPT

## 2020-09-29 PROCEDURE — 6370000000 HC RX 637 (ALT 250 FOR IP): Performed by: STUDENT IN AN ORGANIZED HEALTH CARE EDUCATION/TRAINING PROGRAM

## 2020-09-29 PROCEDURE — 71045 X-RAY EXAM CHEST 1 VIEW: CPT

## 2020-09-29 PROCEDURE — 76770 US EXAM ABDO BACK WALL COMP: CPT

## 2020-09-29 PROCEDURE — 83690 ASSAY OF LIPASE: CPT

## 2020-09-29 PROCEDURE — APPSS45 APP SPLIT SHARED TIME 31-45 MINUTES: Performed by: CLINICAL NURSE SPECIALIST

## 2020-09-29 PROCEDURE — 73562 X-RAY EXAM OF KNEE 3: CPT

## 2020-09-29 PROCEDURE — 99285 EMERGENCY DEPT VISIT HI MDM: CPT

## 2020-09-29 PROCEDURE — 84484 ASSAY OF TROPONIN QUANT: CPT

## 2020-09-29 PROCEDURE — 93005 ELECTROCARDIOGRAM TRACING: CPT | Performed by: STUDENT IN AN ORGANIZED HEALTH CARE EDUCATION/TRAINING PROGRAM

## 2020-09-29 PROCEDURE — 6360000002 HC RX W HCPCS: Performed by: INTERNAL MEDICINE

## 2020-09-29 RX ORDER — INSULIN GLARGINE 100 [IU]/ML
25 INJECTION, SOLUTION SUBCUTANEOUS 2 TIMES DAILY
Status: DISCONTINUED | OUTPATIENT
Start: 2020-09-29 | End: 2020-10-01 | Stop reason: HOSPADM

## 2020-09-29 RX ORDER — POTASSIUM CHLORIDE 7.45 MG/ML
10 INJECTION INTRAVENOUS ONCE
Status: COMPLETED | OUTPATIENT
Start: 2020-09-29 | End: 2020-09-29

## 2020-09-29 RX ORDER — POTASSIUM CHLORIDE 20 MEQ/1
40 TABLET, EXTENDED RELEASE ORAL ONCE
Status: COMPLETED | OUTPATIENT
Start: 2020-09-29 | End: 2020-09-29

## 2020-09-29 RX ORDER — PANTOPRAZOLE SODIUM 40 MG/1
40 TABLET, DELAYED RELEASE ORAL
Status: DISCONTINUED | OUTPATIENT
Start: 2020-09-30 | End: 2020-10-01 | Stop reason: HOSPADM

## 2020-09-29 RX ORDER — POTASSIUM CHLORIDE 7.45 MG/ML
10 INJECTION INTRAVENOUS PRN
Status: DISCONTINUED | OUTPATIENT
Start: 2020-09-29 | End: 2020-10-01 | Stop reason: HOSPADM

## 2020-09-29 RX ORDER — SODIUM POLYSTYRENE SULFONATE 15 G/60ML
15 SUSPENSION ORAL; RECTAL
Status: DISPENSED | OUTPATIENT
Start: 2020-09-29 | End: 2020-09-29

## 2020-09-29 RX ORDER — NICOTINE 21 MG/24HR
1 PATCH, TRANSDERMAL 24 HOURS TRANSDERMAL DAILY PRN
Status: DISCONTINUED | OUTPATIENT
Start: 2020-09-29 | End: 2020-10-01 | Stop reason: HOSPADM

## 2020-09-29 RX ORDER — MAGNESIUM SULFATE 1 G/100ML
1 INJECTION INTRAVENOUS
Status: COMPLETED | OUTPATIENT
Start: 2020-09-29 | End: 2020-09-29

## 2020-09-29 RX ORDER — ASPIRIN 81 MG/1
81 TABLET ORAL DAILY
Status: DISCONTINUED | OUTPATIENT
Start: 2020-09-29 | End: 2020-10-01 | Stop reason: HOSPADM

## 2020-09-29 RX ORDER — ACETAMINOPHEN 650 MG/1
650 SUPPOSITORY RECTAL EVERY 6 HOURS PRN
Status: DISCONTINUED | OUTPATIENT
Start: 2020-09-29 | End: 2020-10-01 | Stop reason: HOSPADM

## 2020-09-29 RX ORDER — ESCITALOPRAM OXALATE 10 MG/1
25 TABLET ORAL DAILY
Status: DISCONTINUED | OUTPATIENT
Start: 2020-09-29 | End: 2020-10-01 | Stop reason: HOSPADM

## 2020-09-29 RX ORDER — SODIUM CHLORIDE 0.9 % (FLUSH) 0.9 %
10 SYRINGE (ML) INJECTION PRN
Status: DISCONTINUED | OUTPATIENT
Start: 2020-09-29 | End: 2020-10-01 | Stop reason: HOSPADM

## 2020-09-29 RX ORDER — QUETIAPINE FUMARATE 300 MG/1
300 TABLET, FILM COATED ORAL 2 TIMES DAILY
Status: DISCONTINUED | OUTPATIENT
Start: 2020-09-30 | End: 2020-10-01 | Stop reason: HOSPADM

## 2020-09-29 RX ORDER — ALPRAZOLAM 0.25 MG/1
0.25 TABLET ORAL 2 TIMES DAILY
Status: DISCONTINUED | OUTPATIENT
Start: 2020-09-29 | End: 2020-10-01 | Stop reason: HOSPADM

## 2020-09-29 RX ORDER — HEPARIN SODIUM 5000 [USP'U]/ML
5000 INJECTION, SOLUTION INTRAVENOUS; SUBCUTANEOUS EVERY 8 HOURS SCHEDULED
Status: DISCONTINUED | OUTPATIENT
Start: 2020-09-29 | End: 2020-10-01 | Stop reason: HOSPADM

## 2020-09-29 RX ORDER — BUDESONIDE AND FORMOTEROL FUMARATE DIHYDRATE 160; 4.5 UG/1; UG/1
2 AEROSOL RESPIRATORY (INHALATION) 2 TIMES DAILY
Status: DISCONTINUED | OUTPATIENT
Start: 2020-09-29 | End: 2020-10-01 | Stop reason: HOSPADM

## 2020-09-29 RX ORDER — SODIUM CHLORIDE 9 MG/ML
INJECTION, SOLUTION INTRAVENOUS CONTINUOUS
Status: DISCONTINUED | OUTPATIENT
Start: 2020-09-29 | End: 2020-10-01 | Stop reason: HOSPADM

## 2020-09-29 RX ORDER — POTASSIUM CHLORIDE 20 MEQ/1
40 TABLET, EXTENDED RELEASE ORAL PRN
Status: DISCONTINUED | OUTPATIENT
Start: 2020-09-29 | End: 2020-10-01 | Stop reason: HOSPADM

## 2020-09-29 RX ORDER — SODIUM POLYSTYRENE SULFONATE 15 G/60ML
30 SUSPENSION ORAL; RECTAL
Status: DISPENSED | OUTPATIENT
Start: 2020-09-29 | End: 2020-09-29

## 2020-09-29 RX ORDER — QUETIAPINE FUMARATE 200 MG/1
400 TABLET, FILM COATED ORAL NIGHTLY
Status: DISCONTINUED | OUTPATIENT
Start: 2020-09-29 | End: 2020-10-01 | Stop reason: HOSPADM

## 2020-09-29 RX ORDER — PREGABALIN 75 MG/1
150 CAPSULE ORAL 2 TIMES DAILY
Status: DISCONTINUED | OUTPATIENT
Start: 2020-09-29 | End: 2020-10-01 | Stop reason: HOSPADM

## 2020-09-29 RX ORDER — TRAZODONE HYDROCHLORIDE 50 MG/1
50 TABLET ORAL NIGHTLY
Status: DISCONTINUED | OUTPATIENT
Start: 2020-09-29 | End: 2020-10-01 | Stop reason: HOSPADM

## 2020-09-29 RX ORDER — ACETAMINOPHEN 325 MG/1
650 TABLET ORAL EVERY 6 HOURS PRN
Status: DISCONTINUED | OUTPATIENT
Start: 2020-09-29 | End: 2020-10-01 | Stop reason: HOSPADM

## 2020-09-29 RX ORDER — ALBUTEROL SULFATE 90 UG/1
1 AEROSOL, METERED RESPIRATORY (INHALATION) 4 TIMES DAILY PRN
Status: DISCONTINUED | OUTPATIENT
Start: 2020-09-29 | End: 2020-10-01 | Stop reason: HOSPADM

## 2020-09-29 RX ORDER — ACETAMINOPHEN 325 MG/1
650 TABLET ORAL ONCE
Status: COMPLETED | OUTPATIENT
Start: 2020-09-29 | End: 2020-09-29

## 2020-09-29 RX ORDER — SODIUM CHLORIDE 0.9 % (FLUSH) 0.9 %
10 SYRINGE (ML) INJECTION EVERY 12 HOURS SCHEDULED
Status: DISCONTINUED | OUTPATIENT
Start: 2020-09-29 | End: 2020-10-01 | Stop reason: HOSPADM

## 2020-09-29 RX ADMIN — INSULIN LISPRO 2 UNITS: 100 INJECTION, SOLUTION INTRAVENOUS; SUBCUTANEOUS at 18:51

## 2020-09-29 RX ADMIN — PREGABALIN 150 MG: 75 CAPSULE ORAL at 23:06

## 2020-09-29 RX ADMIN — TRAZODONE HYDROCHLORIDE 50 MG: 50 TABLET ORAL at 23:06

## 2020-09-29 RX ADMIN — INSULIN GLARGINE 25 UNITS: 100 INJECTION, SOLUTION SUBCUTANEOUS at 23:08

## 2020-09-29 RX ADMIN — SODIUM CHLORIDE: 9 INJECTION, SOLUTION INTRAVENOUS at 18:00

## 2020-09-29 RX ADMIN — ACETAMINOPHEN 650 MG: 325 TABLET ORAL at 21:51

## 2020-09-29 RX ADMIN — MAGNESIUM SULFATE HEPTAHYDRATE 1 G: 1 INJECTION, SOLUTION INTRAVENOUS at 17:00

## 2020-09-29 RX ADMIN — QUETIAPINE FUMARATE 400 MG: 200 TABLET ORAL at 23:06

## 2020-09-29 RX ADMIN — POTASSIUM CHLORIDE 10 MEQ: 10 INJECTION, SOLUTION INTRAVENOUS at 18:00

## 2020-09-29 RX ADMIN — POTASSIUM CHLORIDE 10 MEQ: 7.46 INJECTION, SOLUTION INTRAVENOUS at 23:08

## 2020-09-29 RX ADMIN — Medication 81 MG: at 18:55

## 2020-09-29 RX ADMIN — POTASSIUM CHLORIDE 10 MEQ: 7.46 INJECTION, SOLUTION INTRAVENOUS at 21:37

## 2020-09-29 RX ADMIN — ACETAMINOPHEN 650 MG: 325 TABLET ORAL at 16:03

## 2020-09-29 RX ADMIN — MAGNESIUM SULFATE HEPTAHYDRATE 1 G: 1 INJECTION, SOLUTION INTRAVENOUS at 16:03

## 2020-09-29 RX ADMIN — HEPARIN SODIUM 5000 UNITS: 5000 INJECTION INTRAVENOUS; SUBCUTANEOUS at 21:37

## 2020-09-29 RX ADMIN — ESCITALOPRAM OXALATE 25 MG: 10 TABLET ORAL at 18:51

## 2020-09-29 RX ADMIN — POTASSIUM CHLORIDE 40 MEQ: 1500 TABLET, EXTENDED RELEASE ORAL at 17:01

## 2020-09-29 RX ADMIN — ALPRAZOLAM 0.25 MG: 0.25 TABLET ORAL at 21:36

## 2020-09-29 ASSESSMENT — ENCOUNTER SYMPTOMS
ABDOMINAL PAIN: 0
COUGH: 1
ABDOMINAL PAIN: 1
RHINORRHEA: 0
COUGH: 0
NAUSEA: 1
VOMITING: 0
SHORTNESS OF BREATH: 1
BACK PAIN: 1
DIARRHEA: 1
BLOOD IN STOOL: 0
SORE THROAT: 0
TROUBLE SWALLOWING: 0
NAUSEA: 0

## 2020-09-29 ASSESSMENT — PAIN DESCRIPTION - DESCRIPTORS
DESCRIPTORS: ACHING;SORE
DESCRIPTORS: STABBING

## 2020-09-29 ASSESSMENT — PAIN DESCRIPTION - FREQUENCY
FREQUENCY: INTERMITTENT
FREQUENCY: CONTINUOUS

## 2020-09-29 ASSESSMENT — PAIN DESCRIPTION - ONSET
ONSET: ON-GOING
ONSET: ON-GOING

## 2020-09-29 ASSESSMENT — PAIN DESCRIPTION - LOCATION
LOCATION: ABDOMEN;BACK;LEG
LOCATION: BACK;ABDOMEN

## 2020-09-29 ASSESSMENT — PAIN SCALES - GENERAL
PAINLEVEL_OUTOF10: 10
PAINLEVEL_OUTOF10: 8
PAINLEVEL_OUTOF10: 8

## 2020-09-29 ASSESSMENT — PAIN DESCRIPTION - PROGRESSION: CLINICAL_PROGRESSION: NOT CHANGED

## 2020-09-29 ASSESSMENT — PAIN DESCRIPTION - PAIN TYPE
TYPE: ACUTE PAIN
TYPE: ACUTE PAIN;CHRONIC PAIN

## 2020-09-29 NOTE — CARE COORDINATION
Case Management Initial Discharge Plan  Andres Thompson,             Met with:patient to discuss discharge plans. Information verified: address, contacts, phone number, , insurance Yes    Emergency Contact/Next of Kin name & number: Roma Casillas Jr.(son) [phone]    PCP: Ashleigh Grady MD  Date of last visit: 5 months ago    Insurance Provider: [facility] Medicare and [facility] [employer] dual.    Discharge Planning    Living Arrangements:  Alone   Support Systems:  Friends/Neighbors    Home: apartment, 0 stairs to climb to get into front door, building has an elelvator    Patient able to perform ADL's:Independent    Current Services (outpatient & in home) none  DME equipment: electric wheelchair(does noit work), rolling walker, glucometer, shower chair,  DME provider:     Receiving oral anticoagulation therapy? Yes-81 ASA. If indicated:   Physician managing anticoagulation treatment:   Where does patient obtain lab work for ATC treatment? Potential Assistance Needed:  N/A    Patient agreeable to home care: No  [place] of choice provided:  n/a    Prior SNF/Rehab Placement and Facility:   Agreeable to SNF/Rehab: No  [place] of choice provided: n/a     Evaluation: no    Expected Discharge date:  20    Patient expects to be discharged to:  return to home with current services with [facility] Monday thru Friday for 3 hours/day. Follow Up Appointment: Best Day/ Time:      Transportation provider: medical cab  Transportation arrangements needed for discharge: No, friend will transport. Readmission Risk              Risk of Unplanned Readmission:        16             Does patient have a readmission risk score greater than 14?: Yes  If yes, follow-up appointment must be made within 7 days of discharge.      Goals of Care: normal blood work      Discharge Plan: return to home          Electronically signed by Karolina Silva RN on 20 at 6:00 PM EDT

## 2020-09-29 NOTE — ED NOTES
Lab called critical lab, Potassium 2.9 Dr. Johns Monday informed      Lianne Klinefelter, JULIANO  09/29/20 5515

## 2020-09-29 NOTE — ED NOTES
Pt. To the ED c/o L leg pain that goes into her middle back. Pt. States that she fell and cut her stomach 2 weeks ago. Pt. Also states that she has SOB and a cough that started 2 days ago. States that she does have seasonal allergies. Pt. Also states that she needs a prescription for a wheelchair and a refill on her Seroquel. Pt. Placed on pulse ox, resp. Even and non-labored, NAD noted. Dr. Estuardo Dietz at bedside to assess.         Feroz Ambrose RN  09/29/20 6133

## 2020-09-29 NOTE — ED NOTES
Meal tray delivered. Pt resting on stretcher, in NAD, RR even and unlabored. Pt updated on plan of care. Will continue to monitor. Call light within reach.        Bella Kumar RN  09/29/20 1090

## 2020-09-29 NOTE — ED PROVIDER NOTES
[facility]     Emergency Department     Faculty Attestation    I performed a history and physical examination of the patient and discussed management with the resident. I reviewed the residents note and agree with the documented findings and plan of care. Any areas of disagreement are noted on the chart. I was personally present for the key portions of any procedures. I have documented in the chart those procedures where I was not present during the key portions. I have reviewed the emergency nurses triage note. I agree with the chief complaint, past medical history, past surgical history, allergies, medications, social and family history as documented unless otherwise noted below. For Physician Assistant/ Nurse Practitioner cases/documentation I have personally evaluated this patient and have completed at least one if not all key elements of the E/M (history, physical exam, and MDM). Additional findings are as noted. I have personally seen and evaluated the patient. I find the patient's history and physical exam are consistent with the NP/PA documentation. I agree with the care provided, treatment rendered, disposition and follow-up plan. 49-year-old female presenting with pain after fall 2 weeks ago. States that she has been feeling more weak and fatigued. She has not eaten for 2 days, states that she has been drinking broth from [facility]. She was prescribed potassium in the past, but has not been taking it (states that she thought it was making her feel bad). She denies any headache, has been nauseous without vomiting. She has been urinating normally. She fell 2 weeks ago, mechanically, stating her knees just gave out, and scraped her abdomen on the floor. She has chronic low back pain.     Exam:  General: Laying on the bed, awake, alert and in no acute distress  CV: normal rate and regular rhythm  Lungs: Breathing comfortably on room air with no tachypnea, hypoxia, or increased work of breathing  Abdomen: soft, non-tender, non-distended    Plan:  CBC, electrolytes, troponin    Labs show hypokalemia, hyponatremia, hypochloremia, and CHRIS with creatinine of 2.65 from a baseline of 1.1. Concerning for dehydration, will replete electrolytes, admit for management. CO2 of 13, anion gap of 19, suggesting mild starvation ketosis. Troponin of 19, no prior high-sensitivity troponin. Will repeat in 1 hour.         Susan Lee MD   Attending Emergency  Physician             Susan Lee MD  09/29/20 9302

## 2020-09-29 NOTE — H&P
Attending Supervising Physicians Attestation Statement  I have seen and examined Roberto Sofia and the key elements of all parts of the encounter have been performed by me. I agree with the assessment, plan and orders as documented by the Advanced Practice Provider. I discussed the findings and plans with the Clinical Nurse Specialist  and agree as documented in her note . See note of Chema Munoz for more details. Additional Comments:   75 F who presented with diarrhea, fatigue. Symptoms going on for the past few days. In ER noted to have elevated creatinine. Admitted for CHRIS.      On exam: dry mucus membranes    Impression: CHRIS, HTN, DM, COPD    Plan:  - Continue IVF  - Monitor renal function  - PT/OT evaluation   - Continue home medications  - Hold nephrotoxic medications   - Monitor and replace potassium     Electronically signed by Ciro Pallas, MD on 9/29/2020 at 5:48 PM

## 2020-09-29 NOTE — H&P
[facility]  Office: [address], DO, Christy Terry, DO, Endyelyse Young, DO, Suri Spire Blood, DO, Lavell Herring MD, Arely Farmer MD, Herson Pollard MD, Gisel Yanes MD, Joseph Londono MD, Gus Recio MD, Balbina Man MD, Refugio Curiel MD, Yisel Padilla MD, Edgar Villegas DO, Roma Angulo MD, Dominique Salas MD, Val Beckman, DO, Aidan Petit MD,  Leonela Rota, DO, Michaelle Durand MD, Bryce Isbell MD, Taurus Peterson, [facility], [facility] Villalobos, CNP, Sharlene Moreno, CNP, Bret Cook, CNS, Vinh Gayle, CNP, Karina Harden, CNP, Abdelrahman Cuba, CNP, Heddy Sacks, CNP, Yazmin Diego, CNP, Anna Suazo PA-C, Joan Davis, HU, Angel Reid, CNP, Haritha Culp, CNP, Luca Almendarez, CNP, Lex Dural, CNP, [place], [address]    HISTORY AND PHYSICAL EXAMINATION            Date:   9/29/2020  Patient name:  Gabby Golden  Date of admission:  9/29/2020  2:18 PM  MRN:   [id number]  Account:  [de-identified]  YOB: 1955  PCP:    Christiano Howe MD  Room:   29/29  Code Status:    Prior    Chief Complaint:     Chief Complaint   Patient presents with    Fall     2 weeks ago    Knee Pain     L    Abdominal Pain     scratched abdomen during fall, diarrhea    Back Pain     lower    Other     mobility issue       History Obtained From:     patient, electronic medical record    History of Present Illness:     Gabby Golden is a 75 y.o. female who presents with Fall (2 weeks ago); Knee Pain (L); Abdominal Pain (scratched abdomen during fall, diarrhea); Back Pain (lower); and Other (mobility issue)  She was admitted through the ED Sept 29 for the management of CHRIS (acute kidney injury) ([facility] [address] Pt's left knee gave out 2 weeks ago and she fell on a broken vase causing a 2.5 cm laceration to the abdomen that has healed. For the last 3-4 days she has had constant diarrhea.   Pt denies vomiting but does have some nausea. She also reports H/A, weakness and chills. Pt has not had any sick contacts or eaten out. Pt reports increased phlegm production for about a week. She has noticed swelling of her feet x 1 month. Only 1 pair of sandals fits her. She takes Mobic for chronic back and knee pain and is on Lisinopril for HTN. In the ED labs revealed CHRIS with BUN/ Creat 32/2.65 (15/1.18 on 4/5/20) & K 2.9, Na 129. Troponin 19 & 15, Lipase 40. CXR and Rt knee films unremarkable. Past Medical History:     Past Medical History:   Diagnosis Date    CHRIS (acute kidney injury) ([facility] [address] 9/29/2020    Bipolar 1 disorder ([facility] [address]     Breast lump     Chronic obstructive pulmonary disease ([facility] [address] 8/3/2017    Depression     GERD (gastroesophageal reflux disease)     Hyperlipidemia     Hypertension     Osteoarthritis     Type 2 diabetes mellitus without complication, with long[facility]term [facility] 2/4/2019    Type II or unspecified type diabetes mellitus without mention of complication, not stated as uncontrolled         Past Surgical History:     Past Surgical History:   Procedure Laterality Date    DILATION AND CURETTAGE OF UTERUS      ECTOPIC PREGNANCY SURGERY      KNEE ARTHROPLASTY  2017    KNEE SURGERY Right     NERVE BLOCK Left 12/8/14    left knee    TONSILLECTOMY AND ADENOIDECTOMY          Medications Prior to Admission:     Prior to Admission medications    Medication Sig Start Date End Date Taking?  Authorizing Provider   albuterol (PROVENTIL HFA;VENTOLIN HFA) 108 (90 BASE) MCG/ACT inhaler Inhale 1 puff into the lungs 4 times daily as needed for Wheezing    Yes Historical Provider, MD   insulin glargine (LANTUS SOLOSTAR) 100 UNIT/ML injection pen INJECT 25 UNITS INTO THE SKIN BID 8/20/20   JEANNETTE Brady CNP   Insulin Pen Needle 31G X 8 MM MISC Use BID for insulin administration 8/20/20   JEANNETTE Brady CNP   pregabalin (LYRICA) 150 MG capsule Take 1 capsule by mouth 2 times daily for 30 days. 6/4/20 7/4/20  JEANNETTE Figueroa CNP   meloxicam [facility]) 7.5 MG tablet Take 1 tablet by mouth daily 5/4/20   JEANNETTE Melendez CNP   Lancets MISC 1 each by Does not apply route 4 times daily 5/4/20   JEANNETTE Melendez CNP   blood glucose monitor kit and supplies Test 1 times a day & as needed for symptoms of irregular blood glucose. 4/16/20   JEANNETTE Melendez CNP   aspirin 81 MG EC tablet Take 1 tablet by mouth daily 4/14/20   Winston Blevins MD   escitalopram (LEXAPRO) 5 MG tablet Take 5 tablets by mouth daily 4/14/20   Winston Blevins MD   QUEtiapine (SEROQUEL) 200 MG tablet Take 1 tablet by mouth nightly  Patient taking differently: Take 400 mg by mouth nightly  4/13/20   Winston Blevins MD   pantoprazole (PROTONIX) 40 MG tablet Take 1 tablet by mouth every morning (before breakfast) 4/14/20   Winston Blevins MD   traZODone (DESYREL) 50 MG tablet Take 50 mg by mouth nightly    Historical Provider, MD   QUEtiapine (SEROQUEL) 300 MG tablet Take 1 tablet by mouth 2 times daily 5/11/19   Diana Guerrero DO   ALPRAZolam Evaline Beams) 0.5 MG tablet Take 0.25 mg by mouth 2 times daily. Historical Provider, MD   lisinopril (PRINIVIL;ZESTRIL) 5 MG tablet Take 1 tablet by mouth daily 10/5/17   Tasha Chavez MD        Allergies:     Flexeril [cyclobenzaprine]; Gabapentin; Prochlorperazine edisylate; Promethazine; Thorazine [chlorpromazine]; and Chantix [varenicline tartrate]    Social History:     Tobacco:    reports that she has been smoking cigarettes. She has a 2.50 pack-year smoking history. She has never used smokeless tobacco.  Alcohol:      reports current alcohol use. Drug Use:  reports no history of drug use. Family History:     Family History   Problem Relation Age of Onset    Diabetes Father     Stroke Father     High Blood Pressure Father        Review of Systems:     Positive and Negative as described in HPI.     Review of Systems   Constitutional: Positive for appetite change and chills. HENT: Negative for mouth sores, sore throat and trouble swallowing. Respiratory: Positive for cough. Cardiovascular: Positive for leg swelling (feet x 1 month). Negative for chest pain. Gastrointestinal: Positive for abdominal pain (crampy), diarrhea and nausea. Negative for blood in stool and vomiting. Genitourinary: Negative for dysuria and hematuria. Musculoskeletal: Positive for arthralgias (left knee chronic) and back pain (chronic). Skin: Positive for wound (healed from injury 2 weeks ago). Neurological: Positive for weakness (generalized), light-headedness and headaches. All other systems reviewed and are negative. Physical Exam:   BP (!) 101/53   Pulse 90   Temp 97.5 °F (36.4 °C) (Oral)   Resp 20   Ht 5' 6\" (1.676 m)   Wt 205 lb (93 kg)   LMP  (LMP Unknown)   SpO2 96%   BMI 33.09 kg/m²   Temp (24hrs), Av.5 °F (36.4 °C), Min:97.5 °F (36.4 °C), Max:97.5 °F (36.4 °C)    No results for input(s): POCGLU in the last 72 hours. No intake or output data in the 24 hours ending 20    Physical Exam  Vitals signs and nursing note reviewed. Constitutional:       General: She is not in acute distress. Appearance: She is obese. HENT:      Mouth/Throat:      Mouth: Mucous membranes are dry. Eyes:      General: No scleral icterus. Extraocular Movements: Extraocular movements intact. Conjunctiva/sclera: Conjunctivae normal.      Pupils: Pupils are equal, round, and reactive to light. Neck:      Musculoskeletal: No neck rigidity or muscular tenderness. Cardiovascular:      Rate and Rhythm: Normal rate and regular rhythm. Heart sounds: Normal heart sounds. Pulmonary:      Effort: Pulmonary effort is normal.      Comments: Diminished throughout but clear  Abdominal:      General: There is no distension. Palpations: Abdomen is soft. There is no mass. Tenderness: There is no guarding.       Comments: Hypoactive bowel sounds, generealized tenderness   Musculoskeletal:         General: No swelling or tenderness. Lymphadenopathy:      Cervical: No cervical adenopathy. Skin:     General: Skin is warm and dry. Capillary Refill: Capillary refill takes less than 2 seconds. Coloration: Skin is not jaundiced. Neurological:      General: No focal deficit present. Mental Status: She is alert and oriented to person, place, and time. Cranial Nerves: No cranial nerve deficit.    Psychiatric:         Mood and Affect: Mood normal.         Investigations:      Laboratory Testing:  Recent Results (from the past 24 hour(s))   EKG 12 Lead    Collection Time: 09/29/20  2:51 PM   Result Value Ref Range    Ventricular Rate 91 BPM    Atrial Rate 87 BPM    P-R Interval 150 ms    QRS Duration 82 ms    Q-T Interval 450 ms    QTc Calculation (Bazett) 553 ms    P Axis 42 degrees    R Axis 56 degrees    T Axis 55 degrees   CBC WITH AUTO DIFFERENTIAL    Collection Time: 09/29/20  3:18 PM   Result Value Ref Range    WBC 10.3 3.5 - 11.3 k/uL    RBC 4.08 3.95 - 5.11 m/uL    Hemoglobin 12.8 11.9 - 15.1 g/dL    Hematocrit 41.4 36.3 - 47.1 %    .5 82.6 - 102.9 fL    MCH 31.4 25.2 - 33.5 pg    MCHC 30.9 28.4 - 34.8 g/dL    RDW 13.0 11.8 - 14.4 %    Platelets 575 046 - 798 k/uL    MPV 10.1 8.1 - 13.5 fL    NRBC Automated 0.0 0.0 per 100 WBC    Differential Type NOT REPORTED     WBC Morphology NOT REPORTED     RBC Morphology NOT REPORTED     Platelet Estimate NOT REPORTED     Immature Granulocytes 0 0 %    Seg Neutrophils 64 36 - 66 %    Lymphocytes 23 (L) 24 - 44 %    Monocytes 13 (H) 1 - 7 %    Eosinophils % 0 (L) 1 - 4 %    Basophils 0 0 - 2 %    Absolute Immature Granulocyte 0.00 0.00 - 0.30 k/uL    Segs Absolute 6.59 1.8 - 7.7 k/uL    Absolute Lymph # 2.37 1.0 - 4.8 k/uL    Absolute Mono # 1.34 (H) 0.1 - 0.8 k/uL    Absolute Eos # 0.00 0.0 - 0.4 k/uL    Basophils Absolute 0.00 0.0 - 0.2 k/uL    Morphology Normal    COMPREHENSIVE METABOLIC PANEL    Collection Time: 09/29/20  3:18 PM   Result Value Ref Range    Glucose 194 (H) 70 - 99 mg/dL    BUN 32 (H) 8 - 23 mg/dL    CREATININE 2.65 (H) 0.50 - 0.90 mg/dL    Bun/Cre Ratio NOT REPORTED 9 - 20    Calcium 8.7 8.6 - 10.4 mg/dL    Sodium 129 (L) 135 - 144 mmol/L    Potassium 2.9 (LL) 3.7 - 5.3 mmol/L    Chloride 97 (L) 98 - 107 mmol/L    CO2 13 (L) 20 - 31 mmol/L    Anion Gap 19 (H) 9 - 17 mmol/L    Alkaline Phosphatase 83 35 - 104 U/L    ALT 18 5 - 33 U/L    AST 27 <32 U/L    Total Bilirubin 0.30 0.3 - 1.2 mg/dL    Total Protein 8.1 6.4 - 8.3 g/dL    Alb 3.7 3.5 - 5.2 g/dL    Albumin/Globulin Ratio 0.8 (L) 1.0 - 2.5    GFR Non- 18 (L) >60 mL/min    GFR  22 (L) >60 mL/min    GFR Comment          GFR Staging NOT REPORTED    Troponin    Collection Time: 09/29/20  3:18 PM   Result Value Ref Range    Troponin, High Sensitivity 19 (H) 0 - 14 ng/L    Troponin T NOT REPORTED <0.03 ng/mL    Troponin Interp NOT REPORTED    SPECIMEN REJECTION    Collection Time: 09/29/20  3:18 PM   Result Value Ref Range    Specimen Source . BLOOD     Ordered Test LIP     Reason for Rejection       Unable to perform testing: Specimen quantity not sufficient.    - NOT REPORTED    Basic Metabolic Panel    Collection Time: 09/29/20  5:06 PM   Result Value Ref Range    Glucose 159 (H) 70 - 99 mg/dL    BUN 31 (H) 8 - 23 mg/dL    CREATININE 2.30 (H) 0.50 - 0.90 mg/dL    Bun/Cre Ratio NOT REPORTED 9 - 20    Calcium 8.3 (L) 8.6 - 10.4 mg/dL    Sodium 131 (L) 135 - 144 mmol/L    Potassium 2.6 (LL) 3.7 - 5.3 mmol/L    Chloride 99 98 - 107 mmol/L    CO2 14 (L) 20 - 31 mmol/L    Anion Gap 18 (H) 9 - 17 mmol/L    GFR Non-African American 21 (L) >60 mL/min    GFR  26 (L) >60 mL/min    GFR Comment          GFR Staging NOT REPORTED    Lipase    Collection Time: 09/29/20  5:06 PM   Result Value Ref Range    Lipase 40 13 - 60 U/L   Troponin    Collection Time: 09/29/20  5:06 PM   Result Value Ref Range

## 2020-09-29 NOTE — ED PROVIDER NOTES
Care assumed from Dr. Elvin Navarro,    Patient with falling, decreased appetite and oral intake, hypo-kalemia, starvation ketosis, CHRIS.   Patient admitted     Yoshi Mason MD  09/29/20 9800

## 2020-09-29 NOTE — ED PROVIDER NOTES
Socioeconomic History    Marital status:      Spouse name: Not on file    Number of children: Not on file    Years of education: Not on file    Highest education level: Not on file   Occupational History     Employer: N/A   Social Needs    Financial resource strain: Not hard at all   Russell[place]Larry insecurity     Worry: Never true     Inability: Never true   [employer] needs     Medical: No     Non-medical: No   Tobacco Use    Smoking status: Current Every Day Smoker     Packs/day: 0.25     Years: 10.00     Pack years: 2.50     Types: Cigarettes    Smokeless tobacco: Never Used    Tobacco comment: 3 cigarettes per day; Patient refused counseling   Substance and Sexual Activity    Alcohol use: Yes     Comment: rarely    Drug use: No    Sexual activity: Never   Lifestyle    Physical activity     Days per week: Not on file     Minutes per session: Not on file    Stress: Not on file   Relationships    Social connections     Talks on phone: Not on file     Gets together: Not on file     Attends Latter day service: Not on file     Active member of club or organization: Not on file     Attends meetings of clubs or organizations: Not on file     Relationship status: Not on file    Intimate partner violence     Fear of current or ex partner: Not on file     Emotionally abused: Not on file     Physically abused: Not on file     Forced sexual activity: Not on file   Other Topics Concern    Not on file   Social History Narrative    Not on file       Family History   Problem Relation Age of Onset    Diabetes Father     Stroke Father     High Blood Pressure Father        Allergies:  Flexeril [cyclobenzaprine]; Gabapentin; Prochlorperazine edisylate; Promethazine; Thorazine [chlorpromazine]; and Chantix [varenicline tartrate]    Home Medications:  Prior to Admission medications    Medication Sig Start Date End Date Taking?  Authorizing Provider   albuterol (PROVENTIL HFA;VENTOLIN HFA) 108 (90 BASE) MCG/ACT inhaler Inhale 1 puff into the lungs 4 times daily as needed for Wheezing    Yes Historical Provider, MD   insulin glargine (LANTUS SOLOSTAR) 100 UNIT/ML injection pen INJECT 25 UNITS INTO THE SKIN BID 8/20/20   JEANNETTE Calderon CNP   Insulin Pen Needle 31G X 8 MM MISC Use BID for insulin administration 8/20/20   JEANNETTE Calderon CNP   pregabalin (LYRICA) 150 MG capsule Take 1 capsule by mouth 2 times daily for 30 days. 6/4/20 7/4/20  JEANNETTE Grijalva CNP   meloxicam [facility]) 7.5 MG tablet Take 1 tablet by mouth daily 5/4/20   Milus JEANNETTE Amos CNP   Lancets MISC 1 each by Does not apply route 4 times daily 5/4/20   Mil JEANNETTE Amos CNP   blood glucose monitor kit and supplies Test 1 times a day & as needed for symptoms of irregular blood glucose. 4/16/20   Milus JEANNETTE Amos CNP   aspirin 81 MG EC tablet Take 1 tablet by mouth daily 4/14/20   David Krishna MD   escitalopram (LEXAPRO) 5 MG tablet Take 5 tablets by mouth daily 4/14/20   David Krishna MD   QUEtiapine (SEROQUEL) 200 MG tablet Take 1 tablet by mouth nightly  Patient taking differently: Take 400 mg by mouth nightly  4/13/20   David Krishna MD   pantoprazole (PROTONIX) 40 MG tablet Take 1 tablet by mouth every morning (before breakfast) 4/14/20   David Krishna MD   traZODone (DESYREL) 50 MG tablet Take 50 mg by mouth nightly    Historical Provider, MD   QUEtiapine (SEROQUEL) 300 MG tablet Take 1 tablet by mouth 2 times daily 5/11/19   Diana Player, DO   ALPRAZolam [facility]) 0.5 MG tablet Take 0.25 mg by mouth 2 times daily. Historical Provider, MD   lisinopril (PRINIVIL;ZESTRIL) 5 MG tablet Take 1 tablet by mouth daily 10/5/17   Uriel Flores MD       REVIEW OF SYSTEMS    (2-9 systems for level 4, 10 or more for level 5)      Review of Systems   Constitutional: Negative for chills and fever. HENT: Negative for rhinorrhea and sore throat. Respiratory: Positive for shortness of breath. Negative for cough. Cardiovascular: Negative for chest pain. Gastrointestinal: Positive for diarrhea. Negative for abdominal pain, blood in stool, nausea and vomiting. Genitourinary: Negative for difficulty urinating. Negative for bowel or bladder incontinence or retention. Musculoskeletal: Positive for arthralgias and back pain. Neurological: Negative for weakness and numbness. Negative for saddle anesthesia       PHYSICAL EXAM   (up to 7 for level 4, 8 or more for level 5)      INITIAL VITALS:   BP (!) 101/53   Pulse 90   Temp 97.5 °F (36.4 °C) (Oral)   Resp 20   Ht 5' 6\" (1.676 m)   Wt 205 lb (93 kg)   LMP  (LMP Unknown)   SpO2 96%   BMI 33.09 kg/m²     Physical Exam  Vitals signs and nursing note reviewed. Constitutional:       General: She is not in acute distress. Appearance: Normal appearance. She is obese. She is not ill-appearing, toxic-appearing or diaphoretic. HENT:      Head: Normocephalic and atraumatic. Eyes:      General: No scleral icterus. Conjunctiva/sclera: Conjunctivae normal.   Neck:      Musculoskeletal: Neck supple. Cardiovascular:      Rate and Rhythm: Normal rate and regular rhythm. Pulses:           Dorsalis pedis pulses are 2+ on the right side and 2+ on the left side. Pulmonary:      Effort: Pulmonary effort is normal. No respiratory distress. Breath sounds: Normal breath sounds. No stridor. No wheezing, rhonchi or rales. Abdominal:      General: There is no distension. Palpations: Abdomen is soft. There is no mass. Tenderness: There is no abdominal tenderness. There is no guarding or rebound. Musculoskeletal:      Right lower leg: No edema. Left lower leg: No edema. Comments: Tenderness to palpation over the L knee. Leg is neurovascularly intact. No midline CTL or spine tenderness to palpation. No step-off or deformities noted. Skin:     General: Skin is warm and dry. Findings: No rash (over exposed skin). Neurological:      General: No focal deficit present. Mental Status: She is alert and oriented to person, place, and time. Comments: Full strength and sensation of bilateral lower extremities. Proprioception is intact.    Psychiatric:         Mood and Affect: Mood normal.         Behavior: Behavior normal.         DIAGNOSTICS     PLAN (LABS / IMAGING / EKG):  Orders Placed This Encounter   Procedures    XR KNEE LEFT (3 VIEWS)    XR CHEST PORTABLE    CBC WITH AUTO DIFFERENTIAL    COMPREHENSIVE METABOLIC PANEL    Troponin    POTASSIUM    Lipase    Inpatient consult to Hospitalist    POCT Glucose    EKG 12 Lead    PATIENT STATUS (FROM ED OR OR/PROCEDURAL) Inpatient       MEDICATIONS ORDERED:  Orders Placed This Encounter   Medications    magnesium sulfate 1 g in dextrose 5% 100 mL IVPB    acetaminophen (TYLENOL) tablet 650 mg    potassium chloride (KLOR-CON M) extended release tablet 40 mEq    potassium chloride 10 mEq/100 mL IVPB (Peripheral Line)    insulin lispro (HUMALOG) injection vial 0-12 Units    insulin lispro (HUMALOG) injection vial 0-6 Units       DIAGNOSTIC RESULTS / EMERGENCYDEPARTMENT COURSE / MDM     LABS:  Results for orders placed or performed during the hospital encounter of 09/29/20   CBC WITH AUTO DIFFERENTIAL   Result Value Ref Range    WBC 10.3 3.5 - 11.3 k/uL    RBC 4.08 3.95 - 5.11 m/uL    Hemoglobin 12.8 11.9 - 15.1 g/dL    Hematocrit 41.4 36.3 - 47.1 %    .5 82.6 - 102.9 fL    MCH 31.4 25.2 - 33.5 pg    MCHC 30.9 28.4 - 34.8 g/dL    RDW 13.0 11.8 - 14.4 %    Platelets 293 237 - 311 k/uL    MPV 10.1 8.1 - 13.5 fL    NRBC Automated 0.0 0.0 per 100 WBC    Differential Type NOT REPORTED     WBC Morphology NOT REPORTED     RBC Morphology NOT REPORTED     Platelet Estimate NOT REPORTED     Immature Granulocytes 0 0 %    Seg Neutrophils 64 36 - 66 %    Lymphocytes 23 (L) 24 - 44 %    Monocytes 13 (H) 1 - 7 %    Eosinophils % 0 (L) 1 - 4 %    Basophils 0 0 - 2 % Absolute Immature Granulocyte 0.00 0.00 - 0.30 k/uL    Segs Absolute 6.59 1.8 - 7.7 k/uL    Absolute Lymph # 2.37 1.0 - 4.8 k/uL    Absolute Mono # 1.34 (H) 0.1 - 0.8 k/uL    Absolute Eos # 0.00 0.0 - 0.4 k/uL    Basophils Absolute 0.00 0.0 - 0.2 k/uL    Morphology Normal    COMPREHENSIVE METABOLIC PANEL   Result Value Ref Range    Glucose 194 (H) 70 - 99 mg/dL    BUN 32 (H) 8 - 23 mg/dL    CREATININE 2.65 (H) 0.50 - 0.90 mg/dL    Bun/Cre Ratio NOT REPORTED 9 - 20    Calcium 8.7 8.6 - 10.4 mg/dL    Sodium 129 (L) 135 - 144 mmol/L    Potassium 2.9 (LL) 3.7 - 5.3 mmol/L    Chloride 97 (L) 98 - 107 mmol/L    CO2 13 (L) 20 - 31 mmol/L    Anion Gap 19 (H) 9 - 17 mmol/L    Alkaline Phosphatase 83 35 - 104 U/L    ALT 18 5 - 33 U/L    AST 27 <32 U/L    Total Bilirubin 0.30 0.3 - 1.2 mg/dL    Total Protein 8.1 6.4 - 8.3 g/dL    Alb 3.7 3.5 - 5.2 g/dL    Albumin/Globulin Ratio 0.8 (L) 1.0 - 2.5    GFR Non- 18 (L) >60 mL/min    GFR  22 (L) >60 mL/min    GFR Comment          GFR Staging NOT REPORTED    Troponin   Result Value Ref Range    Troponin, High Sensitivity 19 (H) 0 - 14 ng/L    Troponin T NOT REPORTED <0.03 ng/mL    Troponin Interp NOT REPORTED        RADIOLOGY:  XR KNEE LEFT (3 VIEWS)   Final Result   No acute osseous or soft tissue abnormality. Tricompartmental degenerative change with chondrocalcinosis in the   weight-bearing compartments suggestive of a crystal deposition disorder. XR CHEST PORTABLE   Final Result   No acute cardiopulmonary abnormality.               EKG  Rhythm: normal sinus   Rate: normal  Axis: normal  Ectopy: none  Conduction: Prolonged QTc of 553, o/w normal  ST Segments: no acute change  T Waves: no acute change  Q Waves: none    Clinical Impression: When compared to EKG dated 9/13/2017, no acute changes and non-specific EKG    Normal Interval Reference:  P-wave <110 ms  -200 ms  QRS <100 ms  QT <420 ms  QTc 330-470 ms    All EKG's are interpreted by the Emergency Department Physician who either signs or Co-signsthis chart in the absence of a cardiologist.    EMERGENCY DEPARTMENT COURSE:         MDM: 57-year-old female sending after a mechanical fall approximate 2 weeks ago. Now complaining of left knee pain as well as low back pain. No red flag symptoms or back pain. On exam she is nontoxic-appearing no acute distress. Vitals unremarkable. Heart regular rate and rhythm, lungs are clear to auscultation bilateral.  No soft nontender. She has full strength and sensation of bilateral lower extremities. DP pulses are 2+, and she has intact proprioception in bilateral lower extremities. No midline CTL or S spine tenderness to palpation. Given patient does complain of some shortness of breath will obtain cardiac work-up to rule out any cardiac etiology of patient's symptoms. Also plan for x-ray of left knee. Symptomatic treatment. Patient found to have significant electrolyte abnormalities as well as prolonged QTC on EKG. Knee x-ray without acute findings. Patient will be admitted to the Intermed service for further evaluation management of her electrolyte abnormalities. PROCEDURES:  none    CONSULTS:  IP CONSULT TO HOSPITALIST    FINAL IMPRESSION      1. Hypokalemia    2. Hyponatremia    3. Acute pain of left knee    4. CHRIS (acute kidney injury) ([facility] [address]          DISPOSITION / PLAN     DISPOSITION Admitted 09/29/2020 04:44:48 PM      PATIENT REFERRED TO:  No follow-up provider specified.     DISCHARGE MEDICATIONS:  New Prescriptions    No medications on file       Parth Guerrero DO  Emergency Medicine Resident  [id number] [facility]    (Please note that portions of this note were completed with a voice recognition program.  Efforts were made to edit thedictations but occasionally words are mis-transcribed.)       Parth Guerrero DO  Resident  09/29/20 [id number]

## 2020-09-30 LAB
-: ABNORMAL
ALBUMIN SERPL-MCNC: 3.3 G/DL (ref 3.5–5.2)
ALBUMIN/GLOBULIN RATIO: 1 (ref 1–2.5)
ALP BLD-CCNC: 75 U/L (ref 35–104)
ALT SERPL-CCNC: 24 U/L (ref 5–33)
AMORPHOUS: ABNORMAL
ANION GAP SERPL CALCULATED.3IONS-SCNC: 12 MMOL/L (ref 9–17)
ANION GAP SERPL CALCULATED.3IONS-SCNC: 8 MMOL/L (ref 9–17)
AST SERPL-CCNC: 28 U/L
BACTERIA: ABNORMAL
BILIRUB SERPL-MCNC: 0.27 MG/DL (ref 0.3–1.2)
BILIRUBIN URINE: NEGATIVE
BUN BLDV-MCNC: 21 MG/DL (ref 8–23)
BUN BLDV-MCNC: 25 MG/DL (ref 8–23)
BUN/CREAT BLD: ABNORMAL (ref 9–20)
BUN/CREAT BLD: ABNORMAL (ref 9–20)
CALCIUM SERPL-MCNC: 7.9 MG/DL (ref 8.6–10.4)
CALCIUM SERPL-MCNC: 8 MG/DL (ref 8.6–10.4)
CASTS UA: ABNORMAL /LPF (ref 0–8)
CHLORIDE BLD-SCNC: 109 MMOL/L (ref 98–107)
CHLORIDE BLD-SCNC: 112 MMOL/L (ref 98–107)
CO2: 15 MMOL/L (ref 20–31)
CO2: 17 MMOL/L (ref 20–31)
COLOR: YELLOW
CREAT SERPL-MCNC: 1.19 MG/DL (ref 0.5–0.9)
CREAT SERPL-MCNC: 1.43 MG/DL (ref 0.5–0.9)
CRYSTALS, UA: ABNORMAL /HPF
EKG ATRIAL RATE: 87 BPM
EKG P AXIS: 42 DEGREES
EKG P-R INTERVAL: 150 MS
EKG Q-T INTERVAL: 450 MS
EKG QRS DURATION: 82 MS
EKG QTC CALCULATION (BAZETT): 553 MS
EKG R AXIS: 56 DEGREES
EKG T AXIS: 55 DEGREES
EKG VENTRICULAR RATE: 91 BPM
EPITHELIAL CELLS UA: ABNORMAL /HPF (ref 0–5)
GFR AFRICAN AMERICAN: 44 ML/MIN
GFR AFRICAN AMERICAN: 55 ML/MIN
GFR NON-AFRICAN AMERICAN: 37 ML/MIN
GFR NON-AFRICAN AMERICAN: 45 ML/MIN
GFR SERPL CREATININE-BSD FRML MDRD: ABNORMAL ML/MIN/{1.73_M2}
GLUCOSE BLD-MCNC: 104 MG/DL (ref 70–99)
GLUCOSE BLD-MCNC: 117 MG/DL (ref 65–105)
GLUCOSE BLD-MCNC: 122 MG/DL (ref 65–105)
GLUCOSE BLD-MCNC: 135 MG/DL (ref 65–105)
GLUCOSE BLD-MCNC: 142 MG/DL (ref 65–105)
GLUCOSE BLD-MCNC: 142 MG/DL (ref 70–99)
GLUCOSE BLD-MCNC: 78 MG/DL (ref 65–105)
GLUCOSE URINE: NEGATIVE
HCT VFR BLD CALC: 37.1 % (ref 36.3–47.1)
HEMOGLOBIN: 11.9 G/DL (ref 11.9–15.1)
INR BLD: 1
KETONES, URINE: NEGATIVE
LEUKOCYTE ESTERASE, URINE: NEGATIVE
MAGNESIUM: 3.1 MG/DL (ref 1.6–2.6)
MCH RBC QN AUTO: 31.1 PG (ref 25.2–33.5)
MCHC RBC AUTO-ENTMCNC: 32.1 G/DL (ref 28.4–34.8)
MCV RBC AUTO: 96.9 FL (ref 82.6–102.9)
MUCUS: ABNORMAL
NITRITE, URINE: NEGATIVE
NRBC AUTOMATED: 0 PER 100 WBC
OTHER OBSERVATIONS UA: ABNORMAL
PDW BLD-RTO: 13.2 % (ref 11.8–14.4)
PH UA: 6 (ref 5–8)
PLATELET # BLD: 252 K/UL (ref 138–453)
PMV BLD AUTO: 9.4 FL (ref 8.1–13.5)
POTASSIUM SERPL-SCNC: 3.6 MMOL/L (ref 3.7–5.3)
POTASSIUM SERPL-SCNC: 4.4 MMOL/L (ref 3.7–5.3)
PROTEIN UA: NEGATIVE
PROTHROMBIN TIME: 10 SEC (ref 9–12)
RBC # BLD: 3.83 M/UL (ref 3.95–5.11)
RBC UA: ABNORMAL /HPF (ref 0–4)
RENAL EPITHELIAL, UA: ABNORMAL /HPF
SODIUM BLD-SCNC: 136 MMOL/L (ref 135–144)
SODIUM BLD-SCNC: 137 MMOL/L (ref 135–144)
SODIUM,UR: <20 MMOL/L
SPECIFIC GRAVITY UA: 1.01 (ref 1–1.03)
TOTAL PROTEIN, URINE: 15 MG/DL
TOTAL PROTEIN: 6.7 G/DL (ref 6.4–8.3)
TRICHOMONAS: ABNORMAL
TURBIDITY: CLEAR
URINE HGB: NEGATIVE
UROBILINOGEN, URINE: NORMAL
WBC # BLD: 8.8 K/UL (ref 3.5–11.3)
WBC UA: ABNORMAL /HPF (ref 0–5)
YEAST: ABNORMAL

## 2020-09-30 PROCEDURE — 6370000000 HC RX 637 (ALT 250 FOR IP): Performed by: INTERNAL MEDICINE

## 2020-09-30 PROCEDURE — 83735 ASSAY OF MAGNESIUM: CPT

## 2020-09-30 PROCEDURE — 93010 ELECTROCARDIOGRAM REPORT: CPT | Performed by: INTERNAL MEDICINE

## 2020-09-30 PROCEDURE — 85610 PROTHROMBIN TIME: CPT

## 2020-09-30 PROCEDURE — 6360000002 HC RX W HCPCS: Performed by: INTERNAL MEDICINE

## 2020-09-30 PROCEDURE — 51701 INSERT BLADDER CATHETER: CPT

## 2020-09-30 PROCEDURE — 85027 COMPLETE CBC AUTOMATED: CPT

## 2020-09-30 PROCEDURE — 1200000000 HC SEMI PRIVATE

## 2020-09-30 PROCEDURE — G0008 ADMIN INFLUENZA VIRUS VAC: HCPCS | Performed by: INTERNAL MEDICINE

## 2020-09-30 PROCEDURE — 84156 ASSAY OF PROTEIN URINE: CPT

## 2020-09-30 PROCEDURE — 80048 BASIC METABOLIC PNL TOTAL CA: CPT

## 2020-09-30 PROCEDURE — 99232 SBSQ HOSP IP/OBS MODERATE 35: CPT | Performed by: INTERNAL MEDICINE

## 2020-09-30 PROCEDURE — 81001 URINALYSIS AUTO W/SCOPE: CPT

## 2020-09-30 PROCEDURE — 6360000002 HC RX W HCPCS: Performed by: CLINICAL NURSE SPECIALIST

## 2020-09-30 PROCEDURE — 82947 ASSAY GLUCOSE BLOOD QUANT: CPT

## 2020-09-30 PROCEDURE — 90686 IIV4 VACC NO PRSV 0.5 ML IM: CPT | Performed by: INTERNAL MEDICINE

## 2020-09-30 PROCEDURE — 36415 COLL VENOUS BLD VENIPUNCTURE: CPT

## 2020-09-30 PROCEDURE — 84300 ASSAY OF URINE SODIUM: CPT

## 2020-09-30 PROCEDURE — 6370000000 HC RX 637 (ALT 250 FOR IP): Performed by: CLINICAL NURSE SPECIALIST

## 2020-09-30 PROCEDURE — 51798 US URINE CAPACITY MEASURE: CPT

## 2020-09-30 PROCEDURE — 80053 COMPREHEN METABOLIC PANEL: CPT

## 2020-09-30 RX ADMIN — ESCITALOPRAM OXALATE 25 MG: 10 TABLET ORAL at 08:47

## 2020-09-30 RX ADMIN — HEPARIN SODIUM 5000 UNITS: 5000 INJECTION INTRAVENOUS; SUBCUTANEOUS at 14:58

## 2020-09-30 RX ADMIN — INSULIN GLARGINE 25 UNITS: 100 INJECTION, SOLUTION SUBCUTANEOUS at 22:50

## 2020-09-30 RX ADMIN — POTASSIUM CHLORIDE 40 MEQ: 1500 TABLET, EXTENDED RELEASE ORAL at 08:55

## 2020-09-30 RX ADMIN — HEPARIN SODIUM 5000 UNITS: 5000 INJECTION INTRAVENOUS; SUBCUTANEOUS at 06:10

## 2020-09-30 RX ADMIN — PREGABALIN 150 MG: 75 CAPSULE ORAL at 22:50

## 2020-09-30 RX ADMIN — QUETIAPINE FUMARATE 300 MG: 300 TABLET ORAL at 08:47

## 2020-09-30 RX ADMIN — ALPRAZOLAM 0.25 MG: 0.25 TABLET ORAL at 08:47

## 2020-09-30 RX ADMIN — PREGABALIN 150 MG: 75 CAPSULE ORAL at 08:47

## 2020-09-30 RX ADMIN — POTASSIUM CHLORIDE 10 MEQ: 7.46 INJECTION, SOLUTION INTRAVENOUS at 05:45

## 2020-09-30 RX ADMIN — HEPARIN SODIUM 5000 UNITS: 5000 INJECTION INTRAVENOUS; SUBCUTANEOUS at 22:50

## 2020-09-30 RX ADMIN — POTASSIUM CHLORIDE 10 MEQ: 7.46 INJECTION, SOLUTION INTRAVENOUS at 00:55

## 2020-09-30 RX ADMIN — POTASSIUM CHLORIDE 10 MEQ: 7.46 INJECTION, SOLUTION INTRAVENOUS at 03:27

## 2020-09-30 RX ADMIN — Medication 81 MG: at 08:47

## 2020-09-30 RX ADMIN — INFLUENZA A VIRUS A/VICTORIA/2454/2019 IVR-207 (H1N1) ANTIGEN (PROPIOLACTONE INACTIVATED), INFLUENZA A VIRUS A/HONG KONG/2671/2019 IVR-208 (H3N2) ANTIGEN (PROPIOLACTONE INACTIVATED), INFLUENZA B VIRUS B/VICTORIA/705/2018 BVR-11 ANTIGEN (PROPIOLACTONE INACTIVATED), INFLUENZA B VIRUS B/PHUKET/3073/2013 BVR-1B ANTIGEN (PROPIOLACTONE INACTIVATED) 0.5 ML: 15; 15; 15; 15 INJECTION, SUSPENSION INTRAMUSCULAR at 15:06

## 2020-09-30 RX ADMIN — INSULIN GLARGINE 25 UNITS: 100 INJECTION, SOLUTION SUBCUTANEOUS at 08:48

## 2020-09-30 RX ADMIN — PANTOPRAZOLE SODIUM 40 MG: 40 TABLET, DELAYED RELEASE ORAL at 06:10

## 2020-09-30 ASSESSMENT — PAIN SCALES - GENERAL: PAINLEVEL_OUTOF10: 7

## 2020-09-30 NOTE — PROGRESS NOTES
[facility]).     Pt's left knee gave out 2 weeks ago and she fell on a broken vase causing a 2.5 cm laceration to the abdomen that has healed.       For the last 3-4 days she has had constant diarrhea. Pt denies vomiting but does have some nausea. She also reports H/A, weakness and chills. Pt has not had any sick contacts or eaten out. Pt reports increased phlegm production for about a week. She has noticed swelling of her feet x 1 month. Only 1 pair of sandals fits her. She takes Mobic for chronic back and knee pain and is on Lisinopril for HTN.       In the ED labs revealed CHRIS with BUN/ Creat 32/2.65 (15/1.18 on 4/5/20) & K 2.9, Na 129. Troponin 19 & 15, Lipase 40. CXR and Rt knee films unremarkable      Review of Systems:   Very sleepy, unable to give any clear information:    Constitutional:  negative for chills, fevers, sweats, denies pain  Respiratory:  negative for cough, dyspnea on exertion, shortness of breath, wheezing  Cardiovascular:  negative for chest pain, chest pressure/discomfort, lower extremity edema, palpitations  Gastrointestinal:  negative for abdominal pain, constipation, diarrhea, nausea, vomiting  Neurological:  negative for dizziness, headache    Medications: Allergies:     Allergies   Allergen Reactions    Flexeril [Cyclobenzaprine]     Gabapentin Other (See Comments)     Headache    Prochlorperazine Edisylate     Promethazine     Thorazine [Chlorpromazine]     Chantix [Varenicline Tartrate]      Nightmares       Current Meds:   Scheduled Meds:    sodium chloride flush  10 mL Intravenous 2 times per day    insulin lispro  0-12 Units Subcutaneous TID WC    insulin lispro  0-6 Units Subcutaneous Nightly    ALPRAZolam  0.25 mg Oral BID    aspirin  81 mg Oral Daily    escitalopram  25 mg Oral Daily    insulin glargine  25 Units Subcutaneous BID    pantoprazole  40 mg Oral QAM AC    pregabalin  150 mg Oral BID    QUEtiapine  400 mg Oral Nightly    QUEtiapine  300 mg Oral BID    traZODone  50 mg Oral Nightly    heparin (porcine)  5,000 Units Subcutaneous 3 times per day    budesonide-formoterol  2 puff Inhalation BID    tiotropium  2 puff Inhalation Daily    influenza virus vaccine  0.5 mL Intramuscular Once     Continuous Infusions:    sodium chloride 100 mL/hr at 20 1800     PRN Meds: sodium chloride flush, acetaminophen **OR** acetaminophen, nicotine, potassium chloride **OR** potassium alternative oral replacement **OR** potassium chloride, albuterol sulfate HFA    Data:     Past Medical History:   has a past medical history of CHRIS (acute kidney injury) ([facility] [address] Bipolar 1 disorder ([facility] [address] Breast lump, [facility] disease [facility] Depression, GERD (gastroesophageal reflux disease), Hyperlipidemia, Hypertension, Osteoarthritis, Type 2 diabetes mellitus without complication, with long-term current use of insulin ([facility] and Type II or unspecified type diabetes mellitus without mention of complication, not stated as uncontrolled. Social History:   reports that she has been smoking cigarettes. She has a 2.50 pack-year smoking history. She has never used smokeless tobacco. She reports current alcohol use. She reports that she does not use drugs. Family History:   Family History   Problem Relation Age of Onset    Diabetes Father     Stroke Father     High Blood Pressure Father        Vitals:  BP (!) 100/50   Pulse 89   Temp 98.7 °F (37.1 °C) (Oral)   Resp 18   Ht 5' 6\" (1.676 m)   Wt 205 lb (93 kg)   LMP  (LMP Unknown)   SpO2 100%   BMI 33.09 kg/m²   Temp (24hrs), Av.1 °F (36.7 °C), Min:97.5 °F (36.4 °C), Max:98.7 °F (37.1 °C)    Recent Labs     20  2105 20  0402   POCGLU 122* 142*       I/O (24Hr):     Intake/Output Summary (Last 24 hours) at 2020 0734  Last data filed at 2020 0512  Gross per 24 hour   Intake 1200 ml   Output 850 ml   Net 350 ml       Labs:  Hematology:  Recent Labs     20  1518 20  2232 splenomegaly  Extremities:  no edema, redness, tenderness in the calves  Skin:  no gross lesions, rashes, induration    Assessment:        Hospital Problems           Last Modified POA    * (Principal) CHRIS (acute kidney injury) ([facility] [address] 9/29/2020 Yes    Paranoid schizophrenia ([facility] [address] 9/29/2020 Yes    Essential hypertension 9/29/2020 Yes    Chronic obstructive pulmonary disease ([facility] [address] 9/29/2020 Yes    Type 2 diabetes mellitus without complication, with long-term current use of insulin [facility] 9/29/2020 Yes    Hypokalemia 9/29/2020 Yes    Hyponatremia 9/29/2020 Yes          Plan:        1. Hold Seroquel, alprazolam and trazodone  2. Continue IV fluids  3. Discontinue all nephrotoxic medicines  4.  Recheck BMP at 2 PM  5. PT OT, DVT prophylaxis    Eve Rodarte MD  9/30/2020  7:34 AM

## 2020-09-30 NOTE — FLOWSHEET NOTE
09/30/20 1230   Urine Assessment   Bladder Scan Volume (mL) 536 mL   $ Bladder scan $ Yes   Intermittent/Straight Cath (mL) 675 mL   $ Cath urethra straight $ Yes

## 2020-10-01 VITALS
BODY MASS INDEX: 34.72 KG/M2 | OXYGEN SATURATION: 96 % | DIASTOLIC BLOOD PRESSURE: 62 MMHG | HEART RATE: 83 BPM | SYSTOLIC BLOOD PRESSURE: 126 MMHG | TEMPERATURE: 99 F | HEIGHT: 66 IN | RESPIRATION RATE: 20 BRPM | WEIGHT: 216 LBS

## 2020-10-01 LAB
ANION GAP SERPL CALCULATED.3IONS-SCNC: 10 MMOL/L (ref 9–17)
BUN BLDV-MCNC: 15 MG/DL (ref 8–23)
BUN/CREAT BLD: ABNORMAL (ref 9–20)
CALCIUM SERPL-MCNC: 7.8 MG/DL (ref 8.6–10.4)
CHLORIDE BLD-SCNC: 112 MMOL/L (ref 98–107)
CO2: 13 MMOL/L (ref 20–31)
CREAT SERPL-MCNC: 0.95 MG/DL (ref 0.5–0.9)
GFR AFRICAN AMERICAN: >60 ML/MIN
GFR NON-AFRICAN AMERICAN: 59 ML/MIN
GFR SERPL CREATININE-BSD FRML MDRD: ABNORMAL ML/MIN/{1.73_M2}
GFR SERPL CREATININE-BSD FRML MDRD: ABNORMAL ML/MIN/{1.73_M2}
GLUCOSE BLD-MCNC: 106 MG/DL (ref 65–105)
GLUCOSE BLD-MCNC: 87 MG/DL (ref 65–105)
GLUCOSE BLD-MCNC: 98 MG/DL (ref 65–105)
GLUCOSE BLD-MCNC: 98 MG/DL (ref 70–99)
POTASSIUM SERPL-SCNC: 4 MMOL/L (ref 3.7–5.3)
SODIUM BLD-SCNC: 135 MMOL/L (ref 135–144)

## 2020-10-01 PROCEDURE — 97162 PT EVAL MOD COMPLEX 30 MIN: CPT

## 2020-10-01 PROCEDURE — 80048 BASIC METABOLIC PNL TOTAL CA: CPT

## 2020-10-01 PROCEDURE — 99239 HOSP IP/OBS DSCHRG MGMT >30: CPT | Performed by: INTERNAL MEDICINE

## 2020-10-01 PROCEDURE — 6370000000 HC RX 637 (ALT 250 FOR IP): Performed by: CLINICAL NURSE SPECIALIST

## 2020-10-01 PROCEDURE — 76937 US GUIDE VASCULAR ACCESS: CPT

## 2020-10-01 PROCEDURE — 94640 AIRWAY INHALATION TREATMENT: CPT

## 2020-10-01 PROCEDURE — 36415 COLL VENOUS BLD VENIPUNCTURE: CPT

## 2020-10-01 PROCEDURE — 97535 SELF CARE MNGMENT TRAINING: CPT

## 2020-10-01 PROCEDURE — 97530 THERAPEUTIC ACTIVITIES: CPT

## 2020-10-01 PROCEDURE — 2580000003 HC RX 258: Performed by: CLINICAL NURSE SPECIALIST

## 2020-10-01 PROCEDURE — 97166 OT EVAL MOD COMPLEX 45 MIN: CPT

## 2020-10-01 PROCEDURE — 6360000002 HC RX W HCPCS: Performed by: CLINICAL NURSE SPECIALIST

## 2020-10-01 PROCEDURE — 51798 US URINE CAPACITY MEASURE: CPT

## 2020-10-01 PROCEDURE — 82947 ASSAY GLUCOSE BLOOD QUANT: CPT

## 2020-10-01 RX ORDER — GREEN TEA/HOODIA GORDONII 315-12.5MG
1 CAPSULE ORAL 2 TIMES DAILY
Qty: 14 TABLET | Refills: 1 | Status: SHIPPED | OUTPATIENT
Start: 2020-10-01 | End: 2020-10-08

## 2020-10-01 RX ORDER — SODIUM BICARBONATE 650 MG/1
650 TABLET ORAL 3 TIMES DAILY
Qty: 15 TABLET | Refills: 0 | Status: SHIPPED | OUTPATIENT
Start: 2020-10-01 | End: 2020-10-01

## 2020-10-01 RX ORDER — GREEN TEA/HOODIA GORDONII 315-12.5MG
1 CAPSULE ORAL 2 TIMES DAILY
Qty: 14 TABLET | Refills: 1 | Status: SHIPPED | OUTPATIENT
Start: 2020-10-01 | End: 2020-10-01 | Stop reason: SDUPTHER

## 2020-10-01 RX ORDER — SODIUM BICARBONATE 650 MG/1
650 TABLET ORAL 3 TIMES DAILY
Qty: 15 TABLET | Refills: 0 | Status: SHIPPED | OUTPATIENT
Start: 2020-10-01 | End: 2020-10-06

## 2020-10-01 RX ORDER — SODIUM BICARBONATE 650 MG/1
650 TABLET ORAL 3 TIMES DAILY
Status: DISCONTINUED | OUTPATIENT
Start: 2020-10-01 | End: 2020-10-01 | Stop reason: HOSPADM

## 2020-10-01 RX ADMIN — SODIUM CHLORIDE 750 ML: 9 INJECTION, SOLUTION INTRAVENOUS at 10:03

## 2020-10-01 RX ADMIN — SODIUM BICARBONATE 650 MG: 650 TABLET ORAL at 13:36

## 2020-10-01 RX ADMIN — TIOTROPIUM BROMIDE INHALATION SPRAY 2 PUFF: 3.12 SPRAY, METERED RESPIRATORY (INHALATION) at 10:08

## 2020-10-01 RX ADMIN — Medication 81 MG: at 09:55

## 2020-10-01 RX ADMIN — BUDESONIDE AND FORMOTEROL FUMARATE DIHYDRATE 2 PUFF: 160; 4.5 AEROSOL RESPIRATORY (INHALATION) at 10:08

## 2020-10-01 RX ADMIN — SODIUM BICARBONATE 650 MG: 650 TABLET ORAL at 11:48

## 2020-10-01 RX ADMIN — ESCITALOPRAM OXALATE 25 MG: 10 TABLET ORAL at 09:53

## 2020-10-01 RX ADMIN — INSULIN GLARGINE 25 UNITS: 100 INJECTION, SOLUTION SUBCUTANEOUS at 09:55

## 2020-10-01 RX ADMIN — PREGABALIN 150 MG: 75 CAPSULE ORAL at 09:55

## 2020-10-01 RX ADMIN — PANTOPRAZOLE SODIUM 40 MG: 40 TABLET, DELAYED RELEASE ORAL at 05:55

## 2020-10-01 RX ADMIN — HEPARIN SODIUM 5000 UNITS: 5000 INJECTION INTRAVENOUS; SUBCUTANEOUS at 05:55

## 2020-10-01 ASSESSMENT — PAIN SCALES - GENERAL
PAINLEVEL_OUTOF10: 10
PAINLEVEL_OUTOF10: 5
PAINLEVEL_OUTOF10: 5

## 2020-10-01 ASSESSMENT — PAIN DESCRIPTION - PAIN TYPE: TYPE: CHRONIC PAIN

## 2020-10-01 ASSESSMENT — PAIN DESCRIPTION - LOCATION
LOCATION: LEG;BACK
LOCATION: GROIN
LOCATION: BACK;LEG

## 2020-10-01 ASSESSMENT — PAIN DESCRIPTION - ORIENTATION: ORIENTATION: RIGHT;LEFT

## 2020-10-01 NOTE — PROGRESS NOTES
Physical Therapy    Facility/Department: [facility] RENAL//MED SURG  Initial Assessment    NAME: Klever Sandoval  : 1955  MRN: [id number]    Date of Service: 10/1/2020    Discharge Recommendations:    No further therapy required at discharge. Rollater for mobilization   Assessment   Assessment: The pt ambulated 30 ft with a RW and is being dc'd home today. She would like to have a rollator to help with her mobility. Prognosis: Good  Decision Making: Medium Complexity  PT Education: Goals;PT Role;Plan of Care  REQUIRES PT FOLLOW UP: No  Activity Tolerance  Activity Tolerance: Patient limited by fatigue   Patient Diagnosis(es): The primary encounter diagnosis was CHRIS (acute kidney injury) ([facility] [address] Diagnoses of Hypokalemia, Hyponatremia, and Acute pain of left knee were also pertinent to this visit. has a past medical history of CHRIS (acute kidney injury) ([address] Bipolar 1 disorder ([address] Breast lump, Chronic obstructive pulmonary disease ([address] Depression, GERD (gastroesophageal reflux disease), Hyperlipidemia, Hypertension, Osteoarthritis, Type 2 diabetes mellitus without complication, with long-term current use of insulin ([address] and Type II or unspecified type diabetes mellitus without mention of complication, not stated as uncontrolled. has a past surgical history that includes Ectopic pregnancy surgery; knee surgery (Right); Dilation and curettage of uterus; Nerve Block (Left, 14); Knee Arthroplasty (2017); and Tonsillectomy and adenoidectomy.   Restrictions  Restrictions/Precautions  Restrictions/Precautions: Fall Risk  Required Braces or Orthoses?: No  Position Activity Restriction  Other position/activity restrictions: up with assist  Vision/Hearing  Vision: Impaired  Vision Exceptions: Wears glasses at all times  Hearing: Within functional limits     Subjective  General  Patient assessed for rehabilitation services?: Yes  Response To Previous Treatment: Not applicable  Family / Caregiver Present:

## 2020-10-01 NOTE — PROGRESS NOTES
[facility]  Office: [address], DO, Nesha Hussein, DO, [place], DO, [employer], DO, Segundo Fernandez MD, Tyler Wall MD, Jamaal Veloz MD, Lianne Paul MD, Maira Rizo MD, Yara Pollard MD, Trice Miller MD, Kavita Aguilar MD, Yisel Clements MD, Diane Lal, DO, Shannon Muller MD, Tianna Castro MD, Maged Martino, DO, Vera Castillo MD,  Herson Duvall, DO, Masha Hassan MD, Carlos Champagne MD, Tomasa Wilcox, [facility], [facility], CNP, Joel Brown, CNP, Rehana Wright, CNS, Gabby Joe, CNP, Lino Yeager, CNP, Krunal Reina, CNP, Taye Davis, CNP, Luzma Martinez, CNP, Steffi Cullen PA-C, Isac Kendrick, [facility], Becca Silva, CNP, Bam Willis, CNP, Mornoe Li, CNP, Lisset Little, CNP, Shaunna Jamison, [facility]    Progress Note    10/1/2020    8:11 AM    Name:   Jose Leo  MRN:     [id number]     Acct:      [de-identified]   Room:   [address] [place] [address] Day:  2  Admit Date:  9/29/2020  2:18 PM    PCP:   María Reza MD  Code Status:  Full Code    Subjective:     C/C:   Chief Complaint   Patient presents with   Krissy See Fall     2 weeks ago    Knee Pain     L    Abdominal Pain     scratched abdomen during fall, diarrhea    Back Pain     lower    Other     mobility issue     Interval History Status:   Patient is fully alert and awake today  No diarrhea overnight  Creatinine improved with hydration, 2.65-2.30-1. 43- 0.95  CO2 14-17-13 but patient is asymptomatic  Potassium 4.0  She was on very high dose of Seroquel at home for schizophrenia, also takes trazodone and alprazolam, all 3 were held yesterday  Brief History:   Jose Leo is a 75 y.o. female who presents with Fall (2 weeks ago);  Knee Pain (L); Abdominal Pain (scratched abdomen during fall, diarrhea); Back Pain (lower); and Other (mobility issue)  She was admitted through the ED Sept 29 for the management of CHRIS (acute kidney injury) ([facility] [address]     Pt's left knee gave out 2 weeks ago and she fell on a broken vase causing a 2.5 cm laceration to the abdomen that has healed.       For the last 3-4 days she has had constant diarrhea. Pt denies vomiting but does have some nausea. She also reports H/A, weakness and chills. Pt has not had any sick contacts or eaten out. Pt reports increased phlegm production for about a week. She has noticed swelling of her feet x 1 month. Only 1 pair of sandals fits her. She takes Mobic for chronic back and knee pain and is on Lisinopril for HTN.       In the ED labs revealed CHRIS with BUN/ Creat 32/2.65 (15/1.18 on 4/5/20) & K 2.9, Na 129. Troponin 19 & 15, Lipase 40. CXR and Rt knee films unremarkable      Review of Systems:   Very sleepy, unable to give any clear information:    Constitutional:  negative for chills, fevers, sweats, denies pain  Respiratory:  negative for cough, dyspnea on exertion, shortness of breath, wheezing  Cardiovascular:  negative for chest pain, chest pressure/discomfort, lower extremity edema, palpitations  Gastrointestinal:  negative for abdominal pain, constipation, diarrhea, nausea, vomiting  Neurological:  negative for dizziness, headache    Medications: Allergies:     Allergies   Allergen Reactions    Flexeril [Cyclobenzaprine]     Gabapentin Other (See Comments)     Headache    Prochlorperazine Edisylate     Promethazine     Thorazine [Chlorpromazine]     Chantix [Varenicline Tartrate]      Nightmares       Current Meds:   Scheduled Meds:    sodium chloride flush  10 mL Intravenous 2 times per day    insulin lispro  0-12 Units Subcutaneous TID WC    insulin lispro  0-6 Units Subcutaneous Nightly    [Held by provider] ALPRAZolam  0.25 mg Oral BID    aspirin  81 mg Oral Daily    escitalopram  25 mg Oral Daily    insulin glargine  25 Units Subcutaneous BID    pantoprazole  40 mg Oral QAM AC    pregabalin  150 mg Oral BID    [Held by provider] QUEtiapine 400 mg Oral Nightly    [Held by provider] QUEtiapine  300 mg Oral BID    [Held by provider] traZODone  50 mg Oral Nightly    heparin (porcine)  5,000 Units Subcutaneous 3 times per day    budesonide-formoterol  2 puff Inhalation BID    tiotropium  2 puff Inhalation Daily     Continuous Infusions:    sodium chloride 100 mL/hr at 20 1800     PRN Meds: sodium chloride flush, acetaminophen **OR** acetaminophen, nicotine, potassium chloride **OR** potassium alternative oral replacement **OR** potassium chloride, albuterol sulfate HFA    Data:     Past Medical History:   has a past medical history of CHRIS (acute kidney injury) ([facility] [address] Bipolar 1 disorder ([facility] [address] Breast lump, Chronic obstructive pulmonary disease ([facility] [address] Depression, GERD (gastroesophageal reflux disease), Hyperlipidemia, Hypertension, Osteoarthritis, Type 2 diabetes mellitus without complication, with long-term current use of insulin ([facility] [address] and Type II or unspecified type diabetes mellitus without mention of complication, not stated as uncontrolled. Social History:   reports that she has been smoking cigarettes. She has a 2.50 pack-year smoking history. She has never used smokeless tobacco. She reports current alcohol use. She reports that she does not use drugs. Family History:   Family History   Problem Relation Age of Onset    Diabetes Father     Stroke Father     High Blood Pressure Father        Vitals:  /65   Pulse 88   Temp 98.6 °F (37 °C) (Oral)   Resp 20   Ht 5' 6\" (1.676 m)   Wt 216 lb (98 kg)   LMP  (LMP Unknown)   SpO2 97%   BMI 34.86 kg/m²   Temp (24hrs), Av.2 °F (36.8 °C), Min:96.6 °F (35.9 °C), Max:98.8 °F (37.1 °C)    Recent Labs     20  1150 20  1618 09/30/20  2005 10/01/20  0759   POCGLU 122* 78 117* 106*       I/O (24Hr):     Intake/Output Summary (Last 24 hours) at 10/1/2020 0811  Last data filed at 10/1/2020 0538  Gross per 24 hour   Intake 1500 ml   Output 1775 ml   Net -275 ml Labs:  Hematology:  Recent Labs     09/29/20 1518 09/30/20  0711   WBC 10.3 8.8   RBC 4.08 3.83*   HGB 12.8 11.9   HCT 41.4 37.1   .5 96.9   MCH 31.4 31.1   MCHC 30.9 32.1   RDW 13.0 13.2    252   MPV 10.1 9.4   INR  --  1.0     Chemistry:  Recent Labs     09/29/20  1518 09/29/20  1706 09/30/20  0711 09/30/20  1435   * 131* 136 137   K 2.9* 2.6* 3.6* 4.4   CL 97* 99 109* 112*   CO2 13* 14* 15* 17*   GLUCOSE 194* 159* 142* 104*   BUN 32* 31* 25* 21   CREATININE 2.65* 2.30* 1.43* 1.19*   MG  --   --  3.1*  --    ANIONGAP 19* 18* 12 8*   LABGLOM 18* 21* 37* 45*   GFRAA 22* 26* 44* 55*   CALCIUM 8.7 8.3* 7.9* 8.0*   TROPHS 19* 15*  --   --      Recent Labs     09/29/20 1518 09/29/20  1706 09/30/20  0402 09/30/20  0711 09/30/20  0754 09/30/20  1150 09/30/20  1618 09/30/20  2005 10/01/20  0759   PROT 8.1  --   --   --  6.7  --   --   --   --   --    LABALBU 3.7  --   --   --  3.3*  --   --   --   --   --    AST 27  --   --   --  28  --   --   --   --   --    ALT 18  --   --   --  24  --   --   --   --   --    ALKPHOS 83  --   --   --  75  --   --   --   --   --    BILITOT 0.30  --   --   --  0.27*  --   --   --   --   --    LIPASE  --  40  --   --   --   --   --   --   --   --    POCGLU  --   --    < > 142*  --  135* 122* 78 117* 106*    < > = values in this interval not displayed. ABG:No results found for: POCPH, PHART, PH, POCPCO2, NOI0CVK, PCO2, POCPO2, PO2ART, PO2, POCHCO3, AFF8ACH, HCO3, NBEA, PBEA, BEART, BE, THGBART, THB, INU4NHX, ZBMI4SDN, B9GZXODQ, O2SAT, FIO2  Lab Results   Component Value Date/Time    SPECIAL NOT REPORTED 09/18/2017 02:51 PM     Lab Results   Component Value Date/Time    CULTURE NO SIGNIFICANT GROWTH 09/18/2017 02:51 PM    CULTURE  09/18/2017 02:51 PM     [employer] [address] [place] [address] [phone]       Radiology:  Xr Knee Left (3 Views)    Result Date: 9/29/2020  No acute osseous or soft tissue abnormality.  Tricompartmental degenerative

## 2020-10-01 NOTE — DISCHARGE SUMMARY
[facility]  Office: [address], DO, Nesha Hussein, DO, [place], DO, [employer] [employer] [facility], DO, Segundo Fernandez MD, Tyler Wall MD, Jamaal Veloz MD, Lianne Paul MD, Maira Rizo MD, Yara Pollard MD, Trice Miller MD, Kavita Aguilar MD, Yisel Clements MD, Diane Lal, DO, Shannon Muller MD, Tianna Castro MD, Maged Martino, DO, Vera Castillo MD,  Herson Duvall, DO, Masha Hassan MD, Carlos Champagne MD, Tomasa Wilcox [facility], [facility], CNP, Joel Brown, CNP, Rehana Wright, CNS, Gabby Joe, CNP, Lino Yeager, CNP, Krunal Reina, CNP, Taye Davis, CNP, Luzma Martinez, CNP, Steffi Cullen PA-C, Isac Kendrick, [facility], Becca Silva, CNP, Bam Willis, CNP, Monroe Li, CNP, Lisset Little, CNP, Shaunna Jamison, [facility]    Discharge Summary     Patient ID: Jose Leo  :     MRN: [id number]     ACCOUNT:  [de-identified]   Patient's PCP: María Reza MD  Admit Date: 2020   Discharge Date: 10/1/2020     Length of Stay: 2  Code Status:  Full Code  Admitting Physician: Jesus Silva MD  Discharge Physician: Jesus Silva MD     Active Discharge Diagnoses:     Hospital Problem Lists:  Principal Problem:    CHRIS (acute kidney injury) [facility])  Active Problems:    Paranoid schizophrenia [facility])    Essential hypertension    Chronic obstructive pulmonary disease ([facility] [address]    Type 2 diabetes mellitus without complication, with long-term current use of insulin ([facility] [address]    Hypokalemia    Hyponatremia  Resolved Problems:    * No resolved hospital problems. *      Admission Condition:  poor     Discharged Condition: stable    Hospital Stay:   Admitting history:  Valdo Zuniga a 75 y.o. female who presents with Fall (2 weeks ago);  Knee Pain (L); Abdominal Pain (scratched abdomen during fall, diarrhea); Back Pain (lower); and Other (mobility issue)  She was admitted through the ED  for the management of CHRIS (acute kidney injury) ([facility] [address]     Pt's left knee gave out 2 weeks ago and she fell on a broken vase causing a 2.5 cm laceration to the abdomen that has healed.       For the last 3-4 days she has had constant diarrhea.  Pt denies vomiting but does have some nausea.  She also reports H/A, weakness and chills.  Pt has not had any sick contacts or eaten out.  Pt reports increased phlegm production for about a week.  She has noticed swelling of her feet x 1 month.  Only 1 pair of sandals fits her. Effie Butt takes Mobic for chronic back and knee pain and is on Lisinopril for HTN.       In the ED labs revealed CHRIS with BUN/ Creat 32/2.65 (15/1.18 on 4/5/20) & K 2.9, Na 129.  Troponin 19 & 15, Lipase 40.  CXR and Rt knee films unremarkable    Hospital Course:   Patient is fully alert and awake today  No diarrhea overnight  Creatinine improved with hydration and holding nephrotoxic medicines, 2.65-2.30-1. 43- 0.95  CO2 14-17-13 but patient is asymptomatic  Potassium 4.0  She was on very high dose of Seroquel at home for schizophrenia, also takes trazodone and alprazolam, all 3 were held yesterday 9/30/2020     Plan:         1. Modified nightly dose of Seroquel to 200 mg which was originally prescribed dose (patient was taking 400 mg)  2. Sodium bicarbonate tablets 3 times a day for 5 days  3. Recheck BMP on 10/5/2020 and follow with PCP with the results  4. Resume other medicines as before, add probiotics  5.  Discharge home with home care      Significant therapeutic interventions: See above notes    Significant Diagnostic Studies:   Labs / Micro:  CBC:   Lab Results   Component Value Date    WBC 8.8 09/30/2020    RBC 3.83 09/30/2020    RBC 3.62 02/26/2012    HGB 11.9 09/30/2020    HCT 37.1 09/30/2020    MCV 96.9 09/30/2020    MCH 31.1 09/30/2020    MCHC 32.1 09/30/2020    RDW 13.2 09/30/2020     09/30/2020     02/26/2012     BMP:    Lab Results   Component Value Date    GLUCOSE 98 10/01/2020 12/13/2019        Radiology:  Xr Knee Left (3 Views)    Result Date: 9/29/2020  No acute osseous or soft tissue abnormality. Tricompartmental degenerative change with chondrocalcinosis in the weight-bearing compartments suggestive of a crystal deposition disorder. Us Renal Complete    Result Date: 9/29/2020  Normal sonographic appearance of the kidneys. Xr Chest Portable    Result Date: 9/29/2020  No acute cardiopulmonary abnormality. Consultations:    Consults:     Final Specialist Recommendations/Findings:   IP CONSULT TO HOSPITALIST  IP CONSULT TO IV TEAM  IP CONSULT TO HOME CARE NEEDS      The patient was seen and examined on day of discharge and this discharge summary is in conjunction with any daily progress note from day of discharge. Discharge plan:     Disposition: Home with home care    Physician Follow Up:   PCP within 1 week    Requiring Further Evaluation/Follow Up POST HOSPITALIZATION/Incidental Findings: Get BMP checked on 10/5/2021 follow-up with PCP    Diet: diabetic diet    Activity: As tolerated    Instructions to Patient: Avoid NSAIDs and excessive dose of psychiatric medicines    Discharge Medications:      Medication List      START taking these medications    Probiotic Acidophilus Tabs  Take 1 tablet by mouth 2 times daily for 7 days     sodium bicarbonate 650 MG tablet  Take 1 tablet by mouth 3 times daily for 5 days        CHANGE how you take these medications    * QUEtiapine 300 MG tablet  Commonly known as:  SEROQUEL  Take 1 tablet by mouth 2 times daily  What changed:  Another medication with the same name was changed. Make sure you understand how and when to take each. * QUEtiapine 200 MG tablet  Commonly known as:  SEROQUEL  Take 1 tablet by mouth nightly  What changed:  how much to take         * This list has 2 medication(s) that are the same as other medications prescribed for you.  Read the directions carefully, and ask your doctor or other care provider to review them with you. CONTINUE taking these medications    albuterol sulfate  (90 Base) MCG/ACT inhaler     ALPRAZolam 0.5 MG tablet  Commonly known as:  XANAX     aspirin 81 MG EC tablet  Take 1 tablet by mouth daily     blood glucose monitor kit and supplies  Test 1 times a day & as needed for symptoms of irregular blood glucose. escitalopram 5 MG tablet  Commonly known as:  LEXAPRO  Take 5 tablets by mouth daily     Insulin Pen Needle 31G X 8 MM Misc  Use BID for insulin administration     Lancets Misc  1 each by Does not apply route 4 times daily     Lantus SoloStar 100 UNIT/ML injection pen  Generic drug:  insulin glargine  INJECT 25 UNITS INTO THE SKIN BID     lisinopril 5 MG tablet  Commonly known as:  PRINIVIL;ZESTRIL  Take 1 tablet by mouth daily     pregabalin 150 MG capsule  Commonly known as:  LYRICA  Take 1 capsule by mouth 2 times daily for 30 days. traZODone 50 MG tablet  Commonly known as:  DESYREL        STOP taking these medications    meloxicam 7.5 MG tablet  Commonly known as:  Mobic     pantoprazole 40 MG tablet  Commonly known as:  PROTONIX           Where to Get Your Medications      These medications were sent to [address] [place], [place] [address] [address] [address]    Phone:  [phone]   · Probiotic Acidophilus Tabs  · sodium bicarbonate 650 MG tablet         Discharge Procedure Orders   Basic Metabolic Panel   Standing Status: Future Standing Exp. Date: 10/11/21   Order Comments: Send results to PCP       Time Spent on discharge is  35 mins in patient examination, evaluation, counseling as well as medication reconciliation, prescriptions for required medications, discharge plan and follow up. Electronically signed by   Robert Huddleston MD  10/1/2020  3:19 PM      Thank you Dr. Darrel Carrera MD for the opportunity to be involved in this patient's care.

## 2020-10-01 NOTE — CARE COORDINATION
Transitional planning. Spoke to [place] with [facility], she verified that pt received HHA through their service and that they can provide [address] [place] [address] as well. Referral sent for Home Care. Fax HC order and AVS/VIKI to [phone]  Informed her that pt is discharged today. 1130 LM with Eva Lua, pt's Payee [phone] to call CM back. Will inform him of discharge and ask if he will be picking up pt.     1135 pt states she needs to stop at [address] [place] [address] on the way home to get her meds. She states she can walk into the pharmacy to get her meds. Adam Boykin RN with have scripts transferred to [address] [place] [address] and inform CM when pt is ready for discharge. [address] order AVS/VIKI to number above,   Fax failed    1255 Faxed HC order and AVS/VIKI to number above, fax stopped after 4 pages and started dialing again. [address] to [place] with JULIO Gonzalez, informed her that both times fax has failed. She provided new fax number [phone],   Fax failed again    [facility] and spoke to [place], [place] cab ride to [address] [place] [address]., [facility] [place] [address], then to her home @ [address] [address] [address] [address]   Trip number [phone]  Dispatch will call once  has accepted the trip with JENNY Fajardo informed [place] that Fax still was not able to go through    [phone] Asked Jero Cooper if this can be email, she replied that it would have to be scanned to my email and the word  \"secure\" will have to be placed in the subject line. 916 Chelita Casas, pt's Payee returned call, he will be here in 15 minutes to pick pt up. Confirmed that this is OK with pt. Informed pt's RN. 143 Spoke to Rory Vicente with [address] [employer] transportation and informed her that pt's friend is going to pick her up, [address] [employer] trip cancelled.      Priyank Rogel to [place] with [facility], they received [facility] [place], [facility]. order and AVS/VIKI

## 2020-10-01 NOTE — PROGRESS NOTES
Occupational Therapy   Occupational Therapy Initial Assessment  Date: 10/1/2020   Patient Name: Nila Matthew  MRN: [id number]     : 1955    Date of Service: 10/1/2020    Discharge Recommendations:  Patient would benefit from continued therapy after discharge       Assessment   Performance deficits / Impairments: Decreased functional mobility ; Decreased ADL status; Decreased safe awareness;Decreased endurance;Decreased balance;Decreased high-level IADLs  Assessment: Pt agreeable to OT eval this date. Pt completed functional transfers and functional mobility with RW and CGA throughout. Pt demo min unsteadiness throughout. Pt limited by endurance as pt states typically uses electric scooter for longer distances. Pt to benefit from continued OT services to increase safety and independence with ADLs/IADLs and functional mobility  Prognosis: Good  Decision Making: Medium Complexity  Patient Education: Pt educated on OT role, OT POC, safety awareness, EC/WS, and importance of continued therapy. Pt verbalized good understanding  REQUIRES OT FOLLOW UP: Yes  Activity Tolerance  Activity Tolerance: Patient Tolerated treatment well  Safety Devices  Safety Devices in place: Yes  Type of devices: Nurse notified; Left in chair;Gait belt;Call light within reach  Restraints  Initially in place: No           Patient Diagnosis(es): The primary encounter diagnosis was CHRIS (acute kidney injury) ([facility] [address] Diagnoses of Hypokalemia, Hyponatremia, and Acute pain of left knee were also pertinent to this visit.      has a past medical history of CHRIS (acute kidney injury) ([address] Bipolar 1 disorder ([address] Breast lump, Chronic obstructive pulmonary disease ([address] Depression, GERD (gastroesophageal reflux disease), Hyperlipidemia, Hypertension, Osteoarthritis, Type 2 diabetes mellitus without complication, with long-term current use of insulin ([address] and Type II or unspecified type diabetes mellitus without mention of complication, not stated as uncontrolled. has a past surgical history that includes Ectopic pregnancy surgery; knee surgery (Right); Dilation and curettage of uterus; Nerve Block (Left, 12/8/14); Knee Arthroplasty (2017); and Tonsillectomy and adenoidectomy.            Restrictions  Restrictions/Precautions  Restrictions/Precautions: Fall Risk  Required Braces or Orthoses?: No  Position Activity Restriction  Other position/activity restrictions: up with assist    Subjective   General  Patient assessed for rehabilitation services?: Yes  Family / Caregiver Present: No  Patient Currently in Pain: Yes  Pain Assessment  Pain Assessment: 0-10  Pain Level: 5  Pain Location: Back;Leg  Pain Orientation: Right;Left  Vital Signs  Pulse: 83  BP: 126/62  Patient Currently in Pain: Yes  Oxygen Therapy  SpO2: 96 %     Social/Functional History  Social/Functional History  Lives With: Alone  Type of Home: Apartment(5th floor)  Home Layout: One level  Home Access: Level entry, Elevator  Bathroom Shower/Tub: Tub/Shower unit  Bathroom Toilet: Handicap height  Bathroom Equipment: Shower chair, Grab bars in shower, Grab bars around toilet  Home Equipment: Rolling walker, Electric scooter  Receives Help From: Home health, Neighbor  ADL Assistance: Independent  Homemaking Assistance: Needs assistance  Homemaking Responsibilities: No(pt reports has aide to assist with cooking and cleaning 5 days/week ~3 hours each day)  Ambulation Assistance: Independent(very short distances; pt typically uses electric scooter)  Transfer Assistance: Independent  Active : No  Mode of Transportation: Cab  Occupation: On disability  Leisure & Hobbies: pt states enjoys watching TV       Objective   Vision: Impaired  Vision Exceptions: Wears glasses at all times  Hearing: Within functional limits         Balance  Sitting Balance: Stand by assistance(~8 minutes sitting at edge of chair)  Standing Balance: Contact guard assistance  Functional Mobility  Functional - Mobility Device: Rolling Walker  Activity: Other  Assist Level: Contact guard assistance  Functional Mobility Comments: Pt completed functional mobility within hospital room with RW and CGA. Min unsteadiness noted     ADL  Feeding: Independent;Setup  Grooming: Independent;Setup  UE Bathing: Stand by assistance;Setup; Increased time to complete  LE Bathing: Contact guard assistance;Setup; Increased time to complete  UE Dressing: Stand by assistance;Setup; Increased time to complete  LE Dressing: Contact guard assistance;Setup; Increased time to complete(demo ability to pull up pants from knees > hips with CGA)  Toileting: Contact guard assistance;Setup; Increased time to complete  Tone RUE  RUE Tone: Normotonic  Tone LUE  LUE Tone: Normotonic  Coordination  Movements Are Fluid And Coordinated: Yes    Bed mobility  Comment: JAYLYN--pt in chair at beginning and end of session  Transfers  Sit to stand: Contact guard assistance  Stand to sit: Contact guard assistance    Cognition  Overall Cognitive Status: Exceptions  Safety Judgement: Decreased awareness of need for assistance;Decreased awareness of need for safety  Insights: Decreased awareness of deficits       Sensation  Overall Sensation Status: WFL        LUE AROM (degrees)  LUE AROM : WFL  Left Hand AROM (degrees)  Left Hand AROM: WFL  RUE AROM (degrees)  RUE AROM : WFL  Right Hand AROM (degrees)  Right Hand AROM: WFL  LUE Strength  Gross LUE Strength: WFL  RUE Strength  Gross RUE Strength: WFL                   Plan   Plan  Times per week: 3-4 x/wk  Current Treatment Recommendations: Balance Training, Functional Mobility Training, Endurance Training, Safety Education & Training, Self-Care / ADL    AM-PAC Score        AM-[facility] Inpatient Daily Activity Raw Score: 20 (10/01/20 1536)  AM-PAC Inpatient ADL T-Scale Score : 42.03 (10/01/20 1536)  ADL Inpatient CMS 0-100% Score: 38.32 (10/01/20 1536)  ADL Inpatient CMS G-Code Modifier : CJ (10/01/20 1536)    Goals  Short term goals  Time Frame for Short term goals: By discharge, pt will:  Short term goal 1: Demo Mod I with use of LRD for functional transfers and functional mobility  Short term goal 2: Demo I with setup for UB ADLs and grooming tasks  Short term goal 3: Demo Mod I with setup and AE PRN for LB ADLs and toileting tasks  Short term goal 4: Demo 8+ minutes dynamic standing task to increase safety and independence with ADLs/IADLs       Therapy Time   Individual Concurrent Group Co-treatment   Time In 1310         Time Out 1326         Minutes 16         Timed Code Treatment Minutes: 8 Minutes       Vijay Saliva, OTR/L

## 2020-10-01 NOTE — DISCHARGE INSTR - COC
Continuity of Care Form    Patient Name: Sahara Kerr   :  1955  MRN:  [id number]    Admit date:  2020  Discharge date:  10/1/2020    Code Status Order: Full Code   Advance Directives:   [facility] Documentation       Date/Time Healthcare Directive Type of Healthcare Directive Copy in [facility] [address] Agent's Name Healthcare Agent's Phone Number    [phone]  No, patient does not have an advance directive for healthcare treatment -- -- -- -- --            Admitting Physician:  Aura Pérez MD  PCP: Joan Lopez MD    Discharging Nurse:  [address] Unit/Room#: 0050/1139-32  Discharging Unit Phone Number: [phone]    Emergency Contact:   Extended Emergency Contact Information  Primary Emergency Contact: Amarjit Mosqueda  Address: N/A   [address] Phone: [phone]  Relation: Other  Secondary Emergency Contact: Terese Ridleyan  [place] Phone: [phone]  Relation: Child    Past Surgical History:  Past Surgical History:   Procedure Laterality Date    DILATION AND CURETTAGE OF UTERUS      ECTOPIC PREGNANCY SURGERY      KNEE ARTHROPLASTY  2017    KNEE SURGERY Right     NERVE BLOCK Left 14    left knee    TONSILLECTOMY AND ADENOIDECTOMY         Immunization History:   Immunization History   Administered Date(s) Administered    Influenza, Quadv, IM, PF (6 mo and older Fluzone, Flulaval, Fluarix, and 3 yrs and older Afluria) 10/05/2017, 2020    Pneumococcal Polysaccharide (Pfrtilqcr47) 2017    Tdap (Boostrix, Adacel) 2017    Zoster Live (Zostavax) 10/09/2017       Active Problems:  Patient Active Problem List   Diagnosis Code    Arthritis of knee, left M17.12    Arthritis of knee, right M17.11    Pain due to total right knee replacement (HCC) T84.84XA, Z96.651    [facility] ([facility] F20.0    S/P total knee arthroplasty Z96.659    Chronic pain syndrome G89.4    Osteoarthritis, knee M17.10    Arthritis of knee M17.10    Lumbar facet arthropathy M47.816    Essential hypertension I10    Hallucination, visual R44.1    Chronic obstructive pulmonary disease (HCC) J44.9    Type 2 diabetes mellitus without complication, with long-term current use of insulin ([facility]) E11.9, Z79.4    Tobacco abuse disorder Z72.0    Schizoaffective disorder ([facility]) F25.9    COPD with acute exacerbation ([facility]) J44.1    COPD exacerbation ([facility]) J44.1    CHRIS (acute kidney injury) ([facility] [address] N17.9    Hypokalemia E87.6    Hyponatremia E87.1       Isolation/Infection:   Isolation            No Isolation          Patient Infection Status       Infection Onset Added Last Indicated Last Indicated By Review Planned Expiration Resolved Resolved By    None active    Resolved    COVID-19 Rule Out 04/05/20 04/05/20 04/05/20 COVID-19 (Ordered)   04/06/20 Rule-Out Test Resulted            Nurse Assessment:  Last Vital Signs: /65   Pulse 88   Temp 98.6 °F (37 °C) (Oral)   Resp 20   Ht 5' 6\" (1.676 m)   Wt 216 lb (98 kg)   LMP  (LMP Unknown)   SpO2 97%   BMI 34.86 kg/m²     Last documented pain score (0-10 scale): Pain Level: 7  Last Weight:   Wt Readings from Last 1 Encounters:   10/01/20 216 lb (98 kg)     Mental Status:  oriented    IV Access:  - None    Nursing Mobility/ADLs:  Walking   Assisted  Transfer  Assisted  Bathing  Assisted  Dressing  Assisted  Toileting  Assisted  Feeding  Independent  Med Admin  Independent  Med Delivery   whole    Wound Care Documentation and Therapy:        Elimination:  Continence:   · Bowel: Yes  · Bladder: Yes  Urinary Catheter: None   Colostomy/Ileostomy/Ileal Conduit: No       Date of Last BM: 10/1/2020    Intake/Output Summary (Last 24 hours) at 10/1/2020 1029  Last data filed at 10/1/2020 0538  Gross per 24 hour   Intake 1500 ml   Output 1775 ml   Net -275 ml     I/O last 3 completed shifts:   In: 1500 [P.O.:1500]  Out: [address] [Urine:1775]    Safety Concerns: At Risk for Falls    Impairments/Disabilities:      None    Nutrition Therapy:  Current Nutrition Therapy:   - Oral Diet:  Carb Control 3 carbs/meal (1500kcals/day)    Routes of Feeding: Oral  Liquids: No Restrictions  Daily Fluid Restriction: no  Last Modified Barium Swallow with Video (Video Swallowing Test): not done    Treatments at the Time of Hospital Discharge:   Respiratory Treatments: n/a  Oxygen Therapy:  is not on home oxygen therapy. Ventilator:    - No ventilator support    Rehab Therapies: Physical Therapy and Occupational Therapy  Weight Bearing Status/Restrictions: No weight bearing restirctions  Other Medical Equipment (for information only, NOT a DME order):  wheelchair and walker  Other Treatments: n/a    Patient's personal belongings (please select all that are sent with patient):  None, personal belongings    RN SIGNATURE:  Electronically signed by Kathleen Londono RN on 10/1/20 at 11:31 AM EDT    CASE MANAGEMENT/SOCIAL WORK SECTION    Inpatient Status Date: ***    Readmission Risk Assessment Score:  Readmission Risk              Risk of Unplanned Readmission:        17           Discharging to Facility/ Agency   · Name: [address] [place] [address]         Phone: [phone]       Fax: [phone]        Dialysis Facility (if applicable)   · Name:  · Address:  · Dialysis Schedule:  · Phone:  · Fax:    / signature: Electronically signed by Tylor Torres RN on 10/1/20 at 11:25 AM EDT    PHYSICIAN SECTION    Prognosis: Good    Condition at Discharge: Stable    Rehab Potential (if transferring to Rehab): Good    Recommended Labs or Other Treatments After Discharge: BMP on 10/5/20, results to PCP    Physician Certification: I certify the above information and transfer of Nicole Grief  is necessary for the continuing treatment of the diagnosis listed and that she requires Home Care for greater 30 days.      Update Admission H&P: No

## 2020-10-16 ENCOUNTER — HOSPITAL ENCOUNTER (OUTPATIENT)
Age: 65
Setting detail: SPECIMEN
Discharge: HOME | End: 2020-10-16
Payer: MEDICARE

## 2020-10-18 LAB
CULTURE: ABNORMAL
Lab: ABNORMAL
SPECIMEN DESCRIPTION: ABNORMAL

## 2020-10-21 ENCOUNTER — APPOINTMENT (OUTPATIENT)
Dept: CT IMAGING | Age: 65
End: 2020-10-21
Payer: MEDICARE

## 2020-10-21 ENCOUNTER — HOSPITAL ENCOUNTER (EMERGENCY)
Age: 65
Discharge: HOME | End: 2020-10-21
Attending: EMERGENCY MEDICINE
Payer: MEDICARE

## 2020-10-21 VITALS
HEART RATE: 90 BPM | OXYGEN SATURATION: 92 % | RESPIRATION RATE: 18 BRPM | SYSTOLIC BLOOD PRESSURE: 108 MMHG | HEIGHT: 66 IN | DIASTOLIC BLOOD PRESSURE: 56 MMHG | BODY MASS INDEX: 34.55 KG/M2 | TEMPERATURE: 97.3 F | WEIGHT: 215 LBS

## 2020-10-21 LAB
ABSOLUTE EOS #: 0.11 K/UL (ref 0–0.44)
ABSOLUTE IMMATURE GRANULOCYTE: 0.04 K/UL (ref 0–0.3)
ABSOLUTE LYMPH #: 4.09 K/UL (ref 1.1–3.7)
ABSOLUTE MONO #: 0.73 K/UL (ref 0.1–1.2)
ALBUMIN SERPL-MCNC: 3.8 G/DL (ref 3.5–5.2)
ALBUMIN/GLOBULIN RATIO: 1.1 (ref 1–2.5)
ALP BLD-CCNC: 80 U/L (ref 35–104)
ALT SERPL-CCNC: 9 U/L (ref 5–33)
ANION GAP SERPL CALCULATED.3IONS-SCNC: 14 MMOL/L (ref 9–17)
AST SERPL-CCNC: 14 U/L
BASOPHILS # BLD: 1 % (ref 0–2)
BASOPHILS ABSOLUTE: 0.04 K/UL (ref 0–0.2)
BILIRUB SERPL-MCNC: 0.26 MG/DL (ref 0.3–1.2)
BUN BLDV-MCNC: 36 MG/DL (ref 8–23)
BUN/CREAT BLD: ABNORMAL (ref 9–20)
CALCIUM SERPL-MCNC: 8.7 MG/DL (ref 8.6–10.4)
CHLORIDE BLD-SCNC: 102 MMOL/L (ref 98–107)
CO2: 21 MMOL/L (ref 20–31)
CREAT SERPL-MCNC: 1.49 MG/DL (ref 0.5–0.9)
DIFFERENTIAL TYPE: ABNORMAL
EOSINOPHILS RELATIVE PERCENT: 1 % (ref 1–4)
GFR AFRICAN AMERICAN: 42 ML/MIN
GFR NON-AFRICAN AMERICAN: 35 ML/MIN
GFR SERPL CREATININE-BSD FRML MDRD: ABNORMAL ML/MIN/{1.73_M2}
GFR SERPL CREATININE-BSD FRML MDRD: ABNORMAL ML/MIN/{1.73_M2}
GLUCOSE BLD-MCNC: 131 MG/DL (ref 70–99)
HCT VFR BLD CALC: 39.5 % (ref 36.3–47.1)
HEMOGLOBIN: 12.2 G/DL (ref 11.9–15.1)
IMMATURE GRANULOCYTES: 1 %
LACTIC ACID, WHOLE BLOOD: 2.2 MMOL/L (ref 0.7–2.1)
LACTIC ACID: ABNORMAL MMOL/L
LIPASE: 42 U/L (ref 13–60)
LYMPHOCYTES # BLD: 52 % (ref 24–43)
MCH RBC QN AUTO: 31.9 PG (ref 25.2–33.5)
MCHC RBC AUTO-ENTMCNC: 30.9 G/DL (ref 28.4–34.8)
MCV RBC AUTO: 103.1 FL (ref 82.6–102.9)
MONOCYTES # BLD: 9 % (ref 3–12)
NRBC AUTOMATED: 0 PER 100 WBC
PDW BLD-RTO: 14.6 % (ref 11.8–14.4)
PLATELET # BLD: 241 K/UL (ref 138–453)
PLATELET ESTIMATE: ABNORMAL
PMV BLD AUTO: 8.6 FL (ref 8.1–13.5)
POTASSIUM SERPL-SCNC: 4.4 MMOL/L (ref 3.7–5.3)
RBC # BLD: 3.83 M/UL (ref 3.95–5.11)
RBC # BLD: ABNORMAL 10*6/UL
SEG NEUTROPHILS: 36 % (ref 36–65)
SEGMENTED NEUTROPHILS ABSOLUTE COUNT: 2.75 K/UL (ref 1.5–8.1)
SODIUM BLD-SCNC: 137 MMOL/L (ref 135–144)
TOTAL PROTEIN: 7.3 G/DL (ref 6.4–8.3)
WBC # BLD: 7.8 K/UL (ref 3.5–11.3)
WBC # BLD: ABNORMAL 10*3/UL

## 2020-10-21 PROCEDURE — 6360000004 HC RX CONTRAST MEDICATION: Performed by: EMERGENCY MEDICINE

## 2020-10-21 PROCEDURE — 96375 TX/PRO/DX INJ NEW DRUG ADDON: CPT

## 2020-10-21 PROCEDURE — 74177 CT ABD & PELVIS W/CONTRAST: CPT

## 2020-10-21 PROCEDURE — 96374 THER/PROPH/DIAG INJ IV PUSH: CPT

## 2020-10-21 PROCEDURE — 99284 EMERGENCY DEPT VISIT MOD MDM: CPT

## 2020-10-21 PROCEDURE — 83605 ASSAY OF LACTIC ACID: CPT

## 2020-10-21 PROCEDURE — 83690 ASSAY OF LIPASE: CPT

## 2020-10-21 PROCEDURE — 6360000002 HC RX W HCPCS: Performed by: STUDENT IN AN ORGANIZED HEALTH CARE EDUCATION/TRAINING PROGRAM

## 2020-10-21 PROCEDURE — 2580000003 HC RX 258: Performed by: STUDENT IN AN ORGANIZED HEALTH CARE EDUCATION/TRAINING PROGRAM

## 2020-10-21 PROCEDURE — 80053 COMPREHEN METABOLIC PANEL: CPT

## 2020-10-21 PROCEDURE — 85025 COMPLETE CBC W/AUTO DIFF WBC: CPT

## 2020-10-21 RX ORDER — ONDANSETRON 2 MG/ML
4 INJECTION INTRAMUSCULAR; INTRAVENOUS ONCE
Status: COMPLETED | OUTPATIENT
Start: 2020-10-21 | End: 2020-10-21

## 2020-10-21 RX ORDER — ONDANSETRON 4 MG/1
4 TABLET, ORALLY DISINTEGRATING ORAL EVERY 8 HOURS PRN
Qty: 6 TABLET | Refills: 0 | Status: ON HOLD | OUTPATIENT
Start: 2020-10-21 | End: 2021-10-11

## 2020-10-21 RX ORDER — MAGNESIUM CITRATE
150 SOLUTION, ORAL ORAL ONCE
Qty: 1 BOTTLE | Refills: 0 | Status: SHIPPED | OUTPATIENT
Start: 2020-10-21 | End: 2020-10-21

## 2020-10-21 RX ORDER — MORPHINE SULFATE 4 MG/ML
4 INJECTION, SOLUTION INTRAMUSCULAR; INTRAVENOUS ONCE
Status: COMPLETED | OUTPATIENT
Start: 2020-10-21 | End: 2020-10-21

## 2020-10-21 RX ORDER — 0.9 % SODIUM CHLORIDE 0.9 %
1000 INTRAVENOUS SOLUTION INTRAVENOUS ONCE
Status: COMPLETED | OUTPATIENT
Start: 2020-10-21 | End: 2020-10-21

## 2020-10-21 RX ORDER — DOCUSATE SODIUM 100 MG/1
100 CAPSULE, LIQUID FILLED ORAL 2 TIMES DAILY
Qty: 30 CAPSULE | Refills: 0 | Status: ON HOLD | OUTPATIENT
Start: 2020-10-21 | End: 2021-10-11

## 2020-10-21 RX ORDER — DICYCLOMINE HYDROCHLORIDE 10 MG/1
10 CAPSULE ORAL EVERY 6 HOURS PRN
Qty: 20 CAPSULE | Refills: 0 | Status: ON HOLD | OUTPATIENT
Start: 2020-10-21 | End: 2021-10-11

## 2020-10-21 RX ADMIN — ONDANSETRON 4 MG: 2 INJECTION INTRAMUSCULAR; INTRAVENOUS at 13:41

## 2020-10-21 RX ADMIN — IOPAMIDOL 75 ML: 755 INJECTION, SOLUTION INTRAVENOUS at 16:02

## 2020-10-21 RX ADMIN — SODIUM CHLORIDE 1000 ML: 9 INJECTION, SOLUTION INTRAVENOUS at 17:11

## 2020-10-21 RX ADMIN — MORPHINE SULFATE 4 MG: 4 INJECTION INTRAVENOUS at 13:41

## 2020-10-21 ASSESSMENT — PAIN DESCRIPTION - PAIN TYPE: TYPE: ACUTE PAIN

## 2020-10-21 ASSESSMENT — PAIN DESCRIPTION - LOCATION: LOCATION: ABDOMEN

## 2020-10-21 ASSESSMENT — PAIN DESCRIPTION - FREQUENCY: FREQUENCY: CONTINUOUS

## 2020-10-21 ASSESSMENT — PAIN SCALES - GENERAL: PAINLEVEL_OUTOF10: 8

## 2020-10-21 ASSESSMENT — PAIN DESCRIPTION - DESCRIPTORS: DESCRIPTORS: SHARP;CRUSHING

## 2020-10-21 NOTE — ED NOTES
Received report from [facility] with no questions or concerns. Patient resting comfortably in POC on cot presenting in no obvious distress. Reminded patient of our need for urine sample. Patient reported not needing to urinate yet.       Ray Resendiz RN  10/21/20 0296

## 2020-10-21 NOTE — ED PROVIDER NOTES
[address]  Emergency Department Encounter  Emergency Medicine Resident     Pt Name: Franck Lopez  MRN: [id number]  [place] 1955  Date of evaluation: 10/21/20  PCP:  JEANNETTE Li CNP        This patient was evaluated in the Emergency Department for symptoms described in the history of present illness. He/she was evaluated in the context of the global COVID-19 pandemic, which necessitated consideration that the patient might be at risk for infection with the SARS-CoV-2 virus that causes COVID-19. Institutional protocols and algorithms that pertain to the evaluation of patients at risk for COVID-19 are in a state of rapid change based on information released by regulatory bodies including the CDC and federal and state organizations. These policies and algorithms were followed during the patient's care in the ED. CHIEF COMPLAINT       Chief Complaint   Patient presents with    Abdominal Pain       HISTORY OF PRESENT ILLNESS  (Location/Symptom, Timing/Onset, Context/Setting, Quality, Duration, Modifying Factors, Severity.)    Franck Lopez is a 75 y.o. female No obstetric history on file. No LMP recorded (lmp unknown). Patient is postmenopausal. presents to Emergency Department with lower quadrant abdominal pain x2 days. Achy constant worse with eating. No radiation. Consistently located in right lower quadrant. Felt every bump on the road. Associated nausea and diarrhea no vomiting. Associated anorexia. No treatments prior to arrival.  She called her primary care doctor, Dr. Caty Strange who told her to come to the emergency department for further evaluation. Denies any history of prior abdominal surgeries however she does have prior ectopic as well as D&C listed in her chart.         PAST MEDICAL / SURGICAL / SOCIAL / FAMILY HISTORY    has a past medical history of CHRIS (acute kidney injury) ([address] Bipolar 1 disorder ([address] Breast lump, Chronic obstructive pulmonary disease ([address] Depression, GERD (gastroesophageal reflux disease), Hyperlipidemia, Hypertension, Osteoarthritis, Type 2 diabetes mellitus without complication, with long-term current use of insulin ([facility] [address] and Type II or unspecified type diabetes mellitus without mention of complication, not stated as uncontrolled. has a past surgical history that includes Ectopic pregnancy surgery; knee surgery (Right); Dilation and curettage of uterus; Nerve Block (Left, 12/8/14); Knee Arthroplasty (2017); and Tonsillectomy and adenoidectomy.     Social History     Socioeconomic History    Marital status:      Spouse name: Not on file    Number of children: Not on file    Years of education: Not on file    Highest education level: Not on file   Occupational History     Employer: N/A   Social Needs    Financial resource strain: Not hard at all   [employer] insecurity     Worry: Never true     Inability: Never true   [employer] needs     Medical: No     Non-medical: No   Tobacco Use    Smoking status: Current Every Day Smoker     Packs/day: 0.25     Years: 10.00     Pack years: 2.50     Types: Cigarettes    Smokeless tobacco: Never Used    Tobacco comment: 3 cigarettes per day; Patient refused counseling   Substance and Sexual Activity    Alcohol use: Yes     Comment: rarely    Drug use: No    Sexual activity: Not Currently   Lifestyle    Physical activity     Days per week: Not on file     Minutes per session: Not on file    Stress: Not on file   Relationships    Social connections     Talks on phone: Not on file     Gets together: Not on file     Attends Advent service: Not on file     Active member of club or organization: Not on file     Attends meetings of clubs or organizations: Not on file     Relationship status: Not on file    Intimate partner violence     Fear of current or ex partner: Not on file     Emotionally abused: Not on file     Physically abused: Not on file     Forced sexual activity: Not on file Other Topics Concern    Not on file   Social History Narrative    Not on file       Family History   Problem Relation Age of Onset    Diabetes Father     Stroke Father     High Blood Pressure Father        Allergies:    Flexeril [cyclobenzaprine]; Gabapentin; Prochlorperazine edisylate; Promethazine; Thorazine [chlorpromazine]; and Chantix [varenicline tartrate]    Home Medications:  Prior to Admission medications    Medication Sig Start Date End Date Taking? Authorizing Provider   ondansetron (ZOFRAN ODT) 4 MG disintegrating tablet Take 1 tablet by mouth every 8 hours as needed for Nausea 10/21/20  Yes Sherre Range, DO   dicyclomine (BENTYL) 10 MG capsule Take 1 capsule by mouth every 6 hours as needed (cramps) 10/21/20  Yes Sherre Range, DO   docusate sodium (COLACE) 100 MG capsule Take 1 capsule by mouth 2 times daily 10/21/20  Yes Sherre Range, DO   insulin glargine (LANTUS SOLOSTAR) 100 UNIT/ML injection pen INJECT 25 UNITS INTO THE SKIN BID 8/20/20   JEANNETTE Noriega CNP   Insulin Pen Needle 31G X 8 MM MISC Use BID for insulin administration 8/20/20   JEANNETTE Noriega CNP   pregabalin (LYRICA) 150 MG capsule Take 1 capsule by mouth 2 times daily for 30 days. 6/4/20 7/4/20  JEANNETTE Claudio CNP   Lancets MISC 1 each by Does not apply route 4 times daily 5/4/20   JEANNETTE Banks CNP   blood glucose monitor kit and supplies Test 1 times a day & as needed for symptoms of irregular blood glucose.  4/16/20   JEANNETTE Banks CNP   aspirin 81 MG EC tablet Take 1 tablet by mouth daily 4/14/20   Ayde Kim MD   escitalopram (LEXAPRO) 5 MG tablet Take 5 tablets by mouth daily 4/14/20   Ayde Kim MD   QUEtiapine (SEROQUEL) 200 MG tablet Take 1 tablet by mouth nightly  Patient taking differently: Take 400 mg by mouth nightly  4/13/20   Ayde Kim MD   traZODone (DESYREL) 50 MG tablet Take 50 mg by mouth nightly    Historical Provider, MD   QUEtiapine (SEROQUEL) 300 MG tablet Take 1 tablet by mouth 2 times daily 5/11/19   Diana Player, DO   ALPRAZolam Unice Nigh) 0.5 MG tablet Take 0.25 mg by mouth 2 times daily. Historical Provider, MD   lisinopril (PRINIVIL;ZESTRIL) 5 MG tablet Take 1 tablet by mouth daily 10/5/17   Chris Quiroz MD   albuterol (PROVENTIL HFA;VENTOLIN HFA) 108 (90 BASE) MCG/ACT inhaler Inhale 1 puff into the lungs 4 times daily as needed for Wheezing     Historical Provider, MD       REVIEW OF SYSTEMS    (2-9 systems for level 4, 10 or more for level 5)    Negative unless otherwise specified in HPI  Gen: No Fever, No chills  EYES: No blurry visiion, no double vision  HENT: No sore throat, No runny nose. No cough  CV: No CP , No palpitation  RESP: No SOB, No respiratory distress  GI: No N/V, No Abdm pain  : No dysuria  SKIN: No rash  MSK: No back pain, no joint pain  NEURO: No HA, no weakness  PSYCH: No SI/HI    PHYSICAL EXAM   (up to 7 for level 4, 8 or more for level 5)     INITIAL VITALS:  BP (!) 108/56   Pulse 90   Temp 97.3 °F (36.3 °C) (Skin)   Resp 18   Ht 5' 6\" (1.676 m)   Wt 215 lb (97.5 kg)   LMP  (LMP Unknown)   SpO2 92%   BMI 34.70 kg/m²     Physical Exam  GENERAL: upon initial examination, patient is well appearing, nontoxic, and not in acute respiratory distress, she is laying in the bed comfortable watching television. Afebrile not tachycardic nontachypneic nonhypoxic on room air  HENT: normocephalic , nose normal,   EYES: no occular discharge, no scleral icterus  NECK: no JVD, no tracheal deviation  CV: Normal S1 S2, no MRG  PULM / CHEST: CTA Bilaterally all fields, no WRR  ABDOMEN: Morbidly obese, protuberant no fluid wave, there is tenderness palpation over McBurney's point with associated rebound no right upper quadrant or other abdominal tenderness.   No CVA tenderness., No peritoneal signs  MSK: no gross deformity, no edema, no TTP  NEURO: Awake alert cooperative moving all extremities  SKIN: no rash, no erythema, cap refill < 2 sec  PSYCH / BEHAVIORAL: mood/affect normal, behavior normal, no flight of ideas    DIFFERENTIAL  DIAGNOSIS   PLAN ([facility] [facility] [employer] / EKG):  Orders Placed This Encounter   Procedures    CT ABDOMEN PELVIS W IV CONTRAST Additional Contrast? None    CBC WITH AUTO DIFFERENTIAL    COMPREHENSIVE METABOLIC PANEL    LIPASE    Lactic Acid, Plasma       MEDICATIONS ORDERED:  Orders Placed This Encounter   Medications    ondansetron (ZOFRAN) injection 4 mg    morphine injection 4 mg    iopamidol (ISOVUE-370) 76 % injection 75 mL    0.9 % sodium chloride bolus    ondansetron (ZOFRAN ODT) 4 MG disintegrating tablet     Sig: Take 1 tablet by mouth every 8 hours as needed for Nausea     Dispense:  6 tablet     Refill:  0    dicyclomine (BENTYL) 10 MG capsule     Sig: Take 1 capsule by mouth every 6 hours as needed (cramps)     Dispense:  20 capsule     Refill:  0    docusate sodium (COLACE) 100 MG capsule     Sig: Take 1 capsule by mouth 2 times daily     Dispense:  30 capsule     Refill:  0    magnesium citrate (CITROMA) SOLN     Sig: Take 150 mLs by mouth once for 1 dose     Dispense:  1 Bottle     Refill:  0           DIAGNOSTIC RESULTS / EMERGENCYDEPARTMENT COURSE / MDM   LABS:  Labs Reviewed   CBC WITH AUTO DIFFERENTIAL - Abnormal; Notable for the following components:       Result Value    RBC 3.83 (*)     .1 (*)     RDW 14.6 (*)     Lymphocytes 52 (*)     Immature Granulocytes 1 (*)     Absolute Lymph # 4.09 (*)     All other components within normal limits   COMPREHENSIVE METABOLIC PANEL - Abnormal; Notable for the following components:    Glucose 131 (*)     BUN 36 (*)     CREATININE 1.49 (*)     Total Bilirubin 0.26 (*)     GFR Non- 35 (*)     GFR  42 (*)     All other components within normal limits   LACTIC ACID, PLASMA - Abnormal; Notable for the following components:    Lactic Acid, Whole Blood 2.2 (*)     All other components within normal limits   LIPASE RADIOLOGY:  No results found. MADI Moralez is a 75 y.o. presenting with right lower quadrant abdominal pain with associated nausea with anorexia and diarrhea. There is tenderness at McBurney's point, her symptoms are concerning for appendicitis and her abdomen is distended secondary to her body habitus of morbid obesity, will obtain CAT scan as well as laboratory work-up Zofran analgesia reassess. EMERGENCY DEPARTMENT COURSE:  ED Course as of Oct 25 1504   Wed Oct 21, 2020   1357 WBC: 7.8 [RB]   1408 Lactic Acid, Whole Blood(!): 2.2 [RB]   1415 Approximately patient's baseline not indicative of CHRIS. Lactic acid is 2.2, WBC 7.8. Creatinine(!): 1.49 [RB]   1731 CT showed no obstruction, or infection, the pt is now pain free    [WK]      ED Course User Index  [RB] Sherron Bravo DO  [WK] Rosa Ross DO         PROCEDURES:        CONSULTS:  None    CRITICAL CARE:  Please see attending note    FINAL IMPRESSION     1. Right lower quadrant abdominal pain    2. Constipation, unspecified constipation type         DISPOSITION / PLAN   DISPOSITION Decision To Discharge 10/21/2020 05:04:21 PM       Evaluation and treatment course in the ED, and plan of care upon discharge was discussed in length with the patient. Patient had no further questions prior to being discharged and was instructed to return to the ED for new or worsening symptoms. Any changes to existing medications or new prescriptions were reviewed with patient and they expressed understanding of how to correctly take their medications and the possible side effects.     PATIENT REFERRED TO:  Matilde Hernandez, APRN - CNP  [address]  [phone]    Schedule an appointment as soon as possible for a visit in 3 days  Follow up      DISCHARGE MEDICATIONS:  Discharge Medication List as of 10/21/2020  5:41 PM      START taking these medications    Details   ondansetron (ZOFRAN ODT) 4 MG disintegrating tablet Take 1 tablet by mouth every 8 hours as needed for Nausea, Disp-6 tablet,R-0Print      dicyclomine (BENTYL) 10 MG capsule Take 1 capsule by mouth every 6 hours as needed (cramps), Disp-20 capsule,R-0Print      docusate sodium (COLACE) 100 MG capsule Take 1 capsule by mouth 2 times daily, Disp-30 capsule,R-0Print      magnesium citrate (CITROMA) SOLN Take 150 mLs by mouth once for 1 dose, Disp-1 Bottle,R-0Print             Dr. Francis Boateng.  [address] [place] [address]  Emergency Medicine Resident Physician, PGY-3    (Please note that portions of this note were completed with a voice recognition program.  Efforts were made to edit the dictations but occasionally words are mis-transcribed.)          Yamilet Jha DO  Resident  10/25/20 2732

## 2020-10-21 NOTE — ED PROVIDER NOTES
Handoff taken on the following patient from prior Attending Physician:    Pt Name: Marcin Oliver    PCP:  JEANNETTE White CNP         Attestation    I was available and discussed any additional care issues that arose and coordinated the management plans with the resident(s) caring for the patient during my duty period. Any areas of disagreement with residents documentation of care or procedures are noted on the chart. I was personally present for the key portions of any/all procedures during my duty period. I have documented in the chart those procedures where I was not present during the key portions.     The pt is a 76 YO F awaiting CT abdomen at this time      Joanne Dobson DO  10/21/20 1506

## 2020-10-21 NOTE — ED PROVIDER NOTES
[facility]     Emergency Department     Faculty Attestation    I performed a history and physical examination of the patient and discussed management with the resident. I reviewed the residents note and agree with the documented findings and plan of care. Any areas of disagreement are noted on the chart. I was personally present for the key portions of any procedures. I have documented in the chart those procedures where I was not present during the key portions. I have reviewed the emergency nurses triage note. I agree with the chief complaint, past medical history, past surgical history, allergies, medications, social and family history as documented unless otherwise noted below. For Physician Assistant/ Nurse Practitioner cases/documentation I have personally evaluated this patient and have completed at least one if not all key elements of the E/M (history, physical exam, and MDM). Additional findings are as noted. Presents with right lower quadrant abdominal pain that started yesterday. She says she has had some nausea with it but denies vomiting. She denies fever, chills, chest pain, shortness of breath, changes in bowel or bladder habits. She says she has never had any surgeries on her abdomen. On exam, patient is resting comfortably in the bed. Lungs clear to auscultation bilaterally heart sounds are normal.  Abdomen is soft with moderate right lower quadrant tenderness. No rebound or guarding is present. There is tenderness over McBurney's point. Will check CT scan of the abdomen as well as labs. Will treat patient's pain and nausea and reassess.       Gloria Neff MD  Attending Emergency  Physician              Angelika Reese MD  10/21/20 0644 [facility],   Resident  10/28/20 5913

## 2020-11-05 RX ORDER — DICYCLOMINE HYDROCHLORIDE 10 MG/1
CAPSULE ORAL
Qty: 20 CAPSULE | Refills: 0 | OUTPATIENT
Start: 2020-11-05

## 2020-11-05 RX ORDER — ONDANSETRON 4 MG/1
TABLET, ORALLY DISINTEGRATING ORAL
Qty: 6 TABLET | Refills: 0 | OUTPATIENT
Start: 2020-11-05

## 2021-01-21 ENCOUNTER — HOSPITAL ENCOUNTER (EMERGENCY)
Age: 66
Discharge: HOME | End: 2021-01-21
Attending: EMERGENCY MEDICINE
Payer: MEDICARE

## 2021-01-21 VITALS
RESPIRATION RATE: 18 BRPM | TEMPERATURE: 98.8 F | OXYGEN SATURATION: 97 % | DIASTOLIC BLOOD PRESSURE: 76 MMHG | SYSTOLIC BLOOD PRESSURE: 116 MMHG | HEART RATE: 84 BPM

## 2021-01-21 DIAGNOSIS — Z76.0 ENCOUNTER FOR MEDICATION REFILL: ICD-10-CM

## 2021-01-21 DIAGNOSIS — F41.1 ANXIETY STATE: Primary | ICD-10-CM

## 2021-01-21 PROCEDURE — 99284 EMERGENCY DEPT VISIT MOD MDM: CPT

## 2021-01-21 RX ORDER — HYDROXYZINE PAMOATE 25 MG/1
25 CAPSULE ORAL 3 TIMES DAILY PRN
Qty: 30 CAPSULE | Refills: 0 | Status: ON HOLD | OUTPATIENT
Start: 2021-01-21 | End: 2021-10-15 | Stop reason: HOSPADM

## 2021-01-21 ASSESSMENT — ENCOUNTER SYMPTOMS
WHEEZING: 0
VOMITING: 0
NAUSEA: 0
ABDOMINAL PAIN: 0
SHORTNESS OF BREATH: 0
COUGH: 0

## 2021-01-21 NOTE — ED PROVIDER NOTES
[facility]     Emergency Department     Faculty Attestation        I performed a history and physical examination of the patient and discussed management with the resident. I reviewed the residents note and agree with the documented findings and plan of care. Any areas of disagreement are noted on the chart. I was personally present for the key portions of any procedures. I have documented in the chart those procedures where I was not present during the key portions. I have reviewed the emergency nurses triage note. I agree with the chief complaint, past medical history, past surgical history, allergies, medications, social and family history as documented unless otherwise noted below. For Physician Assistant/ Nurse Practitioner cases/documentation I have personally evaluated this patient and have completed at least one if not all key elements of the E/M (history, physical exam, and MDM). Additional findings are as noted. Vital Signs: BP: 116/76  Pulse: 84  Resp: 18  Temp: 98.8 °F (37.1 °C) SpO2: 97 %  PCP:  JEANNETTE Tai CNP    Pertinent Comments:         Critical Care  None    This patient was evaluated in the Emergency Department for symptoms described in the history of present illness. He/she was evaluated in the context of the global COVID-19 pandemic, which necessitated consideration that the patient might be at risk for infection with the SARS-CoV-2 virus that causes COVID-19. Institutional protocols and algorithms that pertain to the evaluation of patients at risk for COVID-19 are in a state of rapid change based on information released by regulatory bodies including the CDC and federal and state organizations. These policies and algorithms were followed during the patient's care in the ED. (Please note that portions of this note were completed with a voice recognition program. Efforts were made to edit the dictations but occasionally words are mis-transcribed.  Whenever words are used in this note in any gender, they shall be construed as though they were used in the gender appropriate to the circumstances; and whenever words are used in this note in the singular or plural form, they shall be construed as though they were used in the form appropriate to the circumstances.)    MD Christina Rm  Attending Emergency Medicine Physician             Noah Ferreira MD  01/21/21 0249

## 2021-01-21 NOTE — ED TRIAGE NOTES
Pt requesting medication refill because she was unable to provide a urine sample to her PCP when requested for refill. Pt has no other complaints at this time.

## 2021-01-21 NOTE — ED PROVIDER NOTES
[facility] ED  Emergency Department Encounter  Emergency Medicine Resident     Pt Name: Jun Carreon  MRN: [id number]  [place] 1955  Date of evaluation: 1/21/21  PCP:  Matthew Dye, APRN - [facility]       Chief Complaint   Patient presents with    Medication Refill       HISTORY OFPRESENT ILLNESS  (Location/Symptom, Timing/Onset, Context/Setting, Quality, Duration, Modifying Martnicolasa Darrick.)      Jun Carreon is a 78 yo female who presents with medication refill. Patient states that she has been out of her Xanax for the past month and a half and would like a refill. She states that she takes 0.25 mg 3 times a day. She try to get a refill from her primary care provider however she states that she was supposed to give a urine sample and was not able to and therefore was dismissed home. Denies any other problems or symptoms at this time. Denies any sweats, chills, shakes, seizures, or any other complaints. PAST MEDICAL / SURGICAL / SOCIAL / FAMILY HISTORY      has a past medical history of CHRIS (acute kidney injury) ([address] Bipolar 1 disorder ([address] Breast lump, Chronic obstructive pulmonary disease ([address] Depression, GERD (gastroesophageal reflux disease), Hyperlipidemia, Hypertension, Osteoarthritis, Type 2 diabetes mellitus without complication, with long-term current use of insulin ([address] and Type II or unspecified type diabetes mellitus without mention of complication, not stated as uncontrolled. has a past surgical history that includes Ectopic pregnancy surgery; knee surgery (Right); Dilation and curettage of uterus; Nerve Block (Left, 12/8/14); Knee Arthroplasty (2017); and Tonsillectomy and adenoidectomy.      Social History     Socioeconomic History    Marital status:      Spouse name: Not on file    Number of children: Not on file    Years of education: Not on file    Highest education level: Not on file   Occupational History dicyclomine (BENTYL) 10 MG capsule Take 1 capsule by mouth every 6 hours as needed (cramps) 10/21/20   Simeon Bowl, DO   docusate sodium (COLACE) 100 MG capsule Take 1 capsule by mouth 2 times daily 10/21/20   Simeon Bowl, DO   insulin glargine (LANTUS SOLOSTAR) 100 UNIT/ML injection pen INJECT 25 UNITS INTO THE SKIN BID 8/20/20   JEANNETTE Walter CNP   Insulin Pen Needle 31G X 8 MM MISC Use BID for insulin administration 8/20/20   JEANNETTE Walter CNP   pregabalin (LYRICA) 150 MG capsule Take 1 capsule by mouth 2 times daily for 30 days. 6/4/20 7/4/20  JEANNETTE Ricardo CNP   Lancets MISC 1 each by Does not apply route 4 times daily 5/4/20   JEANNETTE Orlando CNP   blood glucose monitor kit and supplies Test 1 times a day & as needed for symptoms of irregular blood glucose. 4/16/20   JEANNETTE Orlando CNP   aspirin 81 MG EC tablet Take 1 tablet by mouth daily 4/14/20   Delma Booth MD   escitalopram (LEXAPRO) 5 MG tablet Take 5 tablets by mouth daily 4/14/20   Delma Booth MD   QUEtiapine (SEROQUEL) 200 MG tablet Take 1 tablet by mouth nightly  Patient taking differently: Take 400 mg by mouth nightly  4/13/20   Delma Booth MD   traZODone (DESYREL) 50 MG tablet Take 50 mg by mouth nightly    Historical Provider, MD   QUEtiapine (SEROQUEL) 300 MG tablet Take 1 tablet by mouth 2 times daily 5/11/19   Jerald Rao,    ALPRAZolam Praveen Lavender) 0.5 MG tablet Take 0.25 mg by mouth 2 times daily. Historical Provider, MD   lisinopril (PRINIVIL;ZESTRIL) 5 MG tablet Take 1 tablet by mouth daily 10/5/17   Chris Hilario MD   albuterol (PROVENTIL HFA;VENTOLIN HFA) 108 (90 BASE) MCG/ACT inhaler Inhale 1 puff into the lungs 4 times daily as needed for Wheezing     Historical Provider, MD       REVIEW OFSYSTEMS    (2-9 systems for level 4, 10 or more for level 5)      Review of Systems   Constitutional: Negative for chills and fever. Respiratory: Negative for cough, shortness of breath and wheezing. Cardiovascular: Negative for chest pain, palpitations and leg swelling. Gastrointestinal: Negative for abdominal pain, nausea and vomiting. Neurological: Negative for light-headedness and headaches. Psychiatric/Behavioral: The patient is nervous/anxious. PHYSICAL EXAM   (up to 7 for level 4, 8 or more forlevel 5)      INITIAL VITALS:   ED Triage Vitals   BP Temp Temp Source Pulse Resp SpO2 Height Weight   01/21/21 1539 01/21/21 1511 01/21/21 1539 01/21/21 1539 01/21/21 1539 01/21/21 1539 -- --   116/76 97.2 °F (36.2 °C) Oral 84 18 97 %         Physical Exam  Vitals signs and nursing note reviewed. Constitutional:       General: She is not in acute distress. Appearance: Normal appearance. She is not ill-appearing, toxic-appearing or diaphoretic. Cardiovascular:      Rate and Rhythm: Normal rate and regular rhythm. Heart sounds: No murmur. No gallop. Pulmonary:      Effort: Pulmonary effort is normal.      Breath sounds: Normal breath sounds. Abdominal:      General: There is no distension. Palpations: Abdomen is soft. Tenderness: There is no abdominal tenderness. There is no guarding. Musculoskeletal:      Right lower leg: No edema. Left lower leg: No edema. Skin:     General: Skin is warm and dry. Neurological:      Mental Status: She is alert and oriented to person, place, and time. DIFFERENTIAL  DIAGNOSIS     PLAN (LABS / IMAGING / EKG):  No orders of the defined types were placed in this encounter.       MEDICATIONS ORDERED:  Orders Placed This Encounter   Medications    hydrOXYzine (VISTARIL) 25 MG capsule     Sig: Take 1 capsule by mouth 3 times daily as needed for Anxiety     Dispense:  30 capsule     Refill:  0 Initial MDM/Plan/ED course: 77 y.o. female who presents for a medication refill. On exam vitals are normal and pt is in no acute distress. PE unremarkable. Pt requesting a refill on xanax for anxiety. Instructed pt that her PCP must prescribe this. Pt has been out of medication for 2 months and shows no signs of withdrawal. Pt discharged with Vistaril and instructed to follow up with PCP. DIAGNOSTIC RESULTS / EMERGENCY DEPARTMENT COURSE / MDM     LABS:  Labs Reviewed - No data to display      RADIOLOGY:  No results found. EKG      All EKG's are interpreted by the [address] [place] [address] Drive Physician who either signs or Co-signs this chart in the absence of a cardiologist.      PROCEDURES:  None    CONSULTS:  None    CRITICAL CARE:  Please see attending note    FINAL IMPRESSION      1. Anxiety state    2.  Encounter for medication refill          DISPOSITION / PLAN     DISPOSITION Decision To Discharge 01/21/2021 03:44:10 PM      PATIENT REFERRED TO:  JEANNETTE Vick - [facility]  [address] [address]  [phone]    Schedule an appointment as soon as possible for a visit   follow up      DISCHARGE MEDICATIONS:  Discharge Medication List as of 1/21/2021  3:51 PM      START taking these medications    Details   hydrOXYzine (VISTARIL) 25 MG capsule Take 1 capsule by mouth 3 times daily as needed for Anxiety, Disp-30 capsule, R-0Print             Claire Burton DO  Emergency Medicine Resident    (Please note that portions of this note were completed with a voice recognition program.Efforts were made to edit the dictations but occasionally words are mis-transcribed.)       Nanci Sue DO  Resident  01/21/21 2006

## 2021-03-22 ENCOUNTER — APPOINTMENT (OUTPATIENT)
Dept: GENERAL RADIOLOGY | Age: 66
End: 2021-03-22
Payer: MEDICARE

## 2021-03-22 ENCOUNTER — HOSPITAL ENCOUNTER (EMERGENCY)
Age: 66
Discharge: HOME | End: 2021-03-22
Attending: EMERGENCY MEDICINE
Payer: MEDICARE

## 2021-03-22 VITALS
SYSTOLIC BLOOD PRESSURE: 152 MMHG | HEART RATE: 91 BPM | DIASTOLIC BLOOD PRESSURE: 75 MMHG | WEIGHT: 200 LBS | OXYGEN SATURATION: 95 % | RESPIRATION RATE: 16 BRPM | HEIGHT: 66 IN | BODY MASS INDEX: 32.14 KG/M2 | TEMPERATURE: 97.4 F

## 2021-03-22 DIAGNOSIS — M25.551 RIGHT HIP PAIN: ICD-10-CM
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
## 2021-03-22 DIAGNOSIS — M79.651 RIGHT THIGH PAIN: ICD-10-CM

## 2021-03-22 LAB
ABSOLUTE EOS #: 0.4 K/UL (ref 0–0.44)
ABSOLUTE IMMATURE GRANULOCYTE: <0.03 K/UL (ref 0–0.3)
ABSOLUTE LYMPH #: 4.64 K/UL (ref 1.1–3.7)
ABSOLUTE MONO #: 0.83 K/UL (ref 0.1–1.2)
ANION GAP SERPL CALCULATED.3IONS-SCNC: 9 MMOL/L (ref 9–17)
BASOPHILS # BLD: 0 % (ref 0–2)
BASOPHILS ABSOLUTE: 0.04 K/UL (ref 0–0.2)
BNP INTERPRETATION: NORMAL
BUN BLDV-MCNC: 19 MG/DL (ref 8–23)
BUN/CREAT BLD: ABNORMAL (ref 9–20)
CALCIUM SERPL-MCNC: 9.2 MG/DL (ref 8.6–10.4)
CHLORIDE BLD-SCNC: 106 MMOL/L (ref 98–107)
CO2: 20 MMOL/L (ref 20–31)
CREAT SERPL-MCNC: 1.15 MG/DL (ref 0.5–0.9)
DIFFERENTIAL TYPE: ABNORMAL
EOSINOPHILS RELATIVE PERCENT: 4 % (ref 1–4)
GFR AFRICAN AMERICAN: 57 ML/MIN
GFR NON-AFRICAN AMERICAN: 47 ML/MIN
GFR SERPL CREATININE-BSD FRML MDRD: ABNORMAL ML/MIN/{1.73_M2}
GFR SERPL CREATININE-BSD FRML MDRD: ABNORMAL ML/MIN/{1.73_M2}
GLUCOSE BLD-MCNC: 122 MG/DL (ref 70–99)
HCT VFR BLD CALC: 40.6 % (ref 36.3–47.1)
HEMOGLOBIN: 12.5 G/DL (ref 11.9–15.1)
IMMATURE GRANULOCYTES: 0 %
LYMPHOCYTES # BLD: 52 % (ref 24–43)
MCH RBC QN AUTO: 29.4 PG (ref 25.2–33.5)
MCHC RBC AUTO-ENTMCNC: 30.8 G/DL (ref 28.4–34.8)
MCV RBC AUTO: 95.5 FL (ref 82.6–102.9)
MONOCYTES # BLD: 9 % (ref 3–12)
NRBC AUTOMATED: 0 PER 100 WBC
PDW BLD-RTO: 12.7 % (ref 11.8–14.4)
PLATELET # BLD: 178 K/UL (ref 138–453)
PLATELET ESTIMATE: ABNORMAL
PMV BLD AUTO: 9.4 FL (ref 8.1–13.5)
POTASSIUM SERPL-SCNC: 4.4 MMOL/L (ref 3.7–5.3)
PRO-BNP: 21 PG/ML
RBC # BLD: 4.25 M/UL (ref 3.95–5.11)
RBC # BLD: ABNORMAL 10*6/UL
SEG NEUTROPHILS: 35 % (ref 36–65)
SEGMENTED NEUTROPHILS ABSOLUTE COUNT: 3.13 K/UL (ref 1.5–8.1)
SODIUM BLD-SCNC: 135 MMOL/L (ref 135–144)
TROPONIN INTERP: NORMAL
TROPONIN T: NORMAL NG/ML
TROPONIN, HIGH SENSITIVITY: 7 NG/L (ref 0–14)
TSH SERPL DL<=0.05 MIU/L-ACNC: 0.89 MIU/L (ref 0.3–5)
WBC # BLD: 9.1 K/UL (ref 3.5–11.3)
WBC # BLD: ABNORMAL 10*3/UL

## 2021-03-22 PROCEDURE — 84443 ASSAY THYROID STIM HORMONE: CPT

## 2021-03-22 PROCEDURE — 85025 COMPLETE CBC W/AUTO DIFF WBC: CPT

## 2021-03-22 PROCEDURE — 90471 IMMUNIZATION ADMIN: CPT

## 2021-03-22 PROCEDURE — 93005 ELECTROCARDIOGRAM TRACING: CPT | Performed by: STUDENT IN AN ORGANIZED HEALTH CARE EDUCATION/TRAINING PROGRAM

## 2021-03-22 PROCEDURE — 84484 ASSAY OF TROPONIN QUANT: CPT

## 2021-03-22 PROCEDURE — 73502 X-RAY EXAM HIP UNI 2-3 VIEWS: CPT

## 2021-03-22 PROCEDURE — 90715 TDAP VACCINE 7 YRS/> IM: CPT

## 2021-03-22 PROCEDURE — 83880 ASSAY OF NATRIURETIC PEPTIDE: CPT

## 2021-03-22 PROCEDURE — 6360000002 HC RX W HCPCS

## 2021-03-22 PROCEDURE — 73562 X-RAY EXAM OF KNEE 3: CPT

## 2021-03-22 PROCEDURE — 80048 BASIC METABOLIC PNL TOTAL CA: CPT

## 2021-03-22 PROCEDURE — 71045 X-RAY EXAM CHEST 1 VIEW: CPT

## 2021-03-22 PROCEDURE — 2580000003 HC RX 258: Performed by: STUDENT IN AN ORGANIZED HEALTH CARE EDUCATION/TRAINING PROGRAM

## 2021-03-22 PROCEDURE — 99284 EMERGENCY DEPT VISIT MOD MDM: CPT

## 2021-03-22 PROCEDURE — 6370000000 HC RX 637 (ALT 250 FOR IP): Performed by: STUDENT IN AN ORGANIZED HEALTH CARE EDUCATION/TRAINING PROGRAM

## 2021-03-22 PROCEDURE — 73552 X-RAY EXAM OF FEMUR 2/>: CPT

## 2021-03-22 RX ORDER — IBUPROFEN 600 MG/1
600 TABLET ORAL EVERY 6 HOURS PRN
Qty: 30 TABLET | Refills: 0 | OUTPATIENT
Start: 2021-03-22 | End: 2021-06-14

## 2021-03-22 RX ORDER — OXYCODONE HYDROCHLORIDE AND ACETAMINOPHEN 5; 325 MG/1; MG/1
1 TABLET ORAL ONCE
Status: COMPLETED | OUTPATIENT
Start: 2021-03-22 | End: 2021-03-22

## 2021-03-22 RX ORDER — ACETAMINOPHEN 500 MG
1000 TABLET ORAL EVERY 6 HOURS PRN
Qty: 30 TABLET | Refills: 0 | Status: SHIPPED | OUTPATIENT
Start: 2021-03-22 | End: 2021-06-14 | Stop reason: SDUPTHER

## 2021-03-22 RX ORDER — 0.9 % SODIUM CHLORIDE 0.9 %
1000 INTRAVENOUS SOLUTION INTRAVENOUS ONCE
Status: COMPLETED | OUTPATIENT
Start: 2021-03-22 | End: 2021-03-22

## 2021-03-22 RX ORDER — METHOCARBAMOL 500 MG/1
500 TABLET, FILM COATED ORAL 4 TIMES DAILY
Qty: 40 TABLET | Refills: 0 | Status: SHIPPED | OUTPATIENT
Start: 2021-03-22 | End: 2021-04-01

## 2021-03-22 RX ORDER — ACETAMINOPHEN 325 MG/1
650 TABLET ORAL ONCE
Status: DISCONTINUED | OUTPATIENT
Start: 2021-03-22 | End: 2021-03-23 | Stop reason: HOSPADM

## 2021-03-22 RX ORDER — LIDOCAINE 50 MG/G
1 PATCH TOPICAL DAILY
Qty: 30 PATCH | Refills: 0 | Status: ON HOLD | OUTPATIENT
Start: 2021-03-22 | End: 2021-10-11

## 2021-03-22 RX ADMIN — TETANUS TOXOID, REDUCED DIPHTHERIA TOXOID AND ACELLULAR PERTUSSIS VACCINE, ADSORBED 0.5 ML: 5; 2.5; 8; 8; 2.5 SUSPENSION INTRAMUSCULAR at 23:20

## 2021-03-22 RX ADMIN — SODIUM CHLORIDE 1000 ML: 9 INJECTION, SOLUTION INTRAVENOUS at 18:25

## 2021-03-22 RX ADMIN — OXYCODONE HYDROCHLORIDE AND ACETAMINOPHEN 1 TABLET: 5; 325 TABLET ORAL at 23:20

## 2021-03-22 ASSESSMENT — PAIN SCALES - GENERAL: PAINLEVEL_OUTOF10: 9

## 2021-03-22 ASSESSMENT — PAIN DESCRIPTION - ORIENTATION: ORIENTATION: RIGHT

## 2021-03-22 NOTE — ED NOTES
Pt presents to ED c/o dizziness x a couple of days and falling @ home. Pt lives at home and says she has fallen a lot. Pt c/o R hip and leg pain, rates as 9/10. Pt denies n/v/d, hitting head, LOC, chest pain, SOB, or any other complaints. Pt is A&Ox4, placed on full monitor, changed into a gown, EKG done, and IV established.       Steve Miller RN  03/22/21 6179

## 2021-03-22 NOTE — ED NOTES
The following labs labeled with pt sticker and tubed:     [x] Lavender   [] on ice   [x] Blue   [x] Green/yellow  [] Green/black [] on ice  [] Pink  [] Red  [x] Yellow     [] COVID-19 swab    [] Rapid     [] Urine Sample  [] Pelvic Cultures    [] Blood Cultures          Juan Momin RN  03/22/21 [id number]

## 2021-03-22 NOTE — ED TRIAGE NOTES
Pt arrived to the ED with c/o falling and feeling dizzy. Pt states she lives at home alone and has fell a few times the last couple of days. Pt states she is also having pain on the right leg and right hip. Pt is alert and oriented x4. VSS.

## 2021-03-23 LAB
EKG ATRIAL RATE: 96 BPM
EKG P AXIS: 66 DEGREES
EKG P-R INTERVAL: 164 MS
EKG Q-T INTERVAL: 342 MS
EKG QRS DURATION: 70 MS
EKG QTC CALCULATION (BAZETT): 432 MS
EKG R AXIS: 53 DEGREES
EKG T AXIS: 43 DEGREES
EKG VENTRICULAR RATE: 96 BPM

## 2021-03-23 PROCEDURE — 93010 ELECTROCARDIOGRAM REPORT: CPT | Performed by: INTERNAL MEDICINE

## 2021-03-23 ASSESSMENT — ENCOUNTER SYMPTOMS
ABDOMINAL PAIN: 0
SHORTNESS OF BREATH: 0
EYE REDNESS: 0
EYE DISCHARGE: 0

## 2021-03-24 NOTE — ED PROVIDER NOTES
[facility] ED  Emergency Department Encounter  Emergency Medicine Resident     Pt Name: Vladimir Brunner  MRN: [id number]  [place] 1955  Date of evaluation: 3/23/21  PCP:  Dylan Schuler, [address] [place] [address] Y[place]       Chief Complaint   Patient presents with    Dizziness    Fall       HISTORY [facility]  (Location/Symptom, Timing/Onset, Context/Setting, Quality, Duration, Modifying Glennette More.)      Vladimir Brunner is a 77 y.o. female who presents with lightheadedness as well as fall. She states she not syncopized however got lightheaded and tripped and fell. Discerned about right hip and right leg pain. Pain is moderate. Worse with movement and palpation. Has been able to ambulate. Denies any blood thinner use. Denies any nausea, vomiting, chest pain, shortness of breath. PAST MEDICAL / SURGICAL / SOCIAL / FAMILY HISTORY      has a past medical history of CHRIS (acute kidney injury) ([address] Bipolar 1 disorder ([address] Breast lump, Chronic obstructive pulmonary disease ([address] Depression, GERD (gastroesophageal reflux disease), Hyperlipidemia, Hypertension, Osteoarthritis, Type 2 diabetes mellitus without complication, with long-term current use of insulin ([address] and Type II or unspecified type diabetes mellitus without mention of complication, not stated as uncontrolled. has a past surgical history that includes Ectopic pregnancy surgery; knee surgery (Right); Dilation and curettage of uterus; Nerve Block (Left, 12/8/14); Knee Arthroplasty (2017); and Tonsillectomy and adenoidectomy.     Social History     Socioeconomic History    Marital status:      Spouse name: Not on file    Number of children: Not on file    Years of education: Not on file    Highest education level: Not on file   Occupational History     Employer: N/A   Social Needs    Financial resource strain: Not hard at all   [employer] insecurity     Worry: Never true     Inability: Never true   [employer] Transportation needs     Medical: No     Non-medical: No   Tobacco Use    Smoking status: Current Every Day Smoker     Packs/day: 0.25     Years: 10.00     Pack years: 2.50     Types: Cigarettes    Smokeless tobacco: Never Used    Tobacco comment: 3 cigarettes per day; Patient refused counseling   Substance and Sexual Activity    Alcohol use: Yes     Comment: rarely    Drug use: No    Sexual activity: Not Currently   Lifestyle    Physical activity     Days per week: Not on file     Minutes per session: Not on file    Stress: Not on file   Relationships    Social connections     Talks on phone: Not on file     Gets together: Not on file     Attends Yarsani service: Not on file     Active member of club or organization: Not on file     Attends meetings of clubs or organizations: Not on file     Relationship status: Not on file    Intimate partner violence     Fear of current or ex partner: Not on file     Emotionally abused: Not on file     Physically abused: Not on file     Forced sexual activity: Not on file   Other Topics Concern    Not on file   Social History Narrative    Not on file       Family History   Problem Relation Age of Onset    Diabetes Father     Stroke Father     High Blood Pressure Father        Allergies:  Flexeril [cyclobenzaprine], Gabapentin, Prochlorperazine edisylate, Promethazine, Thorazine [chlorpromazine], and Chantix [varenicline tartrate]    Home Medications:  Prior to Admission medications    Medication Sig Start Date End Date Taking?  Authorizing Provider   acetaminophen (APAP EXTRA STRENGTH) 500 MG tablet Take 2 tablets by mouth every 6 hours as needed for Pain 3/22/21  Yes Edward Barker DO   ibuprofen (ADVIL;MOTRIN) 600 MG tablet Take 1 tablet by mouth every 6 hours as needed for Pain 3/22/21  Yes Edward Barker DO   lidocaine (LIDODERM) 5 % Place 1 patch onto the skin daily 12 hours on, 12 hours off. 3/22/21  Yes Edward Barker DO   methocarbamol (ROBAXIN) 500 MG tablet Take 1 tablet by mouth 4 times daily for 10 days 3/22/21 4/1/21 Yes Lea Morgan,    OneTouch Delica Lancets 63Z MISC USE AS DIRECTED FOUR TIMES A DAY TO TEST BLOOD SUGARS 2/11/21   JEANNETTE Loving CNP   hydrOXYzine (VISTARIL) 25 MG capsule Take 1 capsule by mouth 3 times daily as needed for Anxiety 1/21/21   Srikanth Reading, DO   ondansetron (ZOFRAN ODT) 4 MG disintegrating tablet Take 1 tablet by mouth every 8 hours as needed for Nausea 10/21/20   Srikanth Reading, DO   dicyclomine (BENTYL) 10 MG capsule Take 1 capsule by mouth every 6 hours as needed (cramps) 10/21/20   Srikanth Reading, DO   docusate sodium (COLACE) 100 MG capsule Take 1 capsule by mouth 2 times daily 10/21/20   Srikanth Reading, DO   insulin glargine (LANTUS SOLOSTAR) 100 UNIT/ML injection pen INJECT 25 UNITS INTO THE SKIN BID 8/20/20   JEANNETTE Novoa CNP   Insulin Pen Needle 31G X 8 MM MISC Use BID for insulin administration 8/20/20   JEANNETTE Novoa CNP   pregabalin (LYRICA) 150 MG capsule Take 1 capsule by mouth 2 times daily for 30 days. 6/4/20 7/4/20  JEANNETTE Quintana CNP   blood glucose monitor kit and supplies Test 1 times a day & as needed for symptoms of irregular blood glucose. 4/16/20   JEANNETTE Loving CNP   aspirin 81 MG EC tablet Take 1 tablet by mouth daily 4/14/20   Shy Delgado MD   escitalopram (LEXAPRO) 5 MG tablet Take 5 tablets by mouth daily 4/14/20   Shy Delgado MD   QUEtiapine (SEROQUEL) 200 MG tablet Take 1 tablet by mouth nightly  Patient taking differently: Take 400 mg by mouth nightly  4/13/20   Shy Delgado MD   traZODone (DESYREL) 50 MG tablet Take 50 mg by mouth nightly    Historical Provider, MD   QUEtiapine (SEROQUEL) 300 MG tablet Take 1 tablet by mouth 2 times daily 5/11/19   Lea Morgan, DO   ALPRAZolam Migue ) 0.5 MG tablet Take 0.25 mg by mouth 2 times daily.      Historical Provider, MD   lisinopril (PRINIVIL;ZESTRIL) 5 MG tablet Take 1 tablet by mouth daily 10/5/17   Chris Wolf MD   albuterol (PROVENTIL HFA;VENTOLIN HFA) 108 (90 BASE) MCG/ACT inhaler Inhale 1 puff into the lungs 4 times daily as needed for Wheezing     Historical Provider, MD       REVIEW OF SYSTEMS    (2-9 systems for level 4, 10 or more for level 5)      Review of Systems   Constitutional: Negative for chills and fever. Eyes: Negative for discharge and redness. Respiratory: Negative for shortness of breath. Cardiovascular: Negative for chest pain. Gastrointestinal: Negative for abdominal pain. Genitourinary: Negative for flank pain. Musculoskeletal: Positive for arthralgias. Skin: Negative for rash. Allergic/Immunologic: Positive for environmental allergies. Neurological: Positive for light-headedness. Psychiatric/Behavioral: Negative for agitation and confusion. PHYSICAL EXAM   (up to 7 for level 4, 8 or more for level 5)     INITIAL VITALS:    height is 5' 6\" (1.676 m) and weight is 200 lb (90.7 kg). Her infrared temperature is 97.4 °F (36.3 °C). Her blood pressure is 152/75 (abnormal) and her pulse is 91. Her respiration is 16 and oxygen saturation is 95%. Physical Exam  Vitals signs and nursing note reviewed. Constitutional:       Appearance: She is well-developed. HENT:      Head: Normocephalic and atraumatic. Nose: Nose normal.      Mouth/Throat:      Mouth: Mucous membranes are moist.   Eyes:      General: No scleral icterus. Conjunctiva/sclera: Conjunctivae normal.      Pupils: Pupils are equal, round, and reactive to light. Neck:      Musculoskeletal: Neck supple. Trachea: No tracheal deviation. Cardiovascular:      Rate and Rhythm: Normal rate and regular rhythm. Heart sounds: Normal heart sounds. No murmur. No friction rub. No gallop. Pulmonary:      Effort: Pulmonary effort is normal. No respiratory distress. Breath sounds: Normal breath sounds. No wheezing or rales.    Abdominal:      General: Bowel sounds are Romina White DO  EmergencyMedicine Resident    (Please note that portions of this note were completed with a voice recognition program.  Efforts were made to edit the dictations but occasionally words are mis-transcribed.)       Romina White DO  Resident  03/23/21 [id number]

## 2021-04-06 ENCOUNTER — HOSPITAL ENCOUNTER (EMERGENCY)
Age: 66
Discharge: HOME | End: 2021-04-06
Attending: EMERGENCY MEDICINE
Payer: MEDICARE

## 2021-04-06 VITALS
WEIGHT: 202 LBS | RESPIRATION RATE: 18 BRPM | OXYGEN SATURATION: 96 % | BODY MASS INDEX: 32.47 KG/M2 | HEART RATE: 82 BPM | DIASTOLIC BLOOD PRESSURE: 79 MMHG | HEIGHT: 66 IN | SYSTOLIC BLOOD PRESSURE: 119 MMHG | TEMPERATURE: 97.8 F

## 2021-04-06 DIAGNOSIS — F41.1 ANXIETY STATE: Primary | ICD-10-CM

## 2021-04-06 PROCEDURE — 6370000000 HC RX 637 (ALT 250 FOR IP): Performed by: STUDENT IN AN ORGANIZED HEALTH CARE EDUCATION/TRAINING PROGRAM

## 2021-04-06 PROCEDURE — 99283 EMERGENCY DEPT VISIT LOW MDM: CPT

## 2021-04-06 RX ORDER — OMEPRAZOLE 20 MG/1
20 CAPSULE, DELAYED RELEASE ORAL
Qty: 30 CAPSULE | Refills: 0 | Status: SHIPPED | OUTPATIENT
Start: 2021-04-06 | End: 2021-05-28 | Stop reason: SDUPTHER

## 2021-04-06 RX ORDER — HYDROXYZINE HYDROCHLORIDE 25 MG/1
25 TABLET, FILM COATED ORAL ONCE
Status: COMPLETED | OUTPATIENT
Start: 2021-04-06 | End: 2021-04-06

## 2021-04-06 RX ORDER — HYDROXYZINE HYDROCHLORIDE 25 MG/1
25 TABLET, FILM COATED ORAL EVERY 8 HOURS PRN
Qty: 30 TABLET | Refills: 0 | Status: SHIPPED | OUTPATIENT
Start: 2021-04-06 | End: 2021-04-16

## 2021-04-06 RX ADMIN — HYDROXYZINE HYDROCHLORIDE 25 MG: 25 TABLET, FILM COATED ORAL at 17:43

## 2021-04-06 ASSESSMENT — PAIN DESCRIPTION - DESCRIPTORS: DESCRIPTORS: ACHING

## 2021-04-06 ASSESSMENT — PAIN DESCRIPTION - LOCATION: LOCATION: LEG;BACK

## 2021-04-06 ASSESSMENT — PAIN DESCRIPTION - PAIN TYPE: TYPE: CHRONIC PAIN

## 2021-04-06 ASSESSMENT — PAIN DESCRIPTION - ORIENTATION: ORIENTATION: RIGHT;LEFT

## 2021-04-06 ASSESSMENT — PAIN SCALES - GENERAL: PAINLEVEL_OUTOF10: 8

## 2021-04-06 ASSESSMENT — PAIN DESCRIPTION - FREQUENCY: FREQUENCY: CONTINUOUS

## 2021-04-06 NOTE — ED NOTES
Pt to ED with c/o anxiety for the past couple of days and chronic back and bilateral leg pain. Pt also requesting medication for the cough she has had for \"awhile. \"  Pt denies any other complaints at this time. Pt sitting in her motorized chair. RR even and non labored. NAD noted at this time.       Wava Runner, JULIANO  04/06/21 2168

## 2021-04-06 NOTE — ED NOTES
Pt states she has been having \"seizures\" where she wakes up in one place and woke up in another place. Pt states she has no hx of seizure. Pt states her Kasbeer Benitez have been acting up that I haven't been able to sleep in three days\"  Pt denies chest pain or SOB. Call light given, white board updated.       Elio Dubose RN  04/06/21 3065

## 2021-04-06 NOTE — ED PROVIDER NOTES
[facility]  ED  Emergency Department Encounter  Emergency Medicine Resident     Pt Name: Joaquin Condon  MRN: [id number]  Carly 1955  Date of evaluation: 4/6/21  PCP:  JEANNETTE Ross [id number]       Chief Complaint   Patient presents with    Anxiety    Leg Pain    Back Pain       HISTORY OFPRESENT ILLNESS  (Location/Symptom, Timing/Onset, Context/Setting, Quality, Duration, Modifying Ree Client.)      Joaquin Condon is a 77 y.o. female who presents to the ED requesting refill of her anxiety meds. Patient states she ran out and has not been able to sleep for 3 days due to this. She has a current with her psychiatrist in approximately a week and a half. She states she usually takes hydroxyzine and this works well. She is denying any new shortness of breath or cough. She has chronic back pain and arthralgias, but nothing new. No has or vision changes, no new weakness or numbness. Patient also states she is struggling with heartburn, and is requesting something for that as well. No other complaints at this time. PAST MEDICAL / SURGICAL / SOCIAL / FAMILY HISTORY      has a past medical history of CHRIS (acute kidney injury) ([address] Bipolar 1 disorder ([address] Breast lump, Chronic obstructive pulmonary disease ([address] Depression, GERD (gastroesophageal reflux disease), Hyperlipidemia, Hypertension, Osteoarthritis, Type 2 diabetes mellitus without complication, with long-term current use of insulin ([address] and Type II or unspecified type diabetes mellitus without mention of complication, not stated as uncontrolled. has a past surgical history that includes Ectopic pregnancy surgery; knee surgery (Right); Dilation and curettage of uterus; Nerve Block (Left, 12/8/14); Knee Arthroplasty (2017); and Tonsillectomy and adenoidectomy.      Social History     Socioeconomic History    Marital status:      Spouse name: Not on file    Number of children: Not on file  Years of education: Not on file    Highest education level: Not on file   Occupational History     Employer: N/A   Social Needs    Financial resource strain: Not hard at all   Jean-Larry insecurity     Worry: Never true     Inability: Never true   [employer] needs     Medical: No     Non-medical: No   Tobacco Use    Smoking status: Current Every Day Smoker     Packs/day: 0.25     Years: 10.00     Pack years: 2.50     Types: Cigarettes    Smokeless tobacco: Never Used    Tobacco comment: 3 cigarettes per day; Patient refused counseling   Substance and Sexual Activity    Alcohol use: Yes     Comment: rarely    Drug use: No    Sexual activity: Not Currently   Lifestyle    Physical activity     Days per week: Not on file     Minutes per session: Not on file    Stress: Not on file   Relationships    Social connections     Talks on phone: Not on file     Gets together: Not on file     Attends Yazidism service: Not on file     Active member of club or organization: Not on file     Attends meetings of clubs or organizations: Not on file     Relationship status: Not on file    Intimate partner violence     Fear of current or ex partner: Not on file     Emotionally abused: Not on file     Physically abused: Not on file     Forced sexual activity: Not on file   Other Topics Concern    Not on file   Social History Narrative    Not on file       Family History   Problem Relation Age of Onset    Diabetes Father     Stroke Father     High Blood Pressure Father         Allergies:  Flexeril [cyclobenzaprine], Gabapentin, Prochlorperazine edisylate, Promethazine, Thorazine [chlorpromazine], and Chantix [varenicline tartrate]    Home Medications:  Prior to Admission medications    Medication Sig Start Date End Date Taking?  Authorizing Provider   hydrOXYzine (ATARAX) 25 MG tablet Take 1 tablet by mouth every 8 hours as needed for Itching 4/6/21 4/16/21 Yes Lela Reyes, DO   omeprazole (PRILOSEC) 20 MG delayed release capsule Take 1 capsule by mouth every morning (before breakfast) 4/6/21  Yes Donteld Sherwood, DO   acetaminophen (APAP EXTRA STRENGTH) 500 MG tablet Take 2 tablets by mouth every 6 hours as needed for Pain 3/22/21   Carry Dalia, DO   ibuprofen (ADVIL;MOTRIN) 600 MG tablet Take 1 tablet by mouth every 6 hours as needed for Pain 3/22/21   Carry Dalia, DO   lidocaine (LIDODERM) 5 % Place 1 patch onto the skin daily 12 hours on, 12 hours off. 3/22/21   Carry Dalia, DO   OneTouch Delica Lancets 21R MISC USE AS DIRECTED FOUR TIMES A DAY TO TEST BLOOD SUGARS 2/11/21   Simmie Severin, APRN - CNP   hydrOXYzine (VISTARIL) 25 MG capsule Take 1 capsule by mouth 3 times daily as needed for Anxiety 1/21/21   Ignacio Frye, DO   ondansetron (ZOFRAN ODT) 4 MG disintegrating tablet Take 1 tablet by mouth every 8 hours as needed for Nausea 10/21/20   Ignacio Frye, DO   dicyclomine (BENTYL) 10 MG capsule Take 1 capsule by mouth every 6 hours as needed (cramps) 10/21/20   Ignacio Frye, DO   docusate sodium (COLACE) 100 MG capsule Take 1 capsule by mouth 2 times daily 10/21/20   Ignacio Frye, DO   insulin glargine (LANTUS SOLOSTAR) 100 UNIT/ML injection pen INJECT 25 UNITS INTO THE SKIN BID 8/20/20   JEANNETTE Garsia CNP   Insulin Pen Needle 31G X 8 MM MISC Use BID for insulin administration 8/20/20   JEANNETTE Garsia CNP   pregabalin (LYRICA) 150 MG capsule Take 1 capsule by mouth 2 times daily for 30 days. 6/4/20 7/4/20  JEANNETTE Zhou CNP   blood glucose monitor kit and supplies Test 1 times a day & as needed for symptoms of irregular blood glucose.  4/16/20   Simmie Severin, APRN - CNP   aspirin 81 MG EC tablet Take 1 tablet by mouth daily 4/14/20   Navneet Washburn MD   escitalopram (LEXAPRO) 5 MG tablet Take 5 tablets by mouth daily 4/14/20   Navneet Washburn MD   QUEtiapine (SEROQUEL) 200 MG tablet Take 1 tablet by mouth nightly  Patient taking differently: Take 400 mg by mouth nightly 4/13/20   Taylor Curiel MD   traZODone (DESYREL) 50 MG tablet Take 50 mg by mouth nightly    Historical Provider, MD   QUEtiapine (SEROQUEL) 300 MG tablet Take 1 tablet by mouth 2 times daily 5/11/19   Marcy Dent DO   ALPRAZolam Renae Crosser) 0.5 MG tablet Take 0.25 mg by mouth 2 times daily. Historical Provider, MD   lisinopril (PRINIVIL;ZESTRIL) 5 MG tablet Take 1 tablet by mouth daily 10/5/17   Chris Esposito MD   albuterol (PROVENTIL HFA;VENTOLIN HFA) 108 (90 BASE) MCG/ACT inhaler Inhale 1 puff into the lungs 4 times daily as needed for Wheezing     Historical Provider, MD       REVIEW OFSYSTEMS    (2-9 systems for level 4, 10 or more for level 5)      Review of Systems   Constitutional: Negative for chills and fever. HENT: Negative for congestion. Eyes: Negative for visual disturbance. Respiratory: Positive for cough (baseline). Negative for shortness of breath. Cardiovascular: Negative for chest pain. Gastrointestinal: Negative for abdominal pain, nausea and vomiting. Musculoskeletal: Positive for back pain (chronic). Negative for myalgias. Skin: Negative for rash and wound. Neurological: Negative for weakness, numbness and headaches. Psychiatric/Behavioral: Positive for sleep disturbance. The patient is nervous/anxious. PHYSICAL EXAM   (up to 7 for level 4, 8 or more forlevel 5)      INITIAL VITALS:   ED Triage Vitals [04/06/21 1613]   BP Temp Temp Source Pulse Resp SpO2 Height Weight   119/79 97.8 °F (36.6 °C) Infrared 82 18 96 % 5' 6\" (1.676 m) 202 lb (91.6 kg)       Physical Exam  Constitutional:       General: She is not in acute distress. Appearance: Normal appearance. She is normal weight. She is not ill-appearing, toxic-appearing or diaphoretic. HENT:      Head: Normocephalic and atraumatic. Nose: Nose normal.      Mouth/Throat:      Mouth: Mucous membranes are moist.      Pharynx: Oropharynx is clear.  No oropharyngeal exudate or posterior oropharyngeal erythema. Eyes:      Extraocular Movements: Extraocular movements intact. Pupils: Pupils are equal, round, and reactive to light. Neck:      Musculoskeletal: Normal range of motion and neck supple. Cardiovascular:      Rate and Rhythm: Normal rate and regular rhythm. Heart sounds: Normal heart sounds. No murmur. Pulmonary:      Effort: Pulmonary effort is normal. No respiratory distress. Breath sounds: Normal breath sounds. No wheezing, rhonchi or rales. Abdominal:      General: There is no distension. Palpations: Abdomen is soft. Tenderness: There is no abdominal tenderness. Musculoskeletal: Normal range of motion. Right lower leg: No edema. Left lower leg: No edema. Skin:     General: Skin is warm and dry. Neurological:      General: No focal deficit present. Mental Status: She is alert and oriented to person, place, and time. Psychiatric:         Mood and Affect: Mood normal.      Comments: Patient reporting increased anxiety         DIFFERENTIAL  DIAGNOSIS     PLAN (LABS / IMAGING / EKG):  No orders of the defined types were placed in this encounter. MEDICATIONS ORDERED:  Orders Placed This Encounter   Medications    hydrOXYzine (ATARAX) tablet 25 mg    hydrOXYzine (ATARAX) 25 MG tablet     Sig: Take 1 tablet by mouth every 8 hours as needed for Itching     Dispense:  30 tablet     Refill:  0    omeprazole (PRILOSEC) 20 MG delayed release capsule     Sig: Take 1 capsule by mouth every morning (before breakfast)     Dispense:  30 capsule     Refill:  0     Initial MDM/Plan/ED COURSE:    77 y.o. female who presents to the ED requesting refill of her anxiety medication. This is hydroxyzine and she is following up with her psychiatrist and presently week and a half to have this refilled. States she may have missed her last appointment which is why she does not have a. On exam, she is in no acute distress.   Vitals are all stable and within normal limits. Heart is regular rate and rhythm and lungs are clear to auscultation bilaterally. No other focal findings on exam.  We discussed giving her a dose of hydroxyzine here, and discharging home with a short prescription for these medications. She also requested a medication for heartburn. We will supply her with omeprazole and instructed her to follow-up with your PCP and psychiatrist as soon as possible.      :     DIAGNOSTIC RESULTS / EMERGENCYDEPARTMENT COURSE / MDM     LABS:  Labs Reviewed - No data to display        No results found. EKG  Not indicated. All EKG's are interpreted by the Emergency Department Physicianwho either signs or Co-signs this chart in the absence of a cardiologist.      PROCEDURES:  None    CONSULTS:  None    CRITICAL CARE:  Please see attending note    FINAL IMPRESSION      1.  Anxiety state          DISPOSITION / PLAN     DISPOSITION Decision To Discharge 04/06/2021 05:48:08 PM      PATIENT REFERRED TO:  JEANNETTE Diaz - CNP  [address]  [phone]    Schedule an appointment as soon as possible for a visit       [facility] ED  [address] [place] [address]  [phone]    If symptoms worsen      DISCHARGE MEDICATIONS:  Discharge Medication List as of 4/6/2021  6:00 PM      START taking these medications    Details   hydrOXYzine (ATARAX) 25 MG tablet Take 1 tablet by mouth every 8 hours as needed for Itching, Disp-30 tablet, R-0Print      omeprazole (PRILOSEC) 20 MG delayed release capsule Take 1 capsule by mouth every morning (before breakfast), Disp-30 capsule, R-0Print             oJel Arndt DO  Emergency Medicine Resident    (Please note that portions of this note were completed with a voice recognition program.Efforts were made to edit the dictations but occasionally words are mis-transcribed.)        Joel Arndt DO  Resident  04/07/21 9842

## 2021-04-06 NOTE — ED PROVIDER NOTES
[address] [facility]     Emergency Department     Faculty Attestation    I performed a history and physical examination of the patient and discussed management with the resident. I reviewed the residents note and agree with the documented findings and plan of care. Any areas of disagreement are noted on the chart. I was personally present for the key portions of any procedures. I have documented in the chart those procedures where I was not present during the key portions. I have reviewed the emergency nurses triage note. I agree with the chief complaint, past medical history, past surgical history, allergies, medications, social and family history as documented unless otherwise noted below. For Physician Assistant/ Nurse Practitioner cases/documentation I have personally evaluated this patient and have completed at least one if not all key elements of the E/M (history, physical exam, and MDM). Additional findings are as noted. I have personally seen and evaluated the patient. I find the patient's history and physical exam are consistent with the NP/PA documentation. I agree with the care provided, treatment rendered, disposition and follow-up plan. 24-year-old female with a history of schizoaffective disorder, chronic back and leg pain, wheelchair dependent, presenting with 3 days of anxiety and chronic pain. Patient states that she has not been able to sleep for 3 days because of anxiety. She describes anxiety as thoughts racing. She denies any chest pain or shortness of breath. She denies any palpitations. She does state that her acid reflux has been bad, stating that she intermittently feels vomit going up into the back of her throat. She has been on medication for this in the past, but does not know what medication she was on.     Exam:  General: Sitting on the bed, awake, alert and in no acute distress  CV: normal rate and regular rhythm  Lungs: Breathing comfortably on room air with no tachypnea, hypoxia, or increased work of breathing    Plan:  Chronic anxiety, off medications. Patient reports that she has an appointment with a new psychiatrist next week. Will restart her Vistaril with a short bridge to get her to this appointment. Patient asked about trazodone, however will avoid polypharmacy and start with the Vistaril. We will also start omeprazole for acid reflux. Low suspicion for cardiac disease given symptoms starting just after eating, no chest pain or shortness of breath, and acid sensation in the back of the throat as her main symptom.         Miley Deshpande MD   Attending Emergency  Physician    (Please note that portions of this note were completed with a voice recognition program. Efforts were made to edit the dictations but occasionally words are mis-transcribed.)              Miley Deshpande MD  04/06/21 1800

## 2021-04-07 ASSESSMENT — ENCOUNTER SYMPTOMS
NAUSEA: 0
SHORTNESS OF BREATH: 0
VOMITING: 0
BACK PAIN: 1
COUGH: 1
ABDOMINAL PAIN: 0

## 2021-05-26 ENCOUNTER — TELEPHONE (OUTPATIENT)
Dept: PSYCHIATRY | Age: 66
End: 2021-05-26

## 2021-05-26 NOTE — TELEPHONE ENCOUNTER
.Appointment Type: New Patient    Outcome of Phone Call: Other Pt Answered    Referring Provider: yes-see referral information    Verify current insurance    Verify PCP    Did provider ask for outreach? yes-referral call    Did patient deny appointment or request an appointment at a later date? no    Are there any barriers with the patient/specific requests?  yes, Pt unable to do Virtual Visit Please refer out for Psychiatry    Please contact if phone call is returned: Lizeth Trotter- [phone]

## 2021-06-14 ENCOUNTER — APPOINTMENT (OUTPATIENT)
Dept: GENERAL RADIOLOGY | Age: 66
End: 2021-06-14
Payer: MEDICARE

## 2021-06-14 ENCOUNTER — HOSPITAL ENCOUNTER (EMERGENCY)
Age: 66
Discharge: HOME | End: 2021-06-15
Attending: EMERGENCY MEDICINE
Payer: MEDICARE

## 2021-06-14 VITALS
RESPIRATION RATE: 20 BRPM | HEART RATE: 86 BPM | DIASTOLIC BLOOD PRESSURE: 71 MMHG | SYSTOLIC BLOOD PRESSURE: 131 MMHG | OXYGEN SATURATION: 93 % | TEMPERATURE: 97.4 F
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
## 2021-06-14 DIAGNOSIS — N30.00 ACUTE CYSTITIS WITHOUT HEMATURIA: Primary | ICD-10-CM

## 2021-06-14 LAB
-: ABNORMAL
AMORPHOUS: ABNORMAL
BACTERIA: ABNORMAL
BILIRUBIN URINE: NEGATIVE
CASTS UA: ABNORMAL /LPF (ref 0–8)
COLOR: YELLOW
CRYSTALS, UA: ABNORMAL /HPF
EPITHELIAL CELLS UA: ABNORMAL /HPF (ref 0–5)
GLUCOSE URINE: NEGATIVE
KETONES, URINE: NEGATIVE
LEUKOCYTE ESTERASE, URINE: ABNORMAL
MUCUS: ABNORMAL
NITRITE, URINE: NEGATIVE
OTHER OBSERVATIONS UA: ABNORMAL
PH UA: 6 (ref 5–8)
PROTEIN UA: ABNORMAL
RBC UA: ABNORMAL /HPF (ref 0–4)
RENAL EPITHELIAL, UA: ABNORMAL /HPF
SPECIFIC GRAVITY UA: 1.01 (ref 1–1.03)
TRICHOMONAS: ABNORMAL
TURBIDITY: CLEAR
URINE HGB: ABNORMAL
UROBILINOGEN, URINE: NORMAL
WBC UA: ABNORMAL /HPF (ref 0–5)
YEAST: ABNORMAL

## 2021-06-14 PROCEDURE — 6370000000 HC RX 637 (ALT 250 FOR IP): Performed by: STUDENT IN AN ORGANIZED HEALTH CARE EDUCATION/TRAINING PROGRAM

## 2021-06-14 PROCEDURE — 87077 CULTURE AEROBIC IDENTIFY: CPT

## 2021-06-14 PROCEDURE — 73590 X-RAY EXAM OF LOWER LEG: CPT

## 2021-06-14 PROCEDURE — 73502 X-RAY EXAM HIP UNI 2-3 VIEWS: CPT

## 2021-06-14 PROCEDURE — 73562 X-RAY EXAM OF KNEE 3: CPT

## 2021-06-14 PROCEDURE — 87086 URINE CULTURE/COLONY COUNT: CPT

## 2021-06-14 PROCEDURE — 99285 EMERGENCY DEPT VISIT HI MDM: CPT

## 2021-06-14 PROCEDURE — 87186 SC STD MICRODIL/AGAR DIL: CPT

## 2021-06-14 PROCEDURE — 73552 X-RAY EXAM OF FEMUR 2/>: CPT

## 2021-06-14 PROCEDURE — 73030 X-RAY EXAM OF SHOULDER: CPT

## 2021-06-14 PROCEDURE — 81001 URINALYSIS AUTO W/SCOPE: CPT

## 2021-06-14 RX ORDER — CEPHALEXIN 250 MG/1
500 CAPSULE ORAL ONCE
Status: DISCONTINUED | OUTPATIENT
Start: 2021-06-14 | End: 2021-06-14

## 2021-06-14 RX ORDER — ACETAMINOPHEN 500 MG
1000 TABLET ORAL EVERY 8 HOURS PRN
Qty: 30 TABLET | Refills: 0 | Status: ON HOLD | OUTPATIENT
Start: 2021-06-14 | End: 2021-10-11

## 2021-06-14 RX ORDER — NAPROXEN 500 MG/1
500 TABLET ORAL 2 TIMES DAILY WITH MEALS
Qty: 14 TABLET | Refills: 0 | Status: SHIPPED | OUTPATIENT
Start: 2021-06-14 | End: 2021-09-18

## 2021-06-14 RX ORDER — SULFAMETHOXAZOLE AND TRIMETHOPRIM 800; 160 MG/1; MG/1
1 TABLET ORAL ONCE
Status: COMPLETED | OUTPATIENT
Start: 2021-06-14 | End: 2021-06-14

## 2021-06-14 RX ORDER — ACETAMINOPHEN 500 MG
1000 TABLET ORAL ONCE
Status: COMPLETED | OUTPATIENT
Start: 2021-06-14 | End: 2021-06-14

## 2021-06-14 RX ORDER — METHOCARBAMOL 750 MG/1
750 TABLET, FILM COATED ORAL 4 TIMES DAILY
Qty: 20 TABLET | Refills: 0 | Status: SHIPPED | OUTPATIENT
Start: 2021-06-14 | End: 2021-06-19

## 2021-06-14 RX ORDER — IBUPROFEN 400 MG/1
400 TABLET ORAL ONCE
Status: COMPLETED | OUTPATIENT
Start: 2021-06-14 | End: 2021-06-14

## 2021-06-14 RX ADMIN — SULFAMETHOXAZOLE AND TRIMETHOPRIM 1 TABLET: 800; 160 TABLET ORAL at 23:59

## 2021-06-14 RX ADMIN — IBUPROFEN 400 MG: 400 TABLET, FILM COATED ORAL at 22:56

## 2021-06-14 RX ADMIN — ACETAMINOPHEN 1000 MG: 500 TABLET ORAL at 22:53

## 2021-06-14 ASSESSMENT — ENCOUNTER SYMPTOMS
NAUSEA: 0
RHINORRHEA: 0
ABDOMINAL PAIN: 0
COUGH: 0
SHORTNESS OF BREATH: 0
VOMITING: 0

## 2021-06-14 ASSESSMENT — PAIN DESCRIPTION - PROGRESSION: CLINICAL_PROGRESSION: NOT CHANGED

## 2021-06-14 ASSESSMENT — PAIN SCALES - GENERAL
PAINLEVEL_OUTOF10: 9

## 2021-06-14 ASSESSMENT — PAIN DESCRIPTION - PAIN TYPE: TYPE: ACUTE PAIN

## 2021-06-14 ASSESSMENT — PAIN DESCRIPTION - ORIENTATION: ORIENTATION: LEFT

## 2021-06-15 NOTE — ED PROVIDER NOTES
[facility]     Emergency Department     Faculty Attestation    I performed a history and physical examination of the patient and discussed management with the resident. I reviewed the resident´s note and agree with the documented findings and plan of care. Any areas of disagreement are noted on the chart. I was personally present for the key portions of any procedures. I have documented in the chart those procedures where I was not present during the key portions. I have reviewed the emergency nurses triage note. I agree with the chief complaint, past medical history, past surgical history, allergies, medications, social and family history as documented unless otherwise noted below. For Physician Assistant/ Nurse Practitioner cases/documentation I have personally evaluated this patient and have completed at least one if not all key elements of the E/M (history, physical exam, and MDM). Additional findings are as noted. Pain left lateral shoulder left hip left mid femur and left mid tibia. Abdomen nontender, no bruising over the abdomen, equal breath sounds, patient also complaining that she has a urinary tract infection.      Tess Castañeda MD  06/14/21 7345

## 2021-06-15 NOTE — ED PROVIDER NOTES
[facility]  Emergency Department Encounter  Emergency Medicine Resident     Pt Name: Ela George  MRN: [id number]  [place]arm[place] 1955  Date of evaluation: 6/14/21  PCP:  Simmie Severin, APRN - [place] [id number]       Chief Complaint   Patient presents with    Fall     Pt states falling on her left side a few days ago       HISTORY OF PRESENT ILLNESS  (Location/Symptom, Timing/Onset, Context/Setting, Quality, Duration, Modifying Factors, Severity.)    Ela George is a 77 y.o. female who presents with left-sided pain. Patient states that she fell down a few stairs at a grocery store on Thursday, 5 days ago. Endorses pain to the left shoulder, left hip, left knee. Did not lose consciousness. Denies any neck or back pain. Denies any abdominal pain. Denies any fever or chills. States that she has been taking some Tylenol and Motrin once or twice since that time. States that she was walking for the first few days. Denies any headache, denies any neck pain. Denies any other complaints. PPE Worn:  Gloves: Yes  Eye Protection: Goggles  Mask: Surgical Mask  Gown: NO    PAST MEDICAL / SURGICAL / SOCIAL / FAMILY HISTORY    has a past medical history of CHRIS (acute kidney injury) ([address] Bipolar 1 disorder ([address] Breast lump, Chronic obstructive pulmonary disease ([address] Depression, GERD (gastroesophageal reflux disease), Hyperlipidemia, Hypertension, Osteoarthritis, Type 2 diabetes mellitus without complication, with long-term current use of insulin ([address] and Type II or unspecified type diabetes mellitus without mention of complication, not stated as uncontrolled. has a past surgical history that includes Ectopic pregnancy surgery; knee surgery (Right); Dilation and curettage of uterus; Nerve Block (Left, 12/8/14); Knee Arthroplasty (2017); and Tonsillectomy and adenoidectomy.     Social History     Socioeconomic History    Marital status:      Spouse name: Not on file    Number of children: Not on file    Years of education: Not on file    Highest education level: Not on file   Occupational History     Employer: N/A   Tobacco Use    Smoking status: Current Every Day Smoker     Packs/day: 0.25     Years: 10.00     Pack years: 2.50     Types: Cigarettes    Smokeless tobacco: Never Used    Tobacco comment: 3 cigarettes per day; Patient refused counseling   Vaping Use    Vaping Use: Never used   Substance and Sexual Activity    Alcohol use: Yes     Comment: rarely    Drug use: No    Sexual activity: Not Currently   Other Topics Concern    Not on file   Social History Narrative    Not on file     Social Determinants of Health     Financial Resource Strain: Low Risk     Difficulty of Paying Living Expenses: Not hard at all   Food Insecurity: No Food Insecurity    Worried About Running Out of Food in the Last Year: Never true    Rocio of Food in the Last Year: Never true   Transportation Needs:     Lack of Transportation (Medical):      Lack of Transportation (Non-Medical):    Physical Activity:     Days of Exercise per Week:     Minutes of Exercise per Session:    Stress:     Feeling of Stress :    Social Connections:     Frequency of Communication with Friends and Family:     Frequency of Social Gatherings with Friends and Family:     Attends Jainism Services:     Active Member of Clubs or Organizations:     Attends Club or Organization Meetings:     Marital Status:    Intimate Partner Violence:     Fear of Current or Ex-Partner:     Emotionally Abused:     Physically Abused:     Sexually Abused:        Family History   Problem Relation Age of Onset    Diabetes Father     Stroke Father     High Blood Pressure Father        Allergies:    Flexeril [cyclobenzaprine], Gabapentin, Prochlorperazine edisylate, Promethazine, Thorazine [chlorpromazine], and Chantix [varenicline tartrate]    Home Medications:  Prior to Admission medications    Medication Sig DO   ondansetron (ZOFRAN ODT) 4 MG disintegrating tablet Take 1 tablet by mouth every 8 hours as needed for Nausea  Patient not taking: Reported on 5/25/2021 10/21/20   Danilo Garza DO   dicyclomine (BENTYL) 10 MG capsule Take 1 capsule by mouth every 6 hours as needed (cramps)  Patient not taking: Reported on 5/25/2021 10/21/20   Danilo Garza DO   docusate sodium (COLACE) 100 MG capsule Take 1 capsule by mouth 2 times daily  Patient not taking: Reported on 5/25/2021 10/21/20   Danilo Garza DO   Insulin Pen Needle 31G X 8 MM MISC Use BID for insulin administration 8/20/20   JEANNETTE Yi CNP   pregabalin (LYRICA) 150 MG capsule Take 1 capsule by mouth 2 times daily for 30 days. 6/4/20 5/25/21  JEANNETTE Varela CNP   blood glucose monitor kit and supplies Test 1 times a day & as needed for symptoms of irregular blood glucose. 4/16/20   JEANNETTE Nguyen CNP   aspirin 81 MG EC tablet Take 1 tablet by mouth daily 4/14/20   Nayan Lozano MD   escitalopram (LEXAPRO) 5 MG tablet Take 5 tablets by mouth daily 4/14/20   Nayan Lozano MD   QUEtiapine (SEROQUEL) 200 MG tablet Take 1 tablet by mouth nightly  Patient taking differently: Take 400 mg by mouth nightly  4/13/20   Nayan Lozano MD   traZODone (DESYREL) 50 MG tablet Take 50 mg by mouth nightly    Historical Provider, MD   QUEtiapine (SEROQUEL) 300 MG tablet Take 1 tablet by mouth 2 times daily 5/11/19   Glenn Zapata DO   ALPRAZolam Jc Huddle) 0.5 MG tablet Take 0.25 mg by mouth 2 times daily. Patient not taking: Reported on 5/25/2021    Historical Provider, MD   albuterol (PROVENTIL HFA;VENTOLIN HFA) 108 (90 BASE) MCG/ACT inhaler Inhale 1 puff into the lungs 4 times daily as needed for Wheezing     Historical Provider, MD       REVIEW OF SYSTEMS    (2-9 systems for level 4, 10 or more for level 5)    Review of Systems   Constitutional: Negative for chills, fatigue and fever. HENT: Negative for congestion and rhinorrhea.     Respiratory: Negative for cough and shortness of breath. Cardiovascular: Negative for chest pain. Gastrointestinal: Negative for abdominal pain, nausea and vomiting. Musculoskeletal: Negative for myalgias. Left shoulder, left hip, left knee pain   Neurological: Negative for headaches. All other systems reviewed and are negative. PHYSICAL EXAM   (up to 7 for level 4, 8 or more for level 5)    INITIAL VITALS:   ED Triage Vitals [06/14/21 1756]   BP Temp Temp Source Pulse Resp SpO2 Height Weight   131/71 97.4 °F (36.3 °C) Tympanic 86 20 93 % -- --       Physical Exam  Vitals and nursing note reviewed. Constitutional:       General: She is not in acute distress. Appearance: She is well-developed. She is not ill-appearing. Cardiovascular:      Rate and Rhythm: Normal rate and regular rhythm. Pulses:           Radial pulses are 2+ on the right side and 2+ on the left side. Dorsalis pedis pulses are 2+ on the right side and 2+ on the left side. Posterior tibial pulses are 2+ on the right side and 2+ on the left side. Heart sounds: Normal heart sounds. No murmur heard. Pulmonary:      Effort: Pulmonary effort is normal. No respiratory distress. Breath sounds: Normal breath sounds. No decreased breath sounds. Abdominal:      General: There is no distension. Palpations: Abdomen is soft. Tenderness: There is no abdominal tenderness. Musculoskeletal:      Cervical back: No tenderness. Right lower leg: No edema. Left lower leg: No edema. Comments: Left shoulder tenderness on palpation, left hip tenderness on palpation, left knee tenderness on palpation. Skin:     General: Skin is warm and dry. Capillary Refill: Capillary refill takes less than 2 seconds. Neurological:      General: No focal deficit present. Mental Status: She is alert and oriented to person, place, and time. GCS: GCS eye subscore is 4. GCS verbal subscore is 5.  GCS normal limits. Surrounding soft tissues are unremarkable. The visualized left lung is well aerated. 1. No radiographic evidence of acute left shoulder trauma. 2. Mild glenohumeral degenerative joint disease. XR HIP 2-3 VW W PELVIS LEFT    Result Date: 6/14/2021  EXAMINATION: ONE XRAY VIEW OF THE PELVIS AND TWO XRAY VIEWS LEFT HIP 6/14/2021 10:22 pm COMPARISON: 03/22/2021 HISTORY: ORDERING SYSTEM PROVIDED HISTORY: fall, four days ago TECHNOLOGIST PROVIDED HISTORY: fall, four days ago FINDINGS: No acute fracture. No dislocation. Mild narrowing of both hip joints. . Sacroiliac joints are symmetric. Degenerative changes are noted in the lower lumbar spine. No acute osseous abnormality. Impression:  Patient fell 5 days ago. Landed on her left side. Normal range of motion to the left shoulder but is having tenderness throughout the left shoulder, not on the clavicle, not on the scapula. We will get an x-ray left shoulder. Normal range of motion without effusion to the left elbow. Normal range of motion with no tenderness to the left wrist or the left hand. Distal sensation intact, radial pulse 2+. Cap refill less than 2seconds. No paresthesias in left arm the left leg. No pain in the left thorax or the left flank. No pain on the neck, cervical, thoracic, or lumbar spine. Does have some tenderness to the left hip into the left knee. Is able to flex and extend at the left hip is able to flex and extend the knee but is more painful with extension than flexion of the left knee. No pain to the left ankle. Will get x-rays of those areas and reevaluate. Tylenol Motrin for pain. EMERGENCY DEPARTMENT COURSE:   Upon attending evaluation, patient began complaining more of left thigh pain and left lower leg pain along with dysuria. X-rays and urine added on.     After going over [facility], patient had actually already been started on a antibiotic, Bactrim by her primary care provider for urinary symptoms. Indication on the antibiotic says is for an abdominal wall abscess however after reinspection discussion with patient, she has no abdominal abscess or abdominal wound. First dose of Bactrim given in the emergency department. X-rays negative. Urine concerning for infection however patient has no bacteria in her urine. Patient denies being sexually active. Denies any vaginal discharge or vaginal pain or vaginal bleeding. Again repeat physical examination demonstrates no abdominal pain or tenderness on light or deep palpation. After discussion with patient with attending physician, will send off urine culture, treat as the patient does have a UTI. Discharge patient with Tylenol, naproxen, Robaxin. Bactrim is already been called in by her PCP. Patient able to ambulate albeit with pain but is stable on her feet. .  Safe for discharge    MDM  Number of Diagnoses or Management Options  Acute cystitis without hematuria: new, needed workup  Fall (on) (from) other stairs and steps, initial encounter: new, needed workup     Amount and/or Complexity of Data Reviewed  Clinical lab tests: ordered and reviewed  Tests in the radiology section of CPT®: ordered and reviewed  Review and summarize past medical records: yes  Discuss the patient with other providers: yes  Independent visualization of images, tracings, or specimens: yes    Risk of Complications, Morbidity, and/or Mortality  Presenting problems: low  Diagnostic procedures: moderate  Management options: low    Patient Progress  Patient progress: stable      PROCEDURES:  none    CONSULTS:  None    CRITICAL CARE:  Please see attending note    FINAL IMPRESSION     1. Acute cystitis without hematuria    2.  Fall (on) (from) other stairs and steps, initial encounter          DISPOSITION / PLAN   DISPOSITION Decision To Discharge 06/14/2021 11:16:31 PM      Evaluation and treatment course in the ED, and plan of care upon discharge was discussed in length with the patient. Patient had no further questions prior to being discharged and was instructed to return to the ED for new or worsening symptoms. Any changes to existing medications or new prescriptions were reviewed with patient and they expressed understanding of how to correctly take their medications and the possible side effects.     PATIENT REFERRED TO:  JEANNETTE Johnson - CNP  [address]  [phone]    Schedule an appointment as soon as possible for a visit         DISCHARGE MEDICATIONS:  New Prescriptions    METHOCARBAMOL (ROBAXIN-750) 750 MG TABLET    Take 1 tablet by mouth 4 times daily for 5 days    NAPROXEN (NAPROSYN) 500 MG TABLET    Take 1 tablet by mouth 2 times daily (with meals) for 7 days       Laila David DO  Emergency Medicine Resident Physician, PGY-3    (Please note that portions of this note were completed with a voice recognition program.  Efforts were made to edit the dictations but occasionally words are mis-transcribed.)         Laila David MD  06/14/21 [id number]

## 2021-06-15 NOTE — ED NOTES
arranged transportation for patient back to residence using [facility] and [employer] due to [employer] where patient reports someone will be there to assist.  Confirmation #[id number]     [place]  06/15/21 0057

## 2021-06-17 LAB
CULTURE: ABNORMAL
Lab: ABNORMAL
SPECIMEN DESCRIPTION: ABNORMAL

## 2021-07-24 ENCOUNTER — HOSPITAL ENCOUNTER (EMERGENCY)
Age: 66
Discharge: HOME | End: 2021-07-24
Attending: EMERGENCY MEDICINE
Payer: MEDICAID

## 2021-07-24 VITALS
WEIGHT: 210 LBS | RESPIRATION RATE: 16 BRPM | OXYGEN SATURATION: 98 % | HEIGHT: 66 IN | BODY MASS INDEX: 33.75 KG/M2 | DIASTOLIC BLOOD PRESSURE: 68 MMHG | SYSTOLIC BLOOD PRESSURE: 130 MMHG | TEMPERATURE: 98.9 F | HEART RATE: 79 BPM

## 2021-07-24 DIAGNOSIS — Z76.0 ENCOUNTER FOR MEDICATION REFILL: Primary | ICD-10-CM

## 2021-07-24 DIAGNOSIS — R44.3 HALLUCINATION: ICD-10-CM

## 2021-07-24 PROCEDURE — 99282 EMERGENCY DEPT VISIT SF MDM: CPT

## 2021-07-24 RX ORDER — QUETIAPINE FUMARATE 300 MG/1
300 TABLET, FILM COATED ORAL 2 TIMES DAILY
Qty: 14 TABLET | Refills: 0 | Status: ON HOLD | OUTPATIENT
Start: 2021-07-24 | End: 2021-10-15 | Stop reason: HOSPADM

## 2021-07-24 RX ORDER — TRAZODONE HYDROCHLORIDE 150 MG/1
150 TABLET ORAL NIGHTLY
Qty: 7 TABLET | Refills: 0 | Status: ON HOLD | OUTPATIENT
Start: 2021-07-24 | End: 2021-10-27 | Stop reason: SDUPTHER

## 2021-07-24 RX ORDER — QUETIAPINE FUMARATE 200 MG/1
400 TABLET, FILM COATED ORAL NIGHTLY
Qty: 14 TABLET | Refills: 0 | Status: ON HOLD | OUTPATIENT
Start: 2021-07-24 | End: 2021-10-15 | Stop reason: HOSPADM

## 2021-07-24 RX ORDER — TRAZODONE HYDROCHLORIDE 50 MG/1
50 TABLET ORAL NIGHTLY
Qty: 7 TABLET | Refills: 0 | Status: SHIPPED | OUTPATIENT
Start: 2021-07-24 | End: 2021-07-24

## 2021-07-24 ASSESSMENT — ENCOUNTER SYMPTOMS
VOMITING: 0
ABDOMINAL PAIN: 0
DIARRHEA: 1
CONSTIPATION: 0
SHORTNESS OF BREATH: 0
SORE THROAT: 0
NAUSEA: 0

## 2021-07-24 ASSESSMENT — PAIN SCALES - GENERAL: PAINLEVEL_OUTOF10: 2

## 2021-07-24 NOTE — ED PROVIDER NOTES
[address] [facility]     Emergency Department     Faculty Attestation    I performed a history and physical examination of the patient and discussed management with the resident. I reviewed the residents note and agree with the documented findings including all diagnostic interpretations and plan of care. Any areas of disagreement are noted on the chart. I was personally present for the key portions of any procedures. I have documented in the chart those procedures where I was not present during the key portions. I have reviewed the emergency nurses triage note. I agree with the chief complaint, past medical history, past surgical history, allergies, medications, social and family history as documented unless otherwise noted below. Documentation of the HPI, Physical Exam and Medical Decision Making performed by scribes is based on my personal performance of the HPI, PE and MDM. For Physician Assistant/ Nurse Practitioner cases/documentation I have personally evaluated this patient and have completed at least one if not all key elements of the E/M (history, physical exam, and MDM). Additional findings are as noted. This patient was evaluated in the Emergency Department for symptoms described in the history of present illness. He/she was evaluated in the context of the global COVID-19 pandemic, which necessitated consideration that the patient might be at risk for infection with the SARS-CoV-2 virus that causes COVID-19. Institutional protocols and algorithms that pertain to the evaluation of patients at risk for COVID-19 are in a state of rapid change based on information released by regulatory bodies including the CDC and federal and state organizations. These policies and algorithms were followed during the patient's care in the ED. Primary Care Physician: JEANNETTE Resendez - CNP    History:  This is a 77 y.o. female who presents to the Emergency Department with complaint of need for medication refill. Has run out of her Seroquel and trazodone. Has an appointment on Friday with her clinician. She reports she takes 100 mg nightly as well as 300 mg in the morning and afternoon of the Seroquel. She takes 150 of the trazodone nightly. No suicidal homicidal ideation. Does report hallucinations. She also reports some nausea and diarrhea since stopping the medication 1 week ago when she ran out    Physical:     height is 5' 6\" (1.676 m) and weight is 210 lb (95.3 kg). Her oral temperature is 98.9 °F (37.2 °C). Her blood pressure is 130/68 and her pulse is 79. Her respiration is 16 and oxygen saturation is 98%.     77 y.o. female no acute distress, conversant and oriented, cardiac exam regular rate and rhythm no murmurs rubs gallops, pulmonary clear bilaterally    Impression: Medication refill    Plan: Meds refilled        Geovani Lewis MD, Maik Cabral  Attending Emergency Physician         Dk Lockett MD  07/24/21 [phone]

## 2021-07-24 NOTE — ED TRIAGE NOTES
Patient presents with complaints of seeing things related to not having prescribed meds. Needs refill.

## 2021-07-24 NOTE — ED PROVIDER NOTES
Since Tuesday     [address] ED  Emergency Department Encounter  EmergencyMedicine Resident     Pt Andrey Cavazos  MRN: [id number]  [place] 1955  Date of evaluation: 7/24/21  PCP:  JEANNETTE Livingstno [id number]       Chief Complaint   Patient presents with    Medication Refill    Hallucinations       HISTORY OF PRESENT ILLNESS  (Location/Symptom, Timing/Onset, Context/Setting, Quality, Duration, Modifying Factors, Severity.)      Sophia Genao is a 77 y.o. female who presents to the emergency department with need for medication refill. Patient has a history of auditory and visual hallucinations of seeing people and hearing \"mumbling\" and states that since she has been out of her Seroquel and trazodone since Sunday she has had persistent hallucinations and today comes into the ER due to distress from these hallucinations and requests medication refill. She states that the reason she is out of her medications is because she missed her doctor's appointment on July 15 but has a follow-up appointment with her physician next Friday and just needs medicine until then. She is otherwise not out of any of her other medications other than Xanax and denies fever, chills, nausea, vomiting currently, chest pain, shortness of breath, or problems with urination. Some diarrhea which patient attributes to not having her medications and states that previously when she did not take her medicines she did have diarrhea similar to this. No new numbness or tingling anywhere. Denies drug use and denies alcohol use. Denies SI/HI.     PAST MEDICAL / SURGICAL / SOCIAL / FAMILY HISTORY      has a past medical history of CHRIS (acute kidney injury) ([address] Bipolar 1 disorder ([address] Breast lump, Chronic obstructive pulmonary disease ([address] Depression, GERD (gastroesophageal reflux disease), Hyperlipidemia, Hypertension, Osteoarthritis, Type 2 diabetes mellitus without complication, with long-term current use of insulin ([facility] and Type II or unspecified type diabetes mellitus without mention of complication, not stated as uncontrolled. has a past surgical history that includes Ectopic pregnancy surgery; knee surgery (Right); Dilation and curettage of uterus; Nerve Block (Left, 12/8/14); Knee Arthroplasty (2017); and Tonsillectomy and adenoidectomy. Social History     Socioeconomic History    Marital status:      Spouse name: Not on file    Number of children: Not on file    Years of education: Not on file    Highest education level: Not on file   Occupational History     Employer: N/A   Tobacco Use    Smoking status: Current Every Day Smoker     Packs/day: 0.25     Years: 10.00     Pack years: 2.50     Types: Cigarettes    Smokeless tobacco: Never Used    Tobacco comment: 3 cigarettes per day; Patient refused counseling   Vaping Use    Vaping Use: Never used   Substance and Sexual Activity    Alcohol use: Yes     Comment: rarely    Drug use: No    Sexual activity: Not Currently   Other Topics Concern    Not on file   Social History Narrative    Not on file     Social Determinants of Health     Financial Resource Strain: Low Risk     Difficulty of Paying Living Expenses: Not hard at all   Food Insecurity: No Food Insecurity    Worried About Running Out of Food in the Last Year: Never true    Rocio of Food in the Last Year: Never true   Transportation Needs:     Lack of Transportation (Medical):      Lack of Transportation (Non-Medical):    Physical Activity:     Days of Exercise per Week:     Minutes of Exercise per Session:    Stress:     Feeling of Stress :    Social Connections:     Frequency of Communication with Friends and Family:     Frequency of Social Gatherings with Friends and Family:     Attends Temple Services:     Active Member of Clubs or Organizations:     Attends Club or Organization Meetings:     Marital Status:    Intimate Partner Violence:     Fear of Current or Ex-Partner:     Emotionally Abused:     Physically Abused:     Sexually Abused:        Family History   Problem Relation Age of Onset    Diabetes Father     Stroke Father     High Blood Pressure Father        Allergies:  Chlorpromazine, Cyclobenzaprine, Gabapentin, Prochlorperazine, Prochlorperazine edisylate, Promethazine, and Chantix [varenicline tartrate]    Home Medications:  Prior to Admission medications    Medication Sig Start Date End Date Taking? Authorizing Provider   QUEtiapine (SEROQUEL) 200 MG tablet Take 2 tablets by mouth nightly for 7 days 7/24/21 7/31/21 Yes Fernanda Leo MD   traZODone (DESYREL) 50 MG tablet Take 1 tablet by mouth nightly for 7 days 7/24/21 7/31/21 Yes Fernanda Leo MD   baclofen (LIORESAL) 10 MG tablet Take 0.5 tablets by mouth nightly as needed (muscle spasms) 7/16/21   Amelia Ramirez PA-C   acetaminophen (TYLENOL) 500 MG tablet Take 2 tablets by mouth every 6 hours as needed for Pain 7/16/21   Amelia Ramirez PA-C   nystatin (MYCOSTATIN) 772884 UNIT/GM cream APPLY TOPICALLY 2 TIMES DAILY. 7/7/21   JEANNETTE Mcdermott CNP   acetaminophen (APAP EXTRA STRENGTH) 500 MG tablet Take 2 tablets by mouth every 8 hours as needed for Pain 6/14/21 6/19/21  Bebeto Moya MD   naproxen (NAPROSYN) 500 MG tablet Take 1 tablet by mouth 2 times daily (with meals) for 7 days 6/14/21 6/21/21  Bebeto Moya MD   omeprazole (PRILOSEC) 20 MG delayed release capsule TAKE 1 CAPSULE BY MOUTH IN THE MORNING BEFORE BREAKFAST (BOTTLE) 5/28/21   JEANNETTE Mcdermott CNP   Cobalamin Combinations (NEURIVA PLUS PO) Take by mouth    Historical Provider, MD   insulin glargine (LANTUS SOLOSTAR) 100 UNIT/ML injection pen INJECT 20 UNITS INTO THE SKIN BID 5/25/21   JEANNETTE Mcdermott CNP   lisinopril (PRINIVIL;ZESTRIL) 10 MG tablet Take 1 tablet by mouth daily 5/25/21   JEANNETTE Mcdermott CNP   nystatin (MYCOSTATIN) 346989 UNIT/GM cream Apply topically 2 times daily.  5/25/21   Jesus Espinoza Víctor, APRN - CNP   SPIRIVA HANDIHALER 18 MCG inhalation capsule INHALE THE CONTENTS OF 1 CAPSULE INTO THE LUNG ONCE DAILY 5/19/21   JEANNETTE Livingston CNP   lidocaine (LIDODERM) 5 % Place 1 patch onto the skin daily 12 hours on, 12 hours off. Patient not taking: Reported on 7/16/2021 3/22/21   Mesha Nelson DO   ibuprofen (ADVIL;MOTRIN) 600 MG tablet Take 1 tablet by mouth every 6 hours as needed for Pain  Patient not taking: Reported on 5/25/2021 3/22/21 6/14/21  Mesha Nelson DO   OneTouch Delica Lancets 61T MISC USE AS DIRECTED FOUR TIMES A DAY TO TEST BLOOD SUGARS 2/11/21   JEANNETTE Livingston CNP   hydrOXYzine (VISTARIL) 25 MG capsule Take 1 capsule by mouth 3 times daily as needed for Anxiety 1/21/21   Ilya Castañeda DO   ondansetron (ZOFRAN ODT) 4 MG disintegrating tablet Take 1 tablet by mouth every 8 hours as needed for Nausea  Patient not taking: Reported on 5/25/2021 10/21/20   Ilya Castañeda DO   dicyclomine (BENTYL) 10 MG capsule Take 1 capsule by mouth every 6 hours as needed (cramps)  Patient not taking: Reported on 5/25/2021 10/21/20   Ilya Castañeda DO   docusate sodium (COLACE) 100 MG capsule Take 1 capsule by mouth 2 times daily 10/21/20   Ilya Castañeda DO   Insulin Pen Needle 31G X 8 MM MISC Use BID for insulin administration 8/20/20   JEANNETTE Bowers CNP   pregabalin (LYRICA) 150 MG capsule Take 1 capsule by mouth 2 times daily for 30 days. 6/4/20 5/25/21  JEANNETTE Degroot CNP   blood glucose monitor kit and supplies Test 1 times a day & as needed for symptoms of irregular blood glucose. 4/16/20   JEANNETTE Livingston CNP   aspirin 81 MG EC tablet Take 1 tablet by mouth daily 4/14/20   Andie Martinez MD   escitalopram (LEXAPRO) 5 MG tablet Take 5 tablets by mouth daily 4/14/20   Andie Martinez MD   ALPRAZolam Jeanine Alvarez) 0.5 MG tablet Take 0.25 mg by mouth 2 times daily.    Patient not taking: Reported on 5/25/2021    Historical Provider, MD   albuterol (PROVENTIL HFA;VENTOLIN HFA) 108 (90 BASE) MCG/ACT inhaler Inhale 1 puff into the lungs 4 times daily as needed for Wheezing     Historical Provider, MD       REVIEW OF SYSTEMS    (2-9 systems for level 4, 10 or more for level 5)      Review of Systems   Constitutional: Negative for chills and fever. HENT: Negative for ear pain, hearing loss and sore throat. Eyes: Negative for visual disturbance. Respiratory: Negative for shortness of breath. Cardiovascular: Negative for chest pain. Gastrointestinal: Positive for diarrhea. Negative for abdominal pain, constipation, nausea and vomiting. Genitourinary: Negative for difficulty urinating and dysuria. Musculoskeletal: Negative for arthralgias and myalgias. Neurological: Negative for numbness. Psychiatric/Behavioral: Positive for hallucinations. Negative for agitation, confusion and suicidal ideas (Patient emphatically denies suicidal or homicidal ideation and denies tactile hallucinations). PHYSICAL EXAM   (up to 7 for level 4, 8 or more for level 5)      INITIAL VITALS:   /68   Pulse 79   Temp 98.9 °F (37.2 °C) (Oral)   Resp 16   Ht 5' 6\" (1.676 m)   Wt 210 lb (95.3 kg)   LMP  (LMP Unknown)   SpO2 98%   BMI 33.89 kg/m²     Physical Exam  Vitals and nursing note reviewed. Constitutional:       General: She is not in acute distress. Appearance: Normal appearance. She is well-developed. She is not ill-appearing or diaphoretic. HENT:      Head: Normocephalic and atraumatic. Right Ear: External ear normal.      Left Ear: External ear normal.      Nose: Nose normal.      Mouth/Throat:      Mouth: Mucous membranes are moist.   Eyes:      Extraocular Movements: Extraocular movements intact. Conjunctiva/sclera: Conjunctivae normal.   Neck:      Trachea: No tracheal deviation. Cardiovascular:      Rate and Rhythm: Normal rate and regular rhythm. Heart sounds: Normal heart sounds. No murmur heard. No friction rub. No gallop.     Pulmonary: examination demonstrates a well-appearing female of stated age in a wheelchair who is alert and oriented and denies suicidal or homicidal ideation. All of the symptoms that she is currently experiencing are emphatically stated by patient to be characteristic of her medication withdrawal symptoms and she requests a short course of medication refill so that she can follow-up with her primary care doctor and mitigate her current acute on chronic hallucinations. These medications will be provided but stated the patient we will not be represcribing her Xanax. Patient verbalizes understanding agreement with plan as well as return precautions. RADIOLOGY:  No results found. EKG  None    All EKG's are interpreted by the Emergency Department Physician who either signs or co-signs this chart in the absence of a cardiologist.    EMERGENCY DEPARTMENT COURSE:       PROCEDURES:  None    CONSULTS:  None    CRITICAL CARE:  Please see attending note. FINAL IMPRESSION      1. Encounter for medication refill    2. Hallucination          DISPOSITION / PLAN     DISPOSITION Decision To Discharge 07/24/2021 03:16:29 PM      PATIENT REFERRED TO:  Charlotte Arroyo, APRN - CNP  [address] [facility] [address]  [phone]    Schedule an appointment as soon as possible for a visit in 1 week  For followup    [facility]  [address] [address]  [phone]  Go to   As needed, If symptoms worsen      DISCHARGE MEDICATIONS:  Current Discharge Medication List          Aleena Talavera MD  Emergency Medicine Resident    This patient was evaluated in the Emergency Department for symptoms described in the history of present illness. He/she was evaluated in the context of the global COVID-19 pandemic, which necessitated consideration that the patient might be at risk for infection with the SARS-CoV-2 virus that causes COVID-19.  Institutional protocols and algorithms that pertain to the evaluation of patients at risk for COVID-19 are in a state of rapid change based on information released by regulatory bodies including the CDC and federal and state organizations. These policies and algorithms were followed during the patient's care in the ED.     (Please note that portions of thisnote were completed with a voice recognition program.  Efforts were made to edit the dictations but occasionally words are mis-transcribed.)       Faraz Underwood MD  Resident  07/24/21 [id number]

## 2021-09-03 NOTE — ED PROVIDER NOTES
Patient reporting chest pain- MD made aware.  Given PRN tylenol and muscle relaxer  Reflux meds ordered PRN  EKG ordered- A MD Eladio made aware  Tele ordered and placed on patient  Morning BP meds were give   [facility] ED  Emergency Department Encounter  Mid Level Provider     Pt Name: Elzbieta Tiwari  MRN: [id number]  [place] 1955  Date of evaluation: 6/6/19  PCP:  JEANNETTE Denis [id number]       Chief Complaint   Patient presents with    Psychiatric Evaluation     out of psych meds, hearing voices       HISTORY OF PRESENT ILLNESS  (Location/Symptom, Timing/Onset,Context/Setting, Quality, Duration, Modifying Factors, Severity.)      Elzbieta Tiwari is a 70 y.o. female who presents with request for prescription for Seroquel. Patient reports she has been out for a week she believes she lost them on a bus or somewhere in [place]. Complains of hearing voices. Denies suicidal ideations. Denies homicidal thoughts. Patient upon review of systems does admit to diarrhea however does not want to be seen for this today. Patient requesting a dose that is not recorded in the system    PAST MEDICAL Taina Oldham / SOCIAL / FAMILY HISTORY      has a past medical history of Bipolar 1 disorder ([address] Breast lump, Chronic obstructive pulmonary disease ([address] Depression, GERD (gastroesophageal reflux disease), Hyperlipidemia, Hypertension, Osteoarthritis, Type 2 diabetes mellitus without complication, with long-term current use of insulin ([address] and Type II or unspecified type diabetes mellitus without mention of complication, not stated as uncontrolled. has a past surgical history that includes Ectopic pregnancy surgery; knee surgery (Right); Dilation and curettage of uterus; Nerve Block (Left, 12/8/14); and Knee Arthroplasty (2017).     Social History     Socioeconomic History    Marital status:      Spouse name: Not on file    Number of children: Not on file    Years of education: Not on file    Highest education level: Not on file   Occupational History     Employer: N/A   Social Needs    Financial resource strain: Not on file    Food insecurity:     Worry: Not on file     Inability: Not on file    Transportation needs:     Medical: Not on file     Non-medical: Not on file   Tobacco Use    Smoking status: Current Every Day Smoker     Packs/day: 0.25     Years: 10.00     Pack years: 2.50     Types: Cigarettes    Smokeless tobacco: Never Used    Tobacco comment: 3 cigarettes per day   Substance and Sexual Activity    Alcohol use: Yes     Comment: 02/06/17 patient denies    Drug use: No     Comment: 02/06/17 patient denies    Sexual activity: Never   Lifestyle    Physical activity:     Days per week: Not on file     Minutes per session: Not on file    Stress: Not on file   Relationships    Social connections:     Talks on phone: Not on file     Gets together: Not on file     Attends Jew service: Not on file     Active member of club or organization: Not on file     Attends meetings of clubs or organizations: Not on file     Relationship status: Not on file    Intimate partner violence:     Fear of current or ex partner: Not on file     Emotionally abused: Not on file     Physically abused: Not on file     Forced sexual activity: Not on file   Other Topics Concern    Not on file   Social History Narrative    Not on file       Family History   Problem Relation Age of Onset    Diabetes Father     Stroke Father     High Blood Pressure Father        Allergies:  Flexeril [cyclobenzaprine]; Gabapentin; Prochlorperazine edisylate; Promethazine; and Thorazine [chlorpromazine]    Home Medications:  Prior to Admission medications    Medication Sig Start Date End Date Taking?  Authorizing Provider   hydrOXYzine (ATARAX) 10 MG tablet 1 to 2 po TID prn anxiety 5/31/19   JEANNETTE Solorio CNP   omeprazole (PRILOSEC) 20 MG delayed release capsule Take 1 capsule by mouth daily 5/31/19   JEANNETTE Solorio CNP   solifenacin (VESICARE) 5 MG tablet Take 1 tablet by mouth daily 5/31/19   JEANNETTE Solorio CNP   QUEtiapine (SEROQUEL) 300 MG tablet Take 1 tablet by mouth 2 times daily 5/11/19   Ed Mcnamara DO   escitalopram (LEXAPRO) 20 MG tablet Take 1 tablet by mouth daily 3/26/19   Historical Provider, MD   Insulin Syringes, Disposable, U-100 1 ML MISC 1 each by Does not apply route daily 2/26/19   JEANNETTE Sanchez CNP   nicotine (NICODERM CQ) 14 MG/24HR Place 1 patch onto the skin daily for 14 days 2/26/19 3/27/19  JEANNETTE Sanchez CNP   insulin glargine (LANTUS) 100 UNIT/ML injection vial Inject 50 Units into the skin nightly 2/19/19   JEANNETTE Sanchez CNP   pregabalin (LYRICA) 50 MG capsule Take 1 capsule by mouth 2 times daily for 30 days. . 2/8/19 3/10/19  JEANNETTE Sanchez CNP   blood glucose monitor strips Check Blood sugar 3 times/day , Dx: Insulin dependent DM Please substitute for brand compatible with patients glucometer 2/4/19   JEANNETTE Sanchez CNP   Blood Glucose Monitoring Suppl (ONE TOUCH ULTRA MINI) w/Device KIT use as directed 2/4/19   JEANNETTE Sanchez CNP   ALPRAZolam Brandi Bard) 0.5 MG tablet Take 0.5 mg by mouth 2 times daily. Sara January Historical Provider, MD   ibuprofen (ADVIL;MOTRIN) 400 MG tablet TAKE 1 TABLET EVERY 8 HOURS AS NEEDED FOR PAIN WITH FOOD 10/12/17   Chris Benton MD   lisinopril (PRINIVIL;ZESTRIL) 5 MG tablet Take 1 tablet by mouth daily 10/5/17   Jann Moore MD   glucose monitoring kit (FREESTYLE) monitoring kit Use as directed. Dx  Insulin dependent DM, Please substitute it with Glucometer covered by patients insurance or patients choice. 9/21/17   Jann Mooer MD   Lancets MISC Use as directed to test 2-3 times/day, Dx Insulin dependent DM 9/21/17   Jann Moore MD   aspirin 81 MG EC tablet Take 1 tablet by mouth daily 9/13/17   Elizabeth Mari MD   Insulin Pen Needle 31G X 5 MM MISC Use as directed.  Dx Insulin dependent DM 8/3/17   Chris Benton MD   tiotropium (SPIRIVA) 18 MCG inhalation capsule Inhale 18 mcg into the lungs daily    Historical Provider, MD albuterol (PROVENTIL HFA;VENTOLIN HFA) 108 (90 BASE) MCG/ACT inhaler Inhale 1 puff into the lungs 4 times daily as needed for Wheezing     Historical Provider, MD       REVIEW OF SYSTEMS    (2-9 systems for level 4, 10 or more for level 5)      Review of Systems   Constitutional: Negative for chills and fever. Respiratory: Negative for shortness of breath. Cardiovascular: Negative for chest pain. Gastrointestinal: Positive for diarrhea. Negative for abdominal pain. Psychiatric/Behavioral: Positive for decreased concentration and hallucinations. Negative for suicidal ideas. PHYSICALEXAM   (upto 7 for level 4, 8 or more for level 5)      INITIAL VITALS:  oral temperature is 99.1 °F (37.3 °C). Her blood pressure is 144/93 (abnormal) and her pulse is 90. Her respiration is 12 and oxygen saturation is 96%. Physical Exam   Constitutional: She is oriented to person, place, and time. She appears well-developed and well-nourished. No distress. HENT:   Head: Normocephalic. Eyes: Pupils are equal, round, and reactive to light. Neck: Normal range of motion. Neck supple. Cardiovascular: Normal rate. Pulmonary/Chest: Effort normal. No respiratory distress. Musculoskeletal: Normal range of motion. Neurological: She is alert and oriented to person, place, and time. Skin: Skin is warm and dry. Capillary refill takes less than 2 seconds. Psychiatric: She has a normal mood and affect. Her behavior is normal. Judgment and thought content normal.   Nursing note and vitals reviewed. DIFFERENTIAL  DIAGNOSIS   Encounter for prescription refill    PLAN (LABS / IMAGING / EKG):  No orders of the defined types were placed in this encounter. MEDICATIONS ORDERED:  No orders of the defined types were placed in this encounter.       Controlled Substances Monitoring:      DIAGNOSTIC RESULTS / EMERGENCY DEPARTMENT COURSE / MDM     RADIOLOGY:   I directly visualized(with the attending physician) the following  images and reviewed the radiologist interpretations:  No results found. No orders to display       LABS:  No results found for this visit on 06/06/19. EMERGENCY DEPARTMENT COURSE:  Patient here last month reporting she accidentally dumped her Seroquel down a drain was given a 10 day supply. Patient reports she does go to [facility] on June 13. Did advise her no prescription today and that she could go to rescue immediately to discuss the prescription. Patient also can go as a walk in to [facility].  is also going to discuss this with patient    CONSULTS:      PROCEDURES:  None    FINAL IMPRESSION      1.  Encounter for medication refill          DISPOSITION / PLAN     DISPOSITION Decision To Discharge    PATIENT REFERRED TO:  JEANNETTE Mackey CNP  [address] [facility]  [address] [facility],[address]  [address]  [phone]  In 1 day        DISCHARGE MEDICATIONS:  Discharge Medication List as of 6/6/2019  2:12 PM          JEANNETTE Hutchison CNP   Emergency Medicine Nurse Practitioner    (Please note that portions of this note were completed with a voice recognitionprogram.  Efforts were made to edit the dictations but occasionally words are mis-transcribed.)        JEANNETTE Hutchison CNP  06/06/19 1985

## 2021-09-17 ENCOUNTER — APPOINTMENT (OUTPATIENT)
Dept: GENERAL RADIOLOGY | Age: 66
End: 2021-09-17
Payer: MEDICARE

## 2021-09-17 ENCOUNTER — HOSPITAL ENCOUNTER (EMERGENCY)
Age: 66
Discharge: HOME | End: 2021-09-18
Attending: EMERGENCY MEDICINE
Payer: MEDICARE

## 2021-09-17 VITALS
SYSTOLIC BLOOD PRESSURE: 119 MMHG | HEART RATE: 82 BPM | DIASTOLIC BLOOD PRESSURE: 70 MMHG | HEIGHT: 67 IN | OXYGEN SATURATION: 92 % | BODY MASS INDEX: 33.43 KG/M2 | TEMPERATURE: 99.3 F | WEIGHT: 213 LBS | RESPIRATION RATE: 18 BRPM

## 2021-09-17 DIAGNOSIS — M79.604 RIGHT LEG PAIN: Primary | ICD-10-CM

## 2021-09-17 PROCEDURE — 73562 X-RAY EXAM OF KNEE 3: CPT

## 2021-09-17 PROCEDURE — 99285 EMERGENCY DEPT VISIT HI MDM: CPT

## 2021-09-17 PROCEDURE — 96372 THER/PROPH/DIAG INJ SC/IM: CPT

## 2021-09-17 PROCEDURE — 73590 X-RAY EXAM OF LOWER LEG: CPT

## 2021-09-17 ASSESSMENT — ENCOUNTER SYMPTOMS
VOMITING: 0
COUGH: 0
RHINORRHEA: 0
SHORTNESS OF BREATH: 0
DIARRHEA: 0
SORE THROAT: 0
NAUSEA: 0
BACK PAIN: 0
ABDOMINAL PAIN: 0

## 2021-09-17 ASSESSMENT — PAIN DESCRIPTION - LOCATION: LOCATION: LEG

## 2021-09-17 ASSESSMENT — PAIN SCALES - GENERAL
PAINLEVEL_OUTOF10: 8
PAINLEVEL_OUTOF10: 8

## 2021-09-18 PROCEDURE — 6360000002 HC RX W HCPCS: Performed by: STUDENT IN AN ORGANIZED HEALTH CARE EDUCATION/TRAINING PROGRAM

## 2021-09-18 PROCEDURE — 6370000000 HC RX 637 (ALT 250 FOR IP): Performed by: STUDENT IN AN ORGANIZED HEALTH CARE EDUCATION/TRAINING PROGRAM

## 2021-09-18 RX ORDER — KETOROLAC TROMETHAMINE 15 MG/ML
15 INJECTION, SOLUTION INTRAMUSCULAR; INTRAVENOUS ONCE
Status: COMPLETED | OUTPATIENT
Start: 2021-09-18 | End: 2021-09-18

## 2021-09-18 RX ORDER — CALCIUM CARBONATE 200(500)MG
1 TABLET,CHEWABLE ORAL DAILY
Qty: 30 TABLET | Refills: 0 | Status: SHIPPED | OUTPATIENT
Start: 2021-09-18 | End: 2021-10-18

## 2021-09-18 RX ORDER — NAPROXEN 375 MG/1
375 TABLET ORAL 2 TIMES DAILY WITH MEALS
Qty: 14 TABLET | Refills: 0 | Status: ON HOLD | OUTPATIENT
Start: 2021-09-18 | End: 2021-10-11 | Stop reason: ALTCHOICE

## 2021-09-18 RX ORDER — MAGNESIUM HYDROXIDE/ALUMINUM HYDROXICE/SIMETHICONE 120; 1200; 1200 MG/30ML; MG/30ML; MG/30ML
30 SUSPENSION ORAL ONCE
Status: COMPLETED | OUTPATIENT
Start: 2021-09-18 | End: 2021-09-18

## 2021-09-18 RX ORDER — FAMOTIDINE 20 MG/1
20 TABLET, FILM COATED ORAL ONCE
Status: COMPLETED | OUTPATIENT
Start: 2021-09-18 | End: 2021-09-18

## 2021-09-18 RX ORDER — FAMOTIDINE 20 MG/1
20 TABLET, FILM COATED ORAL 2 TIMES DAILY
Qty: 60 TABLET | Refills: 0 | Status: ON HOLD | OUTPATIENT
Start: 2021-09-18 | End: 2021-10-15 | Stop reason: HOSPADM

## 2021-09-18 RX ADMIN — KETOROLAC TROMETHAMINE 15 MG: 15 INJECTION, SOLUTION INTRAMUSCULAR; INTRAVENOUS at 01:06

## 2021-09-18 RX ADMIN — FAMOTIDINE 20 MG: 20 TABLET, FILM COATED ORAL at 01:06

## 2021-09-18 RX ADMIN — ALUMINUM HYDROXIDE, MAGNESIUM HYDROXIDE, AND SIMETHICONE 30 ML: 200; 200; 20 SUSPENSION ORAL at 01:06

## 2021-09-18 ASSESSMENT — PAIN SCALES - GENERAL: PAINLEVEL_OUTOF10: 8

## 2021-09-18 NOTE — ED PROVIDER NOTES
[facility] ED  Emergency Department Encounter  EmergencyMedicine Resident     Pt Tami Anderson  MRN: [id number]  [place] 1955  Date of evaluation: 9/17/21  PCP:  JEANNETTE Lopes CNP    This patient was evaluated in the Emergency Department for symptoms described in the history of present illness. The patient was evaluated in the context of the global COVID-19 pandemic, which necessitated consideration that the patient might be at risk for infection with the SARS-CoV-2 virus that causes COVID-19. Institutional protocols and algorithms that pertain to the evaluation of patients at risk for COVID-19 are in a state of rapid change based on information released by regulatory bodies including the CDC and federal and state organizations. These policies and algorithms were followed during the patient's care in the ED. CHIEF COMPLAINT       Chief Complaint   Patient presents with    Leg Pain     Pt stated having pain for a couple months       HISTORY OF PRESENT ILLNESS  (Location/Symptom, Timing/Onset, Context/Setting, Quality, Duration, Modifying Factors, Severity.)      Myrna Mesa is a 77 y.o. female who presents with longstanding right-sided leg pain. Patient is status post right knee arthroplasty in 2012. She states she has had pain in her right knee and right shin since that time, but denies any other complications since surgery. She has been having increasing falls over the past several months, and had a fall yesterday as she was attempting to rise from a chair, and fell onto her right side on a carpeted ground. Endorses some increased numbness in the right foot, but denies pallor, complete loss of sensation, or pain in the right foot, and states that her pain is at baseline. She denies any other falls or trauma. Patient states she is able to ambulate with some difficulty. Pain has not increased from baseline during her visit today.   She states she has had difficulty finding a primary care doctor for longstanding pain control. History of COPD, has been taking all her inhalers and other medications regularly. On ASA 81 mg, no other blood thinners. Denies head trauma, LOC, other trauma or injuries, chest pain, shortness of breath, abdominal pain, N/V/D. PAST MEDICAL / SURGICAL / SOCIAL / FAMILY HISTORY      has a past medical history of CHRIS (acute kidney injury) ([facility] [address] Bipolar 1 disorder ([facility] [address] Breast lump, Chronic obstructive pulmonary disease ([facility] [address] Depression, GERD (gastroesophageal reflux disease), Hyperlipidemia, Hypertension, Osteoarthritis, Type 2 diabetes mellitus without complication, with long-term current use of insulin ([facility] [address] and Type II or unspecified type diabetes mellitus without mention of complication, not stated as uncontrolled. has a past surgical history that includes Ectopic pregnancy surgery; knee surgery (Right); Dilation and curettage of uterus; Nerve Block (Left, 12/8/14); Knee Arthroplasty (2017); and Tonsillectomy and adenoidectomy.     Social History     Socioeconomic History    Marital status:      Spouse name: Not on file    Number of children: Not on file    Years of education: Not on file    Highest education level: Not on file   Occupational History     Employer: N/A   Tobacco Use    Smoking status: Current Every Day Smoker     Packs/day: 0.25     Years: 10.00     Pack years: 2.50     Types: Cigarettes    Smokeless tobacco: Never Used    Tobacco comment: 3 cigarettes per day; Patient refused counseling   Vaping Use    Vaping Use: Never used   Substance and Sexual Activity    Alcohol use: Yes     Comment: rarely    Drug use: No    Sexual activity: Not Currently   Other Topics Concern    Not on file   Social History Narrative    Not on file     Social Determinants of Health     Financial Resource Strain: Low Risk     Difficulty of Paying Living Expenses: Not hard at all   Food Insecurity: No Food Insecurity    Worried About Running Out of Food in the Last Year: Never true    Ran Out of Food in the Last Year: Never true   Transportation Needs:     Lack of Transportation (Medical):  Lack of Transportation (Non-Medical):    Physical Activity:     Days of Exercise per Week:     Minutes of Exercise per Session:    Stress:     Feeling of Stress :    Social Connections:     Frequency of Communication with Friends and Family:     Frequency of Social Gatherings with Friends and Family:     Attends Restoration Services:     Active Member of Clubs or Organizations:     Attends Club or Organization Meetings:     Marital Status:    Intimate Partner Violence:     Fear of Current or Ex-Partner:     Emotionally Abused:     Physically Abused:     Sexually Abused:        Family History   Problem Relation Age of Onset    Diabetes Father     Stroke Father     High Blood Pressure Father        Allergies:  Chlorpromazine, Cyclobenzaprine, Gabapentin, Prochlorperazine, Prochlorperazine edisylate, Promethazine, and Chantix [varenicline tartrate]    Home Medications:  Prior to Admission medications    Medication Sig Start Date End Date Taking? Authorizing Provider   naproxen (NAPROSYN) 375 MG tablet Take 1 tablet by mouth 2 times daily (with meals) for 7 days 9/18/21 9/25/21 Yes Kimberlyn Rose MD   calcium carbonate (ANTACID) 500 MG chewable tablet Take 1 tablet by mouth daily 9/18/21 10/18/21 Yes Kimberlyn Rose MD   famotidine (PEPCID) 20 MG tablet Take 1 tablet by mouth 2 times daily 9/18/21  Yes Kimberlyn Rose MD   nystatin (MYCOSTATIN) 737335 UNIT/GM cream APPLY TOPICALLY 2 TIMES DAILY.  8/30/21   JEANNETTE Tsang CNP   Lancets (ONETOUCH DELICA PLUS [id number]) MISC USE AS DIRECTED FOUR TIMES A DAY TO TEST BLOOD SUGARS 8/19/21   JEANNETTE Beatty CNP   QUEtiapine (SEROQUEL) 200 MG tablet Take 2 tablets by mouth nightly for 7 days 7/24/21 7/31/21  Kasey Spring MD   QUEtiapine (SEROQUEL) 300 MG tablet Take 1 tablet by mouth 2 times daily 300 mg in morning and afternoon in addition to 400 mg at night. 7/24/21   Deysi Goode MD   traZODone (DESYREL) 150 MG tablet Take 1 tablet by mouth nightly 7/24/21   Deysi Goode MD   baclofen (LIORESAL) 10 MG tablet Take 0.5 tablets by mouth nightly as needed (muscle spasms) 7/16/21   Ceci Couch PA-C   acetaminophen (TYLENOL) 500 MG tablet Take 2 tablets by mouth every 6 hours as needed for Pain 7/16/21   Ceci Couch PA-C   acetaminophen (APAP EXTRA STRENGTH) 500 MG tablet Take 2 tablets by mouth every 8 hours as needed for Pain 6/14/21 6/19/21  Tacos Parker MD   Cobalamin Combinations (NEURIVA PLUS PO) Take by mouth    Historical Provider, MD   insulin glargine (LANTUS SOLOSTAR) 100 UNIT/ML injection pen INJECT 20 UNITS INTO THE SKIN BID 5/25/21   JEANNETTE Banks CNP   lisinopril (PRINIVIL;ZESTRIL) 10 MG tablet Take 1 tablet by mouth daily 5/25/21   JEANNETTE Banks CNP   nystatin (MYCOSTATIN) 364213 UNIT/GM cream Apply topically 2 times daily. 5/25/21   JEANNETTE Banks CNP   SPIRIVA HANDIHALER 18 MCG inhalation capsule INHALE THE CONTENTS OF 1 CAPSULE INTO THE LUNG ONCE DAILY 5/19/21   JEANNETTE Banks CNP   lidocaine (LIDODERM) 5 % Place 1 patch onto the skin daily 12 hours on, 12 hours off.   Patient not taking: Reported on 7/16/2021 3/22/21   [facility],    ibuprofen (ADVIL;MOTRIN) 600 MG tablet Take 1 tablet by mouth every 6 hours as needed for Pain  Patient not taking: Reported on 5/25/2021 3/22/21 6/14/21  [facility]   hydrOXYzine (VISTARIL) 25 MG capsule Take 1 capsule by mouth 3 times daily as needed for Anxiety 1/21/21   Murray Bliss DO   ondansetron (ZOFRAN ODT) 4 MG disintegrating tablet Take 1 tablet by mouth every 8 hours as needed for Nausea  Patient not taking: Reported on 5/25/2021 10/21/20   Murray Range, DO   dicyclomine (BENTYL) 10 MG capsule Take 1 capsule by mouth every 6 hours as needed (cramps)  Patient not taking: Reported on 5/25/2021 10/21/20   Javier Whipple,    docusate sodium (COLACE) 100 MG capsule Take 1 capsule by mouth 2 times daily 10/21/20   Javier Whipple DO   Insulin Pen Needle 31G X 8 MM MISC Use BID for insulin administration 8/20/20   Fedeyuri JEANNETTE Hatch CNP   pregabalin (LYRICA) 150 MG capsule Take 1 capsule by mouth 2 times daily for 30 days. 6/4/20 5/25/21  JEANNETTE Lewis CNP   blood glucose monitor kit and supplies Test 1 times a day & as needed for symptoms of irregular blood glucose. 4/16/20   JEANNETTE Max CNP   aspirin 81 MG EC tablet Take 1 tablet by mouth daily 4/14/20   Dorothea Gaines MD   escitalopram (LEXAPRO) 5 MG tablet Take 5 tablets by mouth daily 4/14/20   Dorothea Gaines MD   ALPRAZolam Danney Simon) 0.5 MG tablet Take 0.25 mg by mouth 2 times daily. Patient not taking: Reported on 5/25/2021    Historical Provider, MD   albuterol (PROVENTIL HFA;VENTOLIN HFA) 108 (90 BASE) MCG/ACT inhaler Inhale 1 puff into the lungs 4 times daily as needed for Wheezing     Historical Provider, MD       REVIEW OF SYSTEMS    (2-9 systems for level 4, 10 or more for level 5)      Review of Systems   Constitutional: Negative for activity change, appetite change and fever. HENT: Negative for congestion, rhinorrhea and sore throat. Respiratory: Negative for cough and shortness of breath. Cardiovascular: Negative for chest pain and leg swelling. Gastrointestinal: Negative for abdominal pain, diarrhea, nausea and vomiting. Genitourinary: Negative. Musculoskeletal: Positive for arthralgias (R knee pain since 2012, unchanged from baseline despite fall onto right side yesterday when trying to stand up from a chair) and gait problem (Able to ambulate with some difficulty since 2012. No acute worsening in gait difficulty. Increasing falls over the past several months). Negative for back pain and myalgias.    Neurological: Positive for numbness (Endorsing some mild numbness in right foot for the past several months,). Negative for dizziness, tremors, weakness, light-headedness and headaches. PHYSICAL EXAM   (up to 7 for level 4, 8 or more for level 5)      INITIAL VITALS:   /70   Pulse 82   Temp 99.3 °F (37.4 °C) (Oral)   Resp 18   Ht 5' 7\" (1.702 m)   Wt 213 lb (96.6 kg)   LMP  (LMP Unknown)   SpO2 92%   BMI 33.36 kg/m²     Physical Exam  Vitals (SpO2 92-93%, patient with COPD not on home O2. Patient not complaining of any SOB or CP, denying any difficulty breathing. Lungs CTAB.) reviewed. Constitutional:       General: She is not in acute distress. Appearance: She is not toxic-appearing. HENT:      Head: Normocephalic and atraumatic. Nose: Nose normal.      Mouth/Throat:      Mouth: Mucous membranes are moist.      Pharynx: Oropharynx is clear. Eyes:      Extraocular Movements: Extraocular movements intact. Pupils: Pupils are equal, round, and reactive to light. Cardiovascular:      Rate and Rhythm: Normal rate and regular rhythm. Heart sounds: Normal heart sounds. No murmur heard. Pulmonary:      Breath sounds: Normal breath sounds. No stridor. No wheezing, rhonchi or rales. Abdominal:      Palpations: Abdomen is soft. Tenderness: There is no abdominal tenderness. There is no guarding or rebound. Musculoskeletal:         General: No swelling, tenderness (Patient nontender to palpation of right knee and right lower extremity. Full ROM. Neurovascularly intact. No sensory deficit. Strength 5/5 in B/L lower extremities) or deformity. Normal range of motion. Cervical back: Normal range of motion and neck supple. No rigidity or tenderness. Right lower leg: No edema. Left lower leg: No edema. Skin:     Capillary Refill: Capillary refill takes less than 2 seconds. Coloration: Skin is not pale. Neurological:      General: No focal deficit present.       Mental Status: She is alert and oriented to person, place, and time. Sensory: No sensory deficit. Motor: No weakness. Gait: Gait (Patient able to ambulate slowly, no gait abnormality) normal.      Deep Tendon Reflexes: Reflexes normal.       DIFFERENTIAL  DIAGNOSIS     PLAN (LABS / IMAGING / EKG):  Orders Placed This Encounter   Procedures    XR KNEE RIGHT (3 VIEWS)    XR TIBIA FIBULA RIGHT (2 VIEWS)       MEDICATIONS ORDERED:  Orders Placed This Encounter   Medications    ketorolac (TORADOL) injection 15 mg    naproxen (NAPROSYN) 375 MG tablet     Sig: Take 1 tablet by mouth 2 times daily (with meals) for 7 days     Dispense:  14 tablet     Refill:  0    calcium carbonate (ANTACID) 500 MG chewable tablet     Sig: Take 1 tablet by mouth daily     Dispense:  30 tablet     Refill:  0    DISCONTD: famotidine (PEPCID) injection 20 mg    aluminum & magnesium hydroxide-simethicone (MAALOX) 200-200-20 MG/5ML suspension 30 mL    famotidine (PEPCID) tablet 20 mg    famotidine (PEPCID) 20 MG tablet     Sig: Take 1 tablet by mouth 2 times daily     Dispense:  60 tablet     Refill:  0     DDX: Right meniscus tear vs dislocation vs fracture vs soft tissue injury vs chronic pain s/p right knee arthroplasty    DIAGNOSTIC RESULTS / EMERGENCY DEPARTMENT COURSE / [facility]     RADIOLOGY:  ***    EMERGENCY DEPARTMENT COURSE:  ED Course as of Sep 18 0030   Fri Sep 17, 2021   2329 No tenderness to palpation of R knee. Full ROM. Negative anterior and posterior drawer test. No pallor or paresthesia, neurovascularly intact, no clinical suspicion for compartment syndrome. Sensation intact bilaterally. Strength 5/5 in b/l lower extremities. Patient states her pain is at baseline and has not worsened since her fall from the chair onto carpeted ground as she was attempting to stand. No external evidence of injury, no ecchymoses or abrasions. Well healing surgical scar on R knee s/p R knee arthroplasty in 2012.  Will obtain Xray    [JG]   2331 Patient denying any pain above baseline at this time. [JG]   2332 SpO2 92-93 on RA, patient respirations even and unlabored, denies any SOB. Lungs CTAB. Hx of COPD, not on home O2, states she has been compliant with her medications including spiriva inhaler. Patient placed on pulse ox and will continue to monitor SpO2 closely    [JG]   Sat Sep 18, 2021   0018 Patient states she has been having some indigestion and requesting something, has a rx for omeprazole outpatient but will add maalox. Given toradol while in the ED, and given script for naproxen to go home and follow up with ortho    [JG]      ED Course User Index  [JG] Jeanine Rosen MD       FINAL IMPRESSION      1.  Right leg pain          DISPOSITION / PLAN     DISPOSITION        PATIENT REFERRED TO:  José Miguel Garcia, JEANNETTE - CNP  [address]  [place] [address]  [phone]    Schedule an appointment as soon as possible for a visit today  As needed     [place] [address]  [phone]  Schedule an appointment as soon as possible for a visit   As needed      DISCHARGE MEDICATIONS:  New Prescriptions    CALCIUM CARBONATE (ANTACID) 500 MG CHEWABLE TABLET    Take 1 tablet by mouth daily    FAMOTIDINE (PEPCID) 20 MG TABLET    Take 1 tablet by mouth 2 times daily    NAPROXEN (NAPROSYN) 375 MG TABLET    Take 1 tablet by mouth 2 times daily (with meals) for 7 days       Jeanine Rosen MD  Emergency Medicine Resident    (Please note that portions of thisnote were completed with a voice recognition program.  Efforts were made to edit the dictations but occasionally words are mis-transcribed.)

## 2021-09-18 NOTE — ED NOTES
KRISTOPHER consulted for transportation assistance back to residence. SW used patient's insurance [employer]. Patient reports she is able to take a regular cab back to residence. Transportation was arranged through [employer].  Trip # [id number] ETA 1-3 hours      MARIAMA Eduardo  09/18/21 [id number]

## 2021-09-18 NOTE — ED PROVIDER NOTES
[facility] ED  Emergency Department Encounter  EmergencyMedicine Resident     Pt Saúl Severino  MRN: [id number]  [place] 1955  Date of evaluation: 9/17/21  PCP:  JEANNETTE Okeefe CNP    This patient was evaluated in the Emergency Department for symptoms described in the history of present illness. The patient was evaluated in the context of the global COVID-19 pandemic, which necessitated consideration that the patient might be at risk for infection with the SARS-CoV-2 virus that causes COVID-19. Institutional protocols and algorithms that pertain to the evaluation of patients at risk for COVID-19 are in a state of rapid change based on information released by regulatory bodies including the CDC and federal and state organizations. These policies and algorithms were followed during the patient's care in the ED. CHIEF COMPLAINT       Chief Complaint   Patient presents with    Leg Pain     Pt stated having pain for a couple months       HISTORY OF PRESENT ILLNESS  (Location/Symptom, Timing/Onset, Context/Setting, Quality, Duration, Modifying Factors, Severity.)      Ishaan Blankenship is a 77 y.o. female who presents with longstanding right-sided leg pain. Patient is status post right knee arthroplasty in 2012. She states she has had pain in her right knee and right shin since that time, but denies any other complications since surgery. She has been having increasing falls over the past several months, and had a fall yesterday as she was attempting to rise from a chair, and fell onto her right side on a carpeted ground. Endorses some increased numbness in the right foot, but denies pallor, complete loss of sensation, or pain in the right foot, and states that her pain is at baseline. She denies any other falls or trauma. Patient states she is able to ambulate with some difficulty. Pain has not increased from baseline during her visit today.   She states she has had difficulty finding a primary care doctor for longstanding pain control. History of COPD, has been taking all her inhalers and other medications regularly. On ASA 81 mg, no other blood thinners. Denies head trauma, LOC, other trauma or injuries, chest pain, shortness of breath, abdominal pain, N/V/D. PAST MEDICAL / SURGICAL / SOCIAL / FAMILY HISTORY      has a past medical history of CHRIS (acute kidney injury) ([facility] [address] Bipolar 1 disorder ([facility] [address] Breast lump, Chronic obstructive pulmonary disease ([facility] [address] Depression, GERD (gastroesophageal reflux disease), Hyperlipidemia, Hypertension, Osteoarthritis, Type 2 diabetes mellitus without complication, with long-term current use of insulin ([facility] [address] and Type II or unspecified type diabetes mellitus without mention of complication, not stated as uncontrolled. has a past surgical history that includes Ectopic pregnancy surgery; knee surgery (Right); Dilation and curettage of uterus; Nerve Block (Left, 12/8/14); Knee Arthroplasty (2017); and Tonsillectomy and adenoidectomy.     Social History     Socioeconomic History    Marital status:      Spouse name: Not on file    Number of children: Not on file    Years of education: Not on file    Highest education level: Not on file   Occupational History     Employer: N/A   Tobacco Use    Smoking status: Current Every Day Smoker     Packs/day: 0.25     Years: 10.00     Pack years: 2.50     Types: Cigarettes    Smokeless tobacco: Never Used    Tobacco comment: 3 cigarettes per day; Patient refused counseling   Vaping Use    Vaping Use: Never used   Substance and Sexual Activity    Alcohol use: Yes     Comment: rarely    Drug use: No    Sexual activity: Not Currently   Other Topics Concern    Not on file   Social History Narrative    Not on file     Social Determinants of Health     Financial Resource Strain: Low Risk     Difficulty of Paying Living Expenses: Not hard at all   Food Insecurity: No Food Insecurity    Worried About Running Out of Food in the Last Year: Never true    Ran Out of Food in the Last Year: Never true   Transportation Needs:     Lack of Transportation (Medical):  Lack of Transportation (Non-Medical):    Physical Activity:     Days of Exercise per Week:     Minutes of Exercise per Session:    Stress:     Feeling of Stress :    Social Connections:     Frequency of Communication with Friends and Family:     Frequency of Social Gatherings with Friends and Family:     Attends Sikhism Services:     Active Member of Clubs or Organizations:     Attends Club or Organization Meetings:     Marital Status:    Intimate Partner Violence:     Fear of Current or Ex-Partner:     Emotionally Abused:     Physically Abused:     Sexually Abused:        Family History   Problem Relation Age of Onset    Diabetes Father     Stroke Father     High Blood Pressure Father        Allergies:  Chlorpromazine, Cyclobenzaprine, Gabapentin, Prochlorperazine, Prochlorperazine edisylate, Promethazine, and Chantix [varenicline tartrate]    Home Medications:  Prior to Admission medications    Medication Sig Start Date End Date Taking? Authorizing Provider   naproxen (NAPROSYN) 375 MG tablet Take 1 tablet by mouth 2 times daily (with meals) for 7 days 9/18/21 9/25/21 Yes Andreina Queen MD   calcium carbonate (ANTACID) 500 MG chewable tablet Take 1 tablet by mouth daily 9/18/21 10/18/21 Yes Andreina Queen MD   famotidine (PEPCID) 20 MG tablet Take 1 tablet by mouth 2 times daily 9/18/21  Yes Andreina Queen MD   nystatin (MYCOSTATIN) 311217 UNIT/GM cream APPLY TOPICALLY 2 TIMES DAILY.  8/30/21   JEANNETTE Mendoza CNP   Lancets (ONETOUCH DELICA PLUS [id number]) MISC USE AS DIRECTED FOUR TIMES A DAY TO TEST BLOOD SUGARS 8/19/21   JEANNETTE Rowley CNP   QUEtiapine (SEROQUEL) 200 MG tablet Take 2 tablets by mouth nightly for 7 days 7/24/21 7/31/21  Robin Barrett MD   QUEtiapine (SEROQUEL) 300 MG tablet Take 1 (cramps)  Patient not taking: Reported on 5/25/2021 10/21/20   Martin Parul, DO   docusate sodium (COLACE) 100 MG capsule Take 1 capsule by mouth 2 times daily 10/21/20   Martin Parul, DO   Insulin Pen Needle 31G X 8 MM MISC Use BID for insulin administration 8/20/20   JEANNETTE Rodney CNP   pregabalin (LYRICA) 150 MG capsule Take 1 capsule by mouth 2 times daily for 30 days. 6/4/20 5/25/21  Ansel Saint, APRN - CNP   blood glucose monitor kit and supplies Test 1 times a day & as needed for symptoms of irregular blood glucose. 4/16/20   JEANNETTE Mancia CNP   aspirin 81 MG EC tablet Take 1 tablet by mouth daily 4/14/20   Shimon Osorio MD   escitalopram (LEXAPRO) 5 MG tablet Take 5 tablets by mouth daily 4/14/20   Shimon Osorio MD   ALPRAZolam Sae Ket) 0.5 MG tablet Take 0.25 mg by mouth 2 times daily. Patient not taking: Reported on 5/25/2021    Historical Provider, MD   albuterol (PROVENTIL HFA;VENTOLIN HFA) 108 (90 BASE) MCG/ACT inhaler Inhale 1 puff into the lungs 4 times daily as needed for Wheezing     Historical Provider, MD       REVIEW OF SYSTEMS    (2-9 systems for level 4, 10 or more for level 5)      Review of Systems   Constitutional: Negative for activity change, appetite change and fever. HENT: Negative for congestion, rhinorrhea and sore throat. Respiratory: Negative for cough and shortness of breath. Cardiovascular: Negative for chest pain and leg swelling. Gastrointestinal: Negative for abdominal pain, diarrhea, nausea and vomiting. Genitourinary: Negative. Musculoskeletal: Positive for arthralgias (R knee pain since 2012, unchanged from baseline despite fall onto right side yesterday when trying to stand up from a chair) and gait problem (Able to ambulate with some difficulty since 2012. No acute worsening in gait difficulty. Increasing falls over the past several months). Negative for back pain and myalgias.    Neurological: Positive for numbness (Endorsing some mild numbness in right foot for the past several months,). Negative for dizziness, tremors, weakness, light-headedness and headaches. PHYSICAL EXAM   (up to 7 for level 4, 8 or more for level 5)      INITIAL VITALS:   /70   Pulse 82   Temp 99.3 °F (37.4 °C) (Oral)   Resp 18   Ht 5' 7\" (1.702 m)   Wt 213 lb (96.6 kg)   LMP  (LMP Unknown)   SpO2 92%   BMI 33.36 kg/m²     Physical Exam  Vitals (SpO2 92-93%, patient with COPD not on home O2. Patient not complaining of any SOB or CP, denying any difficulty breathing. Lungs CTAB.) reviewed. Constitutional:       General: She is not in acute distress. Appearance: She is not toxic-appearing. HENT:      Head: Normocephalic and atraumatic. Nose: Nose normal.      Mouth/Throat:      Mouth: Mucous membranes are moist.      Pharynx: Oropharynx is clear. Eyes:      Extraocular Movements: Extraocular movements intact. Pupils: Pupils are equal, round, and reactive to light. Cardiovascular:      Rate and Rhythm: Normal rate and regular rhythm. Heart sounds: Normal heart sounds. No murmur heard. Pulmonary:      Breath sounds: Normal breath sounds. No stridor. No wheezing, rhonchi or rales. Abdominal:      Palpations: Abdomen is soft. Tenderness: There is no abdominal tenderness. There is no guarding or rebound. Musculoskeletal:         General: No swelling, tenderness (Patient nontender to palpation of right knee and right lower extremity. Full ROM. Neurovascularly intact. No sensory deficit. Strength 5/5 in B/L lower extremities) or deformity. Normal range of motion. Cervical back: Normal range of motion and neck supple. No rigidity or tenderness. Right lower leg: No edema. Left lower leg: No edema. Skin:     Capillary Refill: Capillary refill takes less than 2 seconds. Coloration: Skin is not pale. Neurological:      General: No focal deficit present.       Mental Status: She is alert and oriented to person, place, and time. Sensory: No sensory deficit. Motor: No weakness. Gait: Gait (Patient able to ambulate slowly, no gait abnormality) normal.      Deep Tendon Reflexes: Reflexes normal.       DIFFERENTIAL  DIAGNOSIS     PLAN (LABS / IMAGING / EKG):  Orders Placed This Encounter   Procedures    XR KNEE RIGHT (3 VIEWS)    XR TIBIA FIBULA RIGHT (2 VIEWS)       MEDICATIONS ORDERED:  Orders Placed This Encounter   Medications    ketorolac (TORADOL) injection 15 mg    naproxen (NAPROSYN) 375 MG tablet     Sig: Take 1 tablet by mouth 2 times daily (with meals) for 7 days     Dispense:  14 tablet     Refill:  0    calcium carbonate (ANTACID) 500 MG chewable tablet     Sig: Take 1 tablet by mouth daily     Dispense:  30 tablet     Refill:  0    DISCONTD: famotidine (PEPCID) injection 20 mg    aluminum & magnesium hydroxide-simethicone (MAALOX) 200-200-20 MG/5ML suspension 30 mL    famotidine (PEPCID) tablet 20 mg    famotidine (PEPCID) 20 MG tablet     Sig: Take 1 tablet by mouth 2 times daily     Dispense:  60 tablet     Refill:  0     DDX: Right meniscus tear vs dislocation vs fracture vs soft tissue injury vs chronic pain s/p right knee arthroplasty    DIAGNOSTIC RESULTS / EMERGENCY DEPARTMENT COURSE / MDM     RADIOLOGY:  XR KNEE RIGHT (3 VIEWS)    Result Date: 9/17/2021  EXAMINATION: THREE XRAY VIEWS OF THE RIGHT KNEE; 4 XRAY VIEWS OF THE RIGHT TIBIA AND FIBULA 9/17/2021 11:28 pm COMPARISON: Knee right three views March 22, 2021. HISTORY: ORDERING SYSTEM PROVIDED HISTORY: Fall, hx of r knee arthroplasty TECHNOLOGIST PROVIDED HISTORY: Fall, hx of r knee arthroplasty FINDINGS: Knee: Bone alignment is within normal limits. Bone mineralization is unremarkable. Total knee arthroplasty seen without evidence of hardware loosening, malalignment or other complication identified. Calcification of the MCL is seen. No evidence of joint effusion is seen. Surrounding soft tissues are unremarkable. Tibia/fibula: The tibia and fibula demonstrate normal alignment. Ankle mortise is symmetric and intact. The joint spaces are preserved. Bone mineralization is within normal limits. No evidence of acute fracture, dislocation is noted. Surrounding soft tissues are unremarkable. 1. No radiographic evidence of acute abnormality involving the right knee or right tibia/fibula. 2. 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 XR TIBIA FIBULA RIGHT (2 VIEWS)    Result Date: 9/17/2021  EXAMINATION: THREE XRAY VIEWS OF THE RIGHT KNEE; 4 XRAY VIEWS OF THE RIGHT TIBIA AND FIBULA 9/17/2021 11:28 pm COMPARISON: Knee right three views March 22, 2021. HISTORY: ORDERING SYSTEM PROVIDED HISTORY: Fall, hx of r knee arthroplasty TECHNOLOGIST PROVIDED HISTORY: Fall, hx of r knee arthroplasty FINDINGS: Knee: Bone alignment is within normal limits. Bone mineralization is unremarkable. Total knee arthroplasty seen without evidence of hardware loosening, malalignment or other complication identified. Calcification of the MCL is seen. No evidence of joint effusion is seen. Surrounding soft tissues are unremarkable. Tibia/fibula: The tibia and fibula demonstrate normal alignment. Ankle mortise is symmetric and intact. The joint spaces are preserved. Bone mineralization is within normal limits. No evidence of acute fracture, dislocation is noted. Surrounding soft tissues are unremarkable. 1. No radiographic evidence of acute abnormality involving the right knee or right tibia/fibula. 2. 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 EMERGENCY DEPARTMENT COURSE:  ED Course as of Sep 19 1744   Fri Sep 17, 2021   4020 No tenderness to palpation of R knee. Full ROM. Negative anterior and posterior drawer test. No pallor or paresthesia, neurovascularly intact, no clinical suspicion for compartment syndrome. Sensation intact bilaterally.  Strength 5/5 in b/l lower extremities. Patient states her pain is at baseline and has not worsened since her fall from the chair onto carpeted ground as she was attempting to stand. No external evidence of injury, no ecchymoses or abrasions. Well healing surgical scar on R knee s/p R knee arthroplasty in 2012. Will obtain Xray    [JG]   2331 Patient denying any pain above baseline at this time. [JG]   2332 SpO2 92-93 on RA, patient respirations even and unlabored, denies any SOB. Lungs CTAB. Hx of COPD, not on home O2, states she has been compliant with her medications including spiriva inhaler. Patient placed on pulse ox and will continue to monitor SpO2 closely    [JG]   Sat Sep 18, 2021   0018 Patient states she has been having some indigestion and requesting something, has a rx for omeprazole outpatient but will add maalox. Given toradol while in the ED, and given script for naproxen to go home and follow up with ortho    [JG]      ED Course User Index  [JG] Rex Vyas MD       FINAL IMPRESSION      1.  Right leg pain          DISPOSITION / PLAN     DISPOSITION  Discharged 9/18/2021  1:30 AM        PATIENT REFERRED TO:  Dennie Junker, APRN - CNP  [address] [place] [address]  [phone]    Schedule an appointment as soon as possible for a visit today  As needed    [place] [address]  [phone]  Schedule an appointment as soon as possible for a visit   As needed      DISCHARGE MEDICATIONS:  Discharge Medication List as of 9/18/2021 12:22 AM      START taking these medications    Details   calcium carbonate (ANTACID) 500 MG chewable tablet Take 1 tablet by mouth daily, Disp-30 tablet, R-0Print             Rex Vyas MD  Emergency Medicine Resident    (Please note that portions of thisnote were completed with a voice recognition program.  Efforts were made to edit the dictations but occasionally words are mis-transcribed.)       Rex Vyas MD  Resident  09/19/21 [id number]

## 2021-09-20 NOTE — ED PROVIDER NOTES
[address]   Emergency Department  Emergency Medicine Attending Sign-out     Care of Klever Sandoval was assumed from previous attending Dr. María Mcnulty  and is being seen for Leg Pain (Pt stated having pain for a couple months)  . The patient's initial evaluation and plan have been discussed with the prior provider who initially evaluated the patient. Attestation  I was available and discussed any additional care issues that arose and coordinated the management plans with the resident(s) caring for the patient during my duty period. Any areas of disagreement with resident's documentation of care or procedures are noted on the chart. I was personally present for the key portions of any/all procedures, during my duty period. I have documented in the chart those procedures where I was not present during the key portions. BRIEF PATIENT SUMMARY/MDM COURSE PER INITIAL PROVIDER:   RECENT VITALS:     Temp: 99.3 °F (37.4 °C),  Pulse: 82, Resp: 18, BP: 119/70, SpO2: 92 %    This patient is a 77 y.o.  Female with right leg pain, awaiting xrays and likely d/c     DIAGNOSTICS/MEDICATIONS:     MEDICATIONS GIVEN:  ED Medication Orders (From admission, onward)    Start Ordered     Status Ordering Provider    09/18/21 0030 09/18/21 0015  ketorolac (TORADOL) injection 15 mg  ONCE      Last MAR action: Given - by Anali Harman on 09/18/21 at [facility] [address] [facility]    09/18/21 0030 09/18/21 0029  aluminum & magnesium hydroxide-simethicone (MAALOX) 200-200-20 MG/5ML suspension 30 mL  ONCE      Last MAR action: Given - by Anali Harman on 09/18/21 at [address]    09/18/21 0030 09/18/21 0029  famotidine (PEPCID) tablet 20 mg  ONCE      Last MAR action: Given - by Anali Harman on 09/18/21 at [address], [facility] Sandra Casas Reviewed - No data to display    RADIOLOGY  XR KNEE RIGHT (3 VIEWS)    Result Date: 9/17/2021  EXAMINATION: THREE XRAY VIEWS OF THE RIGHT KNEE; 4 XRAY VIEWS OF THE RIGHT TIBIA AND FIBULA 9/17/2021 11:28 pm COMPARISON: Knee right three views March 22, 2021. HISTORY: ORDERING SYSTEM PROVIDED HISTORY: Fall, hx of r knee arthroplasty TECHNOLOGIST PROVIDED HISTORY: Fall, hx of r knee arthroplasty FINDINGS: Knee: Bone alignment is within normal limits. Bone mineralization is unremarkable. Total knee arthroplasty seen without evidence of hardware loosening, malalignment or other complication identified. Calcification of the MCL is seen. No evidence of joint effusion is seen. Surrounding soft tissues are unremarkable. Tibia/fibula: The tibia and fibula demonstrate normal alignment. Ankle mortise is symmetric and intact. The joint spaces are preserved. Bone mineralization is within normal limits. No evidence of acute fracture, dislocation is noted. Surrounding soft tissues are unremarkable. 1. No radiographic evidence of acute abnormality involving the right knee or right tibia/fibula. 2. 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 XR TIBIA FIBULA RIGHT (2 VIEWS)    Result Date: 9/17/2021  EXAMINATION: THREE XRAY VIEWS OF THE RIGHT KNEE; 4 XRAY VIEWS OF THE RIGHT TIBIA AND FIBULA 9/17/2021 11:28 pm COMPARISON: Knee right three views March 22, 2021. HISTORY: ORDERING SYSTEM PROVIDED HISTORY: Fall, hx of r knee arthroplasty TECHNOLOGIST PROVIDED HISTORY: Fall, hx of r knee arthroplasty FINDINGS: Knee: Bone alignment is within normal limits. Bone mineralization is unremarkable. Total knee arthroplasty seen without evidence of hardware loosening, malalignment or other complication identified. Calcification of the MCL is seen. No evidence of joint effusion is seen. Surrounding soft tissues are unremarkable. Tibia/fibula: The tibia and fibula demonstrate normal alignment. Ankle mortise is symmetric and intact. The joint spaces are preserved. Bone mineralization is within normal limits.  No evidence of acute fracture, dislocation is

## 2021-10-09 ENCOUNTER — APPOINTMENT (OUTPATIENT)
Dept: GENERAL RADIOLOGY | Age: 66
End: 2021-10-09
Payer: MEDICARE

## 2021-10-09 ENCOUNTER — HOSPITAL ENCOUNTER (OUTPATIENT)
Age: 66
Setting detail: OBSERVATION
Discharge: PSYCHIATRIC OR SUBSTANCE USE FACILITY | End: 2021-10-10
Attending: EMERGENCY MEDICINE | Admitting: EMERGENCY MEDICINE
Payer: MEDICARE

## 2021-10-09 DIAGNOSIS — M25.552 LEFT HIP PAIN: Primary | ICD-10-CM

## 2021-10-09 PROBLEM — R44.3 HALLUCINATION: Status: ACTIVE | Noted: 2021-10-09

## 2021-10-09 LAB
SARS-COV-2, RAPID: NOT DETECTED
SPECIMEN DESCRIPTION: NORMAL

## 2021-10-09 PROCEDURE — 73502 X-RAY EXAM HIP UNI 2-3 VIEWS: CPT

## 2021-10-09 PROCEDURE — 2580000003 HC RX 258

## 2021-10-09 PROCEDURE — 87635 SARS-COV-2 COVID-19 AMP PRB: CPT

## 2021-10-09 PROCEDURE — G0378 HOSPITAL OBSERVATION PER HR: HCPCS

## 2021-10-09 PROCEDURE — 99284 EMERGENCY DEPT VISIT MOD MDM: CPT

## 2021-10-09 RX ORDER — SODIUM CHLORIDE 0.9 % (FLUSH) 0.9 %
5-40 SYRINGE (ML) INJECTION EVERY 12 HOURS SCHEDULED
Status: DISCONTINUED | OUTPATIENT
Start: 2021-10-09 | End: 2021-10-11 | Stop reason: HOSPADM

## 2021-10-09 RX ORDER — ONDANSETRON 2 MG/ML
4 INJECTION INTRAMUSCULAR; INTRAVENOUS EVERY 4 HOURS PRN
Status: DISCONTINUED | OUTPATIENT
Start: 2021-10-09 | End: 2021-10-11 | Stop reason: HOSPADM

## 2021-10-09 RX ORDER — ASPIRIN 81 MG/1
81 TABLET ORAL DAILY
Status: DISCONTINUED | OUTPATIENT
Start: 2021-10-10 | End: 2021-10-11 | Stop reason: HOSPADM

## 2021-10-09 RX ORDER — ALBUTEROL SULFATE 90 UG/1
1 AEROSOL, METERED RESPIRATORY (INHALATION) 4 TIMES DAILY PRN
Status: DISCONTINUED | OUTPATIENT
Start: 2021-10-09 | End: 2021-10-11 | Stop reason: HOSPADM

## 2021-10-09 RX ORDER — LISINOPRIL 10 MG/1
10 TABLET ORAL DAILY
Status: DISCONTINUED | OUTPATIENT
Start: 2021-10-10 | End: 2021-10-11 | Stop reason: HOSPADM

## 2021-10-09 RX ORDER — POTASSIUM CHLORIDE 20 MEQ/1
40 TABLET, EXTENDED RELEASE ORAL PRN
Status: DISCONTINUED | OUTPATIENT
Start: 2021-10-09 | End: 2021-10-11 | Stop reason: HOSPADM

## 2021-10-09 RX ORDER — ACETAMINOPHEN 650 MG/1
650 SUPPOSITORY RECTAL EVERY 6 HOURS PRN
Status: DISCONTINUED | OUTPATIENT
Start: 2021-10-09 | End: 2021-10-10

## 2021-10-09 RX ORDER — SODIUM CHLORIDE 9 MG/ML
25 INJECTION, SOLUTION INTRAVENOUS PRN
Status: DISCONTINUED | OUTPATIENT
Start: 2021-10-09 | End: 2021-10-11 | Stop reason: HOSPADM

## 2021-10-09 RX ORDER — HYDROXYZINE HYDROCHLORIDE 10 MG/1
25 TABLET, FILM COATED ORAL 3 TIMES DAILY PRN
Status: DISCONTINUED | OUTPATIENT
Start: 2021-10-09 | End: 2021-10-11 | Stop reason: HOSPADM

## 2021-10-09 RX ORDER — POTASSIUM CHLORIDE 7.45 MG/ML
10 INJECTION INTRAVENOUS PRN
Status: DISCONTINUED | OUTPATIENT
Start: 2021-10-09 | End: 2021-10-11 | Stop reason: HOSPADM

## 2021-10-09 RX ORDER — SODIUM CHLORIDE 0.9 % (FLUSH) 0.9 %
5-40 SYRINGE (ML) INJECTION PRN
Status: DISCONTINUED | OUTPATIENT
Start: 2021-10-09 | End: 2021-10-11 | Stop reason: HOSPADM

## 2021-10-09 RX ORDER — ESCITALOPRAM OXALATE 10 MG/1
25 TABLET ORAL DAILY
Status: DISCONTINUED | OUTPATIENT
Start: 2021-10-10 | End: 2021-10-11 | Stop reason: HOSPADM

## 2021-10-09 RX ORDER — FAMOTIDINE 20 MG/1
20 TABLET, FILM COATED ORAL 2 TIMES DAILY
Status: DISCONTINUED | OUTPATIENT
Start: 2021-10-09 | End: 2021-10-11 | Stop reason: HOSPADM

## 2021-10-09 RX ORDER — POLYETHYLENE GLYCOL 3350 17 G/17G
17 POWDER, FOR SOLUTION ORAL DAILY PRN
Status: DISCONTINUED | OUTPATIENT
Start: 2021-10-09 | End: 2021-10-10 | Stop reason: SDUPTHER

## 2021-10-09 RX ORDER — SODIUM CHLORIDE 9 MG/ML
INJECTION, SOLUTION INTRAVENOUS CONTINUOUS
Status: DISCONTINUED | OUTPATIENT
Start: 2021-10-09 | End: 2021-10-11 | Stop reason: HOSPADM

## 2021-10-09 RX ORDER — ACETAMINOPHEN 325 MG/1
650 TABLET ORAL EVERY 6 HOURS PRN
Status: DISCONTINUED | OUTPATIENT
Start: 2021-10-09 | End: 2021-10-10

## 2021-10-09 RX ADMIN — SODIUM CHLORIDE: 9 INJECTION, SOLUTION INTRAVENOUS at 23:29

## 2021-10-09 ASSESSMENT — ENCOUNTER SYMPTOMS
ABDOMINAL PAIN: 0
BLOOD IN STOOL: 0
CONSTIPATION: 0
SHORTNESS OF BREATH: 1
NAUSEA: 0
ABDOMINAL DISTENTION: 0
CHEST TIGHTNESS: 0
EYES NEGATIVE: 1
VOMITING: 0
DIARRHEA: 0
APNEA: 0
COUGH: 0
GASTROINTESTINAL NEGATIVE: 1
ANAL BLEEDING: 0
STRIDOR: 0
ALLERGIC/IMMUNOLOGIC NEGATIVE: 1
WHEEZING: 0
RECTAL PAIN: 0
CHOKING: 0

## 2021-10-09 ASSESSMENT — PAIN SCALES - GENERAL: PAINLEVEL_OUTOF10: 0

## 2021-10-09 NOTE — ED PROVIDER NOTES
[facility] ED  Emergency Department Encounter  EmergencyMedicine Resident     Pt Laisha Fernandez  MRN: [id number]  Carmengfdana 1955  Date of evaluation: 10/9/21  PCP:  JEANNETTE Langley CNP    CHIEF COMPLAINT       Chief Complaint   Patient presents with    Hip Pain       HISTORY OF PRESENT ILLNESS  (Location/Symptom, Timing/Onset, Context/Setting, Quality, Duration, Modifying Factors, Severity.)      Shaggy Peralta is a 77 y.o. female who presents with left hip pain that starts at the waistband and radiates down the leg to the ankle that began after she sustained a fall in her home 1 to 2 days ago. Timeline is somewhat unclear as patient is a poor historian. She does not use a cane or walker for ambulation. She reports that for the last couple months she has been sustaining multiple falls in her home for which she has had to call 911 multiple times for assistance getting back up into bed. She lives in an apartment and lives alone. The hip pain is described as dull and achy. Walking makes it worse, resting makes it better. It is tender to palpation. She has never experienced this pain before. Patient reports he tried to tough it out over the last day or 2 and then she decided that her leg was too painful and she was not able to ambulate on it and decided to come in. She has chronic right leg pain and is status post right TKA. Reports normal appetite, drinks approximately 3 to 4 glasses of Jorje-Aid, milk, or other throughout the day. She reports that her Hoveround has been broken for approximately 2 weeks which is her main mode of ambulation. Patient reports that previously she has had home health care visiting however her home nurse quit and she is without care. She has been taking sponge baths. Patient appears unkempt and is wearing dirty clothes. Denies fevers, chills, headache, abdominal pain, nausea, vomiting, changes to bowel or bladder function, dysuria. She does have a history of COPD for which she uses inhalers, no home O2, she reports that she has shortness of breath on exertion which is baseline. PAST MEDICAL / SURGICAL / SOCIAL / FAMILY HISTORY      has a past medical history of CHRIS (acute kidney injury) ([facility] [address] [facility] [facility] Breast lump, Chronic obstructive pulmonary disease ([facility] Depression, GERD (gastroesophageal reflux disease), Hyperlipidemia, Hypertension, Osteoarthritis, Type 2 diabetes mellitus without complication, with long-term current use of insulin ([facility] and Type II or unspecified type diabetes mellitus without mention of complication, not stated as uncontrolled. has a past surgical history that includes Ectopic pregnancy surgery; knee surgery (Right); Dilation and curettage of uterus; Nerve Block (Left, 12/8/14); Knee Arthroplasty (2017); and Tonsillectomy and adenoidectomy.       Social History     Socioeconomic History    Marital status:      Spouse name: Not on file    Number of children: Not on file    Years of education: Not on file    Highest education level: Not on file   Occupational History     Employer: N/A   Tobacco Use    Smoking status: Current Every Day Smoker     Packs/day: 0.25     Years: 10.00     Pack years: 2.50     Types: Cigarettes    Smokeless tobacco: Never Used    Tobacco comment: 3 cigarettes per day; Patient refused counseling   Vaping Use    Vaping Use: Never used   Substance and Sexual Activity    Alcohol use: Yes     Comment: rarely    Drug use: No    Sexual activity: Not Currently   Other Topics Concern    Not on file   Social History Narrative    Not on file     Social Determinants of Health     Financial Resource Strain: Low Risk     Difficulty of Paying Living Expenses: Not hard at all   Food Insecurity: No Food Insecurity    Worried About Running Out of Food in the Last Year: Never true    Rocio of Food in the Last Year: Never true   Transportation Needs:     Lack of Transportation (Medical):  Lack of Transportation (Non-Medical):    Physical Activity:     Days of Exercise per Week:     Minutes of Exercise per Session:    Stress:     Feeling of Stress :    Social Connections:     Frequency of Communication with Friends and Family:     Frequency of Social Gatherings with Friends and Family:     Attends Gnosticism Services:     Active Member of Clubs or Organizations:     Attends Club or Organization Meetings:     Marital Status:    Intimate Partner Violence:     Fear of Current or Ex-Partner:     Emotionally Abused:     Physically Abused:     Sexually Abused:        Family History   Problem Relation Age of Onset    Diabetes Father     Stroke Father     High Blood Pressure Father        Allergies:  Chlorpromazine, Cyclobenzaprine, Gabapentin, Prochlorperazine, Prochlorperazine edisylate, Promethazine, and Chantix [varenicline tartrate]    Home Medications:  Prior to Admission medications    Medication Sig Start Date End Date Taking? Authorizing Provider   naproxen (NAPROSYN) 375 MG tablet Take 1 tablet by mouth 2 times daily (with meals) for 7 days 9/18/21 9/25/21  Vashti Harvey MD   calcium carbonate (ANTACID) 500 MG chewable tablet Take 1 tablet by mouth daily 9/18/21 10/18/21  Vashti Harvey MD   famotidine (PEPCID) 20 MG tablet Take 1 tablet by mouth 2 times daily 9/18/21   Vashti Harvey MD   nystatin (MYCOSTATIN) 652247 UNIT/GM cream APPLY TOPICALLY 2 TIMES DAILY.  8/30/21   JEANNETTE Nguyen CNP   [facility] PLUS [id number]) MISC USE AS DIRECTED FOUR TIMES A DAY TO TEST BLOOD SUGARS 8/19/21   JEANNETTE Robbins CNP   QUEtiapine (SEROQUEL) 200 MG tablet Take 2 tablets by mouth nightly for 7 days 7/24/21 7/31/21  Oneil Johnson MD   QUEtiapine (SEROQUEL) 300 MG tablet Take 1 tablet by mouth 2 times daily 300 mg in morning and afternoon in addition to 400 mg at night. 7/24/21   Luis Anderson MD   traZODone ([facility]) 150 MG tablet Take 1 tablet by mouth nightly 7/24/21   Pat Hess MD   baclofen (LIORESAL) 10 MG tablet Take 0.5 tablets by mouth nightly as needed (muscle spasms) 7/16/21   Jeannette Galloway PA-C   acetaminophen (TYLENOL) 500 MG tablet Take 2 tablets by mouth every 6 hours as needed for Pain 7/16/21   Jeannette Galloway PA-C   acetaminophen (APAP EXTRA STRENGTH) 500 MG tablet Take 2 tablets by mouth every 8 hours as needed for Pain 6/14/21 6/19/21  Karen Wheeler MD   Cobalamin Combinations (NEURIVA PLUS PO) Take by mouth    Historical Provider, MD   insulin glargine (LANTUS SOLOSTAR) 100 UNIT/ML injection pen INJECT 20 UNITS INTO THE SKIN BID 5/25/21   JEANNETTE Connor CNP   lisinopril (PRINIVIL;ZESTRIL) 10 MG tablet Take 1 tablet by mouth daily 5/25/21   JEANNETTE Connor CNP   nystatin (MYCOSTATIN) 430511 UNIT/GM cream Apply topically 2 times daily. 5/25/21   JEANNETTE Connor CNP   SPIRIVA HANDIHALER 18 MCG inhalation capsule INHALE THE CONTENTS OF 1 CAPSULE INTO THE LUNG ONCE DAILY 5/19/21   JEANNETTE Connor CNP   lidocaine (LIDODERM) 5 % Place 1 patch onto the skin daily 12 hours on, 12 hours off.   Patient not taking: Reported on 7/16/2021 3/22/21   Alayna Dalton DO   ibuprofen (ADVIL;MOTRIN) 600 MG tablet Take 1 tablet by mouth every 6 hours as needed for Pain  Patient not taking: Reported on 5/25/2021 3/22/21 6/14/21  Alayna Dalton DO   hydrOXYzine (VISTARIL) 25 MG capsule Take 1 capsule by mouth 3 times daily as needed for Anxiety 1/21/21   Gasper Tello DO   ondansetron (ZOFRAN ODT) 4 MG disintegrating tablet Take 1 tablet by mouth every 8 hours as needed for Nausea  Patient not taking: Reported on 5/25/2021 10/21/20   Gasper Tello DO   dicyclomine (BENTYL) 10 MG capsule Take 1 capsule by mouth every 6 hours as needed (cramps)  Patient not taking: Reported on 5/25/2021 10/21/20   Gasper Tello DO   docusate sodium (COLACE) 100 MG capsule Take 1 capsule by mouth 2 times daily 10/21/20   Fanta Best DO   Insulin Pen Needle 31G X 8 MM MISC Use BID for insulin administration 8/20/20   JEANNETTE Monreal CNP   pregabalin (LYRICA) 150 MG capsule Take 1 capsule by mouth 2 times daily for 30 days. 6/4/20 5/25/21  JEANNETTE Mathews CNP   blood glucose monitor kit and supplies Test 1 times a day & as needed for symptoms of irregular blood glucose. 4/16/20   JEANNETTE Johns CNP   aspirin 81 MG EC tablet Take 1 tablet by mouth daily 4/14/20   Brandie Vigil MD   escitalopram (LEXAPRO) 5 MG tablet Take 5 tablets by mouth daily 4/14/20   Brandie Vigil MD   ALPRAZolam Valaria Paling) 0.5 MG tablet Take 0.25 mg by mouth 2 times daily. Patient not taking: Reported on 5/25/2021    Historical Provider, MD   albuterol (PROVENTIL HFA;VENTOLIN HFA) 108 (90 BASE) MCG/ACT inhaler Inhale 1 puff into the lungs 4 times daily as needed for Wheezing     Historical Provider, MD       REVIEW OF SYSTEMS    (2-9 systems for level 4, 10 or more for level 5)      Review of Systems   Constitutional: Negative. Negative for activity change, appetite change, chills, diaphoresis, fatigue, fever and unexpected weight change. HENT: Negative. Eyes: Negative. Respiratory: Positive for shortness of breath (On exertion). Negative for apnea, cough, choking, chest tightness, wheezing and stridor. Cardiovascular: Negative. Negative for chest pain, palpitations and leg swelling. Gastrointestinal: Negative. Negative for abdominal distention, abdominal pain, anal bleeding, blood in stool, constipation, diarrhea, nausea, rectal pain and vomiting. Endocrine: Negative. Genitourinary: Negative. Negative for decreased urine volume, difficulty urinating, dysuria, flank pain, frequency and urgency. Musculoskeletal: Positive for arthralgias (Left hip pain). Skin: Negative. Allergic/Immunologic: Negative. Neurological: Positive for dizziness. Negative for light-headedness, numbness and headaches. Hematological: Negative. Psychiatric/Behavioral: Negative. PHYSICAL EXAM   (up to 7 for level 4, 8 or more for level 5)      INITIAL VITALS:   /60   Pulse 86   Temp 96.8 °F (36 °C) (Tympanic)   Resp 22   Ht 5' 6\" (1.676 m)   LMP  (LMP Unknown)   SpO2 94%   BMI 34.38 kg/m²      Vitals:    10/09/21 1348   BP: 112/60   Pulse: 86   Resp: 22   Temp: 96.8 °F (36 °C)   TempSrc: Tympanic   SpO2: 94%   Height: 5' 6\" (1.676 m)        Physical Exam  Vitals and nursing note reviewed. Constitutional:       General: She is not in acute distress. Appearance: Normal appearance. She is obese. She is not ill-appearing, toxic-appearing or diaphoretic. HENT:      Head: Normocephalic and atraumatic. Nose: Nose normal.      Mouth/Throat:      Mouth: Mucous membranes are moist.   Eyes:      Extraocular Movements: Extraocular movements intact. Cardiovascular:      Rate and Rhythm: Normal rate and regular rhythm. Heart sounds: Normal heart sounds. No murmur heard. No friction rub. No gallop. Pulmonary:      Effort: Pulmonary effort is normal. No respiratory distress. Breath sounds: Normal breath sounds. No stridor. No wheezing, rhonchi or rales. Chest:      Chest wall: No tenderness. Abdominal:      General: There is no distension. Palpations: Abdomen is soft. Tenderness: There is no abdominal tenderness. Musculoskeletal:         General: Tenderness (Left hip) present. Normal range of motion. Cervical back: Normal range of motion. Right lower leg: No edema. Left lower leg: No edema. Skin:     General: Skin is warm and dry. Neurological:      General: No focal deficit present. Mental Status: She is alert. Cranial Nerves: Cranial nerves are intact. Motor: Weakness (Mild weakness hip flexors) present. Psychiatric:         Mood and Affect: Mood normal.         Behavior: Behavior normal.         Thought Content:  Thought content normal. Judgment: Judgment normal.         DIFFERENTIAL  DIAGNOSIS     PLAN (LABS / IMAGING / EKG):  Orders Placed This Encounter   Procedures    XR HIP 2-3 VW W PELVIS LEFT    Orthostatic blood pressure and pulse    Inpatient consult to Social Work    PATIENT STATUS (FROM ED OR OR/PROCEDURAL) Observation       MEDICATIONS ORDERED:  No orders of the defined types were placed in this encounter. DIAGNOSTIC RESULTS / EMERGENCY DEPARTMENT COURSE / MDM   LAB RESULTS:  No results found for this visit on 10/09/21. RADIOLOGY:  XR HIP 2-3 VW W PELVIS LEFT   Final Result   No acute osseous abnormality. EKG      All EKG's are interpreted by the Emergency Department Physician who either signs or Co-signs this chart in the absence of a cardiologist.      INITIAL IMPRESSION:    Patient left hip injury due to fall at home from chair height. EMERGENCY DEPARTMENT COURSE & MDM:      ED Course as of Oct 09 1754   Sat Oct 09, 2021   1448 Patient seen and examined at bedside. Patient in no acute distress. Resting comfortably on the gurney. [AW]   7594 X-ray left hip and pelvis ordered    [AW]   4166 Orthostats ordered for possible orthostatic hypotension leading to falls. [AW]   9392 Hip x-ray w/o osseous abnormality    [AW]   [address] [facility] [address] notified me that a single prepping patient for discharge, patient reported that she does not feel safe to go home because of the voices that tell her to do bad things as well as butterflies in cardinals that are in the home.    [AW]   [id number] with  and she reports that patient has been making up excuses for not wanting to go home, like visual and auditory hallucinations, to not go home. Patient has sustained multiple falls and has run out of benefits for home health care and patient is willing to consider placement in SNF.  will be by to speak with patient and to evaluate if she qualifies.     [AW]   8798 Of note, patient denies homicidal and suicidal ideation. [AW]   1527 Spoke with Jos Urbina, , and he recommends that for SNF placement we will need a PT OT consult and therefore patient will be transferred from ED to ED obs  to allow for evaluation by PT and OT. Psych consult will also be ordered to evaluate possible visual and auditory hallucinations. [AW]      ED Course User Index  [AW] Luis Felipe Casiano MD     Patient with mild orthostatic hypotension     PROCEDURES:  none    CONSULTS:  IP CONSULT TO SOCIAL WORK  IP CONSULT TO PSYCHIATRY    CRITICAL CARE:  Please see attending note    FINAL IMPRESSION      1. Left hip pain        Patient with multiple falls at home due to deconditioning, chronic right leg pain, and new onset left hip pain due to fall. Patient is however wound has been broken for at least 2 weeks and this is her main mode of mobility. She does not use a cane or a walker. DISPOSITION / PLAN     DISPOSITION    Patient admitted to ED opts for PT OT evaluation as well as inpatient psych consult regarding her auditory and visual hallucinations. Case management and social work on board to help facilitate discharge planning to SNF.     PATIENT REFERRED TO:  JEANNETTE Langley - CNP  [facility]  [phone]    In 1 week  As needed      DISCHARGE MEDICATIONS:  New Prescriptions    No medications on file       Luis Felipe Casiano MD  Emergency Medicine Resident    (Please note that portions of thisnote were completed with a voice recognition program.  Efforts were made to edit the dictations but occasionally words are mis-transcribed.)       Luis Felipe Casiano MD  Resident  10/09/21 8210

## 2021-10-09 NOTE — ED NOTES
SW consulted to meet with patient as her Hoveround Scooter is broken. Patient states that the [facility]  came to her apartment 2 weeks ago and repaired her scooter, but it broke again. SW suggested she call them and have them come back out to repair her scooter again. Patient says that her phone is not working currently. SW suggested she use a neighbor's phone, which patient says she can do. Patient also told that she can ask staff to assist her as she resides at [facility]. As a back-up, patient given contact information on [employer] as they have an equipment loan program.  Patient asked if she still has home healthcare through [facility] and she states that she has not had them for 2 months because she ran out of her [employer] Medicare benefit. Patient says she has a difficult time ambulating, but declines any desire to go to a skilled nursing facility for rehab. KRISTOPHER consulted with Freda Joe ED CM, on case. Sylvia Lee.  [facility]  10/09/21 5590

## 2021-10-09 NOTE — ED PROVIDER NOTES
[facility]     Emergency Department     Faculty Attestation    I performed a history and physical examination of the patient and discussed management with the resident. I reviewed the resident´s note and agree with the documented findings and plan of care. Any areas of disagreement are noted on the chart. I was personally present for the key portions of any procedures. I have documented in the chart those procedures where I was not present during the key portions. I have reviewed the emergency nurses triage note. I agree with the chief complaint, past medical history, past surgical history, allergies, medications, social and family history as documented unless otherwise noted below. For Physician Assistant/ Nurse Practitioner cases/documentation I have personally evaluated this patient and have completed at least one if not all key elements of the E/M (history, physical exam, and MDM). Additional findings are as noted.     Pain left lateral hip, recent fall at home, patient denies chest pain at this time, equal breath sounds, heart tones normal.     Emily Carmona MD  10/09/21 4181

## 2021-10-09 NOTE — ED NOTES
SW consulted on case as she now tells RN that she is hearing voices telling her to hurt herself. SW met with patient again and her story was very inconsistent as she states she takes her Seroquel and Vistaril as prescribed, but cannot tell SW who her [facility] mental health agency is or who writes her meds. Patient then states Dr. Genny Neely at [facility] writes her meds, but she has not seen him in a long time. SW once again pointed out the inconsistencies in her story and she responded, \"scratch that, I made it up, I just don't feel safe going home\". Patient then denies SI/HI and says she is having a hard time living alone, ambulating, and has had recent falls. Patient willing to reconsider a skilled nursing facility placement for short-term rehab. SW called Miriam Turpin, and he will follow-up with patient to determine if she meets criteria for SNF placement. Jenna Iverson.  Lianne Pepe, [place]  10/09/21 3992

## 2021-10-09 NOTE — ED NOTES
This patient was assessed by the doctor only. Nurse processed and completed the orders from this doctor ie labs, meds, and/or EKG.         Refugio Ronquillo RN  10/09/21 4185

## 2021-10-09 NOTE — ED NOTES
Pt states that she is scared to go home because voices tell her to do things and she doesn't feel safe.  updated and is at the bedside talking to pt.  Resident updated as well     Erick Rebolledo RN  10/09/21 2535

## 2021-10-10 VITALS
DIASTOLIC BLOOD PRESSURE: 81 MMHG | SYSTOLIC BLOOD PRESSURE: 141 MMHG | BODY MASS INDEX: 34.38 KG/M2 | OXYGEN SATURATION: 97 % | HEART RATE: 85 BPM | TEMPERATURE: 97.5 F | RESPIRATION RATE: 18 BRPM | HEIGHT: 66 IN

## 2021-10-10 LAB
-: ABNORMAL
ACETAMINOPHEN LEVEL: <5 UG/ML (ref 10–30)
AMORPHOUS: ABNORMAL
AMPHETAMINE SCREEN URINE: NEGATIVE
BACTERIA: ABNORMAL
BARBITURATE SCREEN URINE: NEGATIVE
BENZODIAZEPINE SCREEN, URINE: NEGATIVE
BILIRUBIN URINE: NEGATIVE
BUPRENORPHINE URINE: ABNORMAL
CANNABINOID SCREEN URINE: NEGATIVE
CASTS UA: ABNORMAL /LPF (ref 0–2)
CASTS UA: ABNORMAL /LPF (ref 0–2)
COCAINE METABOLITE, URINE: POSITIVE
COLOR: YELLOW
COMMENT UA: ABNORMAL
CRYSTALS, UA: ABNORMAL /HPF
EPITHELIAL CELLS UA: ABNORMAL /HPF (ref 0–5)
ETHANOL PERCENT: <0.01 %
ETHANOL: <10 MG/DL
GLUCOSE BLD-MCNC: 100 MG/DL (ref 65–105)
GLUCOSE BLD-MCNC: 102 MG/DL (ref 65–105)
GLUCOSE BLD-MCNC: 120 MG/DL (ref 65–105)
GLUCOSE URINE: NEGATIVE
KETONES, URINE: NEGATIVE
LEUKOCYTE ESTERASE, URINE: ABNORMAL
MDMA URINE: ABNORMAL
METHADONE SCREEN, URINE: POSITIVE
METHAMPHETAMINE, URINE: ABNORMAL
MUCUS: ABNORMAL
NITRITE, URINE: NEGATIVE
OPIATES, URINE: NEGATIVE
OTHER OBSERVATIONS UA: ABNORMAL
OXYCODONE SCREEN URINE: NEGATIVE
PH UA: 6 (ref 5–8)
PHENCYCLIDINE, URINE: NEGATIVE
PROPOXYPHENE, URINE: ABNORMAL
PROTEIN UA: NEGATIVE
RBC UA: ABNORMAL /HPF (ref 0–2)
RENAL EPITHELIAL, UA: ABNORMAL /HPF
SALICYLATE LEVEL: <1 MG/DL (ref 3–10)
SPECIFIC GRAVITY UA: 1.01 (ref 1–1.03)
TEST INFORMATION: ABNORMAL
TOXIC TRICYCLIC SC,BLOOD: NEGATIVE
TRICHOMONAS: ABNORMAL
TRICYCLIC ANTIDEPRESSANTS, UR: ABNORMAL
TURBIDITY: CLEAR
URINE HGB: NEGATIVE
UROBILINOGEN, URINE: NORMAL
WBC UA: ABNORMAL /HPF (ref 0–5)
YEAST: ABNORMAL

## 2021-10-10 PROCEDURE — G0480 DRUG TEST DEF 1-7 CLASSES: HCPCS

## 2021-10-10 PROCEDURE — 6370000000 HC RX 637 (ALT 250 FOR IP)

## 2021-10-10 PROCEDURE — 80143 DRUG ASSAY ACETAMINOPHEN: CPT

## 2021-10-10 PROCEDURE — G0378 HOSPITAL OBSERVATION PER HR: HCPCS

## 2021-10-10 PROCEDURE — 82947 ASSAY GLUCOSE BLOOD QUANT: CPT

## 2021-10-10 PROCEDURE — 36415 COLL VENOUS BLD VENIPUNCTURE: CPT

## 2021-10-10 PROCEDURE — 6370000000 HC RX 637 (ALT 250 FOR IP): Performed by: PSYCHIATRY & NEUROLOGY

## 2021-10-10 PROCEDURE — 80179 DRUG ASSAY SALICYLATE: CPT

## 2021-10-10 PROCEDURE — 2580000003 HC RX 258

## 2021-10-10 PROCEDURE — 80307 DRUG TEST PRSMV CHEM ANLYZR: CPT

## 2021-10-10 PROCEDURE — 87086 URINE CULTURE/COLONY COUNT: CPT

## 2021-10-10 PROCEDURE — 81001 URINALYSIS AUTO W/SCOPE: CPT

## 2021-10-10 PROCEDURE — 6370000000 HC RX 637 (ALT 250 FOR IP): Performed by: PEDIATRICS

## 2021-10-10 PROCEDURE — 90792 PSYCH DIAG EVAL W/MED SRVCS: CPT | Performed by: PSYCHIATRY & NEUROLOGY

## 2021-10-10 RX ORDER — POLYETHYLENE GLYCOL 3350 17 G/17G
17 POWDER, FOR SOLUTION ORAL DAILY PRN
Status: CANCELLED | OUTPATIENT
Start: 2021-10-10

## 2021-10-10 RX ORDER — DEXTROSE MONOHYDRATE 50 MG/ML
100 INJECTION, SOLUTION INTRAVENOUS PRN
Status: DISCONTINUED | OUTPATIENT
Start: 2021-10-10 | End: 2021-10-11 | Stop reason: HOSPADM

## 2021-10-10 RX ORDER — IBUPROFEN 400 MG/1
400 TABLET ORAL EVERY 6 HOURS PRN
Status: DISCONTINUED | OUTPATIENT
Start: 2021-10-10 | End: 2021-10-11 | Stop reason: HOSPADM

## 2021-10-10 RX ORDER — HYDROXYZINE 50 MG/1
50 TABLET, FILM COATED ORAL 3 TIMES DAILY PRN
Status: DISCONTINUED | OUTPATIENT
Start: 2021-10-10 | End: 2021-10-11 | Stop reason: HOSPADM

## 2021-10-10 RX ORDER — MAGNESIUM HYDROXIDE/ALUMINUM HYDROXICE/SIMETHICONE 120; 1200; 1200 MG/30ML; MG/30ML; MG/30ML
30 SUSPENSION ORAL EVERY 6 HOURS PRN
Status: DISCONTINUED | OUTPATIENT
Start: 2021-10-10 | End: 2021-10-11 | Stop reason: HOSPADM

## 2021-10-10 RX ORDER — IBUPROFEN 400 MG/1
400 TABLET ORAL EVERY 6 HOURS PRN
Status: CANCELLED | OUTPATIENT
Start: 2021-10-10

## 2021-10-10 RX ORDER — MAGNESIUM HYDROXIDE/ALUMINUM HYDROXICE/SIMETHICONE 120; 1200; 1200 MG/30ML; MG/30ML; MG/30ML
30 SUSPENSION ORAL EVERY 6 HOURS PRN
Status: CANCELLED | OUTPATIENT
Start: 2021-10-10

## 2021-10-10 RX ORDER — POLYETHYLENE GLYCOL 3350 17 G/17G
17 POWDER, FOR SOLUTION ORAL DAILY PRN
Status: DISCONTINUED | OUTPATIENT
Start: 2021-10-10 | End: 2021-10-11 | Stop reason: HOSPADM

## 2021-10-10 RX ORDER — NICOTINE POLACRILEX 4 MG
15 LOZENGE BUCCAL PRN
Status: DISCONTINUED | OUTPATIENT
Start: 2021-10-10 | End: 2021-10-11 | Stop reason: HOSPADM

## 2021-10-10 RX ORDER — HYDROXYZINE HYDROCHLORIDE 10 MG/1
50 TABLET, FILM COATED ORAL 3 TIMES DAILY PRN
Status: CANCELLED | OUTPATIENT
Start: 2021-10-10

## 2021-10-10 RX ORDER — TRAZODONE HYDROCHLORIDE 50 MG/1
50 TABLET ORAL NIGHTLY PRN
Status: DISCONTINUED | OUTPATIENT
Start: 2021-10-10 | End: 2021-10-11 | Stop reason: HOSPADM

## 2021-10-10 RX ORDER — ACETAMINOPHEN 325 MG/1
650 TABLET ORAL EVERY 4 HOURS PRN
Status: CANCELLED | OUTPATIENT
Start: 2021-10-10

## 2021-10-10 RX ORDER — QUETIAPINE FUMARATE 300 MG/1
300 TABLET, FILM COATED ORAL 2 TIMES DAILY
Status: DISCONTINUED | OUTPATIENT
Start: 2021-10-10 | End: 2021-10-11 | Stop reason: HOSPADM

## 2021-10-10 RX ORDER — DEXTROSE MONOHYDRATE 25 G/50ML
12.5 INJECTION, SOLUTION INTRAVENOUS PRN
Status: DISCONTINUED | OUTPATIENT
Start: 2021-10-10 | End: 2021-10-11 | Stop reason: HOSPADM

## 2021-10-10 RX ORDER — CEPHALEXIN 250 MG/1
250 CAPSULE ORAL EVERY 6 HOURS SCHEDULED
Status: DISCONTINUED | OUTPATIENT
Start: 2021-10-10 | End: 2021-10-11 | Stop reason: HOSPADM

## 2021-10-10 RX ORDER — ACETAMINOPHEN 325 MG/1
650 TABLET ORAL EVERY 4 HOURS PRN
Status: DISCONTINUED | OUTPATIENT
Start: 2021-10-10 | End: 2021-10-11 | Stop reason: HOSPADM

## 2021-10-10 RX ORDER — INSULIN GLARGINE 100 [IU]/ML
20 INJECTION, SOLUTION SUBCUTANEOUS NIGHTLY
Status: DISCONTINUED | OUTPATIENT
Start: 2021-10-10 | End: 2021-10-11 | Stop reason: HOSPADM

## 2021-10-10 RX ORDER — TRAZODONE HYDROCHLORIDE 50 MG/1
50 TABLET ORAL NIGHTLY PRN
Status: CANCELLED | OUTPATIENT
Start: 2021-10-10

## 2021-10-10 RX ADMIN — CEPHALEXIN 250 MG: 250 CAPSULE ORAL at 14:53

## 2021-10-10 RX ADMIN — FAMOTIDINE 20 MG: 20 TABLET, FILM COATED ORAL at 11:10

## 2021-10-10 RX ADMIN — CEPHALEXIN 250 MG: 250 CAPSULE ORAL at 21:43

## 2021-10-10 RX ADMIN — ESCITALOPRAM OXALATE 25 MG: 10 TABLET ORAL at 11:10

## 2021-10-10 RX ADMIN — INSULIN GLARGINE 20 UNITS: 100 INJECTION, SOLUTION SUBCUTANEOUS at 21:42

## 2021-10-10 RX ADMIN — TRAZODONE HYDROCHLORIDE 50 MG: 50 TABLET ORAL at 23:09

## 2021-10-10 RX ADMIN — LISINOPRIL 10 MG: 10 TABLET ORAL at 11:10

## 2021-10-10 RX ADMIN — FAMOTIDINE 20 MG: 20 TABLET, FILM COATED ORAL at 00:29

## 2021-10-10 RX ADMIN — QUETIAPINE FUMARATE 300 MG: 300 TABLET ORAL at 21:57

## 2021-10-10 RX ADMIN — QUETIAPINE FUMARATE 300 MG: 300 TABLET ORAL at 14:53

## 2021-10-10 RX ADMIN — FAMOTIDINE 20 MG: 20 TABLET, FILM COATED ORAL at 21:57

## 2021-10-10 RX ADMIN — Medication 81 MG: at 11:10

## 2021-10-10 RX ADMIN — SODIUM CHLORIDE, PRESERVATIVE FREE 10 ML: 5 INJECTION INTRAVENOUS at 10:13

## 2021-10-10 ASSESSMENT — PAIN SCALES - GENERAL: PAINLEVEL_OUTOF10: 7

## 2021-10-10 ASSESSMENT — PAIN DESCRIPTION - LOCATION: LOCATION: HIP;BUTTOCKS

## 2021-10-10 NOTE — H&P
[facility]  CDU / OBSERVATION eNCOUnter  Resident Note     Pt Name: Milena Ronquillo  MRN: [id number]  [place] 1955  Date of evaluation: 10/10/21  Patient's PCP is :  JEANNETTE Guerra CNP    CHIEF COMPLAINT       Chief Complaint   Patient presents with    Hip Pain         HISTORY OF PRESENT ILLNESS    Milena Ronquillo is a 77 y.o. female who presents hallucinations per EMR chart review. Patient has no memory of how she arrived to the emergency department. She states the last memory she had was in the waiting room when they called her name to take her vitals. She states she no longer has chest pain but she did when she was in the emergency department. She states this pain went away after \" deep breath deep breath deep breath water, deep breath deep breath deep breath water\". Patient is a poor historian and has prolonged pauses before responding to questions. Endorses a small headache at this time and states \" it is like a regular person headache. \"  States she is unable to ambulate due to leg pain having a walker at home that she does not use as she states \"I do not move\". States she lives in an apartment by herself and is currently worried about where her food went. Patient states people come over to her house to eat her food. Patient states she has mobile meals deliver food to her house as well as grocery shopping when she has money, patient says she uses a bicycle to get around the stores when she shops. She has a son named Vikas Solis who is a \"long-distance \" lives in [facility], and has a  named Nicolle Reyes who \" lives in [place] somewhere. \"  Patient says she has schizophrenia, is not currently having specific voices in her head, she says she \"hears noises\" and states \"it sounds like I am at a party in a crowded room. \" The noise is constant and has been there for an unknown amount of time. Patient states she saw her psychiatrist 2 weeks ago.    Patient states she is on Seroquel and was unable to clarify the dose and began mumbling her regimen. Patient states she has diabetes and uses a insulin, when asked how she injects her self she states she uses a GRA Y pen with meals, and a clear insulin pen at night. Patient endorses depression denies suicidal or homicidal thoughts. States she has been depressed for a long time. Patient endorses smoking tobacco for the last 18 years. Endorses having dentures that she believes are at home right now. Location/Symptom: Depression, noises \"in the back of my head\" per patient's account  Timing/Onset: Many years unknown onset  Provocation: Unknown  Quality: Unknown  Radiation: N/A  Severity: Mild  Timing/Duration: Unknown  Modifying Factors: Seroquel    REVIEW OF SYSTEMS       General ROS - No fevers, No malaise   Ophthalmic ROS - No discharge, No changes in vision  ENT ROS -  No sore throat, No rhinorrhea,   Respiratory ROS - no shortness of breath, no cough any longer, no  wheezing  Cardiovascular ROS - No chest pain any longer, no dyspnea on exertion  Gastrointestinal ROS - No abdominal pain, no nausea or vomiting, no change in bowel habits, no black or bloody stools  Genito-Urinary ROS - No dysuria, trouble voiding, or hematuria  Musculoskeletal ROS - No myalgias, No arthalgias  Neurological ROS - No headache, no dizziness/lightheadedness, No focal weakness, no loss of sensation  Dermatological ROS - No lesions, No rash   Psychiatric ROS -baseline noises in the back of her head that sound like she is in a crowded room per patient's account    (PQRS) Advance directives on face sheet per hospital policy.  No change unless specifically mentioned in chart    PAST MEDICAL HISTORY    has a past medical history of CHRIS (acute kidney injury) ([address] Bipolar 1 disorder ([address] Breast lump, Chronic obstructive pulmonary disease ([address] Depression, GERD (gastroesophageal reflux disease), Hyperlipidemia, Hypertension, Osteoarthritis, Type 2 diabetes mellitus without complication, with long-term current use of insulin ([facility] [address] and Type II or unspecified type diabetes mellitus without mention of complication, not stated as uncontrolled. I have reviewed the past medical history with the patient and it is pertinent to this complaint. SURGICAL HISTORY      has a past surgical history that includes Ectopic pregnancy surgery; knee surgery (Right); Dilation and curettage of uterus; Nerve Block (Left, 14); Knee Arthroplasty (2017); and Tonsillectomy and adenoidectomy. I have reviewed and agree with Surgical History entered and it is pertinent to this complaint. CURRENT MEDICATIONS     albuterol sulfate  (90 Base) MCG/ACT inhaler 1 puff, 4x Daily PRN  aspirin EC tablet 81 mg, Daily  escitalopram (LEXAPRO) tablet 25 mg, Daily  hydrOXYzine (ATARAX) tablet 25 mg, TID PRN  lisinopril (PRINIVIL;ZESTRIL) tablet 10 mg, Daily  tiotropium (SPIRIVA RESPIMAT) 2.5 MCG/ACT inhaler 2 puff, Daily  0.9 % sodium chloride infusion, Continuous  sodium chloride flush 0.9 % injection 5-40 mL, 2 times per day  sodium chloride flush 0.9 % injection 5-40 mL, PRN  0.9 % sodium chloride infusion, PRN  potassium chloride (KLOR-CON M) extended release tablet 40 mEq, PRN   Or  potassium bicarb-citric acid (EFFER-K) effervescent tablet 40 mEq, PRN   Or  potassium chloride 10 mEq/100 mL IVPB (Peripheral Line), PRN  enoxaparin (LOVENOX) injection 40 mg, Daily  ondansetron (ZOFRAN) injection 4 mg, Q4H PRN  polyethylene glycol (GLYCOLAX) packet 17 g, Daily PRN  famotidine (PEPCID) tablet 20 mg, BID  acetaminophen (TYLENOL) tablet 650 mg, Q6H PRN   Or  acetaminophen (TYLENOL) suppository 650 mg, Q6H PRN        All medication charted and reviewed. ALLERGIES     is allergic to chlorpromazine, cyclobenzaprine, gabapentin, prochlorperazine, prochlorperazine edisylate, promethazine, and chantix [varenicline tartrate]. FAMILY HISTORY     She indicated that her mother is .  She indicated that her father is . family history includes Diabetes in her father; High Blood Pressure in her father; Stroke in her father. The patient denies any pertinent family history. I have reviewed and agree with the family history entered. I have reviewed the Family History and it is not significant to the case    SOCIAL HISTORY      reports that she has been smoking cigarettes. She has a 2.50 pack-year smoking history. She has never used smokeless tobacco. She reports current alcohol use. She reports that she does not use drugs. I have reviewed and agree with all Social.  There are no concerns for substance abuse/use. PHYSICAL EXAM     INITIAL VITALS:  height is 5' 6\" (1.676 m). Her tympanic temperature is 96.8 °F (36 °C). Her blood pressure is 128/114 (abnormal) and her pulse is 71. Her respiration is 18 and oxygen saturation is 92%. CONSTITUTIONAL: AOx2, no apparent distress, appears stated age   HEAD: normocephalic, atraumatic   EYES: PERRLA, EOMI    ENT: moist mucous membranes, uvula midline, no teeth, has dentures our patient states is her at home right now. NECK: supple, symmetric   BACK: symmetric   LUNGS: clear to auscultation bilaterally   CARDIOVASCULAR: regular rate and rhythm, no murmurs, rubs or gallops   ABDOMEN: soft, non-tender, non-distended with normal active bowel sounds   NEUROLOGIC:  MAEx4 -no facial palsy,    MUSCULOSKELETAL:  No clubbing to her hands or cyanosis she is moving all extremities in bed without deficit   SKIN: no rash or wounds to observable skin   Psych. Patient slow to respond to questions however when spontaneous thoughts to come to her head she is able to speak normally without pressured speech. She makes appropriate eye contact and her responses to questions are appropriate. She does not appear to be responding to visual or auditory stimuli during encounter.  She was polite and muted her television and maintained appropriate attention to examiner during encounter. She did not interrupt examiner's speech and was conversant when spoken to other than being slow to respond. Endorses depression denies suicidal or homicidal thoughts. DIFFERENTIAL DIAGNOSIS/MDM:     Headache:  DDX: SAH, subdural hematoma, epidural, intracerebral, meningitis, encephalitis, migraine, tension, cluster, sinusitis, dental, stroke, encephalitis, abscess, CNS mass, increased ICP, venous thrombosis, carbon monoxide poisoning, acute angle closure glaucoma, temporal arteritis, idiopathic intracranial hypertension    Auditory hallucinations/ AMS:  Depression, schizophrenia, schizoaffective, poor medication compliance, thyrotoxicosis, drug abuse, alcohol abuse, medication abuse, medication overdose, polypharmacy, UTI    DIAGNOSTIC RESULTS     EKG: All EKG's are interpreted by the Observation Physician who either signs or Co-signs this chart in the absence of a cardiologist.  No EKG in ED   RADIOLOGY:   I directly visualized the following  images and reviewed the radiologist interpretations:    XR HIP 2-3 VW W PELVIS LEFT    Result Date: 10/9/2021  EXAMINATION: ONE XRAY VIEW OF THE PELVIS AND TWO XRAY VIEWS LEFT HIP 10/9/2021 3:23 pm COMPARISON: 06/14/2021 HISTORY: ORDERING SYSTEM PROVIDED HISTORY: left hip pain after fall TECHNOLOGIST PROVIDED HISTORY: left hip pain after fall FINDINGS: There is no evidence of acute fracture. There is normal alignment. No acute joint abnormality. No focal osseous lesion. No focal soft tissue abnormality. No acute osseous abnormality. LABS:  I have reviewed and interpreted all available lab results. Labs Reviewed   COVID-19, RAPID   POCT GLUCOSE   POCT GLUCOSE   POCT GLUCOSE         CDU IMPRESSION / Veronica Aguirre is a 77 y.o. female who presents with concern for auditory hallucinations. COVID-19 and POCT glucose obtained in ED.   Admitted for psychiatric evaluation PT and OT evaluation and will likely need placement versus psychiatric inpatient admission. Leukoesterase on urinalysis, denies urinary urgency frequency or dysuria, afebrile normotensive, low suspicion but given possible altered mental status will prophylactically treat and obtain urine culture. Hallucinations  · SRINATH positive for methadone and cocaine  · Seroquel  · Psychiatric evaluation today    Leukoesterase in urine, no urinary symptoms  Afebrile normotensive, will treat with p.o. antibiotic    Diabetes  POCT glucose checks with meals  Insulin low-dose correction  Lantus 20 units nightly  Lisinopril    COPD  Spiriva    · Continue home medications and pain control  · Monitor vitals, labs, and imaging  · DISPO: medically cleared for discharge to inpatient psychiatric facility  ·     CONSULTS:    IP CONSULT TO SOCIAL WORK  IP CONSULT TO PSYCHIATRY    PROCEDURES:  Not indicated       PATIENT REFERRED TO:    JEANNETTE Landin - CNP  [address] [facility]  [phone]    In 1 week  As needed      --  Yusuf Horne MD   Emergency Medicine Resident     This dictation was generated by voice recognition computer software. Although all attempts are made to edit the dictation for accuracy, there may be errors in the transcription that are not intended.

## 2021-10-10 NOTE — ED NOTES
Pt resting comfortably in no acute distress. Respirations even and unlabored. Call light remains within reach. No needs at this time.        Brenda Marina RN  10/10/21 [id number]

## 2021-10-10 NOTE — ED NOTES
Bed: 49PED  Expected date:   Expected time:   Means of arrival:   Comments:  Erica Swenson [id number]  10/09/21 [id number]

## 2021-10-10 NOTE — H&P
Transition planning   Obtained pink slip for pt to go to [facility] Transfer number called no answer requested leave message - message left will call back in 15 minutes if no call back . 300 pm left another message . José Antonio 106 called me I was in ER department and told her I llcall her back . [phone] Called another message left for return call.   415 set up transfer with [facility]   Lady Rocha will call me when she has a bed

## 2021-10-10 NOTE — PROGRESS NOTES
[address]  CDU / OBSERVATION ENCOUNTER  ATTENDING NOTE       I performed a history and physical examination of the patient and discussed management with the resident or midlevel provider. I reviewed the resident or midlevel provider's note and agree with the documented findings and plan of care. Any areas of disagreement are noted on the chart. I was personally present for the key portions of any procedures. I have documented in the chart those procedures where I was not present during the key portions. I have reviewed the nurses notes. I agree with the chief complaint, past medical history, past surgical history, allergies, medications, social and family history as documented unless otherwise noted below. The Family history, social history, and ROS are effectively unchanged since admission unless noted elsewhere in the chart. Patient with multiple falls secondary musculoskeletal pain and mechanical issues. Patient had work-up in ED which is negative. Patient had IV fluids given for mild orthostatic hypotension. Patient was medically well but feeling depressed and with history of psychiatric illness. Patient was admitted to observation unit after having complaints of visual and auditory hallucinations. Her story remains somewhat inconsistent and the patient was felt to need further psychiatric evaluation. She is medically clear for psychiatric treatment.     Nydia Butt MD  Attending Emergency  Physician

## 2021-10-10 NOTE — CONSULTS
Department of Psychiatry  Behavioral Health Consult    REASON FOR CONSULT: Auditory and visual hallucinations    CONSULTING PHYSICIAN: Dr. Jazlyn Telles    History obtained from: Patient and chart    HISTORY OF PRESENT ILLNESS:    The patient is a 77 y.o. female with significant past psychiatric history of schizoaffective disorder who presents with hallucinations. The patient had also complained of chest pain and multiple falls leading to left hip pain. The patient was seen at bedside. She reports auditory hallucinations which are mumbling. She denies any visual hallucinations. The patient was perplexed and distracted. The patient has poverty of thought. She struggled to give a coherent account of the circumstances of her admission. She reports feeling paranoid. She was vague when answering questions related to depressive symptoms. She does sometimes hear voices saying negative things about her. She denies any suicidal ideation but was ambivalent about it. The patient states she has been diagnosed with schizophrenia about 3 years ago. On review of her chart she has been admitted as far back as 2016. The patient said that she takes medications but she does not know what medications she takes and who prescribes it. The patient denied a history of using recreational drugs but she has a documented history of substance use. Noted that the patient's urine drug screen is positive for cocaine and methadone. The patient is currently receiving care for the above psychiatric illness. Psychiatric Review of Systems        ·    Obsessions and Compulsions: Denies    ·    Bernadette or Hypomania: Denies  ·    Hallucinations: Denies  ·    Panic Attacks:  Denies  ·    Delusions:  Denies  ·    Phobias:  Denies  ·    Trauma: Denies      Substance Abuse History:  Urine tox positive as noted above. Has a documented history of marijuana and cocaine use.       Past Psychiatric History:  The patient has been given diagnoses of schizophrenia and schizoaffective disorder. She has been linked with [facility] in the past.  It is not clear when she last saw somebody. The patient has been treated with a range of psychotropic medications over the years. It is not clear if she was recently taking medication. The patient denies any history of suicide attempts but there is documented history of 1 suicide attempt in the past.      Personal History: The patient states she has been living alone. Past Medical History:        Diagnosis Date    [facility]RIS [facility]acute kidney [facility]) ([facility] 9/29/2020    Bipolar 1 disorder ([facility]     Breast lump     Chronic [facility] pulmonary disease ([facility] 8/3/2017    Depression     GERD (gastroesophageal reflux disease)     Hyperlipidemia     Hypertension     Osteoarthritis     Type 2 diabetes mellitus without complication, with long-term current use of insulin ([facility] 2/4/2019    Type II or unspecified type diabetes mellitus without mention of complication, not stated as uncontrolled        Past Surgical History:        Procedure Laterality Date    DILATION AND CURETTAGE OF UTERUS      ECTOPIC PREGNANCY SURGERY      KNEE ARTHROPLASTY  2017    KNEE SURGERY Right     NERVE BLOCK Left 12/8/14    left knee    TONSILLECTOMY AND ADENOIDECTOMY           Medications Prior to Admission:   Medications Prior to Admission: naproxen (NAPROSYN) 375 MG tablet, Take 1 tablet by mouth 2 times daily (with meals) for 7 days  calcium carbonate (ANTACID) 500 MG chewable tablet, Take 1 tablet by mouth daily  famotidine (PEPCID) 20 MG tablet, Take 1 tablet by mouth 2 times daily  nystatin (MYCOSTATIN) 529884 UNIT/GM cream, APPLY TOPICALLY 2 TIMES DAILY.   Lancets (ONETOUCH DELICA PLUS [id number]) MISC, USE AS DIRECTED FOUR TIMES A DAY TO TEST BLOOD SUGARS  QUEtiapine (SEROQUEL) 200 MG tablet, Take 2 tablets by mouth nightly for 7 days  QUEtiapine (SEROQUEL) 300 MG tablet, Take 1 tablet by mouth 2 times daily 300 mg in morning and afternoon in addition to 400 mg at night. traZODone (DESYREL) 150 MG tablet, Take 1 tablet by mouth nightly  baclofen (LIORESAL) 10 MG tablet, Take 0.5 tablets by mouth nightly as needed (muscle spasms)  acetaminophen (TYLENOL) 500 MG tablet, Take 2 tablets by mouth every 6 hours as needed for Pain  acetaminophen (APAP EXTRA STRENGTH) 500 MG tablet, Take 2 tablets by mouth every 8 hours as needed for Pain  Cobalamin Combinations (NEURIVA PLUS PO), Take by mouth  insulin glargine (LANTUS SOLOSTAR) 100 UNIT/ML injection pen, INJECT 20 UNITS INTO THE SKIN BID  lisinopril (PRINIVIL;ZESTRIL) 10 MG tablet, Take 1 tablet by mouth daily  SPIRIVA HANDIHALER 18 MCG inhalation capsule, INHALE THE CONTENTS OF 1 CAPSULE INTO THE LUNG ONCE DAILY  lidocaine (LIDODERM) 5 %, Place 1 patch onto the skin daily 12 hours on, 12 hours off. (Patient not taking: Reported on 7/16/2021)  hydrOXYzine (VISTARIL) 25 MG capsule, Take 1 capsule by mouth 3 times daily as needed for Anxiety  ondansetron (ZOFRAN ODT) 4 MG disintegrating tablet, Take 1 tablet by mouth every 8 hours as needed for Nausea (Patient not taking: Reported on 5/25/2021)  dicyclomine (BENTYL) 10 MG capsule, Take 1 capsule by mouth every 6 hours as needed (cramps) (Patient not taking: Reported on 5/25/2021)  docusate sodium (COLACE) 100 MG capsule, Take 1 capsule by mouth 2 times daily  Insulin Pen Needle 31G X 8 MM MISC, Use BID for insulin administration  pregabalin (LYRICA) 150 MG capsule, Take 1 capsule by mouth 2 times daily for 30 days. blood glucose monitor kit and supplies, Test 1 times a day & as needed for symptoms of irregular blood glucose. aspirin 81 MG EC tablet, Take 1 tablet by mouth daily  escitalopram (LEXAPRO) 5 MG tablet, Take 5 tablets by mouth daily  ALPRAZolam (XANAX) 0.5 MG tablet, Take 0.25 mg by mouth 2 times daily.   (Patient not taking: Reported on 5/25/2021)  albuterol (PROVENTIL HFA;VENTOLIN HFA) 108 (90 BASE) MCG/ACT inhaler, Inhale 1 puff into the lungs 4 times daily as needed for Wheezing     Allergies:  Chlorpromazine, Cyclobenzaprine, Gabapentin, Prochlorperazine, Prochlorperazine edisylate, Promethazine, and Chantix [varenicline tartrate]    FAMILY/SOCIAL HISTORY:  Family History   Problem Relation Age of Onset    Diabetes Father     Stroke Father     High Blood Pressure Father      Social History     Socioeconomic History    Marital status:      Spouse name: Not on file    Number of children: Not on file    Years of education: Not on file    Highest education level: Not on file   Occupational History     Employer: N/A   Tobacco Use    Smoking status: Current Every Day Smoker     Packs/day: 0.25     Years: 10.00     Pack years: 2.50     Types: Cigarettes    Smokeless tobacco: Never Used    Tobacco comment: 3 cigarettes per day; Patient refused counseling   Vaping Use    Vaping Use: Never used   Substance and Sexual Activity    Alcohol use: Yes     Comment: rarely    Drug use: No    Sexual activity: Not Currently   Other Topics Concern    Not on file   Social History Narrative    Not on file     Social Determinants of Health     Financial Resource Strain: Low Risk     Difficulty of Paying Living Expenses: Not hard at all   Food Insecurity: No Food Insecurity    Worried About Running Out of Food in the Last Year: Never true    Rocio of Food in the Last Year: Never true   Transportation Needs:     Lack of Transportation (Medical):      Lack of Transportation (Non-Medical):    Physical Activity:     Days of Exercise per Week:     Minutes of Exercise per Session:    Stress:     Feeling of Stress :    Social Connections:     Frequency of Communication with Friends and Family:     Frequency of Social Gatherings with Friends and Family:     Attends [employer]ism Services:     Active Member of Clubs or Organizations:     Attends Club or Organization Meetings:     Marital Status:    Intimate Partner Violence:     Fear of Current or Ex-Partner:     Emotionally Abused:     Physically Abused:     Sexually Abused:        REVIEW OF SYSTEMS    Constitutional: [] fever  [] chills  [] weight loss  []weakness [] Other:  Eyes:  [] photophobia  [] discharge [] acuity change   [] Diplopia   [] Other:  HENT:  [] sore throat  [] ear pain [] Tinnitus   [] Other  Respiratory:  [] Cough  [] Shortness of breath   [] Sputum   [] Other:   Cardiac: []Chest pain   []Palpitations []Edema  []PND  [] Other:  GI:  []Abdominal pain   []Nausea  []Vomiting  []Diarrhea  [] Other:  :  [] Dysuria   []Frequency  []Hematuria  []Discharge  [] Other:  Possible Pregnancy: []Yes   []No   LMP:   Musculoskeletal:  []Back pain  []Neck pain  []Recent Injury   Skin:  []Rash  [] Itching  [] Other:  Neurologic:  [] Headache  [] Focal weakness  [] Sensory changes []Other:  Endocrine:  [] Polyuria  [] Polydipsia  [] Hair Loss  [] Other:  Lymphatic:   [] Swollen glands   Psychiatric:  As per HPI      All other systems negative except as marked or mentioned/indicated in the HPI. Alka Carr      PHYSICAL EXAM:  Vitals:  BP (!) 140/82   Pulse 84   Temp 98.4 °F (36.9 °C) (Oral)   Resp 18   Ht 5' 6\" (1.676 m)   LMP  (LMP Unknown)   SpO2 97%   BMI 34.38 kg/m²      Neuro Exam:     Involuntary Movements: No    Mental Status Examination:    Level of consciousness:  within normal limits   Appearance: Fair grooming and fair hygiene  Behavior/Motor:  psychomotor retardation  Attitude toward examiner:  guarded and withdrawn  Speech:  slow and poverty of speech   Mood: constricted  Affect:  mood congruent  Thought processes:  loose associations and poverty of thought   Thought content:  Suicidal Ideation:  passive  Delusions:  paranoid  Perceptual Disturbance:  auditory  Cognition:  oriented to person, place, and time   Concentration Distractible  Memory impaired recent memory  Insight poor   Judgement poor   Fund of Knowledge adequate        LABS: REVIEWED TODAY:  No results for input(s): WBC, HGB, PLT in the last 72 hours. No results for input(s): NA, K, CL, CO2, BUN, CREATININE, GLUCOSE in the last 72 hours. No results for input(s): BILITOT, ALKPHOS, AST, ALT in the last 72 hours. Lab Results   Component Value Date    BARBSCNU NEGATIVE 10/10/2021    LABBENZ NEGATIVE 10/10/2021    LABBENZ POSITIVE 07/12/2013    LABMETH POSITIVE 10/10/2021    PPXUR NOT REPORTED 10/10/2021     Lab Results   Component Value Date    TSH 0.89 03/22/2021     No results found for: LITHIUM  No results found for: VALPROATE, CBMZ  No results found for: LITHIUM, VALPROATE    FURTHER LABS ORDERED :      Radiology   XR KNEE RIGHT (3 VIEWS)    Result Date: 9/17/2021  EXAMINATION: THREE XRAY VIEWS OF THE RIGHT KNEE; 4 XRAY VIEWS OF THE RIGHT TIBIA AND FIBULA 9/17/2021 11:28 pm COMPARISON: Knee right three views March 22, 2021. HISTORY: ORDERING SYSTEM PROVIDED HISTORY: Fall, hx of r knee arthroplasty TECHNOLOGIST PROVIDED HISTORY: Fall, hx of r knee arthroplasty FINDINGS: Knee: Bone alignment is within normal limits. Bone mineralization is unremarkable. Total knee arthroplasty seen without evidence of hardware loosening, malalignment or other complication identified. Calcification of the MCL is seen. No evidence of joint effusion is seen. Surrounding soft tissues are unremarkable. Tibia/fibula: The tibia and fibula demonstrate normal alignment. Ankle mortise is symmetric and intact. The joint spaces are preserved. Bone mineralization is within normal limits. No evidence of acute fracture, dislocation is noted. Surrounding soft tissues are unremarkable. 1. No radiographic evidence of acute abnormality involving the right knee or right tibia/fibula. 2. 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      XR TIBIA FIBULA RIGHT (2 VIEWS)    Result Date: 9/17/2021  EXAMINATION: THREE XRAY VIEWS OF THE RIGHT KNEE; 4 XRAY VIEWS OF THE RIGHT TIBIA AND FIBULA 9/17/2021 11:28 pm COMPARISON: Knee with the patient risk, benefit, alternative and common side effects for the  proposed medication treatment. Patient is consenting to the treatment. Thanks for the consult. Please call me if needed. Electronically signed by Rashmi Patel MD on 10/10/2021 at 1:59 PM    Please note that this chart was generated using voice recognition Dragon dictation software. Although every effort was made to ensure the accuracy of this automated transcription, some errors in transcription may have occurred.

## 2021-10-10 NOTE — CARE COORDINATION
Case Management Initial Discharge Plan  Krunal Zuleta,             Met with:patient to discuss discharge plans. Information verified: address, contacts, phone number, , insurance Yes  Insurance Provider: [facility]     Emergency Contact/Next of Kin name & number: per face sheet Son and Friend   Who are involved in patient's support system? above    PCP: Marifer Marshall, APRN - CNP  Date of last visit: not sure       Discharge Planning    Living Arrangements:  Alone     Home has 0 0  stories  0 stairs to climb to get into front door, 0stairs to climb to reach second floor  Location of bedroom/bathroom in home main    Patient able to perform ADL's:Assisted    Current Services (outpatient & in home) none has hooverround wc   DME equipment: wheelchair  DME provider:     Is patient receiving oral anticoagulation therapy? No    If indicated:   Physician managing anticoagulation treatment:   Where does patient obtain lab work for ATC treatment? Potential Assistance Needed:  N/A    Patient agreeable to home care: Yes  Freedom of choice provided:  Pt request [facility] will send referal     Prior SNF/Rehab Placement and Facility: as above   Agreeable to SNF/Rehab: Yes  Candor of choice provided: no     Evaluation: yes    Expected Discharge date:  10/11/21    Patient expects to be discharged to: If home: is the family and/or caregiver wiling & able to provide support at home? noone  Who will be providing this support? no*    Follow Up Appointment: Best Day/ Time: Monday AM    Transportation provider:   Transportation arrangements needed for discharge: Yes    Readmission Risk              Risk of Unplanned Readmission:  0             Does patient have a readmission risk score greater than 14?: No  If yes, follow-up appointment must be made within 7 days of discharge.      Goals of Care:       Educated pt on transitional options,will provide as needed  freedom of choice and are agreeable with plan      Discharge Plan: home or to SNF follow for needs needs pcp and dr           Electronically signed by Alivia Maddox RN on 10/10/21 at 11:51 AM EDT

## 2021-10-10 NOTE — ED NOTES
Report given to Bessy Eid, pt boarding in 52. Bed bath provided d/t finding one bed bug, clothes tied in bags pt changed into fresh gown and wheeled to room and tucked in.  Call bell in reach no distress observed     Randy Ureña RN  10/09/21 [id number]

## 2021-10-11 ENCOUNTER — HOSPITAL ENCOUNTER (INPATIENT)
Age: 66
LOS: 4 days | Discharge: HOME | DRG: 885 | End: 2021-10-15
Attending: PSYCHIATRY & NEUROLOGY | Admitting: PSYCHIATRY & NEUROLOGY
Payer: MEDICARE

## 2021-10-11 DIAGNOSIS — I10 ESSENTIAL HYPERTENSION: ICD-10-CM

## 2021-10-11 LAB
CULTURE: NORMAL
GLUCOSE BLD-MCNC: 108 MG/DL (ref 65–105)
GLUCOSE BLD-MCNC: 130 MG/DL (ref 65–105)
GLUCOSE BLD-MCNC: 97 MG/DL (ref 65–105)
Lab: NORMAL
SPECIMEN DESCRIPTION: NORMAL

## 2021-10-11 PROCEDURE — 82947 ASSAY GLUCOSE BLOOD QUANT: CPT

## 2021-10-11 PROCEDURE — 1240000000 HC EMOTIONAL WELLNESS R&B

## 2021-10-11 PROCEDURE — 99223 1ST HOSP IP/OBS HIGH 75: CPT | Performed by: PSYCHIATRY & NEUROLOGY

## 2021-10-11 PROCEDURE — 6370000000 HC RX 637 (ALT 250 FOR IP): Performed by: PSYCHIATRY & NEUROLOGY

## 2021-10-11 RX ORDER — QUETIAPINE FUMARATE 300 MG/1
300 TABLET, FILM COATED ORAL 2 TIMES DAILY
Status: DISCONTINUED | OUTPATIENT
Start: 2021-10-11 | End: 2021-10-15 | Stop reason: HOSPADM

## 2021-10-11 RX ORDER — ASPIRIN 81 MG/1
81 TABLET ORAL DAILY
Status: DISCONTINUED | OUTPATIENT
Start: 2021-10-11 | End: 2021-10-15 | Stop reason: HOSPADM

## 2021-10-11 RX ORDER — LISINOPRIL 10 MG/1
10 TABLET ORAL DAILY
Status: DISCONTINUED | OUTPATIENT
Start: 2021-10-11 | End: 2021-10-15 | Stop reason: HOSPADM

## 2021-10-11 RX ORDER — SOLIFENACIN SUCCINATE 10 MG/1
5 TABLET, FILM COATED ORAL DAILY
Status: ON HOLD | COMMUNITY
End: 2021-10-15 | Stop reason: HOSPADM

## 2021-10-11 RX ORDER — ALBUTEROL SULFATE 90 UG/1
1 AEROSOL, METERED RESPIRATORY (INHALATION) EVERY 6 HOURS PRN
Status: DISCONTINUED | OUTPATIENT
Start: 2021-10-11 | End: 2021-10-15 | Stop reason: HOSPADM

## 2021-10-11 RX ORDER — NICOTINE POLACRILEX 4 MG
15 LOZENGE BUCCAL PRN
Status: DISCONTINUED | OUTPATIENT
Start: 2021-10-11 | End: 2021-10-15 | Stop reason: HOSPADM

## 2021-10-11 RX ORDER — NYSTATIN 100000 U/G
CREAM TOPICAL 2 TIMES DAILY
Status: DISCONTINUED | OUTPATIENT
Start: 2021-10-11 | End: 2021-10-15 | Stop reason: HOSPADM

## 2021-10-11 RX ORDER — INSULIN GLARGINE 100 [IU]/ML
20 INJECTION, SOLUTION SUBCUTANEOUS 2 TIMES DAILY
Status: DISCONTINUED | OUTPATIENT
Start: 2021-10-11 | End: 2021-10-15 | Stop reason: HOSPADM

## 2021-10-11 RX ORDER — ESCITALOPRAM OXALATE 10 MG/1
30 TABLET ORAL DAILY
Status: ON HOLD | COMMUNITY
End: 2021-10-15 | Stop reason: HOSPADM

## 2021-10-11 RX ORDER — TRAZODONE HYDROCHLORIDE 150 MG/1
150 TABLET ORAL NIGHTLY
Status: DISCONTINUED | OUTPATIENT
Start: 2021-10-11 | End: 2021-10-15 | Stop reason: HOSPADM

## 2021-10-11 RX ORDER — DEXTROSE MONOHYDRATE 50 MG/ML
100 INJECTION, SOLUTION INTRAVENOUS PRN
Status: DISCONTINUED | OUTPATIENT
Start: 2021-10-11 | End: 2021-10-15 | Stop reason: HOSPADM

## 2021-10-11 RX ORDER — DEXTROSE MONOHYDRATE 25 G/50ML
12.5 INJECTION, SOLUTION INTRAVENOUS PRN
Status: DISCONTINUED | OUTPATIENT
Start: 2021-10-11 | End: 2021-10-15 | Stop reason: HOSPADM

## 2021-10-11 RX ADMIN — INSULIN GLARGINE 20 UNITS: 100 INJECTION, SOLUTION SUBCUTANEOUS at 20:50

## 2021-10-11 RX ADMIN — TRAZODONE HYDROCHLORIDE 150 MG: 150 TABLET ORAL at 20:50

## 2021-10-11 RX ADMIN — ASPIRIN 81 MG: 81 TABLET, COATED ORAL at 16:43

## 2021-10-11 RX ADMIN — LISINOPRIL 10 MG: 10 TABLET ORAL at 16:43

## 2021-10-11 RX ADMIN — QUETIAPINE FUMARATE 300 MG: 300 TABLET ORAL at 20:50

## 2021-10-11 ASSESSMENT — PAIN - FUNCTIONAL ASSESSMENT: PAIN_FUNCTIONAL_ASSESSMENT: 0-10

## 2021-10-11 ASSESSMENT — SLEEP AND FATIGUE QUESTIONNAIRES
DO YOU HAVE DIFFICULTY SLEEPING: NO
DO YOU USE A SLEEP AID: NO
AVERAGE NUMBER OF SLEEP HOURS: 8

## 2021-10-11 ASSESSMENT — LIFESTYLE VARIABLES
HISTORY_ALCOHOL_USE: NO
HISTORY_ALCOHOL_USE: NO

## 2021-10-11 ASSESSMENT — PAIN SCALES - GENERAL: PAINLEVEL_OUTOF10: 0

## 2021-10-11 NOTE — PROGRESS NOTES
Behavioral Services  Medicare Certification Upon Admission    I certify that this patient's inpatient psychiatric hospital admission is medically necessary for:    [x] (1) Treatment which could reasonably be expected to improve this patient's condition,       [x] (2) Or for diagnostic study;     AND     [x](2) The inpatient psychiatric services are provided while the individual is under the care of a physician and are included in the individualized plan of care.     Estimated length of stay/service -2-6 days  Plan for post-hospital care -outpatient care    Electronically signed by Willie Bethea MD on 10/11/2021 at 2:21 PM

## 2021-10-11 NOTE — BH NOTE
Patient given tobacco quitline number 33512432146 at this time, refusing to call at this time, states \" I just dont want to quit now\"- patient given information as to the dangers of long term tobacco use. Continue to reinforce the importance of tobacco cessation.

## 2021-10-11 NOTE — PLAN OF CARE
[facility]  Initial Interdisciplinary Treatment Plan NO      Original treatment plan Date & Time: 10/11/2021  [id number]    Admission Type:  Admission Type: Involuntary    Reason for admission:   Reason for Admission: Pinked for being unable to care for self and is at risk of threat to her cruz or health of others.     Estimated Length of Stay:  5-7days  Estimated Discharge Date: to be determined by physician    PATIENT STRENGTHS:  Patient Strengths:Strengths: Communication, Positive Support  Patient Strengths and Limitations:Limitations: Inappropriate/potentially harmful leisure interests  Addictive Behavior: Addictive Behavior  In the past 3 months, have you felt or has someone told you that you have a problem with:  : None  Do you have a history of Chemical Use?: No  Do you have a history of Alcohol Use?: No  Do you have a history of Street Drug Abuse?: No  Histroy of Prescripton Drug Abuse?: No  Medical Problems:  Past Medical History:   Diagnosis Date    CHRIS (acute kidney injury) ([facility] 9/29/2020    Bipolar 1 disorder ([facility]     Breast lump     Chronic obstructive pulmonary disease ([facility] 8/3/2017    Depression     GERD (gastroesophageal reflux disease)     Hyperlipidemia     Hypertension     Osteoarthritis     Type 2 diabetes mellitus without complication, with long-term current use of insulin ([facility] 2/4/2019    Type II or unspecified type diabetes mellitus without mention of complication, not stated as uncontrolled      Status EXAM:Status and Exam  Normal: No  Facial Expression: Flat  Affect: Blunt  Level of Consciousness: Alert  Mood:Normal: No  Mood: Depressed, Anxious  Motor Activity:Normal: No  Motor Activity: Decreased  Interview Behavior: Cooperative  Preception: [place] to Person, [place] to Time, [place] to Place, [place] to Situation  Attention:Normal: No  Attention: Distractible  Thought Processes: Circumstantial  Thought Content:Normal: No  Thought Content: Preoccupations  Hallucinations: None  Delusions: No  Memory:Normal: No  Memory: Poor Recent, Poor Remote  Insight and Judgment: No  Insight and Judgment: Poor Judgment, Poor Insight  Present Suicidal Ideation: No  Present Homicidal Ideation: No    EDUCATION:   Learner Progress Toward Treatment Goals: reviewed group plans and strategies for care    Method:group therapy, medication compliance, individualized assessments and care planning    Outcome: needs reinforcement    PATIENT GOALS: to be discussed with patient within 72 hours    PLAN/TREATMENT RECOMMENDATIONS:     continue group therapy , medications compliance, goal setting, individualized assessments and care, continue to monitor pt on unit      SHORT-TERM GOALS:   Time frame for Short-Term Goals: 5-7 days    LONG-TERM GOALS:  Time frame for Long-Term Goals: 6 months  Members Present in Team Meeting: See Signature Sheet    CHAPITO Cid

## 2021-10-11 NOTE — BH NOTE
[facility]  Admission Note     Admission Type:   Admission Type: Involuntary    Reason for admission:  Reason for Admission: Pinked for being unable to care for self and is at risk of threat to her cruz or health of others.     PATIENT STRENGTHS:  Strengths: Communication, Positive Support    Patient Strengths and Limitations:  Limitations: Inappropriate/potentially harmful leisure interests    Addictive Behavior:   Addictive Behavior  In the past 3 months, have you felt or has someone told you that you have a problem with:  : None  Do you have a history of Chemical Use?: No  Do you have a history of Alcohol Use?: No  Do you have a history of Street Drug Abuse?: No  Histroy of Prescripton Drug Abuse?: No    Medical Problems:   Past Medical History:   Diagnosis Date    CHRIS (acute kidney injury) ([facility] 9/29/2020    Bipolar 1 disorder ([facility]     Breast lump     Chronic obstructive pulmonary disease ([facility] 8/3/2017    Depression     GERD (gastroesophageal reflux disease)     Hyperlipidemia     Hypertension     Osteoarthritis     Type 2 diabetes mellitus without complication, with long-term current use of insulin ([facility] 2/4/2019    Type II or unspecified type diabetes mellitus without mention of complication, not stated as uncontrolled        Status EXAM:  Status and Exam  Normal: No  Facial Expression: Flat  Affect: Blunt  Level of Consciousness: Alert  Mood:Normal: No  Mood: Depressed, Anxious  Motor Activity:Normal: No  Motor Activity: Decreased  Interview Behavior: Cooperative  Preception: [place] to Person, [place] to Time, [place] to Place, [place] to Situation  Attention:Normal: No  Attention: Distractible  Thought Processes: Circumstantial  Thought Content:Normal: No  Thought Content: Preoccupations  Hallucinations: None  Delusions: No  Memory:Normal: No  Memory: Poor Recent, Poor Remote  Insight and Judgment: No  Insight and Judgment: Poor Judgment, Poor Insight  Present Suicidal Ideation: No  Present Homicidal Ideation: No    Tobacco Screening:  Practical Counseling, on admission, noa X, if applicable and completed (first 3 are required if patient doesn't refuse):            ( )  Recognizing danger situations (included triggers and roadblocks)                    ( )  Coping skills (new ways to manage stress, exercise, relaxation techniques, changing routine, distraction)                                                           ( )  Basic information about quitting (benefits of quitting, techniques in how to quit, available resources  ( ) Referral for counseling faxed to [facility]                                           ( ) Patient refused counseling  ( ) Patient has not smoked in the last 30 days    Metabolic Screening:    Lab Results   Component Value Date    LABA1C 5.2 05/25/2021       Lab Results   Component Value Date    CHOL 155 12/13/2019    CHOL 137 08/31/2017    CHOL 159 02/22/2013    CHOL 123 02/26/2012     Lab Results   Component Value Date    TRIG 104 12/13/2019    TRIG 102 08/31/2017    TRIG 430 (H) 02/22/2013    TRIG 112 02/26/2012     Lab Results   Component Value Date    HDL 74 12/13/2019    HDL 74 12/13/2019    HDL 65 08/31/2017    HDL 37 (L) 02/22/2013    HDL 54 02/26/2012     No components found for: LDLCAL  No results found for: LABVLDL      Body mass index is 34.38 kg/m². BP Readings from Last 2 Encounters:   10/11/21 (!) 144/65   10/10/21 (!) 141/81           Pt admitted with followings belongings:  Dentures: None (Simultaneous filing. User may not have seen previous data.)  Vision - Corrective Lenses: Glasses (Simultaneous filing. User may not have seen previous data.)  Hearing Aid: None (Simultaneous filing.  User may not have seen previous data.)  Jewelry: Other (Comment) (Pt items double bagged for bed bugs  )  Body Piercings Removed: N/A  Clothing: Other (Comment) (Pt items double bagged for bed bugs  )  Were All Patient Medications Collected?: Not Applicable (Simultaneous filing. User may not have seen previous data.)  Other Valuables: Other (Comment) (Pt items double bagged for bed bugs  Simultaneous filing. User may not have seen previous data.)     Caterina Patient oriented to surroundings and program expectations and copy of patient rights given. Received admission packet. Patient verbalized understanding. Patient education on precautions. Patient presented to the [facility] involuntarily, pinked for being a possible harm to herself or others. Patient denies any thoughts to harm others, patient denies any thoughts to harm self, patient denies any hallucinations. Denies any drugs or alcohol. Patient is a poor historian. Did not sign paperwork.                    Issa Santos RN

## 2021-10-11 NOTE — PLAN OF CARE
Problem: Falls - Risk of:  Goal: Will remain free from falls  Description: Will remain free from falls  10/11/2021 1613 by Solange Clark LPN  Outcome: Ongoing  Note: Patient remains free of falls/injury and verbalizes understanding of individual fall risks. Patient wearing non skid footwear and encouraged to seek out staff for any assistance needed. 10/11/2021 0758 by ALEXUS Osorio  Outcome: Ongoing  Goal: Absence of physical injury  Description: Absence of physical injury  10/11/2021 0758 by ALEXUS Osorio  Outcome: Ongoing     Problem: Altered Mood, Psychotic Behavior:  Goal: Able to verbalize decrease in frequency and intensity of hallucinations  Description: Able to verbalize decrease in frequency and intensity of hallucinations  10/11/2021 1613 by Solange Clark LPN  Outcome: Ongoing  Note: Patient verbalizes auditory hallucinations which are \"mostly mumbles\" and states sometimes at night she sees bunnies or mice in her room but nothing currently. Patient denies any thoughts of harm to self or others. Patient is a line of sight for safety and uses a wheelchair to get around the milieu. Safe environment maintained. Safety checks continued Q 15 minutes and intermittently. 10/11/2021 0758 by ALEXUS Osorio  Outcome: Ongoing     Problem: Skin Integrity:  Goal: Will show no infection signs and symptoms  Description: Will show no infection signs and symptoms  10/11/2021 1613 by Solange Clark LPN  Outcome: Ongoing  Note: Skin intact. No signs or symptome noted at this time.     10/11/2021 0758 by ALEXUS Osorio  Outcome: Ongoing  Goal: Absence of new skin breakdown  Description: Absence of new skin breakdown  10/11/2021 0758 by ALEXUS Osorio  Outcome: Ongoing     Problem: Tobacco Use:  Goal: Inpatient tobacco use cessation counseling participation  Description: Inpatient tobacco use cessation counseling participation  10/11/2021 1613 by Solange Clark LPN  Outcome: Ongoing  Note: Patient declines tobacco cessation counseling at this time.    10/11/2021 0758 by Jacqueline Castellano, CTRS  Outcome: Ongoing

## 2021-10-11 NOTE — PROGRESS NOTES
Pharmacy Medication History Note      List of current medications patient is taking is complete. Source of information: Valencia Pittman, [facility] Cee Pollard [facility]    Changes made to medication list:  Medications removed (include reason, ex. therapy complete or physician discontinued, noncompliance): Baclofen (therapy completed), Cobalamin (list clean up), Dicyclomine (list clean up), Docusate (list clean up), Naproxen (list clean up), Ondansetron (list clean up), Pregabalin (list clean up)    Medications added/doses adjusted:  Adjusted Escitalopram to 30 mg daily  Added Solifenacin 10 mg daily    Other notes (ex. Recent course of antibiotics, Coumadin dosing): The patient was on Escitalopram 30 mg at home but was receiving 25 mg daily while admitted to [facility]. Patient was also taking Quetiapine 300 mg twice daily during the day and 400 mg nightly at home but was receiving 200 mg twice daily while admitted to [facility]. Patient was up to date on refills per Iam Wilkerson. Please let me know if you have any questions about this encounter. Thank you!     Electronically signed by Rush Deluna, [facility] on 10/11/2021 at 10:49 AM

## 2021-10-11 NOTE — FLOWSHEET NOTE
SPIRITUAL CARE DEPARTMENT - [address]  PROGRESS NOTE    Shift date: 10/10/21  Shift day: Sunday   Shift # 2    Room # 1581/3946-75   Name: George Villeda            Age: 77 y.o. Gender: female          Christianity: [address] of [employer]ism:     Referral: Routine Visit    Admit Date & Time: 10/9/2021  2:15 PM    PATIENT/EVENT DESCRIPTION:  George Villeda is a 77 y.o. female in room 342. SPIRITUAL ASSESSMENT/INTERVENTION:   arrived with members of medical team arriving shortly after.  waited outside of room. Patient appeared to welcome  presence and asked for daily bread which  brought back to room.  listened and validated feelings and concerns.  offered prayer which patient accepted. Patient expressed gratitude for prayer, daily bread, and  visit. SPIRITUAL CARE FOLLOW-UP PLAN:  Chaplains will remain available to offer spiritual and emotional support as needed. Electronically signed by Clovis Kimball, on 10/10/2021 at 8:00 PM.  [facility]  [phone]       10/10/21 1959   Encounter Summary   Services provided to: Patient   Support System Children   Continue Visiting   (10/10/21)   Complexity of Encounter Low   Length of Encounter 30 minutes   Spiritual Assessment Completed Yes   Spiritual/Anabaptist   Type Spiritual support   Assessment Approachable; Anxious; Hopeful;Coping   Intervention Prayer;Nurtured hope;Provided reading materials/devotional materials;Sustaining presence/ Ministry of presence; Active listening   Outcome Expressed gratitude

## 2021-10-11 NOTE — CARE COORDINATION
BHI Biopsychosocial Assessment    Current Level of Psychosocial Functioning     Independent   Dependent  X  Minimal Assist     Psychosocial High Risk Factors (check all that apply)    Unable to obtain meds   Chronic illness/pain  X  Substance abuse X  Lack of Family Support X  Financial stress   Isolation X  Inadequate Community Resources  Suicide attempt(s)  Not taking medications X  Victim of crime   Developmental Delay  Unable to manage personal needs  X  Age 72 or older X  Homeless  No transportation   Readmission within 30 days  Unemployment  Traumatic Event      Psychiatric Advanced Directives: none reported     Family to Involve in Treatment: lack of family support     Sexual Orientation:  [facility]    Patient Strengths: insurance, housing, linked with [facility] for Outpatient Treatment     Patient Barriers: lack of family support, presenting with an increase in symptoms of Depression, health issues. Opiate Education Provided: N/A PT denies and does not have a documented history of Opiate or Heroin use/abuse. Pt denies any illicit drug use, but PT's drug screen was positive for Cocaine and Methadone. CMHC/mental health history: Pt is linked with [facility] on [employer] For outpatient treatment. Pt reports treatment compliance. Plan of Care   medication management, group/individual therapies, family meetings, psycho -education, treatment team meetings to assist with stabilization, referral to community resources. Initial Discharge Plan:  Pt reports a plan to look into housing, Pt reports that she has been in her apartment for four years, but reports that the landlord started the eviction process due to her apartment being  \"a mess\" Pt reports that she is trying to clean her apartment, but is unsure if she will be able to stay there. Pt reports a plan to continue outpatient Treatment at [facility].        Clinical Summary:       The patient is a 77 y.o. female with significant past history of seizure  disorder, was transferred from [place] for management of visual and auditory hallucination. It was documented in PT chart that she was initially seen at [address] with multiple falls secondary to skeletal pain. Pt is a poor historian.        Patient also complaining of auditory and visual hallucination. She is stating that the hallucinations developed 3 years ago, the hallucinations are very inconsistent sometimes they  will come and stay for 15 to 20 minutes, sometimes they will happen for couple of days.       She feels sad, does not take enjoy any activities, unable to sleep, feels hopelessness, have anxiety Pt reports that she follows up for Outpatient Treatment at [facility] and reports treatment compliancPt reports that she has been in her apartment for four years, but reports that the landlord started the eviction process due to her apartment being  \"a mess\" Pt reports that she is trying to clean her apartment, but is unsure if she will be able to stay there. Patient's urine drug screen was positive for cocaine and methadone.  Pt denies using alcohol or illicit drug use.

## 2021-10-11 NOTE — H&P
Department of Psychiatry  H&P    CHIEF COMPLAINT: Auditory and visual hallucination    History obtained from:  patient, electronic medical record    Patient was seen after discussing with the treatment team and reviewing the chart. CIRCUMSTANCES OF ADMISSION:  Patient initially presented to [facility] for with multiple fall secondary to muscular skeletal pain and mechanical issues. Patient was admitted to observation unit, for medical management and visual and auditory hallucination. HISTORY OF PRESENT ILLNESS:      The patient is a 77 y.o. female with significant past history of seizure affective disorder, was transferred from [facility] for management of visual and auditory hallucination. Patient is inconsistent with a history. Last thing she remembered that taking vitals in the hospital.  [facility] patient received fluids for orthostatic hypotension. Patient also complaining of auditory and visual hallucination. Auditory hallucinations which were mostly of mumbling, \"go girls\", that is what she was most likely hearing. Visual hallucinations were people walking on the street in the room. She stated that her hallucination got worsened for the last 2 days. She is stating that the hallucination developed 3 years ago, the hallucinations are very inconsistent sometimes it will come and stay for 15 to 20 minutes, sometimes it will happen for couple of days. Sometimes she will have every day. Hallucinations are mostly auditory and visual.  None of the auditory hallucination has ever told her to hurt herself or hurt others. She states the auditory hallucinations talk stupid stuff. Patient denies any suicidal ideation or homicidal thoughts.   She stated that she has been diagnosed with schizophrenia but 3 years ago, with chart review found that she was admitted back in 2016, she states she has been compliant with the medication but only medication name she could list was MCG inhalation capsule, INHALE THE CONTENTS OF 1 CAPSULE INTO THE LUNG ONCE DAILY  [DISCONTINUED] lidocaine (LIDODERM) 5 %, Place 1 patch onto the skin daily 12 hours on, 12 hours off. (Patient not taking: Reported on 7/16/2021)  hydrOXYzine (VISTARIL) 25 MG capsule, Take 1 capsule by mouth 3 times daily as needed for Anxiety  ondansetron (ZOFRAN ODT) 4 MG disintegrating tablet, Take 1 tablet by mouth every 8 hours as needed for Nausea (Patient not taking: Reported on 5/25/2021)  dicyclomine (BENTYL) 10 MG capsule, Take 1 capsule by mouth every 6 hours as needed (cramps) (Patient not taking: Reported on 5/25/2021)  docusate sodium (COLACE) 100 MG capsule, Take 1 capsule by mouth 2 times daily  Insulin Pen Needle 31G X 8 MM MISC, Use BID for insulin administration  [DISCONTINUED] pregabalin (LYRICA) 150 MG capsule, Take 1 capsule by mouth 2 times daily for 30 days. blood glucose monitor kit and supplies, Test 1 times a day & as needed for symptoms of irregular blood glucose. aspirin 81 MG EC tablet, Take 1 tablet by mouth daily  escitalopram (LEXAPRO) 5 MG tablet, Take 5 tablets by mouth daily  albuterol (PROVENTIL HFA;VENTOLIN HFA) 108 (90 BASE) MCG/ACT inhaler, Inhale 1 puff into the lungs 4 times daily as needed for Wheezing     Compliance:  Not sure     Lifetime Psychiatric Review of Systems       Depression:   Yes, anhedonia, sleep disturbance, feeling of worthlessness, fatigue.      Bernadette or Hypomania:  no     Panic Attacks:  no     Phobias:  no     Obsessions and Compulsions:  no     PTSD :       Hallucinations:  yes -auditory and visual     Delusions:  No    Substance Abuse History:  ETOH:  Denies    Marijuana: Denies   Opiates:  Denies   Other Drugs:  Cocaine       Past Psychiatric History:  Prior Diagnosis:  Schizophrenia;    Psychiatrist:    Therapist:   Hospitalization: yes  Hx of Suicidal Attempts: no  Hx of violence:  no  ECT: no  Previous discontinued Psychiatric Med Trials: Seroquel    Past Medical History:        Diagnosis Date    CHRIS (acute kidney injury) ([facility] 9/29/2020    Bipolar 1 disorder (HCC)     Breast lump     Chronic obstructive pulmonary disease ([facility] 8/3/2017    Depression     GERD (gastroesophageal reflux disease)     Hyperlipidemia     Hypertension     Osteoarthritis     Type 2 diabetes mellitus without complication, with long-term current use of insulin ([facility] 2/4/2019    Type II or unspecified type diabetes mellitus without mention of complication, not stated as uncontrolled        Past Surgical History:        Procedure Laterality Date    DILATION AND CURETTAGE OF UTERUS      ECTOPIC PREGNANCY SURGERY      KNEE ARTHROPLASTY  2017    KNEE SURGERY Right     NERVE BLOCK Left 12/8/14    left knee    TONSILLECTOMY AND ADENOIDECTOMY         Allergies:   Chlorpromazine, Cyclobenzaprine, Gabapentin, Prochlorperazine, Prochlorperazine edisylate, Promethazine, and Chantix [varenicline tartrate]    Family History:   Family History   Problem Relation Age of Onset    Diabetes Father     Stroke Father     High Blood Pressure Father          Social History:    Social History     Socioeconomic History    Marital status:      Spouse name: None    Number of children: None    Years of education: None    Highest education level: None   Occupational History     Employer: N/A   Tobacco Use    Smoking status: Current Every Day Smoker     Packs/day: 0.25     Years: 10.00     Pack years: 2.50     Types: Cigarettes    Smokeless tobacco: Never Used    Tobacco comment: 3 cigarettes per day; Patient refused counseling   Vaping Use    Vaping Use: Never used   Substance and Sexual Activity    Alcohol use: Yes     Comment: rarely    Drug use: No    Sexual activity: Not Currently   Other Topics Concern    None   Social History Narrative    None     Social Determinants of Health     Financial Resource Strain: Low Risk     Difficulty of Paying Living Expenses: Not hard at all   Food Insecurity: No Food Insecurity    Worried About Running Out of Food in the Last Year: Never true    Ran Out of Food in the Last Year: Never true   Transportation Needs:     Lack of Transportation (Medical):  Lack of Transportation (Non-Medical):    Physical Activity:     Days of Exercise per Week:     Minutes of Exercise per Session:    Stress:     Feeling of Stress :    Social Connections:     Frequency of Communication with Friends and Family:     Frequency of Social Gatherings with Friends and Family:     Attends Anabaptist Services:     Active Member of Clubs or Organizations:     Attends Club or Organization Meetings:     Marital Status:    Intimate Partner Violence:     Fear of Current or Ex-Partner:     Emotionally Abused:     Physically Abused:     Sexually Abused:            EXAMINATION:    PHYSICAL EXAM:  Vitals:  BP (!) 144/65   Pulse 89   Temp 98 °F (36.7 °C) (Oral)   Resp 14   Ht 5' 6\" (1.676 m)   Wt 213 lb (96.6 kg)   LMP  (LMP Unknown)   BMI 34.38 kg/m²      Review of Systems   Constitutional: Negative for chills and weight loss. HENT: Negative for ear pain and nosebleeds. Eyes: Negative for blurred vision and photophobia. Respiratory: Negative for cough, shortness of breath and wheezing. Cardiovascular: Negative for chest pain and palpitations. Gastrointestinal: Negative for abdominal pain, diarrhea and vomiting. Genitourinary: Negative for dysuria and urgency. Musculoskeletal: Negative for falls and joint pain. Skin: Negative for itching and rash. Neurological: Negative for tremors, seizures and weakness. Endo/Heme/Allergies: Does not bruise/bleed easily. Physical Exam:      Constitutional:  Appears well-developed and well-nourished, no acute distress  HENT:   Head: Normocephalic and atraumatic. Eyes: Conjunctivae are normal. Right eye exhibits no discharge. Left eye exhibits no discharge. No scleral icterus. Neck: Normal range of motion.  Neck supple. Pulmonary/Chest:  No respiratory distress or accessory muscle use, no wheezing. Abdominal: Soft. Exhibits no distension. Musculoskeletal: Normal range of motion. Exhibits no edema. Skin: Skin is warm and dry. Patient is not diaphoretic. No erythema. Neurologic Exam:   Muscle Strength & Tone: full ROM  CN 2-12-    II: Pupils equal and reactive,     III, IV, VI: EOM intact, no gaze preference or deviation    V: normal    VII: no facial asymmetry    VIII: normal hearing to speech  CN IX, X: Phonation is normal.  CN XI: Head turning and shoulder shrug are intact  CN XII: Tongue is midline with normal movements and no atrophy. Gait: not assessed as patient was in the wheel chair    Involuntary Movements: No          Mental Status Examination:    Level of consciousness:  within normal limits   Appearance:  in chair  Behavior/Motor:  no abnormalities noted  Attitude toward examiner:  cooperative  Speech:  normal rate and normal volume   Mood: anxious  Affect:  flat  Thought processes:  linear   Thought content:  Preoccupied   Cognition:  oriented to person, place, and time   Concentration poor  Memory intact  Insight  poor   Judgement poor   Fund of Knowledge marginal    Mini Mental Status        DIAGNOSIS:     Schizophrenia; paranoid type         RISK ASSESSMENT:    SUICIDE RISK ASSESSMENT: Moderate risk. Moderate risk of self-neglect  HOMICIDE: No  AGITATION/VIOLENCE: No  ELOPEMENT: No    LABS: REVIEWED TODAY:  No results for input(s): WBC, HGB, PLT in the last 72 hours. No results for input(s): NA, K, CL, CO2, BUN, CREATININE, GLUCOSE in the last 72 hours. No results for input(s): BILITOT, ALKPHOS, AST, ALT in the last 72 hours.   Lab Results   Component Value Date    BARBSCNU NEGATIVE 10/10/2021    LABBENZ NEGATIVE 10/10/2021    LABBENZ POSITIVE 07/12/2013    LABMETH POSITIVE 10/10/2021    PPXUR NOT REPORTED 10/10/2021     Lab Results   Component Value Date    TSH 0.89 03/22/2021     No results found for: LITHIUM  No results found for: VALPROATE, CBMZ  No results found for: LITHIUM, VALPROATE    FURTHER LABS ORDERED :      Radiology    XR KNEE RIGHT (3 VIEWS)    Result Date: 9/17/2021  EXAMINATION: THREE XRAY VIEWS OF THE RIGHT KNEE; 4 XRAY VIEWS OF THE RIGHT TIBIA AND FIBULA 9/17/2021 11:28 pm COMPARISON: Knee right three views March 22, 2021. HISTORY: ORDERING SYSTEM PROVIDED HISTORY: Fall, hx of r knee arthroplasty TECHNOLOGIST PROVIDED HISTORY: Fall, hx of r knee arthroplasty FINDINGS: Knee: Bone alignment is within normal limits. Bone mineralization is unremarkable. Total knee arthroplasty seen without evidence of hardware loosening, malalignment or other complication identified. Calcification of the MCL is seen. No evidence of joint effusion is seen. Surrounding soft tissues are unremarkable. Tibia/fibula: The tibia and fibula demonstrate normal alignment. Ankle mortise is symmetric and intact. The joint spaces are preserved. Bone mineralization is within normal limits. No evidence of acute fracture, dislocation is noted. Surrounding soft tissues are unremarkable. 1. No radiographic evidence of acute abnormality involving the right knee or right tibia/fibula. 2. 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 XR TIBIA FIBULA RIGHT (2 VIEWS)    Result Date: 9/17/2021  EXAMINATION: THREE XRAY VIEWS OF THE RIGHT KNEE; 4 XRAY VIEWS OF THE RIGHT TIBIA AND FIBULA 9/17/2021 11:28 pm COMPARISON: Knee right three views March 22, 2021. HISTORY: ORDERING SYSTEM PROVIDED HISTORY: Fall, hx of r knee arthroplasty TECHNOLOGIST PROVIDED HISTORY: Fall, hx of r knee arthroplasty FINDINGS: Knee: Bone alignment is within normal limits. Bone mineralization is unremarkable. Total knee arthroplasty seen without evidence of hardware loosening, malalignment or other complication identified. Calcification of the MCL is seen. No evidence of joint effusion is seen.  Surrounding soft tissues are unremarkable. Tibia/fibula: The tibia and fibula demonstrate normal alignment. Ankle mortise is symmetric and intact. The joint spaces are preserved. Bone mineralization is within normal limits. No evidence of acute fracture, dislocation is noted. Surrounding soft tissues are unremarkable. 1. No radiographic evidence of acute abnormality involving the right knee or right tibia/fibula. 2. 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 XR HIP 2-3 VW W PELVIS LEFT    Result Date: 10/9/2021  EXAMINATION: ONE XRAY VIEW OF THE PELVIS AND TWO XRAY VIEWS LEFT HIP 10/9/2021 3:23 pm COMPARISON: 06/14/2021 HISTORY: ORDERING SYSTEM PROVIDED HISTORY: left hip pain after fall TECHNOLOGIST PROVIDED HISTORY: left hip pain after fall FINDINGS: There is no evidence of acute fracture. There is normal alignment. No acute joint abnormality. No focal osseous lesion. No focal soft tissue abnormality. No acute osseous abnormality. EKG:  TRACING REVIEWED    TREATMENT PLAN:    Risk Management:  close watch and suicide risk    Collateral Information:  Will obtain collateral information from the family or friends. Will obtain medical records as appropriate from out patient providers  Will consult the hospitalist for a physical exam to rule out any co-morbid physical condition. Home medication Reconciled       New Medications started during this admission :    See orders  Prn Haldol 5mg and Vistaril 50mg q6hr for extreme agitation. Trazodone as ordered for insomnia  Vistaril as ordered for anxiety  Discussed with the patient risk, benefit, alternative and common side effects for the  proposed medication treatment. Patient is consenting to the treatment.     Psychotherapy:   Encourage participation in milieu and group therapy  Individual therapy as needed        [address] Medicare Certification      Admission Day 1  I certify that this patient's inpatient psychiatric hospital admission is medically necessary for:     (1) treatment which could reasonably be expected to improve this patient's condition, or     (2) diagnostic study or its equivalent. Neetu Gamble is a 77 y.o. female being evaluated face to face    --Marilyn Moore MD on 10/11/2021 at 9:45 AM    An electronic signature was used to authenticate this note. **This report has been created using voice recognition software. It may contain minor errors which are inherent in voice recognition technology. **  I independently saw and evaluated the patient. I reviewed the resident's documentation above. Any additional comments or changes to the midlevel provider's documentation are stated below otherwise agree with assessment. The patient was seen in the day area. She is wheelchair-bound. The patient reports some depressive and anxiety symptoms. She also reports auditory and visual hallucinations. She cannot make out the voices she is hearing. She has seen visual hallucinations of her mouth and the clown. She says she was taking her medications prior to admission. Her prior to admission Seroquel has been ordered for her and a dose of 300 mg twice daily. Her Lexapro has been held as this overlaps with Seroquel and its action and also prolonged QTC. We will continue to monitor her mood and consider the need for antidepressant treatment. PLAN  Medications as noted above  Attempt to develop insight  Psycho-education conducted. Supportive Therapy conducted. Probable discharge is 2-5 days.    Follow-up daily while on inpatient unit    Electronically signed by Tim Vogt MD on 10/11/21 at 4:51 PM EDT

## 2021-10-11 NOTE — GROUP NOTE
Group Therapy Note    Date: 10/11/2021    Group Start Time: 1000  Group End Time: 1030  Group Topic: Psychotherapy    SKY Vega        Group Therapy Note         Patient refused to attend psychotherapy group after encouragement from staff. 1:1 talk time offered but refused. Signature:   Zak Vega

## 2021-10-11 NOTE — GROUP NOTE
Group Therapy Note    Date: 10/11/2021    Group Start Time: 1100  Group End Time: 0507  Group Topic: Cognitive Skills    SKY Keene, CTRS    Pt did not attend 1100 cognitive skills group d/t resting in room despite staff invitation to attend. 1:1 talk time offered as alternative to group session, pt declined.               Signature:  Racquel Hedrick

## 2021-10-12 LAB
GLUCOSE BLD-MCNC: 100 MG/DL (ref 65–105)
GLUCOSE BLD-MCNC: 102 MG/DL (ref 65–105)
GLUCOSE BLD-MCNC: 127 MG/DL (ref 65–105)
GLUCOSE BLD-MCNC: 74 MG/DL (ref 65–105)

## 2021-10-12 PROCEDURE — 6370000000 HC RX 637 (ALT 250 FOR IP): Performed by: PSYCHIATRY & NEUROLOGY

## 2021-10-12 PROCEDURE — 1240000000 HC EMOTIONAL WELLNESS R&B

## 2021-10-12 PROCEDURE — 82947 ASSAY GLUCOSE BLOOD QUANT: CPT

## 2021-10-12 PROCEDURE — 99232 SBSQ HOSP IP/OBS MODERATE 35: CPT | Performed by: PSYCHIATRY & NEUROLOGY

## 2021-10-12 PROCEDURE — APPSS30 APP SPLIT SHARED TIME 16-30 MINUTES: Performed by: PSYCHIATRY & NEUROLOGY

## 2021-10-12 RX ADMIN — LISINOPRIL 10 MG: 10 TABLET ORAL at 08:25

## 2021-10-12 RX ADMIN — TRAZODONE HYDROCHLORIDE 150 MG: 150 TABLET ORAL at 21:27

## 2021-10-12 RX ADMIN — NICOTINE POLACRILEX 2 MG: 2 GUM, CHEWING BUCCAL at 08:25

## 2021-10-12 RX ADMIN — INSULIN GLARGINE 20 UNITS: 100 INJECTION, SOLUTION SUBCUTANEOUS at 08:26

## 2021-10-12 RX ADMIN — QUETIAPINE FUMARATE 300 MG: 300 TABLET ORAL at 21:27

## 2021-10-12 RX ADMIN — ASPIRIN 81 MG: 81 TABLET, COATED ORAL at 08:25

## 2021-10-12 RX ADMIN — QUETIAPINE FUMARATE 300 MG: 300 TABLET ORAL at 08:25

## 2021-10-12 RX ADMIN — INSULIN GLARGINE 20 UNITS: 100 INJECTION, SOLUTION SUBCUTANEOUS at 21:26

## 2021-10-12 RX ADMIN — NYSTATIN: 100000 CREAM TOPICAL at 21:26

## 2021-10-12 NOTE — PLAN OF CARE
No  Present Homicidal Ideation: No    Daily Assessment Last Entry:   Daily Sleep (WDL): Within Defined Limits         Patient Currently in Pain: No  Daily Nutrition (WDL): Within Defined Limits    Patient Monitoring:  Frequency of Checks: 4 times per hour, close    Psychiatric Symptoms:   Depression Symptoms  Depression Symptoms: Isolative, Loss of interest  Anxiety Symptoms  Anxiety Symptoms: Generalized  Bernadette Symptoms  Bernadette Symptoms: No problems reported or observed. Psychosis Symptoms  Delusion Type: No problems reported or observed.     Suicide Risk CSSR-S:  Have you wished you were dead or wished you could go to sleep and not wake up? : NO  Have you actually had any thoughts of killing yourself? : NO  Have you ever done anything, started to do anything, or prepared to do anything to end your life?: NO  Change in Result      no                          Change in Plan of care                  no      EDUCATION:   EDUCATION:   Learner Progress Toward Treatment Goals: Reviewed results and recommendations of this team, Reviewed group plan and strategies, Reviewed signs, symptoms and risk of self harm and violent behavior, Reviewed goals and plan of care    Method:small group, individual verbal education    Outcome:verbalized by patient, but needs reinforcement to obtain goals    PATIENT GOALS:  Short term: \"stop voices\"  Long term: \"clean house out\"    PLAN/TREATMENT RECOMMENDATIONS UPDATE: continue with group therapies, increased socialization, continue planning for after discharge goals, continue with medication compliance    SHORT-TERM GOALS UPDATE:   Time frame for Short-Term Goals: 5-7 days    LONG-TERM GOALS UPDATE:   Time frame for Long-Term Goals: 6 months  Members Present in Team Meeting: See Signature Sheet    Jennifer Blunt

## 2021-10-12 NOTE — PLAN OF CARE
Problem: Falls - Risk of:  Goal: Will remain free from falls  Description: Will remain free from falls  10/12/2021 0446 by Maurizio Cardenas RN  Outcome: Ongoing     Problem: Falls - Risk of:  Goal: Absence of physical injury  Description: Absence of physical injury  Outcome: Ongoing     Problem: Altered Mood, Psychotic Behavior:  Goal: Able to verbalize decrease in frequency and intensity of hallucinations  Description: Able to verbalize decrease in frequency and intensity of hallucinations  10/12/2021 0446 by Maurizio Cardenas RN  Outcome: Ongoing     Problem: Altered Mood, Psychotic Behavior:  Goal: Able to verbalize reality based thinking  Description: Able to verbalize reality based thinking  Outcome: Ongoing   Patient is out in the day area, social with peers and medication compliant. Patient is able to ambulate without aid device this shift. She denies suicidal homicidal ideation and is able to express needs appropriately.

## 2021-10-12 NOTE — PLAN OF CARE
Problem: Falls - Risk of:  Goal: Will remain free from falls  Description: Will remain free from falls  10/12/2021 1654 by Valente Mcelroy LPN  Outcome: Ongoing  Note: Patient remains free of falls/injury and verbalizes understanding of individual fall risks. Patient wearing non skid footwear, uses a walker to ambulate and remains a line of sight for safety. Patient is encouraged to seek out staff for any assistance needed. 10/12/2021 1308 by ALEXUS Lockhart  Outcome: Ongoing  10/12/2021 0446 by Valery Romo RN  Outcome: Ongoing     Problem: Altered Mood, Psychotic Behavior:  Goal: Able to verbalize decrease in frequency and intensity of hallucinations  Description: Able to verbalize decrease in frequency and intensity of hallucinations  10/12/2021 1654 by Valente Mcelroy LPN  Outcome: Ongoing  Note: Patient reports auditory hallucinations that are not commanding or threatening and states that last night she saw cows and mice in her room. 10/12/2021 1308 by ALEXUS Lockhart  Outcome: Ongoing  10/12/2021 0446 by Valery Romo RN  Outcome: Ongoing     Problem: Altered Mood, Psychotic Behavior:  Goal: Able to verbalize reality based thinking  Description: Able to verbalize reality based thinking  10/12/2021 1654 by Valente Mcelroy LPN  Outcome: Ongoing  Note: Patient answers assessment questions appropriately and is able to verbalize reality based thinking. 10/12/2021 1308 by ALEXUS Lockhart  Outcome: Ongoing  10/12/2021 0446 by Valery Romo RN  Outcome: Ongoing     Problem: Skin Integrity:  Goal: Will show no infection signs and symptoms  Description: Will show no infection signs and symptoms  10/12/2021 1654 by Valente Mcelroy LPN  Outcome: Ongoing  Note: Patient has no signs or symptoms of infection noted.    10/12/2021 1308 by ALEXUS Lockhart  Outcome: Ongoing     Problem: Skin Integrity:  Goal: Absence of new skin breakdown  Description: Absence of new skin breakdown  10/12/2021 1654 by Nivia Henriquez LPN  Outcome: Ongoing  Note: No new skin breakdown noted at this time. 10/12/2021 1308 by ALEXUS Dhillon  Outcome: Ongoing     Problem: Tobacco Use:  Goal: Inpatient tobacco use cessation counseling participation  Description: Inpatient tobacco use cessation counseling participation  10/12/2021 1654 by Nivia Henriquez LPN  Outcome: Ongoing  Note: Patient given tobacco quitline number 44047632449 at this time, refusing to call at this time, states \" I just dont want to quit now\"- patient given information as to the dangers of long term tobacco use. Continue to reinforce the importance of tobacco cessation.    10/12/2021 1308 by ALEXUS Dhillon  Outcome: Ongoing

## 2021-10-12 NOTE — PROGRESS NOTES
Daily Progress Note  10/12/2021    Patient Name: Laquetta Crigler    CHIEF COMPLAINT: Acute psychosis with paranoia         SUBJECTIVE:      Nursing staff report the patient has maintained medication adherence and has not required emergency medications since admission. Ms. Lucia Bernard is on milieu and agrees to assessment and common area if she is sitting alone very privately. She reports that she is not comfortable utilizing the wheelchair and has requested using a walker. She is agreeable to participating with physical therapy for safety assessment regarding utilization of this equipment. Patient denies side effects related to her medications and reports that   Auditory and visual hallucinations are less disturbing currently. She confirms that she has been able to focus on the television for approximately the last 10 minutes without distraction. Patient denies homicidal or suicidal ideation and contracts for safety on unit. She does have some concerns related to her family not knowing her whereabouts and believes that her son may be traveling currently. Patient is ensured that the team will help her contact those individuals she wishes to be part of her treatment plan. Writer encouraged patient to attend groups on the unit. At this time, the patient is not appropriate for a lower level of care. There is risk of decompensation and patient warrants further hospitalization for safety and stabilization. Appetite:  [x] Normal/Adequate/Unchanged  [] Increased  [] Decreased      Sleep:       [] Normal/Adequate/Unchanged  [x] Fair  [] Poor      Group Attendance on Unit:   [] Yes  [] Selectively    [x] No    Medication Side Effects:Patient denies any medication side effects at the time of assessment. Mental Status Exam  Level of consciousness: Alert and awake. Appearance: Appropriate attire for setting, seated in chair, with poor grooming and hygiene.    Behavior/Motor: Calm, psychomotor retardation and difficulty with ambulation requiring/requesting use of wheelchair or walker  Attitude toward examiner: Cooperative, attentive, poor eye contact. Speech: Delayed rate, low volume gentle tone with some mumbling  Mood:  Patient reports \"K. .I guess\". Affect: Flat  Thought processes: Linear and coherent. Thought content: Denies homicidal ideation. Suicidal Ideation: Active suicidal ideations, without current plan or intent, contracts for safety on the unit. Delusions: No evidence of delusions. Denies paranoia. Perceptual Disturbance: Patient does not appear to be responding to internal stimuli. Endorses auditory hallucinations. Endorses visual hallucinations. Cognition: Oriented to self,, location,, situation. and self, location, time, and situation. Memory: Impaired. Insight & Judgement: Poor. Limited fund of knowledge    Data   height is 5' 6\" (1.676 m) and weight is 213 lb (96.6 kg). Her oral temperature is 98.9 °F (37.2 °C). Her blood pressure is 111/57 (abnormal) and her pulse is 78. Her respiration is 14. Labs:   Admission on 10/11/2021   Component Date Value Ref Range Status    POC Glucose 10/11/2021 108* 65 - 105 mg/dL Final    POC Glucose 10/11/2021 130* 65 - 105 mg/dL Final    POC Glucose 10/11/2021 97  65 - 105 mg/dL Final    POC Glucose 10/11/2021 102  65 - 105 mg/dL Final    POC Glucose 10/12/2021 100  65 - 105 mg/dL Final    POC Glucose 10/12/2021 74  65 - 105 mg/dL Final         Reviewed patient's current plan of care and vital signs with nursing staff.     Labs reviewed: [x] Yes  Last EKG in EMR reviewed: [x] Yes  QTc: 432    Medications  Current Facility-Administered Medications: glucose (GLUTOSE) 40 % oral gel 15 g, 15 g, Oral, PRN  dextrose 50 % IV solution, 12.5 g, IntraVENous, PRN  glucagon (rDNA) injection 1 mg, 1 mg, IntraMUSCular, PRN  dextrose 5 % solution, 100 mL/hr, IntraVENous, PRN  traZODone (DESYREL) tablet 150 mg, 150 mg, Oral, Nightly  QUEtiapine (SEROQUEL) tablet 300 mg, 300 mg, Oral, BID  nystatin (MYCOSTATIN) cream, , Topical, BID  lisinopril (PRINIVIL;ZESTRIL) tablet 10 mg, 10 mg, Oral, Daily  insulin glargine (LANTUS) injection vial 20 Units, 20 Units, SubCUTAneous, BID  aspirin EC tablet 81 mg, 81 mg, Oral, Daily  albuterol sulfate  (90 Base) MCG/ACT inhaler 1 puff, 1 puff, Inhalation, Q6H PRN  nicotine polacrilex (NICORETTE) gum 2 mg, 2 mg, Oral, PRN    ASSESSMENT  Paranoid schizophrenia (HCC)         PLAN  Patient symptoms are: Remains Unstable. Continue current medication regimen. P Tconsult ordered  Monitor need and frequency of PRN medications. Encourage participation in groups and milieu. Attempt to develop insight. Psycho-education conducted. Supportive Therapy conducted. Probable discharge per attending physician and currently undetermined. Follow-up daily while inpatient. Patient continues to be monitored in the inpatient psychiatric facility at [facility] for safety and stabilization. Patient continues to need, on a daily basis, active treatment furnished directly by or requiring the supervision of inpatient psychiatric personnel. Electronically signed by JEANNETTE Garcia CNP on 10/12/2021 at 5:27 PM    **This report has been created using voice recognition software. It may contain minor errors which are inherent in voice recognition technology. **  I independently saw and evaluated the patient. I reviewed the midlevel provider's documentation above. Any additional comments or changes to the midlevel provider's documentation are stated below otherwise agree with assessment. The patient complains of visual hallucinations. She also has poverty of speech. She has been compliant with medications and reports no side effects. PLAN  Medications as noted above  Attempt to develop insight  Psycho-education conducted. Supportive Therapy conducted. Probable discharge is 2-3 days.    Follow-up daily while on inpatient

## 2021-10-12 NOTE — DISCHARGE SUMMARY
CDU Discharge Summary        Patient:  Mirtha Cade  YOB: 1955    MRN: [id number]   Acct: [de-identified]    Primary Care Physician: JEANNETTE Moralez CNP    Admit date:  10/9/2021  2:15 PM  Discharge date: 10/10/2021 11:35 PM      Discharge Diagnoses:     1.)  Patient with history of psychiatric illness. Patient with history of depression. Patient complains of depression and need for psychiatric evaluation. Patient recommended for inpatient psychiatric care    2. Medically clear for psychiatric evaluation    3. History of COPD. 4.  History of frequent falls     Follow-up:  Call today/tomorrow for a follow up appointment with JEANNETTE Moralez CNP , or return to the Emergency Room with worsening symptoms    Stressed to patient the importance of following up with primary care doctor for further workup/management of symptoms. Pt verbalizes understanding and agrees with plan. Discharge Medication Changes:       Medication List      CONTINUE taking these medications    acetaminophen 500 MG tablet  Commonly known as: TYLENOL  Take 2 tablets by mouth every 6 hours as needed for Pain     albuterol sulfate  (90 Base) MCG/ACT inhaler     aspirin 81 MG EC tablet  Take 1 tablet by mouth daily     calcium carbonate 500 MG chewable tablet  Commonly known as: Antacid  Take 1 tablet by mouth daily     famotidine 20 MG tablet  Commonly known as: Pepcid  Take 1 tablet by mouth 2 times daily     hydrOXYzine 25 MG capsule  Commonly known as: Vistaril  Take 1 capsule by mouth 3 times daily as needed for Anxiety     Lantus SoloStar 100 UNIT/ML injection pen  Generic drug: insulin glargine  INJECT 20 UNITS INTO THE SKIN BID     lisinopril 10 MG tablet  Commonly known as: PRINIVIL;ZESTRIL  Take 1 tablet by mouth daily     nystatin 419853 UNIT/GM cream  Commonly known as: MYCOSTATIN  APPLY TOPICALLY 2 TIMES DAILY.      * QUEtiapine 200 MG tablet  Commonly known as: SEROQUEL  Take 2 tablets by mouth nightly for 7 days     * QUEtiapine 300 MG tablet  Commonly known as: SEROquel  Take 1 tablet by mouth 2 times daily 300 mg in morning and afternoon in addition to 400 mg at night. Spiriva HandiHaler 18 MCG inhalation capsule  Generic drug: tiotropium  INHALE THE CONTENTS OF 1 CAPSULE INTO THE LUNG ONCE DAILY     traZODone 150 MG tablet  Commonly known as: DESYREL  Take 1 tablet by mouth nightly         * This list has 2 medication(s) that are the same as other medications prescribed for you. Read the directions carefully, and ask your doctor or other care provider to review them with you. STOP taking these medications    ALPRAZolam 0.5 MG tablet  Commonly known as: XANAX     baclofen 10 MG tablet  Commonly known as: LIORESAL     ibuprofen 600 MG tablet  Commonly known as: ADVIL;MOTRIN     naproxen 375 MG tablet  Commonly known as: NAPROSYN            Diet:  No diet orders on file, advance as tolerated     Activity:  As tolerated    Consultants: IP CONSULT TO SOCIAL WORK  IP CONSULT TO PSYCHIATRY    Procedures:  Not indicated      Diagnostic Test:   Results for orders placed or performed during the hospital encounter of 10/09/21   COVID-19, Rapid    Specimen: Nasopharyngeal Swab   Result Value Ref Range    Specimen Description . NASOPHARYNGEAL SWAB     SARS-CoV-2, Rapid Not Detected Not Detected   Culture, Urine    Specimen: Urine   Result Value Ref Range    Specimen Description . URINE     Special Requests NOT REPORTED     Culture NO SIGNIFICANT GROWTH    Urinalysis Reflex to Culture    Specimen: Urine, clean catch   Result Value Ref Range    Color, UA Yellow Yellow    Turbidity UA Clear Clear    Glucose, Ur NEGATIVE NEGATIVE    Bilirubin Urine NEGATIVE NEGATIVE    Ketones, Urine NEGATIVE NEGATIVE    Specific Gravity, UA 1.014 1.005 - 1.030    Urine Hgb NEGATIVE NEGATIVE    pH, UA 6.0 5.0 - 8.0    Protein, UA NEGATIVE NEGATIVE    Urobilinogen, Urine Normal Normal    Nitrite, Urine NEGATIVE NEGATIVE Leukocyte Esterase, Urine LARGE (A) NEGATIVE    Urinalysis Comments NOT REPORTED    Urine Drug Screen   Result Value Ref Range    Amphetamine Screen, Ur NEGATIVE NEGATIVE    Barbiturate Screen, Ur NEGATIVE NEGATIVE    Benzodiazepine Screen, Urine NEGATIVE NEGATIVE    Cocaine Metabolite, Urine POSITIVE (A) NEGATIVE    Methadone Screen, Urine POSITIVE (A) NEGATIVE    Opiates, Urine NEGATIVE NEGATIVE    Phencyclidine, Urine NEGATIVE NEGATIVE    Propoxyphene, Urine NOT REPORTED NEGATIVE    Cannabinoid Scrn, Ur NEGATIVE NEGATIVE    Oxycodone Screen, Ur NEGATIVE NEGATIVE    Methamphetamine, Urine NOT REPORTED NEGATIVE    Tricyclic Antidepressants, Urine NOT REPORTED NEGATIVE    MDMA, Urine NOT REPORTED NEGATIVE    Buprenorphine Urine NOT REPORTED NEGATIVE    Test Information       Assay provides medical screening only. The absence of expected drug(s) and/or metabolite(s) may indicate diluted or adulterated urine, limitations of testing or timing of collection. TOX SCR, BLD, ED   Result Value Ref Range    Acetaminophen Level <5 (L) 10 - 30 ug/mL    Ethanol <10 <10 mg/dL    Ethanol percent <4.905 <8.981 %    Salicylate Lvl <1 (L) 3 - 10 mg/dL    Toxic Tricyclic Sc,Blood NEGATIVE NEGATIVE   Microscopic Urinalysis   Result Value Ref Range    -          WBC, UA TOO NUMEROUS TO COUNT 0 - 5 /HPF    RBC, UA 2 TO 5 0 - 2 /HPF    Casts UA HYALINE 0 - 2 /LPF    Casts UA 5 TO 10 0 - 2 /LPF    Crystals, UA NOT REPORTED None /HPF    Epithelial Cells UA 2 TO 5 0 - 5 /HPF    Renal Epithelial, UA NOT REPORTED 0 /HPF    Bacteria, UA FEW (A) None    Mucus, UA 1+ (A) None    Trichomonas, UA NOT REPORTED None    Amorphous, UA NOT REPORTED None    Other Observations UA NOT REPORTED NOT REQ.     Yeast, UA MODERATE (A) None   POC Glucose Fingerstick   Result Value Ref Range    POC Glucose 120 (H) 65 - 105 mg/dL   POC Glucose Fingerstick   Result Value Ref Range    POC Glucose 100 65 - 105 mg/dL   POC Glucose Fingerstick   Result Value Ref

## 2021-10-12 NOTE — GROUP NOTE
Group Therapy Note    Date: 10/12/2021    Group Start Time: 1000  Group End Time: 1030  Group Topic: Psychotherapy    SKY Stephens        Group Therapy Note       Patient refused to attend psychotherapy group after encouragement from staff. 1:1 talk time offered but refused. Signature:   Taiwo Stephens

## 2021-10-12 NOTE — PROGRESS NOTES
BEHAVIORAL HEALTH FOLLOW-UP NOTE     10/12/2021     Patient was seen and examined in person, Chart reviewed   Patient's case discussed with staff/team    Chief Complaint: Auditory and visual hallucination. Interim History:   Patient seen and examined at the bedside. Chart reviewed. Spoke to nursing staff. Patient sitting having coffee in the wheelchair inside the room. Patient slept well overnight. Denies any auditory or visual hallucination, any suicidal ideation or homicidal thoughts. Patient continues to feel sad, depressed.       BP (!) 153/70   Pulse 92   Temp 98.5 °F (36.9 °C)   Resp 14   Ht 5' 6\" (1.676 m)   Wt 213 lb (96.6 kg)   LMP  (LMP Unknown)   BMI 34.38 kg/m²   Appetite:    [x] Normal/Unchanged  [] Increased  [] Decreased      Sleep:       [x] Normal/Unchanged  [] Fair       [] Poor              Energy:    [x] Normal/Unchanged  [] Increased  [] Decreased        Aggression:  [] yes  [x] no    Patient is [x] able  [] unable to CONTRACT FOR SAFETY ON THE UNIT    PAST MEDICAL/PSYCHIATRIC HISTORY:   Past Medical History:   Diagnosis Date    CHRIS (acute kidney injury) ([facility] 9/29/2020    Bipolar 1 disorder ([facility] [address]     Breast lump     Chronic obstructive pulmonary disease ([facility] [address] 8/3/2017    Depression     GERD (gastroesophageal reflux disease)     Hyperlipidemia     Hypertension     Osteoarthritis     Type 2 diabetes mellitus without complication, with long-term current use of insulin ([facility] [address] 2/4/2019    Type II or unspecified type diabetes mellitus without mention of complication, not stated as uncontrolled        FAMILY/SOCIAL HISTORY:  Family History   Problem Relation Age of Onset    Diabetes Father     Stroke Father     High Blood Pressure Father      Social History     Socioeconomic History    Marital status:      Spouse name: Not on file    Number of children: Not on file    Years of education: Not on file    Highest education level: Not on file   Occupational History Employer: N/A   Tobacco Use    Smoking status: Current Every Day Smoker     Packs/day: 0.25     Years: 10.00     Pack years: 2.50     Types: Cigarettes    Smokeless tobacco: Never Used    Tobacco comment: 3 cigarettes per day; Patient refused counseling   Vaping Use    Vaping Use: Never used   Substance and Sexual Activity    Alcohol use: Yes     Comment: rarely    Drug use: No    Sexual activity: Not Currently   Other Topics Concern    Not on file   Social History Narrative    Not on file     Social Determinants of Health     Financial Resource Strain: Low Risk     Difficulty of Paying Living Expenses: Not hard at all   Food Insecurity: No Food Insecurity    Worried About Running Out of Food in the Last Year: Never true    Rocio of Food in the Last Year: Never true   Transportation Needs:     Lack of Transportation (Medical):  Lack of Transportation (Non-Medical):    Physical Activity:     Days of Exercise per Week:     Minutes of Exercise per Session:    Stress:     Feeling of Stress :    Social Connections:     Frequency of Communication with Friends and Family:     Frequency of Social Gatherings with Friends and Family:     Attends [employer] Services:     Active Member of Clubs or Organizations:     Attends Club or Organization Meetings:     Marital Status:    Intimate Partner Violence:     Fear of Current or Ex-Partner:     Emotionally Abused:     Physically Abused:     Sexually Abused:            ROS:  [x] All negative/unchanged except if checked.  Explain positive(checked items) below:  [] Constitutional  [] Eyes  [] Ear/Nose/Mouth/Throat  [] Respiratory  [] CV  [] GI  []   [] Musculoskeletal  [] Skin/Breast  [] Neurological  [] Endocrine  [] Heme/Lymph  [] Allergic/Immunologic    Explanation:     MEDICATIONS:    Current Facility-Administered Medications:     glucose (GLUTOSE) 40 % oral gel 15 g, 15 g, Oral, PRN, Humphrey Garcia MD    dextrose 50 % IV solution, 12.5 g, IntraVENous, PRN, Lonny Membreno MD    glucagon (rDNA) injection 1 mg, 1 mg, IntraMUSCular, PRN, Lonny Membreno MD    dextrose 5 % solution, 100 mL/hr, IntraVENous, PRN, Lonny Membreno MD    traZODone (DESYREL) tablet 150 mg, 150 mg, Oral, Nightly, Lonny Membreno MD, 150 mg at 10/11/21 2050    QUEtiapine (SEROQUEL) tablet 300 mg, 300 mg, Oral, BID, Lonny Membreno MD, 300 mg at 10/12/21 0825    nystatin (MYCOSTATIN) cream, , Topical, BID, Lonny Membreno MD    lisinopril (PRINIVIL;ZESTRIL) tablet 10 mg, 10 mg, Oral, Daily, Lonny Membreno MD, 10 mg at 10/12/21 0825    insulin glargine (LANTUS) injection vial 20 Units, 20 Units, SubCUTAneous, BID, Lonny Membreno MD, 20 Units at 10/12/21 2541    aspirin EC tablet 81 mg, 81 mg, Oral, Daily, Lonny Membreno MD, 81 mg at 10/12/21 0825    albuterol sulfate  (90 Base) MCG/ACT inhaler 1 puff, 1 puff, Inhalation, Q6H PRN, Lonny Membreno MD    nicotine polacrilex (NICORETTE) gum 2 mg, 2 mg, Oral, PRN, Lonny Membreno MD, 2 mg at 10/12/21 0825      Examination:  BP (!) 153/70   Pulse 92   Temp 98.5 °F (36.9 °C)   Resp 14   Ht 5' 6\" (1.676 m)   Wt 213 lb (96.6 kg)   LMP  (LMP Unknown)   BMI 34.38 kg/m²   Gait -  unsteady due to hip pain    Medication side effects(SE):      Mental Status Examination:    Level of consciousness:  within normal limits   Appearance:  good grooming and good hygiene  Behavior/Motor:  no abnormalities noted  Attitude toward examiner:  cooperative  Speech:  normal rate   Mood: anxious and sad  Affect:  flat  Thought processes:  linear and goal directed   Thought content:  Homicidal ideation - none  Suicidal Ideation:  denies suicidal ideation  Delusions:  no evidence of delusions  Perceptual Disturbance:  denies any perceptual disturbance  Cognition:  oriented to person, place, and time   Concentration intact  Insight good   Judgement good      ASSESSMENT:    Patient symptoms are:  [x] Well controlled  [] Improving  [] Worsening  [] No change      Diagnosis:    Active Problems:    Paranoid schizophrenia ([facility] [address]  Resolved Problems:    * No resolved hospital problems. *      LABS:    No results for input(s): WBC, HGB, PLT in the last 72 hours. No results for input(s): NA, K, CL, CO2, BUN, CREATININE, GLUCOSE in the last 72 hours. No results for input(s): BILITOT, ALKPHOS, AST, ALT in the last 72 hours. Lab Results   Component Value Date    BARBSCNU NEGATIVE 10/10/2021    LABBENZ NEGATIVE 10/10/2021    LABBENZ POSITIVE 07/12/2013    LABMETH POSITIVE 10/10/2021    PPXUR NOT REPORTED 10/10/2021     Lab Results   Component Value Date    TSH 0.89 03/22/2021     No results found for: LITHIUM  No results found for: VALPROATE, CBMZ    RISK ASSESSMENT:      Treatment Plan:  Reviewed current Medications with the patient. Risks, benefits, side effects, drug-to-drug interactions and alternatives to treatment were discussed. The patient    consented to treatment. Encourage patient to attend group and other milieu activities. Discharge planning discussed with the patient and treatment team.    PSYCHOTHERAPY/COUNSELING:  [] Therapeutic interview  [x] Supportive  [] CBT  [] Ongoing  [] Other    [x] Patient continues to need, on a daily basis, active treatment furnished directly by or requiring the supervision of inpatient psychiatric personnel      Anticipated Length of stay:2-5 days                                          Mirtha Cade is a 77 y.o. female being evaluated face to face. --Pastor Dean MD on 10/12/2021 at 9:46 AM    An electronic signature was used to authenticate this note. **This report has been created using voice recognition software. It may contain minor errors which are inherent in voice recognition technology. **

## 2021-10-13 LAB
GLUCOSE BLD-MCNC: 78 MG/DL (ref 65–105)
GLUCOSE BLD-MCNC: 90 MG/DL (ref 65–105)
GLUCOSE BLD-MCNC: 98 MG/DL (ref 65–105)

## 2021-10-13 PROCEDURE — 1240000000 HC EMOTIONAL WELLNESS R&B

## 2021-10-13 PROCEDURE — 99232 SBSQ HOSP IP/OBS MODERATE 35: CPT | Performed by: PSYCHIATRY & NEUROLOGY

## 2021-10-13 PROCEDURE — 97162 PT EVAL MOD COMPLEX 30 MIN: CPT

## 2021-10-13 PROCEDURE — 6370000000 HC RX 637 (ALT 250 FOR IP): Performed by: PSYCHIATRY & NEUROLOGY

## 2021-10-13 PROCEDURE — 97116 GAIT TRAINING THERAPY: CPT

## 2021-10-13 RX ADMIN — INSULIN GLARGINE 20 UNITS: 100 INJECTION, SOLUTION SUBCUTANEOUS at 08:46

## 2021-10-13 RX ADMIN — NYSTATIN: 100000 CREAM TOPICAL at 20:51

## 2021-10-13 RX ADMIN — QUETIAPINE FUMARATE 300 MG: 300 TABLET ORAL at 20:51

## 2021-10-13 RX ADMIN — TRAZODONE HYDROCHLORIDE 150 MG: 150 TABLET ORAL at 20:51

## 2021-10-13 RX ADMIN — INSULIN GLARGINE 20 UNITS: 100 INJECTION, SOLUTION SUBCUTANEOUS at 20:50

## 2021-10-13 RX ADMIN — NYSTATIN: 100000 CREAM TOPICAL at 08:46

## 2021-10-13 RX ADMIN — ASPIRIN 81 MG: 81 TABLET, COATED ORAL at 08:42

## 2021-10-13 RX ADMIN — QUETIAPINE FUMARATE 300 MG: 300 TABLET ORAL at 08:42

## 2021-10-13 ASSESSMENT — PAIN - FUNCTIONAL ASSESSMENT: PAIN_FUNCTIONAL_ASSESSMENT: PREVENTS OR INTERFERES SOME ACTIVE ACTIVITIES AND ADLS

## 2021-10-13 ASSESSMENT — PAIN DESCRIPTION - FREQUENCY: FREQUENCY: CONTINUOUS

## 2021-10-13 ASSESSMENT — PAIN SCALES - GENERAL: PAINLEVEL_OUTOF10: 6

## 2021-10-13 ASSESSMENT — PAIN DESCRIPTION - DIRECTION: RADIATING_TOWARDS: LEFT HIP/LEG

## 2021-10-13 ASSESSMENT — PAIN DESCRIPTION - DESCRIPTORS: DESCRIPTORS: ACHING;DISCOMFORT;CONSTANT

## 2021-10-13 ASSESSMENT — PAIN DESCRIPTION - LOCATION: LOCATION: BACK;HIP;LEG

## 2021-10-13 ASSESSMENT — PAIN DESCRIPTION - PAIN TYPE: TYPE: CHRONIC PAIN

## 2021-10-13 ASSESSMENT — PAIN DESCRIPTION - PROGRESSION: CLINICAL_PROGRESSION: GRADUALLY WORSENING

## 2021-10-13 ASSESSMENT — PAIN DESCRIPTION - ORIENTATION: ORIENTATION: LEFT;LOWER

## 2021-10-13 ASSESSMENT — PAIN DESCRIPTION - ONSET: ONSET: ON-GOING

## 2021-10-13 NOTE — GROUP NOTE
Group Therapy Note    Date: 10/12/2021    Group Start Time: 2030  Group End Time: 2100  Group Topic: Wrap-Up    [facility]    Ninfa Naqvi RN      Group Therapy Note    Attendees: 8/17       Patient's Goal:    1. To be able to reflect on daily unit activities/experiences. 2.  To review accomplished daily goals and be encouraged to set new goals for the next day. 3.  To improve interpersonal interaction through socialization. Notes:  Pt attended and actively participated in Wrap-Up/Goal Review group this evening. Status After Intervention:  Improved     Participation Level:  Active Listener and Interactive     Participation Quality: Appropriate, Attentive and Sharing     Speech:  normal     Thought Process/Content: Logical     Affective Functioning: Congruent     Mood: euthymic     Level of consciousness:  Alert, Oriented x4 and Attentive     Response to Learning: Able to verbalize current knowledge/experience, Able to verbalize/acknowledge new learning     Endings: None Reported     Modes of Intervention: Education, Support and Socialization     Discipline Responsible: Registered Nurse        Signature:  Ninfa Naqvi RN

## 2021-10-13 NOTE — PROGRESS NOTES
Physical Therapy    Facility/Department: [facility]  Initial Assessment    NAME: Bushra Quiles  : 1955  MRN: [id number]    Date of Service: 10/13/2021    Discharge Recommendations:  Patient would benefit from continued therapy after discharge        Assessment   Body structures, Functions, Activity limitations: Decreased functional mobility ; Decreased strength;Decreased endurance;Decreased balance;Decreased posture  Assessment: Presents with decreased strength and decline in fucntional mobility. Will beenfit from further therapy. Pt wants to go home , but reports she cannot manage certain home management tasks, also has difficulty getting in/out of tub shower. Treatment Diagnosis: Impaired fucntion  Prognosis: Good  Decision Making: Medium Complexity  PT Education: PT Role;Goals;Precautions;Transfer Training;Functional Mobility Training;Gait Training;General Safety  REQUIRES PT FOLLOW UP: Yes  Activity Tolerance  Activity Tolerance: Patient Tolerated treatment well       Patient Diagnosis(es): There were no encounter diagnoses. has a past medical history of CHRIS (acute kidney injury) ([address] Bipolar 1 disorder ([address] Breast lump, Chronic obstructive pulmonary disease ([address] Depression, GERD (gastroesophageal reflux disease), Hyperlipidemia, Hypertension, Osteoarthritis, Type 2 diabetes mellitus without complication, with long-term current use of insulin ([address] and Type II or unspecified type diabetes mellitus without mention of complication, not stated as uncontrolled. has a past surgical history that includes Ectopic pregnancy surgery; knee surgery (Right); Dilation and curettage of uterus; Nerve Block (Left, 14); Knee Arthroplasty (2017); and Tonsillectomy and adenoidectomy.     Restrictions  Restrictions/Precautions  Restrictions/Precautions: Fall Risk  Position Activity Restriction  Other position/activity restrictions: Per pt multiple falss at home  Vision/Hearing  Vision: Impaired  Vision Exceptions: Wears glasses at all times  Hearing: Within functional limits     Subjective  General  Patient assessed for rehabilitation services?: Yes  Additional Pertinent Hx: Patient initially presented to [place] for with multiple fall secondary to muscular skeletal pain and mechanical issues. Patient was admitted to observation unit, for medical management and visual and auditory hallucination. The patient is a 77 y.o. female with significant past history of seizure affective disorder, was transferred from [place] for management of visual and auditory hallucination. Referring Practitioner: Dr Aylin Willard  Referral Date : 10/12/21  Diagnosis: Paranoid schizophrenia  Subjective  Subjective: Reports she has been using her w/c a lot lately due to her L Hip/leg pain, pt reports has falls at home, has difficulty mainting her apartment, has difficulty getting in/out of tub shower. Pain Screening  Patient Currently in Pain: Yes  Pain Assessment  Pain Assessment: 0-10  Pain Level: 6  Pain Type: Chronic pain  Pain Location: Back;Hip;Leg (Left Hip/Leg, lower back)  Pain Orientation: Left; Lower  Pain Radiating Towards: Left Hip/leg  Pain Descriptors: Aching;Discomfort; Constant  Pain Frequency: Continuous  Pain Onset: On-going  Clinical Progression: Gradually worsening  Functional Pain Assessment: Prevents or interferes some active activities and ADLs  Vital Signs  Patient Currently in Pain: Yes       Orientation     Social/Functional History  Social/Functional History  Lives With: Alone  Type of Home: Apartment  Home Layout:  (5th floor)  Home Access: Elevator  Bathroom Shower/Tub: Tub/Shower unit, Shower chair with back  Bathroom Toilet: Handicap height  Bathroom Equipment: Grab bars in shower, Hand-held shower, Grab bars around toilet  Bathroom Accessibility: Walker accessible, Accessible  Home Equipment: Rolling walker, Wheelchair-manual (Has a borrowed rolling wlaker, would like to have her own.)  Receives Help From: Other (comment) ([employer] people)  ADL Assistance: Independent (Lately sponge bathing, not able to get in/out of Tub shower)  Homemaking Assistance: Needs assistance  Meal Prep:  (Has meals on wheels, but still cooks some on her own)  Laundry:  (Does own laundry, takes laundry in w/c-1st floor at her apt)  Ambulation Assistance: Independent (RW, lately difficult due to L LE pain)  Transfer Assistance: Independent  Active : No  Patient's  Info: [employer] people drives her for shopping  IADL Comments: Sleeps in a regualr flat bed. Additional Comments: Pt reports , last 2 months she has been using her w/c a lot, unable to ambulate much due to Left LE pain. , pt reports she can not maintain cleaning her apartment, needs some help. Cognition        Objective          AROM RLE (degrees)  RLE AROM: WFL  AROM LLE (degrees)  LLE AROM : WFL  Strength RLE  Comment: Grossly 4-/5  Strength LLE  Comment: Grossly 3+? 5     Sensation  Overall Sensation Status: Impaired  Additional Comments: B toe numbness  Bed mobility  Rolling to Left: Stand by assistance  Rolling to Right: Stand by assistance  Supine to Sit: Stand by assistance  Sit to Supine: Stand by assistance  Scooting: Stand by assistance  Comment: Pt uses bed rail, HOB up ~ 25 degrees. Transfers  Sit to Stand: Contact guard assistance  Stand to sit: Stand by assistance  Ambulation  Ambulation?: Yes  Ambulation 1  Surface: level tile  Device: Rolling Walker  Assistance: Contact guard assistance;Stand by assistance  Quality of Gait: Needs cues for safe use and technique of rolling walker, pushes rolling walker too far away.    Gait Deviations: Slow Manda;Decreased step length;Decreased step height  Distance: 60 ft  Comments: Fatiques easily     Balance  Posture: Fair  Sitting - Static: Good  Sitting - Dynamic: Fair  Standing - Static: Fair;+  Standing - Dynamic: Fair  Comments: Standing with rolling walker  Other exercises  Other exercises?: Yes  Other exercises 1: Standing B LE ex's with rolling walker 5 reps each     Plan   Plan  Times per week: 2 to 3 x/week  Current Treatment Recommendations: Strengthening, Balance Training, Functional Mobility Training, Transfer Training, Endurance Training, Gait Training, Home Exercise Program, Safety Education & Training, Patient/Caregiver Education & Training, Equipment Evaluation, Education, & procurement  Safety Devices  Type of devices: Left in bed, Call light within reach, Gait belt (Gait belt tracee back to the department.)    G-Code       OutComes Score                                                  AM-PAC Score     AM-PAC Inpatient Mobility without Stair Climbing Raw Score : 15 (10/13/21 1423)  AM-PAC Inpatient without Stair Climbing T-Scale Score : 43.03 (10/13/21 1423)  Mobility Inpatient CMS 0-100% Score: 47.43 (10/13/21 1423)  Mobility Inpatient without Stair CMS G-Code Modifier : CK (10/13/21 1423)       Goals  Short term goals  Time Frame for Short term goals: 6 visits  Short term goal 1: Pt able to demonstrate independent bed mobility  Short term goal 2: Pt able to demonstrate safe and independent transfers with rolling walker  Short term goal 3: Pt able to ambulate with rolling walker distance fo 100 ft x 2, at [address]    Short term goal 4: Pt able to tolerate activity for 30 minutes atleast to improve mobility/strength  Patient Goals   Patient goals : Go home       Therapy Time   Individual Concurrent Group Co-treatment   Time In 1040         Time Out 1112         Minutes 32         Timed Code Treatment Minutes: [facility],

## 2021-10-13 NOTE — PLAN OF CARE
Problem: Falls - Risk of:  Goal: Will remain free from falls  Description: Will remain free from falls  Outcome: Ongoing  Note: Patient remains free of falls/injury and verbalizes understanding of individual fall risks. Patient is wearing non skid footwear, uses a wheelchair and encouraged to seek out staff for any assistance needed. Problem: Altered Mood, Psychotic Behavior:  Goal: Able to verbalize decrease in frequency and intensity of hallucinations  Description: Able to verbalize decrease in frequency and intensity of hallucinations  Outcome: Ongoing  Note:   Patient reports auditory hallucinations that are not commanding or threatening and states that she sees  various animals in her room at night. Goal: Able to verbalize reality based thinking  Description: Able to verbalize reality based thinking  Outcome: Ongoing  Note:   Patient answers assessment questions appropriately and is able to verbalize reality based thinking. Problem: Skin Integrity:  Goal: Will show no infection signs and symptoms  Description: Will show no infection signs and symptoms  Outcome: Ongoing  Note:   Patient has no signs or symptoms of infection noted. Goal: Absence of new skin breakdown  Description: Absence of new skin breakdown  Outcome: Ongoing  Note:   No new skin breakdown noted at this time. Problem: Tobacco Use:  Goal: Inpatient tobacco use cessation counseling participation  Description: Inpatient tobacco use cessation counseling participation  Outcome: Ongoing  Note: Patient given tobacco quitline number 01986601796 at this time, refusing to call at this time, states \" I just dont want to quit now\"- patient given information as to the dangers of long term tobacco use. Continue to reinforce the importance of tobacco cessation. Problem: Pain:  Goal: Pain level will decrease  Description: Pain level will decrease  Outcome: Ongoing  Note: No pain reported.  Encouraged patient to report any pain if it occurs.       Problem: Musculor/Skeletal Functional Status  Goal: Absence of falls  Outcome: Ongoing

## 2021-10-13 NOTE — PROGRESS NOTES
BEHAVIORAL HEALTH FOLLOW-UP NOTE        Chief Complaint: Auditory and visual hallucination. Interim History: 10/13/2021  When seen and examined at the bedside. Chart reviewed. Discussed with nursing staff. Patient did not have any acute event overnight. Patient lying in bed comfortably. Patient denies any auditory or visual hallucination this morning. Patient stated that she had auditory and visual hallucinations of 2 cows, making sounds yesterday. Nothing this morning    Patient continues to feel sad and depressed, but denies any suicidal thought or ideation. 10/12/21: Patient seen and examined at the bedside. Chart reviewed. Spoke to nursing staff. Patient sitting having coffee in the wheelchair inside the room. Patient slept well overnight. Denies any auditory or visual hallucination, any suicidal ideation or homicidal thoughts. Patient continues to feel sad, depressed.       BP (!) 111/54   Pulse 90   Temp 98.7 °F (37.1 °C) (Oral)   Resp 14   Ht 5' 6\" (1.676 m)   Wt 213 lb (96.6 kg)   LMP  (LMP Unknown)   BMI 34.38 kg/m²   Appetite:    [x] Normal/Unchanged  [] Increased  [] Decreased      Sleep:       [x] Normal/Unchanged  [] Fair       [] Poor              Energy:    [x] Normal/Unchanged  [] Increased  [] Decreased        Aggression:  [] yes  [x] no    Patient is [x] able  [] unable to CONTRACT FOR SAFETY ON THE UNIT    PAST MEDICAL/PSYCHIATRIC HISTORY:   Past Medical History:   Diagnosis Date    CHRIS (acute kidney injury) ([facility] 9/29/2020    Bipolar 1 disorder ([facility] [facility]     Breast lump     Chronic obstructive pulmonary disease ([facility] [address] 8/3/2017    Depression     GERD (gastroesophageal reflux disease)     Hyperlipidemia     Hypertension     Osteoarthritis     Type 2 diabetes mellitus without complication, with long-term current use of insulin ([address] 2/4/2019    Type II or unspecified type diabetes mellitus without mention of complication, not stated as uncontrolled FAMILY/SOCIAL HISTORY:  Family History   Problem Relation Age of Onset    Diabetes Father     Stroke Father     High Blood Pressure Father      Social History     Socioeconomic History    Marital status:      Spouse name: Not on file    Number of children: Not on file    Years of education: Not on file    Highest education level: Not on file   Occupational History     Employer: N/A   Tobacco Use    Smoking status: Current Every Day Smoker     Packs/day: 0.25     Years: 10.00     Pack years: 2.50     Types: Cigarettes    Smokeless tobacco: Never Used    Tobacco comment: 3 cigarettes per day; Patient refused counseling   Vaping Use    Vaping Use: Never used   Substance and Sexual Activity    Alcohol use: Yes     Comment: rarely    Drug use: No    Sexual activity: Not Currently   Other Topics Concern    Not on file   Social History Narrative    Not on file     Social Determinants of Health     Financial Resource Strain: Low Risk     Difficulty of Paying Living Expenses: Not hard at all   Food Insecurity: No Food Insecurity    Worried About Running Out of Food in the Last Year: Never true    Rocio of Food in the Last Year: Never true   Transportation Needs:     Lack of Transportation (Medical):  Lack of Transportation (Non-Medical):    Physical Activity:     Days of Exercise per Week:     Minutes of Exercise per Session:    Stress:     Feeling of Stress :    Social Connections:     Frequency of Communication with Friends and Family:     Frequency of Social Gatherings with Friends and Family:     Attends Orthodoxy Services:     Active Member of Clubs or Organizations:     Attends Club or Organization Meetings:     Marital Status:    Intimate Partner Violence:     Fear of Current or Ex-Partner:     Emotionally Abused:     Physically Abused:     Sexually Abused:            ROS:  [x] All negative/unchanged except if checked.  Explain positive(checked items) below:  [] processes:  linear and goal directed   Thought content:  Homicidal ideation - none  Suicidal Ideation:  denies suicidal ideation  Delusions:  no evidence of delusions  Perceptual Disturbance:  denies any perceptual disturbance  Cognition:  oriented to person, place, and time   Concentration intact  Insight good   Judgement good      ASSESSMENT:    Patient symptoms are:  [x] Well controlled  [] Improving  [] Worsening  [] No change      Diagnosis:    Principal Problem:    Paranoid schizophrenia ([facility] [address]  Resolved Problems:    * No resolved hospital problems. *      LABS:    No results for input(s): WBC, HGB, PLT in the last 72 hours. No results for input(s): NA, K, CL, CO2, BUN, CREATININE, GLUCOSE in the last 72 hours. No results for input(s): BILITOT, ALKPHOS, AST, ALT in the last 72 hours. Lab Results   Component Value Date    BARBSCNU NEGATIVE 10/10/2021    LABBENZ NEGATIVE 10/10/2021    LABBENZ POSITIVE 07/12/2013    LABMETH POSITIVE 10/10/2021    PPXUR NOT REPORTED 10/10/2021     Lab Results   Component Value Date    TSH 0.89 03/22/2021     No results found for: LITHIUM  No results found for: VALPROATE, CBMZ    RISK ASSESSMENT:      Treatment Plan:  Reviewed current Medications with the patient. Risks, benefits, side effects, drug-to-drug interactions and alternatives to treatment were discussed. The patient    consented to treatment. Encourage patient to attend group and other milieu activities. Discharge planning discussed with the patient and treatment team.    PSYCHOTHERAPY/COUNSELING:  [] Therapeutic interview  [x] Supportive  [] CBT  [] Ongoing  [] Other    [x] Patient continues to need, on a daily basis, active treatment furnished directly by or requiring the supervision of inpatient psychiatric personnel      Anticipated Length of stay:2-5 days                                          Walker Gordon is a 77 y.o. female being evaluated face to face.      --Carmen Novoa MD on 10/13/2021 at 10:19 AM    An electronic signature was used to authenticate this note. **This report has been created using voice recognition software. It may contain minor errors which are inherent in voice recognition technology. **    I independently saw and evaluated the patient. I reviewed the midlevel provider's documentation above. Any additional comments or changes to the midlevel provider's documentation are stated below otherwise agree with assessment. The patient continues to report bizarre visual hallucinations. She denies any auditory hallucinations. She is taking her medication in hopes that her hallucinations will resolve. She has no formal thought disorder. Her mood appears to be depressed. PLAN  Medications as noted above  Attempt to develop insight  Psycho-education conducted. Supportive Therapy conducted. Probable discharge is 1-3 days.    Follow-up daily while on inpatient unit    Electronically signed by Atilio Trinidad MD on 10/13/21 at 9:30 PM EDT

## 2021-10-13 NOTE — GROUP NOTE
Group Therapy Note    Date: 10/13/2021    Group Start Time: 0900  Group End Time: 0930  Group Topic: Group Documentation    [facility]  [address], RN        Group Therapy Note    Attendees:          Patient's Goal:  Setting a goal for the day    Notes:  Goals group    Status After Intervention:  Unchanged    Participation Level: Interactive    Participation Quality: Appropriate      Speech:  normal      Thought Process/Content: Logical      Affective Functioning: Congruent      Mood: depressed      Level of consciousness:  Alert      Response to Learning: Able to retain information      Endings: None Reported    Modes of Intervention: Education      Discipline Responsible: Registered Nurse      Signature:  Apollo Sutton RN

## 2021-10-13 NOTE — GROUP NOTE
Group Therapy Note    Date: 10/13/2021    Group Start Time: 1000  Group End Time: 3179  Group Topic: Group Therapy    MELISSA Montez LSW        Group Therapy Note  Pt declined to attend psychotherapy at 1000 am despite encouragement. Pt offered 1:1.       Attendees: 3/20           Signature:  MELISSA Castañeda LSW

## 2021-10-14 LAB
GLUCOSE BLD-MCNC: 157 MG/DL (ref 65–105)
GLUCOSE BLD-MCNC: 83 MG/DL (ref 65–105)

## 2021-10-14 PROCEDURE — 6370000000 HC RX 637 (ALT 250 FOR IP): Performed by: NURSE PRACTITIONER

## 2021-10-14 PROCEDURE — 99232 SBSQ HOSP IP/OBS MODERATE 35: CPT | Performed by: PSYCHIATRY & NEUROLOGY

## 2021-10-14 PROCEDURE — 6370000000 HC RX 637 (ALT 250 FOR IP): Performed by: PSYCHIATRY & NEUROLOGY

## 2021-10-14 PROCEDURE — 1240000000 HC EMOTIONAL WELLNESS R&B

## 2021-10-14 PROCEDURE — 82947 ASSAY GLUCOSE BLOOD QUANT: CPT

## 2021-10-14 RX ORDER — ACETAMINOPHEN 325 MG/1
650 TABLET ORAL EVERY 4 HOURS PRN
Status: DISCONTINUED | OUTPATIENT
Start: 2021-10-14 | End: 2021-10-15 | Stop reason: HOSPADM

## 2021-10-14 RX ADMIN — ASPIRIN 81 MG: 81 TABLET, COATED ORAL at 08:41

## 2021-10-14 RX ADMIN — QUETIAPINE FUMARATE 300 MG: 300 TABLET ORAL at 20:56

## 2021-10-14 RX ADMIN — INSULIN GLARGINE 20 UNITS: 100 INJECTION, SOLUTION SUBCUTANEOUS at 20:56

## 2021-10-14 RX ADMIN — NYSTATIN: 100000 CREAM TOPICAL at 20:55

## 2021-10-14 RX ADMIN — INSULIN GLARGINE 20 UNITS: 100 INJECTION, SOLUTION SUBCUTANEOUS at 08:42

## 2021-10-14 RX ADMIN — ACETAMINOPHEN 650 MG: 325 TABLET ORAL at 20:58

## 2021-10-14 RX ADMIN — TRAZODONE HYDROCHLORIDE 150 MG: 150 TABLET ORAL at 20:56

## 2021-10-14 RX ADMIN — QUETIAPINE FUMARATE 300 MG: 300 TABLET ORAL at 08:42

## 2021-10-14 ASSESSMENT — PAIN - FUNCTIONAL ASSESSMENT
PAIN_FUNCTIONAL_ASSESSMENT: 0-10
PAIN_FUNCTIONAL_ASSESSMENT: 0-10

## 2021-10-14 ASSESSMENT — PAIN SCALES - GENERAL: PAINLEVEL_OUTOF10: 3

## 2021-10-14 NOTE — GROUP NOTE
Group Therapy Note    Date: 10/14/2021    Group Start Time: 1430  Group End Time: 3829  Group Topic: Cognitive Skills    SKY Jacobs, CTRS        Group Therapy Note    Attendees:    10/20    Pt did not participate in Cognitive Skills Group at 1430 when encouraged by RT due to resting in room. Pt was offered talk time as an alternative to group but declined.        Discipline Responsible: Psychoeducational Specialist      Signature:  Tariq Lopez

## 2021-10-14 NOTE — GROUP NOTE
Group Therapy Note    Date: 10/14/2021    Group Start Time: 1000  Group End Time: 9613  Group Topic: Psychotherapy    [facility] BENJY Parra        Group Therapy Note           Patient refused to attend psychotherapy group after encouragement from staff. 1:1 talk time offered but refused. Signature:   Adonay Parra

## 2021-10-14 NOTE — PLAN OF CARE
Problem: Falls - Risk of:  Goal: Will remain free from falls  Description: Will remain free from falls  10/13/2021 2357 by Virgil Crow  Outcome: Ongoing  Note: Pt remains free of falls and verbalizes understanding of individual fall risks. Pt wearing non skid footwear and encouraged to seek out staff for any assistance needed. Problem: Altered Mood, Psychotic Behavior:  Goal: Able to verbalize decrease in frequency and intensity of hallucinations  Description: Able to verbalize decrease in frequency and intensity of hallucinations  10/13/2021 2357 by Virgil Crow  Outcome: Ongoing  Note: Patient states that she has auditory hallucinations of mumbles at times and sometimes sees animals at night. Patient has been out in the milieu, and was not observed responding to internal stimuli. Cooperative. Compliant with all prescribed medications for this shift. Safety checks maintained q15min and irregular rounding. Problem: Skin Integrity:  Goal: Absence of new skin breakdown  Description: Absence of new skin breakdown  10/13/2021 2357 by Virgil Crow  Outcome: Ongoing     Problem: Pain:  Goal: Pain level will decrease  Description: Pain level will decrease  10/13/2021 2357 by Virgil Crow  Outcome: Ongoing  Note: Patient denies having pain at this time.

## 2021-10-14 NOTE — PLAN OF CARE
Problem: Falls - Risk of:  Goal: Will remain free from falls  Description: Will remain free from falls  Outcome: Ongoing  Note:   Patient remains free of falls/injury and verbalizes understanding of individual fall risks. Patient is wearing non skid footwear, uses a wheelchair and a walker with steady gait. Patient encouraged to seek out staff for any assistance needed. Problem: Altered Mood, Psychotic Behavior:  Goal: Able to verbalize decrease in frequency and intensity of hallucinations  Description: Able to verbalize decrease in frequency and intensity of hallucinations  Outcome: Ongoing  Note:   Patient reports auditory hallucinations that are not commanding or threatening and states that she sees various animals in her room at night. Goal: Able to verbalize reality based thinking  Description: Able to verbalize reality based thinking  Outcome: Ongoing  Note:   Patient answers assessment questions appropriately and is able to verbalize reality based thinking. Problem: Skin Integrity:  Goal: Will show no infection signs and symptoms  Description: Will show no infection signs and symptoms  Outcome: Ongoing  Note:   Patient has no signs or symptoms of infection noted. Goal: Absence of new skin breakdown  Description: Absence of new skin breakdown  Outcome: Ongoing  Note:   No new skin breakdown noted at this time. Problem: Tobacco Use:  Goal: Inpatient tobacco use cessation counseling participation  Description: Inpatient tobacco use cessation counseling participation  Outcome: Ongoing  Note: Patient given tobacco quitline number 38710795940 at this time, refusing to call at this time, states \" I just dont want to quit now\"- patient given information as to the dangers of long term tobacco use. Continue to reinforce the importance of tobacco cessation. Problem: Pain:  Goal: Pain level will decrease  Description: Pain level will decrease  Outcome: Ongoing  Note:   No pain reported.

## 2021-10-14 NOTE — PROGRESS NOTES
BEHAVIORAL HEALTH FOLLOW-UP NOTE        Chief Complaint: Auditory and visual hallucination. Interim History:  10/14/2021: Follow up Progress note. Patient seen and examined at the bedside. Chart reviewed. Discussed with nursing staff. Patient refused to attend any meeting. Yesterday patient worked with physical therapy walked up to 60 feet. Yesterday patient declined any psychotherapy. This morning patient lying in bed. Continues to report having bizarre visual hallucination, usually it is because. And auditory hallucination which is mostly mumbled voice. Last night patient had difficulty in sleep he could not sleep. Patient was continuously thinking in the bed. Patient denies any suicidal ideation or thought. And reports of having anxiety and continues to be depressed and sad. Blood glucose this morning 83. Patient getting Seroquel 300 mg twice daily along with trazodone 150 at bedtime. Patient still have poor insight, she is alert oriented to herself. 10/13/2021: seen and examined at the bedside. Chart reviewed. Discussed with nursing staff. Patient did not have any acute event overnight. Patient lying in bed comfortably. Patient denies any auditory or visual hallucination this morning. Patient stated that she had auditory and visual hallucinations of 2 cows, making sounds yesterday. Nothing this morning  Patient continues to feel sad and depressed, but denies any suicidal thought or ideation. 10/12/21: Patient seen and examined at the bedside. Chart reviewed. Spoke to nursing staff. Patient sitting having coffee in the wheelchair inside the room. Patient slept well overnight. Denies any auditory or visual hallucination, any suicidal ideation or homicidal thoughts. Patient continues to feel sad, depressed.       BP (!) 117/53   Pulse 85   Temp 98.6 °F (37 °C) (Oral)   Resp 20   Ht 5' 6\" (1.676 m)   Wt 213 lb (96.6 kg)   LMP  (LMP Unknown)   BMI 34.38 kg/m² Appetite:    [x] Normal/Unchanged  [] Increased  [] Decreased      Sleep:       [x] Normal/Unchanged  [] Fair       [] Poor              Energy:    [x] Normal/Unchanged  [] Increased  [] Decreased        Aggression:  [] yes  [x] no    Patient is [x] able  [] unable to CONTRACT FOR SAFETY ON THE UNIT    PAST MEDICAL/PSYCHIATRIC HISTORY:   Past Medical History:   Diagnosis Date    [facility] 9/29/2020    Bipolar 1 disorder ([facility]     Breast lump     Chronic obstructive pulmonary disease ([facility] 8/3/2017    Depression     GERD (gastroesophageal reflux disease)     Hyperlipidemia     Hypertension     Osteoarthritis     Type 2 diabetes mellitus without complication, with long-term current use of insulin ([place] [id number] 2/4/2019    Type II or unspecified type diabetes mellitus without mention of complication, not stated as uncontrolled        FAMILY/SOCIAL HISTORY:  Family History   Problem Relation Age of Onset    Diabetes Father     Stroke Father     High Blood Pressure Father      Social History     Socioeconomic History    Marital status:      Spouse name: Not on file    Number of children: Not on file    Years of education: Not on file    Highest education level: Not on file   Occupational History     Employer: N/A   Tobacco Use    Smoking status: Current Every Day Smoker     Packs/day: 0.25     Years: 10.00     Pack years: 2.50     Types: Cigarettes    Smokeless tobacco: Never Used    Tobacco comment: 3 cigarettes per day; Patient refused counseling   Vaping Use    Vaping Use: Never used   Substance and Sexual Activity    Alcohol use: Yes     Comment: rarely    Drug use: No    Sexual activity: Not Currently   Other Topics Concern    Not on file   Social History Narrative    Not on file     Social Determinants of Health     Financial Resource Strain: Low Risk     Difficulty of Paying Living Expenses: Not hard at all   Food Insecurity: No Food Insecurity    Worried About Running Out of Food in the Last Year: Never true    Ran Out of Food in the Last Year: Never true   Transportation Needs:     Lack of Transportation (Medical):  Lack of Transportation (Non-Medical):    Physical Activity:     Days of Exercise per Week:     Minutes of Exercise per Session:    Stress:     Feeling of Stress :    Social Connections:     Frequency of Communication with Friends and Family:     Frequency of Social Gatherings with Friends and Family:     Attends Holiness Services:     Active Member of Clubs or Organizations:     Attends Club or Organization Meetings:     Marital Status:    Intimate Partner Violence:     Fear of Current or Ex-Partner:     Emotionally Abused:     Physically Abused:     Sexually Abused:            ROS:  [x] All negative/unchanged except if checked.  Explain positive(checked items) below:  [] Constitutional  [] Eyes  [] Ear/Nose/Mouth/Throat  [] Respiratory  [] CV  [] GI  []   [] Musculoskeletal  [] Skin/Breast  [] Neurological  [] Endocrine  [] Heme/Lymph  [] Allergic/Immunologic    Explanation:     MEDICATIONS:    Current Facility-Administered Medications:     glucose (GLUTOSE) 40 % oral gel 15 g, 15 g, Oral, PRN, Travis Zee MD    dextrose 50 % IV solution, 12.5 g, IntraVENous, PRN, Travis Zee MD    glucagon (rDNA) injection 1 mg, 1 mg, IntraMUSCular, PRN, Travis Zee MD    dextrose 5 % solution, 100 mL/hr, IntraVENous, PRN, Travis Zee MD    traZODone (DESYREL) tablet 150 mg, 150 mg, Oral, Nightly, Travis Zee MD, 150 mg at 10/13/21 2051    QUEtiapine (SEROQUEL) tablet 300 mg, 300 mg, Oral, BID, Travis Zee MD, 300 mg at 10/14/21 9353    nystatin (MYCOSTATIN) cream, , Topical, BID, Travis Zee MD, Given at 10/13/21 2051    lisinopril (PRINIVIL;ZESTRIL) tablet 10 mg, 10 mg, Oral, Daily, Travis eZe MD, 10 mg at 10/12/21 0825    insulin glargine (LANTUS) injection vial 20 Units, 20 Units, SubCUTAneous, BID, Travis Zee MD, 20 Units at 10/14/21 0842    aspirin EC tablet 81 mg, 81 mg, Oral, Daily, Delio Watson MD, 81 mg at 10/14/21 0841    albuterol sulfate  (90 Base) MCG/ACT inhaler 1 puff, 1 puff, Inhalation, Q6H PRN, Delio Watson MD    nicotine polacrilex (NICORETTE) gum 2 mg, 2 mg, Oral, PRN, Delio Watson MD, 2 mg at 10/12/21 0825      Examination:  BP (!) 117/53   Pulse 85   Temp 98.6 °F (37 °C) (Oral)   Resp 20   Ht 5' 6\" (1.676 m)   Wt 213 lb (96.6 kg)   LMP  (LMP Unknown)   BMI 34.38 kg/m²   Gait -  unsteady due to hip pain    Medication side effects(SE):      Mental Status Examination:    Level of consciousness:  within normal limits   Appearance:  good grooming and good hygiene  Behavior/Motor:  no abnormalities noted  Attitude toward examiner:  cooperative  Speech:  normal rate   Mood: anxious and sad  Affect:  flat  Thought processes:  linear and goal directed   Thought content:  Homicidal ideation - none  Suicidal Ideation:  denies suicidal ideation  Delusions:  no evidence of delusions  Perceptual Disturbance:  denies any perceptual disturbance  Cognition:  oriented to person, place, and time   Concentration intact  Insight good   Judgement good      ASSESSMENT:    Patient symptoms are:  [x] Well controlled  [] Improving  [] Worsening  [] No change      Diagnosis:    Principal Problem:    Paranoid schizophrenia ([facility] [address]  Resolved Problems:    * No resolved hospital problems. *      LABS:    No results for input(s): WBC, HGB, PLT in the last 72 hours. No results for input(s): NA, K, CL, CO2, BUN, CREATININE, GLUCOSE in the last 72 hours. No results for input(s): BILITOT, ALKPHOS, AST, ALT in the last 72 hours.   Lab Results   Component Value Date    BARBSCNU NEGATIVE 10/10/2021    LABBENZ NEGATIVE 10/10/2021    LABBENZ POSITIVE 07/12/2013    LABMETH POSITIVE 10/10/2021    PPXUR NOT REPORTED 10/10/2021     Lab Results   Component Value Date    TSH 0.89 03/22/2021     No results found for: LITHIUM  No

## 2021-10-15 VITALS
DIASTOLIC BLOOD PRESSURE: 62 MMHG | HEART RATE: 81 BPM | WEIGHT: 213 LBS | BODY MASS INDEX: 34.23 KG/M2 | SYSTOLIC BLOOD PRESSURE: 121 MMHG | TEMPERATURE: 98.6 F | HEIGHT: 66 IN | RESPIRATION RATE: 15 BRPM

## 2021-10-15 LAB — GLUCOSE BLD-MCNC: 102 MG/DL (ref 65–105)

## 2021-10-15 PROCEDURE — 6370000000 HC RX 637 (ALT 250 FOR IP): Performed by: PSYCHIATRY & NEUROLOGY

## 2021-10-15 PROCEDURE — 99239 HOSP IP/OBS DSCHRG MGMT >30: CPT | Performed by: PSYCHIATRY & NEUROLOGY

## 2021-10-15 PROCEDURE — 82947 ASSAY GLUCOSE BLOOD QUANT: CPT

## 2021-10-15 RX ORDER — QUETIAPINE FUMARATE 300 MG/1
300 TABLET, FILM COATED ORAL 2 TIMES DAILY
Qty: 60 TABLET | Refills: 3 | Status: SHIPPED | OUTPATIENT
Start: 2021-10-15 | End: 2021-11-22 | Stop reason: SDUPTHER

## 2021-10-15 RX ORDER — LISINOPRIL 10 MG/1
10 TABLET ORAL DAILY
Qty: 90 TABLET | Refills: 1 | Status: ON HOLD | OUTPATIENT
Start: 2021-10-15 | End: 2021-10-27 | Stop reason: SDUPTHER

## 2021-10-15 RX ORDER — NYSTATIN 100000 U/G
CREAM TOPICAL
Qty: 1 EACH | Refills: 0 | Status: SHIPPED | OUTPATIENT
Start: 2021-10-15

## 2021-10-15 RX ADMIN — INSULIN GLARGINE 20 UNITS: 100 INJECTION, SOLUTION SUBCUTANEOUS at 08:42

## 2021-10-15 RX ADMIN — NICOTINE POLACRILEX 2 MG: 2 GUM, CHEWING BUCCAL at 10:39

## 2021-10-15 RX ADMIN — NYSTATIN: 100000 CREAM TOPICAL at 08:50

## 2021-10-15 RX ADMIN — QUETIAPINE FUMARATE 300 MG: 300 TABLET ORAL at 08:42

## 2021-10-15 RX ADMIN — LISINOPRIL 10 MG: 10 TABLET ORAL at 08:42

## 2021-10-15 RX ADMIN — ASPIRIN 81 MG: 81 TABLET, COATED ORAL at 08:42

## 2021-10-15 NOTE — GROUP NOTE
Group Therapy Note    Date: 10/15/2021    Group Start Time: 1100  Group End Time: 7364  Group Topic: Cognitive Skills    SKY Linares, KATHRYNS        Group Therapy Note    Attendees: 8/18         Pt did not participate in Cognitive Skills Group at 1100am when encouraged by RT due to resting in room. Pt is being discharged.     Discipline Responsible: Psychoeducational Specialist      Signature:  Sylvia Parada

## 2021-10-15 NOTE — DISCHARGE SUMMARY
DISCHARGE SUMMARY      Patient ID:  Liban Lantigua  [id number]  67 y.o.  1955    Admit date: 10/11/2021    Discharge date and time: 10/15/2021    Disposition: Home Admitting Physician: Jody Jarquin MD     Discharge Physician: Dr Gladis Broderick MD    Admission Diagnoses: Paranoid [facility] ([facility] [F20.0]    Admission Condition: poor    Discharged Condition: stable    Admission Circumstance: The patient is a 77 y.o. female with significant past history of seizure affective disorder, was transferred from [facility] for management of visual and auditory hallucination.     Patient is inconsistent with a history.     Last thing she remembered that taking vitals in the hospital.  [facility] patient received fluids for orthostatic hypotension. Patient also complaining of auditory and visual hallucination. Auditory hallucinations which were mostly of mumbling, \"go girls\", that is what she was most likely hearing. Visual hallucinations were people walking on the street in the room. She stated that her hallucination got worsened for the last 2 days.     She is stating that the hallucination developed 3 years ago, the hallucinations are very inconsistent sometimes it will come and stay for 15 to 20 minutes, sometimes it will happen for couple of days. Sometimes she will have every day. Hallucinations are mostly auditory and visual.  None of the auditory hallucination has ever told her to hurt herself or hurt others. She states the auditory hallucinations talk stupid stuff.     Patient denies any suicidal ideation or homicidal thoughts.   She stated that she has been diagnosed with schizophrenia but 3 years ago, with chart review found that she was admitted back in 2016, she states she has been compliant with the medication but only medication name she could list was Seroquel.     She feels sad, does not take enjoy any activities, unable to sleep, feels hopelessness, have anxiety.       Patient is alert oriented x3, have good cognition. Patient's urine drug screen was positive for cocaine and methadone.     Personal history. Patient stated her growing up childhood was pleasant, had good relationship with parents. Have 5-6 siblings she gets along with everyone. 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 Patient denies any family history of psychiatric disorder.   Patient denies any alcohol Or any recreational drug        Stressors: She lives by herself      PAST MEDICAL/PSYCHIATRIC HISTORY:   Past Medical History:   Diagnosis Date    CHRIS (acute kidney injury) ([facility] 9/29/2020    Bipolar 1 disorder ([facility]     Breast lump     Chronic obstructive pulmonary disease ([facility] 8/3/2017    Depression     GERD (gastroesophageal reflux disease)     Hyperlipidemia     Hypertension     Osteoarthritis     Type 2 diabetes mellitus without complication, with long-term current use of insulin ([facility] 2/4/2019    Type II or unspecified type diabetes mellitus without mention of complication, not stated as uncontrolled        FAMILY/SOCIAL HISTORY:  Family History   Problem Relation Age of Onset    Diabetes Father     Stroke Father     High Blood Pressure Father      Social History     Socioeconomic History    Marital status:      Spouse name: Not on file    Number of children: Not on file    Years of education: Not on file    Highest education level: Not on file   Occupational History     Employer: N/A   Tobacco Use    Smoking status: Current Every Day Smoker     Packs/day: 0.25     Years: 10.00     Pack years: 2.50     Types: Cigarettes    Smokeless tobacco: Never Used    Tobacco comment: 3 cigarettes per day; Patient refused counseling   Vaping Use    Vaping Use: Never used   Substance and Sexual Activity    Alcohol use: Yes     Comment: rarely    Drug use: No    Sexual activity: Not Currently   Other Topics Concern    Not on file   Social History Narrative    Not on file Units at 10/15/21 0842    aspirin EC tablet 81 mg, 81 mg, Oral, Daily, Roxane Abraham MD, 81 mg at 10/15/21 0842    albuterol sulfate  (90 Base) MCG/ACT inhaler 1 puff, 1 puff, Inhalation, Q6H PRN, Roxane Abraham MD    nicotine polacrilex (NICORETTE) gum 2 mg, 2 mg, Oral, PRN, Roxane Abraham MD, 2 mg at 10/15/21 1039    Examination:  /62   Pulse 81   Temp 98.6 °F (37 °C) (Oral)   Resp 15   Ht 5' 6\" (1.676 m)   Wt 213 lb (96.6 kg)   LMP  (LMP Unknown)   BMI 34.38 kg/m²   Gait - steady    HOSPITAL COURSE[de-identified]  Following admission to the hospital, patient had a complete physical exam and blood work up, . Patient was referred to internal medicine  Patient was monitored closely with suicide precaution  Patient was started on Seroquel as noted above   was encouraged to participate in group and other milieu activity  Patient started to feel better with this combination of treatment. Significant progress in the symptoms since admission. Mood is improved  The patient denies AVH or paranoid thoughts  The patient denies any hopelessness or worthlessness  No active SI/HI  Appetite:  [x] Normal  [] Increased  [] Decreased    Sleep:       [x] Normal  [] Fair       [] Poor            Energy:    [x] Normal  [] Increased  [] Decreased     SI [] Present  [x] Absent  HI  []Present  [x] Absent   Aggression:  [] yes  [] no  Patient is [x] able  [] unable to CONTRACT FOR SAFETY   Medication side effects(SE):  [x] None(Psych.  Meds.) [] Other      Mental Status Examination on discharge:    Level of consciousness:  within normal limits   Appearance:  well-appearing  Behavior/Motor:  no abnormalities noted  Attitude toward examiner:  attentive and good eye contact  Speech:  spontaneous, normal rate and normal volume   Mood: constricted  Affect:  mood congruent  Thought processes:  linear, goal directed and coherent   Thought content:  Suicidal Ideation:  denies suicidal ideation  Delusions:  no evidence of delusions  Perceptual Disturbance:  denies any perceptual disturbance  Cognition:  oriented to person, place, and time   Concentration intact  Memory intact  Insight good   Judgement fair   Fund of Knowledge adequate      ASSESSMENT:  Patient symptoms are:  [x] Well controlled  [x] Improving  [] Worsening  [] No change      Diagnosis:  Principal Problem:    Paranoid schizophrenia ([facility]  Resolved Problems:    * No resolved hospital problems. *      LABS:    No results for input(s): WBC, HGB, PLT in the last 72 hours. No results for input(s): NA, K, CL, CO2, BUN, CREATININE, GLUCOSE in the last 72 hours. No results for input(s): BILITOT, ALKPHOS, AST, ALT in the last 72 hours. Lab Results   Component Value Date    BARBSCNU NEGATIVE 10/10/2021    LABBENZ NEGATIVE 10/10/2021    LABBENZ POSITIVE 07/12/2013    LABMETH POSITIVE 10/10/2021    PPXUR NOT REPORTED 10/10/2021     Lab Results   Component Value Date    TSH 0.89 03/22/2021     No results found for: LITHIUM  No results found for: VALPROATE, CBMZ    RISK ASSESSMENT AT DISCHARGE: Low risk for suicide and homicide. Treatment Plan:  Reviewed current Medications with the patient. Education provided on the complaince with treatment. Risks, benefits, side effects, drug-to-drug interactions and alternatives to treatment were discussed. Encourage patient to attend outpatient follow up appointment and therapy. Patient was advised to call the outpatient provider, visit the nearest ED or call 911 if symptoms are not manageable. Medication List      CHANGE how you take these medications    QUEtiapine 300 MG tablet  Commonly known as: SEROquel  Take 1 tablet by mouth 2 times daily  What changed:   · additional instructions  · Another medication with the same name was removed. Continue taking this medication, and follow the directions you see here.         CONTINUE taking these medications    acetaminophen 500 MG tablet  Commonly known as: TYLENOL  Take 2 tablets by mouth every 6 hours as needed for Pain     albuterol sulfate  (90 Base) MCG/ACT inhaler     aspirin 81 MG EC tablet  Take 1 tablet by mouth daily     calcium carbonate 500 MG chewable tablet  Commonly known as: Antacid  Take 1 tablet by mouth daily     Lantus SoloStar 100 UNIT/ML injection pen  Generic drug: insulin glargine  INJECT 20 UNITS INTO THE SKIN BID     lisinopril 10 MG tablet  Commonly known as: PRINIVIL;ZESTRIL  Take 1 tablet by mouth daily     nystatin 177603 UNIT/GM cream  Commonly known as: MYCOSTATIN  Apply topically 2 times daily. traZODone 150 MG tablet  Commonly known as: DESYREL  Take 1 tablet by mouth nightly        STOP taking these medications    baclofen 10 MG tablet  Commonly known as: LIORESAL     famotidine 20 MG tablet  Commonly known as: Pepcid     hydrOXYzine 25 MG capsule  Commonly known as: Vistaril     ibuprofen 600 MG tablet  Commonly known as: ADVIL;MOTRIN     Lexapro 10 MG tablet  Generic drug: escitalopram     naproxen 375 MG tablet  Commonly known as: NAPROSYN     solifenacin 10 MG tablet  Commonly known as: VESICARE     Spiriva HandiHaler 18 MCG inhalation capsule  Generic drug: tiotropium           Where to Get Your Medications      These medications were sent to [facility], [place] [address]    Phone: [phone]   · lisinopril 10 MG tablet  · nystatin 897294 UNIT/GM cream  · QUEtiapine 300 MG tablet           Core Measures statement:   Not applicable      TIME SPENT - 28 MINUTES TO COMPLETE THE EVALUATION, DISCHARGE SUMMARY, MEDICATION RECONCILIATION AND FOLLOW UP CARE                                         Milena Ronquillo is a 77 y.o. female being evaluated Aracely Gilbert MD on 10/15/2021 at 11:36 AM    An electronic signature was used to authenticate this note. **This report has been created using voice recognition software. It may contain minor errors which are inherent in voice recognition technology. **

## 2021-10-15 NOTE — PROGRESS NOTES
CLINICAL PHARMACY NOTE: MEDS TO BEDS    Total # of Prescriptions Filled: 1   The following medications were delivered to the patient:  · Nystatin Cream    Additional Documentation:  Delivered Medication to Unit Doors    Refill(s) Too Soon + Profiled Rx(s)   - Lisinopril: 10/29   - Seroquel: 10/29

## 2021-10-15 NOTE — BH NOTE
Patient given tobacco quitline number 19915320428 at this time, refusing to call at this time, states \" I just dont want to quit now\"- patient given information as to the dangers of long term tobacco use. Continue to reinforce the importance of tobacco cessation.

## 2021-10-15 NOTE — SUICIDE SAFETY PLAN
SAFETY PLAN    A suicide Safety Plan is a document that supports someone when they are having thoughts of suicide. Warning Signs that indicate a suicidal crisis may be developing: What (situations, thoughts, feelings, body sensations, behaviors, etc.) do you experience that lets you know you are beginning to think about suicide? 1. Increase racing thoughts  2. Sleepless nights  3. cursing    Internal Coping Strategies:  What things can I do (relaxation techniques, hobbies, physical activities, etc.) to take my mind off my problems without contacting another person? 1. Call family  2. 1:1 talk time  3. Call clinic    People and social settings that provide distraction: Who can I call or where can I go to distract me? 1. Name: Adore Reeves   Phone: [phone]  2. Name: Sonal Pizarro  Phone: [phone]   3. Place: sitting room            4. Place: family house    People whom I can ask for help: Who can I call when I need help - for example, friends, family, clergy, someone else? 1. Name: Allyssa                          Phone: [phone]  2. Name: Adore Reeves  Phone: [phone]  3. Name: Sonal Pizarro  Phone: [phone]    Professionals or [facility] agencies I can contact during a crisis: Who can I call for help - for example, my doctor, my psychiatrist, my psychologist, a mental health provider, a suicide hotline? 1. Clinician Name: 911  Phone: 911      Clinician Pager or Emergency Contact #: [phone]    2. Clinician Name: 911    Phone: 911      Clinician Pager or Emergency Contact #: [phone]    1. Suicide Prevention Lifeline: 3-120-478-TALK (0142)    4. [address] [place] [address] Emergency Services -  for example, [facility] suicide hotline, [facility] Hotline: 911      Emergency Services Address: [facility]      Emergency Services Phone: [phone]    Making the environment safe: How can I make my environment (house/apartment/living space) safer?  For example, can I remove guns,

## 2021-10-15 NOTE — SUICIDE SAFETY PLAN
SAFETY PLAN    A suicide Safety Plan is a document that supports someone when they are having thoughts of suicide. Warning Signs that indicate a suicidal crisis may be developing: What (situations, thoughts, feelings, body sensations, behaviors, etc.) do you experience that lets you know you are beginning to think about suicide? 1. Go off medications  2. Mood is depressed and start to feel sad, hopeless, helpless, guilty, decline in self-esteem, excess worry, no interest in doing any pleasurable activities, unable to concentrate  3. Begin to cry over the smallest of things  4. Not eating or sleeping as normal  5. Relationship issues start happening  6. I become angry and start a fight  7. When I dont listen or respond to people in a good, positive way  8. Increase drug use      Internal Coping Strategies:  What things can I do (relaxation techniques, hobbies, physical activities, etc.) to take my mind off my problems without contacting another person? 1. Go to hospital discharge appointments and follow-up with community mental health counseling  2. Talk with other people  3. Learn to identify and control your emotions by new ways  4. Think before you speak or act; walk away from the situation  5. Join a support group in person or on Social Media  6. Take a time-out  7. Take deep breaths; use relaxation techniques  8. Get some exercise; go for a walk  9. Read; listen to music; watch a funny movie    10. Coping skills/ strategies - journal/ listen to music/ go for a walk/ read a book/ watch a funny movie/show / crafts / video game   11.  Grounding techniques- eat a sour candy or hot cinnamon candy / focus on colors, sounds, smells, textures on things around you / drink some herbal tea / eat a piece of dark chocolate / take a hot bath or shower / essential oils for smelling / meditate / color / arts and crafts    People whom I can ask for help: Who can I call when I need help - for example, friends, family, clergy, someone else? 1. Tri Stephens at 030-324-7944  2. Son, Willy Mcwilliams at 2-898.697.4326    Professionals or [address] I can contact during a crisis: Who can I call for help - for example, my doctor, my psychiatrist, my psychologist, a mental health provider, a suicide hotline? 1.   Staff at [facility]  2. Suicide Prevention Lifeline: 2-989-162-TALK (3792)  3. [facility] Crisis [facility] [place] [facility] Team, face-to-face services, call [phone] (6129)  4. [facility] [employer]: 2-1-1, 364.118.5889 or 3-218.817.7090  5. [employer] (Crisis Intervention Team - CIT), 958.434.8019 or 9-1-1  6. [address] [place], 1-638.756.6361  7. National Association of Mental Illness, 5-436.159.3537  8. [facility] Substance Abuse National Helpline, 6-270-558-HELP (3073)  9. Crisis Text Line, Text 4HOPE to 259751 to connect with a crisis counselor  10. [facility], 3-992.888.3659  11. [facility] ([place], [address]), 7-460.275.5849  22. [employer] (Alcohol / Drug help)  13. Call the Recovery Helpline at 09 [phone] (24 hours a day - 7 days a week)  14. COVID-19 Emotional Support Line: [facility] Emergency Services - for example, [address], [facility] suicide hotline,   1. [place] [address], 3-237.970.3649  2. [place] line at 082-126-CARE (2676) for 24/7 to help anyone having a mental crisis or thoughts of self-harm. The Crisis CARE number will also determine in a face-to-face screening needs to be done as well as the safest place. Once this is determined, the Crisis [facility] [place] [facility] Team will be sent out to meet with the patient directly if required. Making the environment safe: How can I make my environment (house/apartment/living space) safer? For example, can I remove guns, medications, and other items?   1. Remove unsafe objects  2. Keep Medications in safe and secure location  3.  Plan daily goals to help remember to stay on specific medications

## 2021-10-15 NOTE — GROUP NOTE
Group Therapy Note    Date: 10/15/2021    Group Start Time: 1430  Group End Time: 3018  Group Topic: Cognitive Skills    SKY Aragon, CTRS        Group Therapy Note    Attendees: 5/18         Pt did not participate in Cognitive Skills Group at 1430 when encouraged by RT due to waiting to be discharged. Pt was offered talk time as an alternative to group but declined.        Discipline Responsible: Psychoeducational Specialist      Signature:  Ulices Villafuerte

## 2021-10-15 NOTE — PLAN OF CARE
Problem: Altered Mood, Psychotic Behavior:  Goal: Able to verbalize decrease in frequency and intensity of hallucinations  Description: Able to verbalize decrease in frequency and intensity of hallucinations  10/14/2021 2150 by James Ricardo  Outcome: Ongoing  Note: Patient states she has been hearing some voices having conversations and sometimes sees animals in her room at night. Out and social, cooperative. Compliant with all prescribed medications for this shift. Safety checks maintained q15min and irregular rounding. Problem: Falls - Risk of:  Goal: Will remain free from falls  Description: Will remain free from falls  10/14/2021 2150 by James Ricardo  Outcome: Ongoing  Note: Pt remains free of falls and verbalizes understanding of individual fall risks. Pt wearing non skid footwear and encouraged to seek out staff for any assistance needed. Problem: Skin Integrity:  Goal: Absence of new skin breakdown  Description: Absence of new skin breakdown  10/14/2021 2150 by James Ricardo  Outcome: Ongoing  Note: Patient remains free from skin breakdown at this time.

## 2021-10-18 NOTE — CARE COORDINATION
Name: George Villeda    : 1955    Discharge Date: 10/15/2021    Primary Auth/Cert #: [id number]    Destination: home via cab    Discharge Medications:      Medication List      CHANGE how you take these medications    QUEtiapine 300 MG tablet  Commonly known as: SEROquel  Take 1 tablet by mouth 2 times daily  What changed:   · additional instructions  · Another medication with the same name was removed. Continue taking this medication, and follow the directions you see here. Notes to patient: Mood/clear thoughts        CONTINUE taking these medications    acetaminophen 500 MG tablet  Commonly known as: TYLENOL  Take 2 tablets by mouth every 6 hours as needed for Pain  Notes to patient: Pain/fever     albuterol sulfate  (90 Base) MCG/ACT inhaler  Notes to patient: wheezing     aspirin 81 MG EC tablet  Take 1 tablet by mouth daily  Notes to patient: prophylactic     calcium carbonate 500 MG chewable tablet  Commonly known as: Antacid  Take 1 tablet by mouth daily  Notes to patient: supplement     Lantus SoloStar 100 UNIT/ML injection pen  Generic drug: insulin glargine  INJECT 20 UNITS INTO THE SKIN BID  Notes to patient: Blood sugar     lisinopril 10 MG tablet  Commonly known as: PRINIVIL;ZESTRIL  Take 1 tablet by mouth daily  Notes to patient: Blood pressure     nystatin [id number] UNIT/GM cream  Commonly known as: MYCOSTATIN  Apply topically 2 times daily.   Notes to patient: Skin irritatiomn     traZODone 150 MG tablet  Commonly known as: DESYREL  Take 1 tablet by mouth nightly  Notes to patient: Sleep aid        STOP taking these medications    baclofen 10 MG tablet  Commonly known as: LIORESAL     famotidine 20 MG tablet  Commonly known as: Pepcid     hydrOXYzine 25 MG capsule  Commonly known as: Vistaril     ibuprofen 600 MG tablet  Commonly known as: ADVIL;MOTRIN     Lexapro 10 MG tablet  Generic drug: escitalopram     naproxen 375 MG tablet  Commonly known as: NAPROSYN     solifenacin 10 MG tablet  Commonly known as: VESICARE     Spiriva HandiHaler 18 MCG inhalation capsule  Generic drug: tiotropium           Where to Get Your Medications      These medications were sent to [facility], [place], [address] [facility] [address]    Phone: [phone]   · lisinopril 10 MG tablet  · nystatin [id number] UNIT/GM cream  · QUEtiapine 300 MG tablet         Follow Up Appointment: Discharge to home:  [facility]  [place] on 10/15/2021  Please send home by Gisel Alford and [address]  Phone: [phone]  Fax: [phone] on 10/28/2021  Thursday, Oct. 28 at 8:30am with nurse practitioner Rosa Staton

## 2021-10-25 ENCOUNTER — APPOINTMENT (OUTPATIENT)
Dept: CT IMAGING | Age: 66
End: 2021-10-25
Payer: MEDICARE

## 2021-10-25 ENCOUNTER — HOSPITAL ENCOUNTER (OUTPATIENT)
Age: 66
Setting detail: OBSERVATION
Discharge: HOME | End: 2021-10-27
Attending: EMERGENCY MEDICINE | Admitting: EMERGENCY MEDICINE
Payer: MEDICARE

## 2021-10-25 ENCOUNTER — APPOINTMENT (OUTPATIENT)
Dept: GENERAL RADIOLOGY | Age: 66
End: 2021-10-25
Payer: MEDICARE
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
## 2021-10-25 DIAGNOSIS — M17.0 PRIMARY OSTEOARTHRITIS OF BOTH KNEES: ICD-10-CM

## 2021-10-25 DIAGNOSIS — I10 ESSENTIAL HYPERTENSION: ICD-10-CM
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
## 2021-10-25 LAB
-: ABNORMAL
ABSOLUTE EOS #: 0.13 K/UL (ref 0–0.44)
ABSOLUTE IMMATURE GRANULOCYTE: 0.03 K/UL (ref 0–0.3)
ABSOLUTE LYMPH #: 4.81 K/UL (ref 1.1–3.7)
ABSOLUTE MONO #: 0.68 K/UL (ref 0.1–1.2)
AMORPHOUS: ABNORMAL
ANION GAP SERPL CALCULATED.3IONS-SCNC: 13 MMOL/L (ref 9–17)
BACTERIA: ABNORMAL
BASOPHILS # BLD: 1 % (ref 0–2)
BASOPHILS ABSOLUTE: 0.04 K/UL (ref 0–0.2)
BILIRUBIN URINE: NEGATIVE
BNP INTERPRETATION: NORMAL
BUN BLDV-MCNC: 9 MG/DL (ref 8–23)
BUN/CREAT BLD: ABNORMAL (ref 9–20)
CALCIUM SERPL-MCNC: 9 MG/DL (ref 8.6–10.4)
CASTS UA: ABNORMAL /LPF (ref 0–2)
CASTS UA: ABNORMAL /LPF (ref 0–2)
CHLORIDE BLD-SCNC: 102 MMOL/L (ref 98–107)
CO2: 20 MMOL/L (ref 20–31)
COLOR: YELLOW
COMMENT UA: ABNORMAL
CREAT SERPL-MCNC: 1.13 MG/DL (ref 0.5–0.9)
CRYSTALS, UA: ABNORMAL /HPF
DIFFERENTIAL TYPE: ABNORMAL
EOSINOPHILS RELATIVE PERCENT: 2 % (ref 1–4)
EPITHELIAL CELLS UA: ABNORMAL /HPF (ref 0–5)
GFR AFRICAN AMERICAN: 58 ML/MIN
GFR NON-AFRICAN AMERICAN: 48 ML/MIN
GFR SERPL CREATININE-BSD FRML MDRD: ABNORMAL ML/MIN/{1.73_M2}
GFR SERPL CREATININE-BSD FRML MDRD: ABNORMAL ML/MIN/{1.73_M2}
GLUCOSE BLD-MCNC: 110 MG/DL (ref 70–99)
GLUCOSE URINE: NEGATIVE
HCT VFR BLD CALC: 40 % (ref 36.3–47.1)
HEMOGLOBIN: 11.9 G/DL (ref 11.9–15.1)
IMMATURE GRANULOCYTES: 0 %
KETONES, URINE: NEGATIVE
LEUKOCYTE ESTERASE, URINE: ABNORMAL
LYMPHOCYTES # BLD: 53 % (ref 24–43)
MCH RBC QN AUTO: 26.9 PG (ref 25.2–33.5)
MCHC RBC AUTO-ENTMCNC: 29.8 G/DL (ref 28.4–34.8)
MCV RBC AUTO: 90.3 FL (ref 82.6–102.9)
MONOCYTES # BLD: 8 % (ref 3–12)
MUCUS: ABNORMAL
NITRITE, URINE: NEGATIVE
NRBC AUTOMATED: 0 PER 100 WBC
OTHER OBSERVATIONS UA: ABNORMAL
PDW BLD-RTO: 14.5 % (ref 11.8–14.4)
PH UA: 6 (ref 5–8)
PLATELET # BLD: 217 K/UL (ref 138–453)
PLATELET ESTIMATE: ABNORMAL
PMV BLD AUTO: 8.7 FL (ref 8.1–13.5)
POTASSIUM SERPL-SCNC: 3.8 MMOL/L (ref 3.7–5.3)
PRO-BNP: 62 PG/ML
PROTEIN UA: NEGATIVE
RBC # BLD: 4.43 M/UL (ref 3.95–5.11)
RBC # BLD: ABNORMAL 10*6/UL
RBC UA: ABNORMAL /HPF (ref 0–2)
RENAL EPITHELIAL, UA: ABNORMAL /HPF
SEG NEUTROPHILS: 36 % (ref 36–65)
SEGMENTED NEUTROPHILS ABSOLUTE COUNT: 3.19 K/UL (ref 1.5–8.1)
SODIUM BLD-SCNC: 135 MMOL/L (ref 135–144)
SPECIFIC GRAVITY UA: 1.01 (ref 1–1.03)
TRICHOMONAS: ABNORMAL
TROPONIN INTERP: NORMAL
TROPONIN T: NORMAL NG/ML
TROPONIN, HIGH SENSITIVITY: 8 NG/L (ref 0–14)
TURBIDITY: CLEAR
URINE HGB: NEGATIVE
UROBILINOGEN, URINE: NORMAL
WBC # BLD: 8.9 K/UL (ref 3.5–11.3)
WBC # BLD: ABNORMAL 10*3/UL
WBC UA: ABNORMAL /HPF (ref 0–5)
YEAST: ABNORMAL

## 2021-10-25 PROCEDURE — 73502 X-RAY EXAM HIP UNI 2-3 VIEWS: CPT

## 2021-10-25 PROCEDURE — 73562 X-RAY EXAM OF KNEE 3: CPT

## 2021-10-25 PROCEDURE — G0378 HOSPITAL OBSERVATION PER HR: HCPCS

## 2021-10-25 PROCEDURE — 83880 ASSAY OF NATRIURETIC PEPTIDE: CPT

## 2021-10-25 PROCEDURE — 81001 URINALYSIS AUTO W/SCOPE: CPT

## 2021-10-25 PROCEDURE — 87086 URINE CULTURE/COLONY COUNT: CPT

## 2021-10-25 PROCEDURE — 72125 CT NECK SPINE W/O DYE: CPT

## 2021-10-25 PROCEDURE — 71045 X-RAY EXAM CHEST 1 VIEW: CPT

## 2021-10-25 PROCEDURE — 70450 CT HEAD/BRAIN W/O DYE: CPT

## 2021-10-25 PROCEDURE — 80048 BASIC METABOLIC PNL TOTAL CA: CPT

## 2021-10-25 PROCEDURE — 84484 ASSAY OF TROPONIN QUANT: CPT

## 2021-10-25 PROCEDURE — 85025 COMPLETE CBC W/AUTO DIFF WBC: CPT

## 2021-10-25 PROCEDURE — 99285 EMERGENCY DEPT VISIT HI MDM: CPT

## 2021-10-25 PROCEDURE — 93005 ELECTROCARDIOGRAM TRACING: CPT | Performed by: STUDENT IN AN ORGANIZED HEALTH CARE EDUCATION/TRAINING PROGRAM

## 2021-10-25 RX ORDER — SODIUM CHLORIDE 9 MG/ML
25 INJECTION, SOLUTION INTRAVENOUS PRN
Status: DISCONTINUED | OUTPATIENT
Start: 2021-10-25 | End: 2021-10-27 | Stop reason: HOSPADM

## 2021-10-25 RX ORDER — ONDANSETRON 4 MG/1
4 TABLET, ORALLY DISINTEGRATING ORAL EVERY 8 HOURS PRN
Status: DISCONTINUED | OUTPATIENT
Start: 2021-10-25 | End: 2021-10-27 | Stop reason: HOSPADM

## 2021-10-25 RX ORDER — SODIUM CHLORIDE 0.9 % (FLUSH) 0.9 %
5-40 SYRINGE (ML) INJECTION EVERY 12 HOURS SCHEDULED
Status: DISCONTINUED | OUTPATIENT
Start: 2021-10-25 | End: 2021-10-27 | Stop reason: HOSPADM

## 2021-10-25 RX ORDER — ACETAMINOPHEN 325 MG/1
650 TABLET ORAL EVERY 4 HOURS PRN
Status: DISCONTINUED | OUTPATIENT
Start: 2021-10-25 | End: 2021-10-27 | Stop reason: HOSPADM

## 2021-10-25 RX ORDER — SODIUM CHLORIDE 0.9 % (FLUSH) 0.9 %
5-40 SYRINGE (ML) INJECTION PRN
Status: DISCONTINUED | OUTPATIENT
Start: 2021-10-25 | End: 2021-10-27 | Stop reason: HOSPADM

## 2021-10-25 RX ORDER — ONDANSETRON 2 MG/ML
4 INJECTION INTRAMUSCULAR; INTRAVENOUS EVERY 6 HOURS PRN
Status: DISCONTINUED | OUTPATIENT
Start: 2021-10-25 | End: 2021-10-27 | Stop reason: HOSPADM

## 2021-10-25 RX ORDER — LISINOPRIL 10 MG/1
10 TABLET ORAL DAILY
Status: DISCONTINUED | OUTPATIENT
Start: 2021-10-26 | End: 2021-10-27 | Stop reason: HOSPADM

## 2021-10-25 ASSESSMENT — PAIN SCALES - GENERAL: PAINLEVEL_OUTOF10: 8

## 2021-10-25 ASSESSMENT — ENCOUNTER SYMPTOMS
ABDOMINAL PAIN: 0
SHORTNESS OF BREATH: 0
NAUSEA: 0
VOMITING: 0
BACK PAIN: 0

## 2021-10-25 NOTE — ED TRIAGE NOTES
Pt presents to the ed via EMS c/o left side leg pain that started last night after she fell. Pt reports her wheelchair is broken at this time and she feels dizzy while standing. EMS reports that pt needs to speak with social work states that pt living conditions are very unsafe.

## 2021-10-25 NOTE — ED TRIAGE NOTES
[facility] saying pt has initial call for leg pain.  Having social issues, being evicted, place not safe to stay

## 2021-10-25 NOTE — ED NOTES
Pt states she fell on her L side early this morning, c/o L hip and side pain, denies hitting head, states she has been feeling generalized weakness since [address] Angelo Heredia RN  10/25/21 2217

## 2021-10-25 NOTE — ED PROVIDER NOTES
with the treatment plan and disposition of the patient as recorded by the APC.     Sadaf Fuller MD  Attending Emergency  Physician       Beth Daigle MD  10/25/21 9412

## 2021-10-25 NOTE — Clinical Note
Patient Class:  Outpatient in a bed [862]   REQUIRED: Diagnosis: Fall at home, initial encounter [613600]   Estimated Length of Stay: Estimated stay of less than 2 midnights   Admitting Provider: Alivia Barrera [[id number]]   Telemetry/Cardiac Monitoring Required?: Yes

## 2021-10-25 NOTE — ED PROVIDER NOTES
[facility] ED  Emergency Department Encounter  Emergency Medicine Resident     Pt Name: Milena Ronquillo  MRN: [id number]  Armsjose ggfurt 1955  Date of evaluation: 10/25/21  PCP:  Gurwinder Guerra [place] Yess[place]       Chief Complaint   Patient presents with    Dizziness    Fall     last night    Leg Pain     left side    Other     needs to speak w/ social work       HISTORY OFPRESENT ILLNESS  (Location/Symptom, Timing/Onset, Context/Setting, Quality, Duration, Modifying Factors,Severity.)      Milena Ronquillo is a 77 y. o.yo female who presents with fall. Patient here after fall yesterday states that she was walking and felt like her left knee gave out, has history of chronic knee pain and left hip pain. States that her left knee gave out which caused her to fall on the left side landing on the left hip area. States that she did not hit her head, did not lose consciousness is having neck pain and generalized pain on the left hip area. States that she continues to have pain on the left knee that has progressively worsened after the fall. States that she is able to bear weight and ambulate. States the pain is 7 out of 10 in severity, nonradiating. Denies flank pain, lumbar pain, pain on the right leg, headache or vision changes or focal deficits. States that she does not move her left side much because of chronic left hip pain. PAST MEDICAL / SURGICAL / SOCIAL / FAMILY HISTORY      has a past medical history of CHRIS (acute kidney injury) ([address] Bipolar 1 disorder ([address] Breast lump, Chronic obstructive pulmonary disease ([address] Depression, GERD (gastroesophageal reflux disease), Hyperlipidemia, Hypertension, Osteoarthritis, Type 2 diabetes mellitus without complication, with long-term current use of insulin ([address] and Type II or unspecified type diabetes mellitus without mention of complication, not stated as uncontrolled.      has a past surgical history that includes Ectopic pregnancy surgery; knee surgery (Right); Dilation and curettage of uterus; Nerve Block (Left, 12/8/14); Knee Arthroplasty (2017); and Tonsillectomy and adenoidectomy. Social History     Socioeconomic History    Marital status:      Spouse name: Not on file    Number of children: Not on file    Years of education: Not on file    Highest education level: Not on file   Occupational History     Employer: N/A   Tobacco Use    Smoking status: Current Every Day Smoker     Packs/day: 0.25     Years: 10.00     Pack years: 2.50     Types: Cigarettes    Smokeless tobacco: Never Used    Tobacco comment: 3 cigarettes per day; Patient refused counseling   Vaping Use    Vaping Use: Never used   Substance and Sexual Activity    Alcohol use: Yes     Comment: rarely    Drug use: No    Sexual activity: Not Currently   Other Topics Concern    Not on file   Social History Narrative    Not on file     Social Determinants of Health     Financial Resource Strain: Low Risk     Difficulty of Paying Living Expenses: Not hard at all   Food Insecurity: No Food Insecurity    Worried About Running Out of Food in the Last Year: Never true    Rocio of Food in the Last Year: Never true   Transportation Needs:     Lack of Transportation (Medical):      Lack of Transportation (Non-Medical):    Physical Activity:     Days of Exercise per Week:     Minutes of Exercise per Session:    Stress:     Feeling of Stress :    Social Connections:     Frequency of Communication with Friends and Family:     Frequency of Social Gatherings with Friends and Family:     Attends [employer] Services:     Active Member of Clubs or Organizations:     Attends Club or Organization Meetings:     Marital Status:    Intimate Partner Violence:     Fear of Current or Ex-Partner:     Emotionally Abused:     Physically Abused:     Sexually Abused:        Family History   Problem Relation Age of Onset    Diabetes Father     Stroke Father  High Blood Pressure Father         Allergies:  Chlorpromazine, Cyclobenzaprine, Gabapentin, Prochlorperazine, Prochlorperazine edisylate, Promethazine, and Chantix [varenicline tartrate]    Home Medications:  Prior to Admission medications    Medication Sig Start Date End Date Taking? Authorizing Provider   nystatin (MYCOSTATIN) 471979 UNIT/GM cream Apply topically 2 times daily. 10/15/21   Gregor Colmenares MD   QUEtiapine (SEROQUEL) 300 MG tablet Take 1 tablet by mouth 2 times daily 10/15/21   Gregor Colmenares MD   lisinopril (PRINIVIL;ZESTRIL) 10 MG tablet Take 1 tablet by mouth daily 10/15/21   Gregor Colmenares MD   traZODone (DESYREL) 150 MG tablet Take 1 tablet by mouth nightly 7/24/21   Arely Georges MD   acetaminophen (TYLENOL) 500 MG tablet Take 2 tablets by mouth every 6 hours as needed for Pain 7/16/21   Mike Michel PA-C   insulin glargine (LANTUS SOLOSTAR) 100 UNIT/ML injection pen INJECT 20 UNITS INTO THE SKIN BID 5/25/21   JEANNETTE Johns CNP   nystatin (MYCOSTATIN) 805100 UNIT/GM cream Apply topically 2 times daily. 5/25/21   JEANNETTE Johns CNP   ibuprofen (ADVIL;MOTRIN) 600 MG tablet Take 1 tablet by mouth every 6 hours as needed for Pain  Patient not taking: Reported on 5/25/2021 3/22/21 6/14/21  Zoya Son, DO   aspirin 81 MG EC tablet Take 1 tablet by mouth daily 4/14/20   Brandie Vigil MD   albuterol (PROVENTIL HFA;VENTOLIN HFA) 108 (90 BASE) MCG/ACT inhaler Inhale 1 puff into the lungs 4 times daily as needed for Wheezing     Historical Provider, MD       REVIEW OFSYSTEMS    (2-9 systems for level 4, 10 or more for level 5)      Review of Systems   Constitutional: Negative for diaphoresis and fever. HENT: Negative for congestion. Eyes: Negative for visual disturbance. Respiratory: Negative for shortness of breath. Cardiovascular: Negative for chest pain. Gastrointestinal: Negative for abdominal pain, nausea and vomiting. Endocrine: Negative for polyuria. Genitourinary: Negative for dysuria. Musculoskeletal: Negative for back pain. L hip pain  Knee pain     Skin: Negative for wound. Neurological: Positive for light-headedness. Negative for headaches. Psychiatric/Behavioral: Negative for confusion. PHYSICAL EXAM   (up to 7 for level 4, 8 or more forlevel 5)      ED TRIAGE VITALS BP: 108/64, Temp: 98.3 °F (36.8 °C), Pulse: 88, Resp: 18, SpO2: 95 %    Vitals:    10/25/21 1654 10/25/21 1730   BP: 108/64    Pulse: 88 90   Resp: 18    Temp: 98.3 °F (36.8 °C)    TempSrc: Temporal    SpO2: 95%    Weight: 200 lb (90.7 kg)    Height: 5' 6\" (1.676 m)        Physical Exam  Constitutional:       General: She is not in acute distress. Appearance: She is well-developed. HENT:      Head: Normocephalic and atraumatic. Nose: Nose normal.   Eyes:      Pupils: Pupils are equal, round, and reactive to light. Cardiovascular:      Rate and Rhythm: Normal rate and regular rhythm. Heart sounds: No murmur heard. Pulmonary:      Effort: Pulmonary effort is normal. No respiratory distress. Breath sounds: No stridor. No wheezing. Abdominal:      General: There is no distension. Palpations: Abdomen is soft. Tenderness: There is no abdominal tenderness. Musculoskeletal:         General: Tenderness present. Cervical back: Normal range of motion and neck supple. Tenderness present. Back:       Left hip: Tenderness present. Left knee: Decreased range of motion. Tenderness present. Legs:    Skin:     General: Skin is warm and dry. Capillary Refill: Capillary refill takes less than 2 seconds. Findings: No erythema or rash. Neurological:      Mental Status: She is alert and oriented to person, place, and time. Sensory: No sensory deficit.       Deep Tendon Reflexes: Reflexes normal.   Psychiatric:         Behavior: Behavior normal.         DIFFERENTIAL  DIAGNOSIS     PLAN (LABS / IMAGING / EKG):  Orders Placed This Encounter   Procedures    CT Head WO Contrast    CT Cervical Spine WO Contrast    XR KNEE LEFT (3 VIEWS)    XR CHEST PORTABLE    XR HIP 2-3 VW W PELVIS LEFT    Basic Metabolic Panel w/ Reflex to MG    CBC Auto Differential    Troponin    Inpatient consult to Social Work    EKG 12 Lead    PATIENT STATUS (FROM ED OR OR/PROCEDURAL) Outpatient in a bed       MEDICATIONS ORDERED:  No orders of the defined types were placed in this encounter. DDX:     Traumatic injuries, orthostatic hypotension and lightheadedness, inability to walk    Initial MDM/Plan: 77 y.o. female who presents with fall.      Recent fall  No LOC no blood thinner  No traumatic injuries on imaging  CT head neck negative  Able to ambulate with assistance has a walker at home  Does not feel comfortable going home think she will fall again  No dizziness at baseline here in the emergency department  States that she is dizzy when she rises from a seated position  Patient did not get dizzy on our assessment here in the emergency department  Plan for admission and PT OT in the ETU unit  EKG unremarkable  Chest x-ray unremarkable    Disposition:  PT OT  Possible cardiology evaluation  Admission to ETU unit    DIAGNOSTIC RESULTS / EMERGENCYDEPARTMENT COURSE / MDM     LABS:  Results for orders placed or performed during the hospital encounter of 31/17/84   Basic Metabolic Panel w/ Reflex to MG   Result Value Ref Range    Glucose 110 (H) 70 - 99 mg/dL    BUN 9 8 - 23 mg/dL    CREATININE 1.13 (H) 0.50 - 0.90 mg/dL    Bun/Cre Ratio NOT REPORTED 9 - 20    Calcium 9.0 8.6 - 10.4 mg/dL    Sodium 135 135 - 144 mmol/L    Potassium 3.8 3.7 - 5.3 mmol/L    Chloride 102 98 - 107 mmol/L    CO2 20 20 - 31 mmol/L    Anion Gap 13 9 - 17 mmol/L    GFR Non-African American 48 (L) >60 mL/min    GFR  58 (L) >60 mL/min    GFR Comment          GFR Staging NOT REPORTED    CBC Auto Differential   Result Value Ref Range    WBC 8.9 3.5 - 11.3 k/uL    RBC 4.43 3.95 - 5.11 m/uL    Hemoglobin 11.9 11.9 - 15.1 g/dL    Hematocrit 40.0 36.3 - 47.1 %    MCV 90.3 82.6 - 102.9 fL    MCH 26.9 25.2 - 33.5 pg    MCHC 29.8 28.4 - 34.8 g/dL    RDW 14.5 (H) 11.8 - 14.4 %    Platelets 563 762 - 591 k/uL    MPV 8.7 8.1 - 13.5 fL    NRBC Automated 0.0 0.0 per 100 WBC    Differential Type NOT REPORTED     Seg Neutrophils 36 36 - 65 %    Lymphocytes 53 (H) 24 - 43 %    Monocytes 8 3 - 12 %    Eosinophils % 2 1 - 4 %    Basophils 1 0 - 2 %    Immature Granulocytes 0 0 %    Segs Absolute 3.19 1.50 - 8.10 k/uL    Absolute Lymph # 4.81 (H) 1.10 - 3.70 k/uL    Absolute Mono # 0.68 0.10 - 1.20 k/uL    Absolute Eos # 0.13 0.00 - 0.44 k/uL    Basophils Absolute 0.04 0.00 - 0.20 k/uL    Absolute Immature Granulocyte 0.03 0.00 - 0.30 k/uL    WBC Morphology NOT REPORTED     RBC Morphology ANISOCYTOSIS PRESENT     Platelet Estimate NOT REPORTED        RADIOLOGY:  XR KNEE LEFT (3 VIEWS)   Final Result   Mild left knee osteoarthritis, primarily involving the medial and   patellofemoral compartments. No acute fracture or dislocation. No effusion. XR CHEST PORTABLE   Final Result   Minimal right basilar atelectasis. Otherwise, clear lungs. Cardiomegaly. XR HIP 2-3 VW W PELVIS LEFT   Final Result   Mild bilateral hip osteoarthritis. No acute fracture or hip dislocation. CT Head WO Contrast   Final Result   No acute intracranial abnormality.          CT Cervical Spine WO Contrast   Final Result   No evidence of an acute fracture or traumatic malalignment involving the   cervical spine             EKG  No ST segment elevations or depressions seen on EKG    All EKG's are interpreted by the Emergency Department Physicianwho either signs or Co-signs this chart in the absence of a cardiologist.    EMERGENCY DEPARTMENT COURSE:  ED Course as of Oct 25 1956   Mon Oct 25, 2021   1811 Patient seen and assessed in the emergency department no acute respiratory cardiovascular distress. Patient here after fall yesterday states that she was walking and felt like her left knee gave out, has history of chronic knee pain and left hip pain. States that her left knee gave out which caused her to fall on the left side landing on the left hip area. States that she did not hit her head, did not lose consciousness is having neck pain and generalized pain on the left hip area. States that she continues to have pain on the left knee that has progressively worsened after the fall. States that she is able to bear weight and ambulate. States the pain is 7 out of 10 in severity, nonradiating. Denies flank pain, lumbar pain, pain on the right leg, headache or vision changes or focal deficits. States that she does not move her left side much because of chronic left hip pain. [PS]   1938 Negative imagining     [PS]   1938 No dizziness acutely     [PS]   1938 Ambulate, discharge    [PS]   1949 Does ambulate with assist, does not feel comfortable going home, no dizziness when ambulated and going up from a seated position. [PS]      ED Course User Index  [PS] Terry Garcia MD          PROCEDURES:  None    CONSULTS:  IP CONSULT TO SOCIAL WORK    CRITICAL CARE:  Please see attending note    FINAL IMPRESSION      1.  Fall, initial encounter          DISPOSITION / [address] Admitted 10/25/2021 07:53:37 PM       PATIENT REFERRED TO:  Kate Blackburn APRN - CNP  [address] [place] [id number] [phone]    In 3 days      [facility] ED  [address] [place] [address]  [phone]    As needed, If symptoms worsen      DISCHARGE MEDICATIONS:  New Prescriptions    No medications on file       Terry Garcia MD  Emergency Medicine Resident    (Please note that portions of this note were completed with a voice recognition program.Efforts were made to edit the dictations but occasionally words are mis-transcribed.)       Terry Garcia MD  Resident  10/25/21 Brenda Valladares MD  Resident  10/25/21 2007

## 2021-10-25 NOTE — ED NOTES
Writer met with patient at bedside regarding concerns for unsafe housing. Patient reports that her house is \"messy\" and she was told by her landlord that if she didn't clean it she would be evicted. Patient states that she has not received an eviction notice at this time. She reports to having some confusion when trying to complete tasks at home which makes cleaning her home difficult. Patient is linked with [employer] and has a . Writer encouraged patient to seek help through her mental health agency as the  can assist with linkage to resources/funding for cleaning. Patient reported understanding. Social work will remain available if needed.       MARISOL Medellin  10/25/21 7892

## 2021-10-25 NOTE — ED NOTES
The following labs labeled with pt sticker and tubed to lab:     [] Blue     [x] Lavender   [] on ice  [x] Green/yellow  [] Green/black [] on ice  [] Yellow  [] Red  [] Pink      [] COVID-19 swab    [] Rapid  [] PCR  [] Peds Viral Panel     [] Urine Sample  [] Pelvic Cultures  [] Blood Cultures            Galo Sena RN  10/25/21 5911

## 2021-10-26 LAB
CULTURE: NORMAL
EKG ATRIAL RATE: 91 BPM
EKG P AXIS: 55 DEGREES
EKG P-R INTERVAL: 152 MS
EKG Q-T INTERVAL: 374 MS
EKG QRS DURATION: 68 MS
EKG QTC CALCULATION (BAZETT): 460 MS
EKG R AXIS: 39 DEGREES
EKG T AXIS: 43 DEGREES
EKG VENTRICULAR RATE: 91 BPM
Lab: NORMAL
SARS-COV-2, RAPID: NOT DETECTED
SPECIMEN DESCRIPTION: NORMAL
SPECIMEN DESCRIPTION: NORMAL

## 2021-10-26 PROCEDURE — 6370000000 HC RX 637 (ALT 250 FOR IP): Performed by: STUDENT IN AN ORGANIZED HEALTH CARE EDUCATION/TRAINING PROGRAM

## 2021-10-26 PROCEDURE — 97116 GAIT TRAINING THERAPY: CPT

## 2021-10-26 PROCEDURE — 97162 PT EVAL MOD COMPLEX 30 MIN: CPT

## 2021-10-26 PROCEDURE — 6370000000 HC RX 637 (ALT 250 FOR IP): Performed by: NURSE PRACTITIONER

## 2021-10-26 PROCEDURE — 2580000003 HC RX 258: Performed by: STUDENT IN AN ORGANIZED HEALTH CARE EDUCATION/TRAINING PROGRAM

## 2021-10-26 PROCEDURE — G0378 HOSPITAL OBSERVATION PER HR: HCPCS

## 2021-10-26 PROCEDURE — 97535 SELF CARE MNGMENT TRAINING: CPT

## 2021-10-26 PROCEDURE — 87635 SARS-COV-2 COVID-19 AMP PRB: CPT

## 2021-10-26 PROCEDURE — 6360000002 HC RX W HCPCS: Performed by: STUDENT IN AN ORGANIZED HEALTH CARE EDUCATION/TRAINING PROGRAM

## 2021-10-26 PROCEDURE — 96372 THER/PROPH/DIAG INJ SC/IM: CPT

## 2021-10-26 PROCEDURE — 97166 OT EVAL MOD COMPLEX 45 MIN: CPT

## 2021-10-26 RX ORDER — NITROFURANTOIN 25; 75 MG/1; MG/1
100 CAPSULE ORAL EVERY 12 HOURS SCHEDULED
Status: DISCONTINUED | OUTPATIENT
Start: 2021-10-26 | End: 2021-10-27 | Stop reason: HOSPADM

## 2021-10-26 RX ORDER — QUETIAPINE FUMARATE 200 MG/1
400 TABLET, FILM COATED ORAL NIGHTLY
COMMUNITY
End: 2021-11-22 | Stop reason: SDUPTHER

## 2021-10-26 RX ORDER — QUETIAPINE FUMARATE 200 MG/1
400 TABLET, FILM COATED ORAL NIGHTLY
Status: DISCONTINUED | OUTPATIENT
Start: 2021-10-26 | End: 2021-10-27 | Stop reason: HOSPADM

## 2021-10-26 RX ORDER — QUETIAPINE FUMARATE 300 MG/1
300 TABLET, FILM COATED ORAL 2 TIMES DAILY
Status: DISCONTINUED | OUTPATIENT
Start: 2021-10-26 | End: 2021-10-27 | Stop reason: HOSPADM

## 2021-10-26 RX ORDER — HYDROCODONE BITARTRATE AND ACETAMINOPHEN 5; 325 MG/1; MG/1
2 TABLET ORAL EVERY 6 HOURS PRN
Status: DISCONTINUED | OUTPATIENT
Start: 2021-10-26 | End: 2021-10-27 | Stop reason: HOSPADM

## 2021-10-26 RX ORDER — 0.9 % SODIUM CHLORIDE 0.9 %
1000 INTRAVENOUS SOLUTION INTRAVENOUS ONCE
Status: COMPLETED | OUTPATIENT
Start: 2021-10-26 | End: 2021-10-26

## 2021-10-26 RX ORDER — HYDROCODONE BITARTRATE AND ACETAMINOPHEN 5; 325 MG/1; MG/1
1 TABLET ORAL EVERY 6 HOURS PRN
Status: DISCONTINUED | OUTPATIENT
Start: 2021-10-26 | End: 2021-10-27 | Stop reason: HOSPADM

## 2021-10-26 RX ADMIN — NITROFURANTOIN MONOHYDRATE/MACROCRYSTALLINE 100 MG: 25; 75 CAPSULE ORAL at 20:49

## 2021-10-26 RX ADMIN — HYDROCODONE BITARTRATE AND ACETAMINOPHEN 2 TABLET: 5; 325 TABLET ORAL at 17:08

## 2021-10-26 RX ADMIN — SODIUM CHLORIDE, PRESERVATIVE FREE 10 ML: 5 INJECTION INTRAVENOUS at 20:49

## 2021-10-26 RX ADMIN — ENOXAPARIN SODIUM 40 MG: 40 INJECTION SUBCUTANEOUS at 09:11

## 2021-10-26 RX ADMIN — QUETIAPINE FUMARATE 300 MG: 300 TABLET ORAL at 16:39

## 2021-10-26 RX ADMIN — TRAZODONE HYDROCHLORIDE 150 MG: 100 TABLET ORAL at 20:49

## 2021-10-26 RX ADMIN — LISINOPRIL 10 MG: 10 TABLET ORAL at 09:11

## 2021-10-26 RX ADMIN — QUETIAPINE FUMARATE 400 MG: 200 TABLET ORAL at 20:48

## 2021-10-26 RX ADMIN — NITROFURANTOIN MONOHYDRATE/MACROCRYSTALLINE 100 MG: 25; 75 CAPSULE ORAL at 09:34

## 2021-10-26 RX ADMIN — SODIUM CHLORIDE 1000 ML: 9 INJECTION, SOLUTION INTRAVENOUS at 10:02

## 2021-10-26 ASSESSMENT — PAIN SCALES - GENERAL
PAINLEVEL_OUTOF10: 7
PAINLEVEL_OUTOF10: 9
PAINLEVEL_OUTOF10: 0
PAINLEVEL_OUTOF10: 7
PAINLEVEL_OUTOF10: 6

## 2021-10-26 ASSESSMENT — PAIN DESCRIPTION - LOCATION
LOCATION: LEG;HIP
LOCATION: HIP;LEG

## 2021-10-26 ASSESSMENT — PAIN DESCRIPTION - PAIN TYPE
TYPE: ACUTE PAIN
TYPE: ACUTE PAIN;CHRONIC PAIN

## 2021-10-26 ASSESSMENT — PAIN DESCRIPTION - DESCRIPTORS
DESCRIPTORS: ACHING;DISCOMFORT;SORE
DESCRIPTORS: ACHING;DISCOMFORT;SORE

## 2021-10-26 ASSESSMENT — PAIN DESCRIPTION - FREQUENCY: FREQUENCY: CONTINUOUS

## 2021-10-26 ASSESSMENT — PAIN DESCRIPTION - ORIENTATION
ORIENTATION: LEFT
ORIENTATION: LEFT

## 2021-10-26 ASSESSMENT — PAIN - FUNCTIONAL ASSESSMENT: PAIN_FUNCTIONAL_ASSESSMENT: PREVENTS OR INTERFERES SOME ACTIVE ACTIVITIES AND ADLS

## 2021-10-26 NOTE — PLAN OF CARE
Problem: Falls - Risk of:  Goal: Will remain free from falls  Description: Will remain free from falls  10/26/2021 1946 by Shlomo Sanchez RN  Outcome: Ongoing  10/26/2021 1815 by Saira Ugalde RN  Outcome: Met This Shift  Goal: Absence of physical injury  Description: Absence of physical injury  10/26/2021 1946 by Shlomo Sanchez RN  Outcome: Ongoing  10/26/2021 1815 by Saira Ugalde RN  Outcome: Met This Shift     Problem: Skin Integrity:  Goal: Will show no infection signs and symptoms  Description: Will show no infection signs and symptoms  10/26/2021 1946 by Shlomo Sanchez RN  Outcome: Ongoing  10/26/2021 1815 by Saira Ugalde RN  Outcome: Met This Shift  Goal: Absence of new skin breakdown  Description: Absence of new skin breakdown  10/26/2021 1946 by Shlomo Sanchez RN  Outcome: Ongoing  10/26/2021 1815 by Saira Ugalde RN  Outcome: Met This Shift     Problem: Pain:  Goal: Pain level will decrease  Description: Pain level will decrease  10/26/2021 1946 by Shlomo Sanchez RN  Outcome: Ongoing  10/26/2021 1815 by Saira Ugalde RN  Outcome: Ongoing  Goal: Control of acute pain  Description: Control of acute pain  Outcome: Ongoing  Goal: Control of chronic pain  Description: Control of chronic pain  10/26/2021 1946 by Shlomo Sanchez RN  Outcome: Ongoing  10/26/2021 1815 by Saira Ugalde RN  Outcome: Ongoing     Problem: Musculor/Skeletal Functional Status  Goal: Highest potential functional level  10/26/2021 1946 by Shlomo Sanchez RN  Outcome: Ongoing  10/26/2021 1815 by Saira Ugalde RN  Outcome: Ongoing

## 2021-10-26 NOTE — PROGRESS NOTES
Physical Therapy    Facility/Department: [address] MED SURG  Initial Assessment    NAME: Branden Dow  : 1955  MRN: [id number]    Date of Service: 10/26/2021  Chief Complaint   Patient presents with    Dizziness    Fall     last night    Leg Pain     left side    Other     needs to speak w/ social work       Discharge Recommendations:  Patient would benefit from continued therapy after discharge   PT Equipment Recommendations  Equipment Needed: Yes  Mobility Devices: Lorrayne Chris: Rolling    Assessment   Body structures, Functions, Activity limitations: Decreased functional mobility ; Decreased strength;Decreased safe awareness;Decreased endurance;Decreased balance  Assessment: Pt with impaired mobility requiring min to mod A for functional transfers and gait training this date. Pt with impaired overall mobility and would be unsafe to ambulate without physical assistance at this time. Pt would be unsafe to return to her prior living arrangements and will benefit from further therapy at discharge. Prognosis: Good  Decision Making: Medium Complexity  PT Education: Goals;PT Role;Plan of Care;Transfer Training;Functional Mobility Training;Gait Training  REQUIRES PT FOLLOW UP: Yes  Activity Tolerance  Activity Tolerance: Patient limited by endurance; Patient limited by fatigue  Activity Tolerance: Cooperative and motivated during session       Patient Diagnosis(es): The encounter diagnosis was Fall, initial encounter. has a past medical history of CHRIS (acute kidney injury) ([address] Bipolar 1 disorder ([address] Breast lump, Chronic obstructive pulmonary disease ([address] Depression, GERD (gastroesophageal reflux disease), Hyperlipidemia, Hypertension, Osteoarthritis, Type 2 diabetes mellitus without complication, with long-term current use of insulin ([address] and Type II or unspecified type diabetes mellitus without mention of complication, not stated as uncontrolled.    has a past surgical history that includes Ectopic pregnancy surgery; knee surgery (Right); Dilation and curettage of uterus; Nerve Block (Left, 12/8/14); Knee Arthroplasty (2017); and Tonsillectomy and adenoidectomy. Restrictions  Restrictions/Precautions  Restrictions/Precautions: General Precautions, Fall Risk, Up as Tolerated  Required Braces or Orthoses?: No  Position Activity Restriction  Other position/activity restrictions: Negative imaging for acute fractures  Vision/Hearing  Vision: Impaired  Vision Exceptions: Wears glasses at all times (Currently broken)  Hearing: Exceptions to [facility]  Hearing Exceptions: Hard of hearing/hearing concerns     Subjective  General  Patient assessed for rehabilitation services?: Yes  Response To Previous Treatment: Not applicable  Family / Caregiver Present: No  Follows Commands: Within Functional Limits  Subjective  Subjective: Pt supine in bed and agreeable to therapy this morning. RN agreeable to therapy. Pt reports pain up to 7/10 on her left side. Pain Screening  Patient Currently in Pain: Yes  Pain Assessment  Pain Assessment: 0-10  Pain Level: 7  Pain Type: Acute pain  Pain Location: Leg;Hip  Pain Orientation: Left  Pain Descriptors: Aching;Discomfort; Sore  Functional Pain Assessment: Prevents or interferes some active activities and ADLs  Non-Pharmaceutical Pain Intervention(s): Ambulation/Increased Activity; Distraction;Repositioned  Response to Pain Intervention: Patient Satisfied  Vital Signs  Patient Currently in Pain: Yes       Orientation  Orientation  Overall Orientation Status: Within Functional Limits  Social/Functional History  Social/Functional History  Lives With: Alone  Type of Home: Apartment  Home Layout: One level (7th floor with laundry within apartment)  Home Access: Elevator, Level entry (level entry with elevator access to 7th floor)  Bathroom Shower/Tub: Tub/Shower unit  Bathroom Toilet: Handicap height  Bathroom Equipment: Tub transfer bench, Grab bars in shower, Grab bars around toilet  Bathroom Accessibility: Accessible  Home Equipment: 4 wheeled walker, Quad cane (Pt previously had an electric w/c which is now broken. Pt reports use of a quad cane in her home and use of rollator in community.)  ADL Assistance: Independent (Pt reports need for increased time and effort now for ADLs but reports she has been still completing what she can independently)  [address]: Independent  Homemaking Responsibilities: Yes  Meal Prep Responsibility: Primary  Laundry Responsibility: Primary  Cleaning Responsibility: Primary  Shopping Responsibility: Primary  Other (Comment): Pt reports difficulty completing but states she has been doing the best she can. She does note she doesn't have the best living conditions. Ambulation Assistance: Independent (with quad cane or rollator)  Transfer Assistance: Independent  Active : No  Patient's  Info: [employer] people drives her for shopping (per prior admission)  Mode of Transportation: Cab, Bus  Occupation: Retired  Type of occupation:   Leisure & Hobbies: Enjoys shooting pool  Additional Comments: Pt does report she has family that is supportive but some live out of town and other's are only able to help sometimes. No one available to provide 24/7 support that she is aware of. Cognition   Cognition  Overall Cognitive Status: Exceptions  Following Commands: Follows multistep commands with repitition; Follows multistep commands with increased time  Safety Judgement: Decreased awareness of need for assistance;Decreased awareness of need for safety  Problem Solving: Assistance required to generate solutions;Assistance required to implement solutions;Assistance required to correct errors made;Assistance required to identify errors made  Insights: Decreased awareness of deficits  Initiation: Requires cues for some  Sequencing: Requires cues for some    Objective          AROM RLE (degrees)  RLE AROM: WFL  AROM LLE (degrees)  LLE AROM : WFL  AROM RUE (degrees)  RUE AROM : WFL  AROM LUE (degrees)  LUE AROM : WFL  Strength RLE  Strength RLE: Exception  R Hip Flexion: 4-/5  R Knee Flexion: 4/5  R Knee Extension: 4-/5  R Ankle Dorsiflexion: 4+/5  R Ankle Plantar flexion: 4+/5  Strength LLE  Strength LLE: Exception  L Hip Flexion: 4-/5  L Knee Flexion: 4/5  L Knee Extension: 4-/5  L Ankle Dorsiflexion: 4+/5  L Ankle Plantar Flexion: 4+/5  Strength RUE  Comment: Co evaluation with OT-see OT evaluation for full UE assessment  Strength LUE  Comment: Co evaluation with OT-see OT evaluation for full UE assessment     Sensation  Overall Sensation Status: Impaired  Additional Comments: Pt reports chronic and intermittent numbness and tingling to bilateral feet. Bed mobility  Supine to Sit: Minimal assistance (HOB raised 60 degrees, use of bed rail)  Sit to Supine: Contact guard assistance (Increased time and effort)  Scooting: Contact guard assistance  Transfers  Sit to Stand: Moderate Assistance (performed twice- min Ax2 on first attempt; mod Ax1 on second attempt; cues for hand placement)  Stand to sit: Moderate Assistance;Minimal Assistance (Min A on first attempt, mod A on second attempt)  Ambulation  Ambulation?: Yes  More Ambulation?: No  Ambulation 1  Surface: level tile  Device: Rolling Walker  Assistance: Minimal assistance  Quality of Gait: unsteady, forward flexion, high reliance on UE support on RW, decreased walker safety/negotiation, minor buckling of knees as she fatigues.   Gait Deviations: Slow Manda;Decreased step length;Decreased step height  Distance: 20ft  Stairs/Curb  Stairs?: No     Balance  Posture: Fair  Sitting - Static: Good;-  Sitting - Dynamic: Fair;+  Standing - Static: Fair  Standing - Dynamic: Fair  Comments: standing balance assessed with RW        Plan   Plan  Times per week: 5-6x  Times per day: Daily  Current Treatment Recommendations: Strengthening, Balance Training, Functional Mobility Training, Transfer Training, Endurance Training, Gait Training, Stair training, Home Exercise Program, Safety Education & Training, Patient/Caregiver Education & Training  Safety Devices  Type of devices: Call light within reach, Gait belt, Nurse notified, Left in bed, Bed alarm in place  Restraints  Initially in place: No                                     AM-PAC Score     AM-PAC Inpatient Mobility without Stair Climbing Raw Score : 14 (10/26/21 1334)  AM-PAC Inpatient without Stair Climbing T-Scale Score : 40.85 (10/26/21 1334)  Mobility Inpatient CMS 0-100% Score: 53.33 (10/26/21 1334)  Mobility Inpatient without Stair CMS G-Code Modifier : CK (10/26/21 1334)       Goals  Short term goals  Time Frame for Short term goals: 14 visits  Short term goal 1: Pt to ambulate 300ft modified independently with RW  Short term goal 2: Pt to sit <> stand transfer independently  Short term goal 3: Pt to demonstrate independent bed mobility  Short term goal 4: Pt to tolerate 30 minutes of therapy for endurance  Short term goal 5: Pt to demonstrate good seated and standing balance to decrease risk of falls       Therapy Time   Individual Concurrent Group Co-treatment   Time In 1014         Time Out 1040         Minutes 26         Timed Code Treatment Minutes: Iam Amor, PT

## 2021-10-26 NOTE — ED PROVIDER NOTES
[facility]  ED  Emergency Department  Emergency Medicine Resident Sign-out     Care of Lashay Reyes was assumed from Dr. Judith Coley and is being seen for Dizziness, Fall (last night), Leg Pain (left side), and Other (needs to speak w/ social work)  . The patient's initial evaluation and plan have been discussed with the prior provider who initially evaluated the patient. EMERGENCY DEPARTMENT COURSE / MEDICAL DECISION MAKING:       MEDICATIONS GIVEN:  No orders of the defined types were placed in this encounter. LABS / RADIOLOGY:     Labs Reviewed   BASIC METABOLIC PANEL W/ REFLEX TO MG FOR LOW K - Abnormal; Notable for the following components:       Result Value    Glucose 110 (*)     CREATININE 1.13 (*)     GFR Non- 48 (*)     GFR  58 (*)     All other components within normal limits   CBC WITH AUTO DIFFERENTIAL - Abnormal; Notable for the following components:    RDW 14.5 (*)     Lymphocytes 53 (*)     Absolute Lymph # 4.81 (*)     All other components within normal limits   TROPONIN       XR KNEE LEFT (3 VIEWS)    Result Date: 10/25/2021  EXAMINATION: THREE XRAY VIEWS OF THE LEFT KNEE 10/25/2021 6:16 pm COMPARISON: None. HISTORY: ORDERING SYSTEM PROVIDED HISTORY: fall TECHNOLOGIST PROVIDED HISTORY: fall FINDINGS: Frontal, lateral, and oblique view radiographs of the left knee were obtained. Bone mineralization is normal.  The osseous structures are intact without acute fracture or destructive abnormality. Joint relationships are maintained. There is mild osteoarthritis involving the medial and patellofemoral compartments, noting subchondral sclerosis, productive change, and joint space narrowing, with relative preservation of the lateral compartment. No joint effusion. No appreciable soft tissue abnormality. Mild left knee osteoarthritis, primarily involving the medial and patellofemoral compartments. No acute fracture or dislocation. No effusion. CT Head WO Contrast    Result Date: 10/25/2021  EXAMINATION: CT OF THE HEAD WITHOUT CONTRAST  10/25/2021 12:10 pm TECHNIQUE: CT of the head was performed without the administration of intravenous contrast. Dose modulation, iterative reconstruction, and/or weight based adjustment of the mA/kV was utilized to reduce the radiation dose to as low as reasonably achievable. COMPARISON: CT scan dated April 30, 2013 HISTORY: ORDERING SYSTEM PROVIDED HISTORY: fall TECHNOLOGIST PROVIDED HISTORY: fall Decision Support Exception - unselect if not a suspected or confirmed emergency medical condition->Emergency Medical Condition (MA) Reason for Exam: fall FINDINGS: BRAIN/VENTRICLES: There is no acute intracranial hemorrhage, mass effect or midline shift. No abnormal extra-axial fluid collection. The gray-white differentiation is maintained without evidence of an acute infarct. There is no evidence of hydrocephalus. Scattered senescent white matter changes are present. Old areas of infarct are present within the basal ganglia region bilaterally. ORBITS: The visualized portion of the orbits demonstrate no acute abnormality. SINUSES: The visualized paranasal sinuses and mastoid air cells demonstrate no acute abnormality. SOFT TISSUES/SKULL:  No acute abnormality of the visualized skull or soft tissues. No acute intracranial abnormality. CT Cervical Spine WO Contrast    Result Date: 10/25/2021  EXAMINATION: CT OF THE CERVICAL SPINE WITHOUT CONTRAST 10/25/2021 12:10 pm TECHNIQUE: CT of the cervical spine was performed without the administration of intravenous contrast. Multiplanar reformatted images are provided for review. Dose modulation, iterative reconstruction, and/or weight based adjustment of the mA/kV was utilized to reduce the radiation dose to as low as reasonably achievable.  COMPARISON: CT scan dated April 32,013 HISTORY: ORDERING SYSTEM PROVIDED HISTORY: fall TECHNOLOGIST PROVIDED HISTORY: fall Decision Support Exception - unselect if not a suspected or confirmed emergency medical condition->Emergency Medical Condition (MA) Reason for Exam: fall FINDINGS: BONES/ALIGNMENT: There is no acute fracture or traumatic malalignment. DEGENERATIVE CHANGES: Multilevel degenerative changes are present, most prominent at the C4-5 C5-6 and C6-7 disc levels. Facet arthropathy is present throughout the cervical spine. SOFT TISSUES: There is no prevertebral soft tissue swelling. Heavily calcified plaque formation is present within the right carotid body, and at the origin of the right internal carotid artery. No evidence of an acute fracture or traumatic malalignment involving the cervical spine     XR CHEST PORTABLE    Result Date: 10/25/2021  EXAMINATION: ONE XRAY VIEW OF THE CHEST 10/25/2021 6:16 pm COMPARISON: None. HISTORY: ORDERING SYSTEM PROVIDED HISTORY: eval for cardiomeg TECHNOLOGIST PROVIDED HISTORY: eval for cardiomeg FINDINGS: A portable upright frontal view chest radiograph was obtained. The heart is enlarged. The mediastinal contours and pleural spaces are otherwise within normal limits. Minimal atelectasis is present at the right lung base. The lungs are otherwise clear. There is no focal consolidation or pneumothorax. The pulmonary vascular pattern is within normal limits. No significant thoracic osseous abnormality. Minimal right basilar atelectasis. Otherwise, clear lungs. Cardiomegaly. XR HIP 2-3 VW W PELVIS LEFT    Result Date: 10/25/2021  EXAMINATION: ONE XRAY VIEW OF THE PELVIS AND TWO XRAY VIEWS LEFT HIP 10/25/2021 6:16 pm COMPARISON: None. HISTORY: ORDERING SYSTEM PROVIDED HISTORY: fall TECHNOLOGIST PROVIDED HISTORY: fall FINDINGS: A frontal view pelvic radiograph was obtained, along with frontal and lateral view radiographs of the left hip. Bone mineralization is normal.  There is no evidence of acute fracture or dislocation. Joint relationships are maintained.   There is a mild weight and ambulate. States the pain is 7 out of 10 in severity, nonradiating. Denies flank pain, lumbar pain, pain on the right leg, headache or vision changes or focal deficits. States that she does not move her left side much because of chronic left hip pain. [PS]   1940 Negative imagining     [PS]   1938 No dizziness acutely     [PS]   1938 Ambulate, discharge    [PS]   1949 Does ambulate with assist, does not feel comfortable going home, no dizziness when ambulated and going up from a seated position. [PS]   2015 I took over care of this patient. She is pending troponin. [KA]   2018 Troponin, High Sensitivity: 8 [KA]   2133 Pro-BNP: 62 [KA]      ED Course User Index  [KA] Aby Luong DO  [PS] Yecenia Wright MD       OUTSTANDING TASKS / RECOMMENDATIONS:    1. Troponin  2. Urinalysis  3. Admit obs     FINAL IMPRESSION:     1.  Fall, initial encounter        DISPOSITION:         DISPOSITION:  []  Discharge   []  Transfer -    [x]  Admission -   ETU   []  Against Medical Advice   []  Eloped   FOLLOW-UP: Torres Stahl, APRN - CNP  [address] [facility] [id number] [phone]    In 3 days      [facility] ED  [address] [place] [address]  [phone]    As needed, If symptoms worsen     DISCHARGE MEDICATIONS: New Prescriptions    No medications on file          Aby Luong DO  Emergency Medicine Resident  [facility] [place], [place]  Resident  10/25/21 1162

## 2021-10-26 NOTE — ED NOTES
Report given, all questions answered, covid result came back negative, called [facility] to inform them of test results and that pt is coming up now.       Brooklyn Iglesias RN  10/26/21 0284

## 2021-10-26 NOTE — ED NOTES
The following labs labeled with pt sticker and tubed to lab:     [] Blue     [] Lavender   [] on ice  [] Green/yellow  [] Green/black [] on ice  [] Yellow  [] Red  [] Pink      [] COVID-19 swab    [] Rapid  [] PCR  [] Peds Viral Panel     [x] Urine Sample  [] Pelvic Cultures  [] Blood Cultures            Lucero Stover RN  10/25/21 8526

## 2021-10-26 NOTE — PROGRESS NOTES
12/8/14); Knee Arthroplasty (2017); and Tonsillectomy and adenoidectomy. Restrictions  Restrictions/Precautions  Restrictions/Precautions: General Precautions, Fall Risk, Up as Tolerated  Required Braces or Orthoses?: No  Position Activity Restriction  Other position/activity restrictions: Orthostatics 10/26 sup (110/63), sit (105/65), stand (139/71, pt sat at end of BP being taken)    Subjective   General  Patient assessed for rehabilitation services?: Yes  Family / Caregiver Present: No  Diagnosis: Fall on L side, AMS< B/L hip osteoarthritis, poor living conditions  Patient Currently in Pain: Yes  Pain Assessment  Pain Assessment: 0-10  Pain Level: 7  Pain Type: Acute pain;Chronic pain  Pain Location: Hip;Leg  Pain Orientation: Left  Pain Descriptors: Aching;Discomfort; Sore  Pain Frequency: Continuous  Non-Pharmaceutical Pain Intervention(s): Ambulation/Increased Activity; Distraction; Emotional support; Therapeutic presence  Response to Pain Intervention: Patient Satisfied  Vital Signs  Patient Currently in Pain: Yes  Social/Functional History  Social/Functional History  Lives With: Alone  Type of Home: Apartment  Home Layout: One level (7th floor, pt stating laundry is in her actual apartment)  Home Access: Elevator, Level entry  Bathroom Shower/Tub: Tub/Shower unit  Bathroom Toilet: Handicap height  Bathroom Equipment: Tub transfer bench, Grab bars in shower  Bathroom Accessibility: Accessible  Home Equipment: 4 wheeled walker, Quad cane (electric scooter currently broken, pt borrowed neighbors 4ww which has no seat-uses when leaving apartment)  ADL Assistance: Needs assistance (\"Its been tough\" when pt speaks of ADL's, pt struggles with LB ADL's)  Homemaking Assistance: Needs assistance (Vaccum broken, per chart EMT\"s stated pt was living in [employer] conditions\", \"my aide quit a while back\" pt states)  Homemaking Responsibilities: Yes  Ambulation Assistance: Independent (using DME)  Transfer Assistance: Independent  Active : No  Patient's  Info: Religious people drives her for shopping (per prior admission)  Mode of Transportation: Cab, Bus  Occupation: Retired  Leisure & Hobbies: shoot pool  Additional Comments: [facility], pt states being evicted currently, son lives in [address]: Impaired (Glasses are broken currently)  Hearing: Exceptions to [facility]  Hearing Exceptions: Hard of hearing/hearing concerns    Orientation  Overall Orientation Status: Impaired  Orientation Level: Oriented to person;Oriented to situation;Oriented to place; Disoriented to time (Pt stated day was a Thursday instead of a Tuesday)     Balance  Sitting Balance: Supervision  Standing Balance: Stand by assistance  Standing Balance  Time: 5 min  Activity: Pt stood bedside x2, func mob around room  Comment: Dizziness when first standing, orthostatics taken and listed in Restriction section of this noted  Functional Mobility  Functional - Mobility Device: Rolling Walker  Activity: Other  Assist Level: Minimal assistance  Functional Mobility Comments: Slow pace, fatigues quickly, neuropathy in feet when standing  ADL  Feeding: Modified independent ; Increased time to complete  Grooming: Stand by assistance; Increased time to complete  UE Bathing: Contact guard assistance; Increased time to complete  LE Bathing: Increased time to complete; Moderate assistance  UE Dressing: Increased time to complete;Minimal assistance  LE Dressing: Increased time to complete;Maximum assistance  Toileting: Increased time to complete;Maximum assistance  Additional Comments: Pt able to perform RLE figure-4 technique to simulate LB ADL's, pt limited with LLE by high pain, pt confused at times, poor standing tolerance  Tone RUE  RUE Tone: Normotonic  Tone LUE  LUE Tone: Normotonic  Coordination  Movements Are Fluid And Coordinated: No  Coordination and Movement description: Decreased speed     Bed mobility  Supine to Sit: Minimal assistance  Sit to Supine: Contact guard assistance  Scooting: Contact guard assistance  Comment: Pt required increased tme to bring BLE's into bed on return to supine, pt supine in bed upon arrival/exit this date  Transfers  Sit to stand: Moderate assistance  Stand to sit: Moderate assistance  Transfer Comments: RW used, pt \"plopping\" down when sitting into of reaching back despite 1 vc this date, fatigues quickly     Cognition  Overall Cognitive Status: Exceptions  Following Commands: Follows multistep commands with repitition; Follows multistep commands with increased time  Memory: Decreased recall of recent events;Decreased short term memory  Safety Judgement: Decreased awareness of need for assistance  Problem Solving: Assistance required to generate solutions;Assistance required to implement solutions;Assistance required to correct errors made;Assistance required to identify errors made  Insights: Decreased awareness of deficits  Initiation: Requires cues for some  Sequencing: Requires cues for some  Cognition Comment: Pt lacks safety awareness at times, \"I think I'm on the 7th floor\" when asked about home setup, pt plopping down on EOB despite vc's to sit slowly        Sensation  Overall Sensation Status: Impaired (Chronic numbness in BLE's)        LUE AROM (degrees)  LUE AROM : WFL  RUE AROM (degrees)  RUE AROM : WFL  LUE Strength  Gross LUE Strength: Exceptions to [facility]  L Shoulder Flex: 4/5  L Elbow Flex: 4+/5  L Elbow Ext: 4+/5  L Hand General: 5/5  RUE Strength  Gross RUE Strength: WFL  R Shoulder Flex: 4+/5  R Elbow Flex: 4+/5  R Elbow Ext: 4+/5  R Hand General: 5/5                   Plan   Plan  Times per week: 3-5x    AM-PAC Score        AM-PAC Inpatient Daily Activity Raw Score: 16 (10/26/21 1212)  AM-PAC Inpatient ADL T-Scale Score : 35.96 (10/26/21 1212)  ADL Inpatient CMS 0-100% Score: 53.32 (10/26/21 1212)  ADL Inpatient CMS G-Code Modifier : CK (10/26/21 1212)    Goals  Short term goals  Time Frame for Short term goals: Pt will by discharge  Short term goal 1: demo good safety awareness during func mob around room using RW and SUP  Short term goal 2: demo standing during func activity for 7 min+ during func activity with SUP and no seated rest break  Short term goal 3: demo ADL UB bathing/dressing activity with mod I and increased time  Short term goal 4: demo ADL LB bathing/dressing activity with CGA, sock-aid/reacher, and increased time  Short term goal 5: demo mod I for all bed mob/transfers using railings/LRD PRN     Therapy Time   Individual Concurrent Group Co-treatment   Time In 1012         Time Out 1043         Minutes 31         Timed Code Treatment Minutes: [address]  [facility], OTR/L

## 2021-10-26 NOTE — PROGRESS NOTES
[facility]  CDU / OBSERVATION ENCOUNTER  ATTENDING NOTE       I performed a history and physical examination of the patient and discussed management with the resident or midlevel provider. I reviewed the resident or midlevel provider's note and agree with the documented findings and plan of care. Any areas of disagreement are noted on the chart. I was personally present for the key portions of any procedures. I have documented in the chart those procedures where I was not present during the key portions. I have reviewed the nurses notes. I agree with the chief complaint, past medical history, past surgical history, allergies, medications, social and family history as documented unless otherwise noted below. The Family history, social history, and ROS are effectively unchanged since admission unless noted elsewhere in the chart. Patient with fall the day prior to presentation. Patient felt like her knee gave out on her. Patient with history of chronic knee and hip pain. Patient denied hitting head and denied losing consciousness. Patient with ongoing pain in left knee and progressive worsening after the fall. Patient seen here earlier this month after a fall as well. Patient was admitted to a psychiatric facility after medical clearance after that fall. Patient had similar incident relating to the fall itself. There is also concern over patient's home situation. Patient assessed as being possibly unable to be returned home secondary to multiple falls and living conditions in the house. The following is copied from the social work note of 10/25/2021:  Cara Freeman met with patient at bedside regarding concerns for unsafe housing. Patient reports that her house is \"messy\" and she was told by her landlord that if she didn't clean it she would be evicted. Patient states that she has not received an eviction notice at this time.   She reports to having some confusion when trying to complete tasks at home which makes cleaning her home difficult. Patient is linked with [facility] and has a . Writer encouraged patient to seek help through her mental health agency as the  can assist with linkage to resources/funding for cleaning. Patient reported understanding. Social work will remain available if needed. Rian Figueroa, [address]  10/25/21 7105    For repeat examination today. Appreciate cardiology input. Patient for fluid bolus now. Patient had ascending colon that she wishes to go live with. She agrees that she is not safe at home and will need help with her living arrangements.   Case management to discuss with son and daughter-in-law regarding ability to take care of her in [facility] 006 MD  Attending Emergency  Physician

## 2021-10-26 NOTE — H&P
[facility]  CDU / OBSERVATION eNCOUnter  Resident Note     Pt Name: Darren Workman  MRN: [id number]  [place] 1955  Date of evaluation: 10/26/21  Patient's PCP is :  JEANNETTE [facility] - [facility]    CHIEF COMPLAINT       Chief Complaint   Patient presents with    Dizziness    Fall     last night    Leg Pain     left side    Other     needs to speak w/ social work         HISTORY OF PRESENT ILLNESS    Darren Workman is a 77 y.o. female past medical history of COPD, hypertension, type 2 diabetes who presents with fall. 1 day prior to arrival to the emergency department patient states that she was walking and felt like her left knee gave out. Patient has a history of chronic left knee and left hip pain. After her left knee gave out she fell on her left side making direct contact to the ground with her left hip. Denies hitting her head, denies losing consciousness. Endorsed neck pain upon arrival.  Endorsed generalized pain to left hip. Work-up in the ED unremarkable. Admitted to observation unit for unsteadiness on her feet to be evaluated by PT/OT. Upon evaluation this morning patient resting comfortably in bed in no acute distress. Endorsing some left knee and left hip pain. Denies fever/chills, chest pain, shortness of breath, abdominal pain, headache, neck pain, change in bowel or bladder function, numbness or tingling. Location/Symptom: Left hip and left knee pain  Timing/Onset: Acute  Provocation: Movement  Quality: Sharp  Radiation: None  Severity: 7/10  Timing/Duration: Constant  Modifying Factors:  Movement    REVIEW OF SYSTEMS       General ROS - No fevers, No malaise   Ophthalmic ROS - No discharge, No changes in vision  ENT ROS -  No sore throat, No rhinorrhea,   Respiratory ROS - no shortness of breath, no cough, no  wheezing  Cardiovascular ROS - No chest pain, no dyspnea on exertion  Gastrointestinal ROS - No abdominal pain, no nausea or vomiting, no change in bowel habits, no black or bloody stools  Genito-Urinary ROS - No dysuria, trouble voiding, or hematuria  Musculoskeletal ROS -left hip and left knee pain. No myalgias, No arthalgias  Neurological ROS - No headache, no dizziness/lightheadedness, No focal weakness, no loss of sensation  Dermatological ROS - No lesions, No rash     (PQRS) Advance directives on face sheet per hospital policy. No change unless specifically mentioned in chart    PAST MEDICAL HISTORY    has a past medical history of CHRIS (acute kidney injury) ([address] Bipolar 1 disorder ([facility] [address] Breast lump, Chronic obstructive pulmonary disease ([facility] [address] Depression, GERD (gastroesophageal reflux disease), Hyperlipidemia, Hypertension, Osteoarthritis, Type 2 diabetes mellitus without complication, with long-term current use of insulin ([facility] [address] and Type II or unspecified type diabetes mellitus without mention of complication, not stated as uncontrolled. I have reviewed the past medical history with the patient and it is pertinent to this complaint. SURGICAL HISTORY      has a past surgical history that includes Ectopic pregnancy surgery; knee surgery (Right); Dilation and curettage of uterus; Nerve Block (Left, 12/8/14); Knee Arthroplasty (2017); and Tonsillectomy and adenoidectomy. I have reviewed and agree with Surgical History entered and it is pertinent to this complaint. CURRENT MEDICATIONS     0.9 % sodium chloride bolus, Once  lisinopril (PRINIVIL;ZESTRIL) tablet 10 mg, Daily  traZODone (DESYREL) tablet 150 mg, Nightly  sodium chloride flush 0.9 % injection 5-40 mL, 2 times per day  sodium chloride flush 0.9 % injection 5-40 mL, PRN  0.9 % sodium chloride infusion, PRN  enoxaparin (LOVENOX) injection 40 mg, Daily  acetaminophen (TYLENOL) tablet 650 mg, Q4H PRN  ondansetron (ZOFRAN-ODT) disintegrating tablet 4 mg, Q8H PRN   Or  ondansetron (ZOFRAN) injection 4 mg, Q6H PRN        All medication charted and reviewed.     ALLERGIES     is allergic to chlorpromazine, cyclobenzaprine, gabapentin, prochlorperazine, prochlorperazine edisylate, promethazine, and chantix [varenicline tartrate]. FAMILY HISTORY     She indicated that her mother is . She indicated that her father is . family history includes Diabetes in her father; High Blood Pressure in her father; Stroke in her father. The patient denies any pertinent family history. I have reviewed and agree with the family history entered. I have reviewed the Family History and it is not significant to the case    SOCIAL HISTORY      reports that she has been smoking cigarettes. She has a 2.50 pack-year smoking history. She has never used smokeless tobacco. She reports current alcohol use. She reports that she does not use drugs. I have reviewed and agree with all Social.  There are no concerns for substance abuse/use. PHYSICAL EXAM     INITIAL VITALS:  height is 5' 6\" (1.676 m) and weight is 200 lb (90.7 kg). Her axillary temperature is 96.6 °F (35.9 °C). Her blood pressure is 107/62 and her pulse is 81. Her respiration is 15 and oxygen saturation is 100%. CONSTITUTIONAL: AOx4, no apparent distress, appears stated age   HEAD: normocephalic, atraumatic   EYES: PERRLA, EOMI    ENT: moist mucous membranes, uvula midline   NECK: supple, symmetric   BACK: symmetric   LUNGS: clear to auscultation bilaterally   CARDIOVASCULAR: regular rate and rhythm, no murmurs, rubs or gallops   ABDOMEN: soft, non-tender, non-distended with normal active bowel sounds   NEUROLOGIC:  MAEx4, no focal sensory or motor deficits   MUSCULOSKELETAL:  Tender to palpation over left hip and left knee.   No clubbing, cyanosis or edema   SKIN: no rash or wounds       DIFFERENTIAL DIAGNOSIS/MDM:   Leg pain:  DDX: Trauma, knee fracture, ACL tear, collaterals, meniscus, ankle injury, DVT, abscess/ cellulitis, septic joint, gout, arthritis, patellar dislocation    DIAGNOSTIC RESULTS     EKG: All EKG's are interpreted by the Observation Physician who either signs or Co-signs this chart in the absence of a cardiologist.    EKG Interpretation    Interpreted by observation physician    Rhythm: normal sinus   Rate: normal  Axis: normal  Ectopy: none  Conduction: normal  ST Segments: normal  T Waves: normal  Q Waves: none    Clinical Impression: No acute changes, non-specific EKG    Nadeem Colmenares DO    RADIOLOGY:   I directly visualized the following  images and reviewed the radiologist interpretations:    XR KNEE LEFT (3 VIEWS)    Result Date: 10/25/2021  EXAMINATION: THREE XRAY VIEWS OF THE LEFT KNEE 10/25/2021 6:16 pm COMPARISON: None. HISTORY: ORDERING SYSTEM PROVIDED HISTORY: fall TECHNOLOGIST PROVIDED HISTORY: fall FINDINGS: Frontal, lateral, and oblique view radiographs of the left knee were obtained. Bone mineralization is normal.  The osseous structures are intact without acute fracture or destructive abnormality. Joint relationships are maintained. There is mild osteoarthritis involving the medial and patellofemoral compartments, noting subchondral sclerosis, productive change, and joint space narrowing, with relative preservation of the lateral compartment. No joint effusion. No appreciable soft tissue abnormality. Mild left knee osteoarthritis, primarily involving the medial and patellofemoral compartments. No acute fracture or dislocation. No effusion. CT Head WO Contrast    Result Date: 10/25/2021  EXAMINATION: CT OF THE HEAD WITHOUT CONTRAST  10/25/2021 12:10 pm TECHNIQUE: CT of the head was performed without the administration of intravenous contrast. Dose modulation, iterative reconstruction, and/or weight based adjustment of the mA/kV was utilized to reduce the radiation dose to as low as reasonably achievable.  COMPARISON: CT scan dated April 30, 2013 HISTORY: ORDERING SYSTEM PROVIDED HISTORY: fall TECHNOLOGIST PROVIDED HISTORY: fall Decision Support Exception - unselect if not a suspected or confirmed emergency medical condition->Emergency Medical Condition (MA) Reason for Exam: fall FINDINGS: BRAIN/VENTRICLES: There is no acute intracranial hemorrhage, mass effect or midline shift. No abnormal extra-axial fluid collection. The gray-white differentiation is maintained without evidence of an acute infarct. There is no evidence of hydrocephalus. Scattered senescent white matter changes are present. Old areas of infarct are present within the basal ganglia region bilaterally. ORBITS: The visualized portion of the orbits demonstrate no acute abnormality. SINUSES: The visualized paranasal sinuses and mastoid air cells demonstrate no acute abnormality. SOFT TISSUES/SKULL:  No acute abnormality of the visualized skull or soft tissues. No acute intracranial abnormality. CT Cervical Spine WO Contrast    Result Date: 10/25/2021  EXAMINATION: CT OF THE CERVICAL SPINE WITHOUT CONTRAST 10/25/2021 12:10 pm TECHNIQUE: CT of the cervical spine was performed without the administration of intravenous contrast. Multiplanar reformatted images are provided for review. Dose modulation, iterative reconstruction, and/or weight based adjustment of the mA/kV was utilized to reduce the radiation dose to as low as reasonably achievable. COMPARISON: CT scan dated April 32,013 HISTORY: ORDERING SYSTEM PROVIDED HISTORY: fall TECHNOLOGIST PROVIDED HISTORY: fall Decision Support Exception - unselect if not a suspected or confirmed emergency medical condition->Emergency Medical Condition (MA) Reason for Exam: fall FINDINGS: BONES/ALIGNMENT: There is no acute fracture or traumatic malalignment. DEGENERATIVE CHANGES: Multilevel degenerative changes are present, most prominent at the C4-5 C5-6 and C6-7 disc levels. Facet arthropathy is present throughout the cervical spine. SOFT TISSUES: There is no prevertebral soft tissue swelling.   Heavily calcified plaque formation is present within the right carotid body, and at the origin of the right internal carotid artery. No evidence of an acute fracture or traumatic malalignment involving the cervical spine     XR CHEST PORTABLE    Result Date: 10/25/2021  EXAMINATION: ONE XRAY VIEW OF THE CHEST 10/25/2021 6:16 pm COMPARISON: None. HISTORY: ORDERING SYSTEM PROVIDED HISTORY: eval for cardiomeg TECHNOLOGIST PROVIDED HISTORY: eval for cardiomeg FINDINGS: A portable upright frontal view chest radiograph was obtained. The heart is enlarged. The mediastinal contours and pleural spaces are otherwise within normal limits. Minimal atelectasis is present at the right lung base. The lungs are otherwise clear. There is no focal consolidation or pneumothorax. The pulmonary vascular pattern is within normal limits. No significant thoracic osseous abnormality. Minimal right basilar atelectasis. Otherwise, clear lungs. Cardiomegaly. XR HIP 2-3 VW W PELVIS LEFT    Result Date: 10/25/2021  EXAMINATION: ONE XRAY VIEW OF THE PELVIS AND TWO XRAY VIEWS LEFT HIP 10/25/2021 6:16 pm COMPARISON: None. HISTORY: ORDERING SYSTEM PROVIDED HISTORY: fall TECHNOLOGIST PROVIDED HISTORY: fall FINDINGS: A frontal view pelvic radiograph was obtained, along with frontal and lateral view radiographs of the left hip. Bone mineralization is normal.  There is no evidence of acute fracture or dislocation. Joint relationships are maintained. There is a mild degree of arthritic change involving both hips in a relatively symmetric, superolateral distribution, noting subchondral sclerosis with minimal productive change and joint space narrowing. No appreciable soft tissue abnormality. Mild bilateral hip osteoarthritis. No acute fracture or hip dislocation. LABS:  I have reviewed and interpreted all available lab results.   Labs Reviewed   BASIC METABOLIC PANEL W/ REFLEX TO MG FOR LOW K - Abnormal; Notable for the following components:       Result Value    Glucose 110 (*)     CREATININE 1.13 (*)     GFR Non- American 48 (*)     GFR  58 (*)     All other components within normal limits   CBC WITH AUTO DIFFERENTIAL - Abnormal; Notable for the following components:    RDW 14.5 (*)     Lymphocytes 53 (*)     Absolute Lymph # 4.81 (*)     All other components within normal limits   URINE RT REFLEX TO CULTURE - Abnormal; Notable for the following components:    Leukocyte Esterase, Urine SMALL (*)     All other components within normal limits   MICROSCOPIC URINALYSIS - Abnormal; Notable for the following components:    Bacteria, UA MODERATE (*)     Yeast, UA MODERATE (*)     All other components within normal limits   COVID-19, RAPID   CULTURE, URINE   TROPONIN   BRAIN NATRIURETIC PEPTIDE       SCREENING TOOLS:    HEART Risk Score for Chest Pain Patients   History and Physical Exam Suspicion Level  (Nausea, Vomiting, Diaphoresis, Radiation, Exertion)   Slightly Suspicious (0 pts)   Moderately Suspicious (1 pt)   Highly Suspicious (2 pts)   EKG Interpretation   Normal (0 pts)   Non-Specific Repolarization Disturbance (1 pt)   Significant ST-Depression (2 pts)   Age of Patient (in years)   = 39 (0 pts)   46-64 (1 pt)   = 65 (2 pts)   Risk Factors   No Risk Factors (0 pts)   1-2 Risk Factors (1 pt)   = 3 Risk Factors (2 pts)   Risk Factors Include:   Hypercholesterolemia   Hypertension   Diabetes Mellitus   Cigarette smoking   Positive family history   Obesity   CAD   (SLE, CKDz, HIV, Cocaine abuse)   Troponin Levels   = Normal Limit (0 pts)   1-3 Times Normal Limit (1 pt)   > 3 Times Normal Limit (2 pts)  TOTAL:    Percent Risk for Major Adverse Cardiac Event (MACE)  0-3 pts indicates low risk for MACE   2.5% (DISCHARGE)   4-7 pts indicates moderate risk for MACE  20.3% (OBS)  8-10 pts indicates high risk for MACE  72.7% (EARLY INVASIVE [place])    CDU NOAM / Jose Quezada is a 77 y.o. female who presents with fall, unsteadiness.     · PT/OT eval  · Possible placement  · /social work consult  · Continue home medications and pain control  · Monitor vitals, labs, and imaging  · DISPO: pending consults and clinical improvement    CONSULTS:    IP CONSULT TO SOCIAL WORK    PROCEDURES:  Not indicated     PATIENT REFERRED TO:    JEANNETTE Brown - CNP  [address] [place] [id number] [phone]    In 3 days      [facility]  [address] [place] [address]  [phone]    As needed, If symptoms worsen      --  Araceli Perez DO   Emergency Medicine Resident     This dictation was generated by voice recognition computer software. Although all attempts are made to edit the dictation for accuracy, there may be errors in the transcription that are not intended.

## 2021-10-27 ENCOUNTER — APPOINTMENT (OUTPATIENT)
Dept: CT IMAGING | Age: 66
End: 2021-10-27
Payer: MEDICARE

## 2021-10-27 VITALS
HEIGHT: 66 IN | OXYGEN SATURATION: 97 % | TEMPERATURE: 97.8 F | DIASTOLIC BLOOD PRESSURE: 70 MMHG | RESPIRATION RATE: 18 BRPM | HEART RATE: 81 BPM | SYSTOLIC BLOOD PRESSURE: 117 MMHG | WEIGHT: 200 LBS | BODY MASS INDEX: 32.14 KG/M2

## 2021-10-27 PROCEDURE — G0378 HOSPITAL OBSERVATION PER HR: HCPCS

## 2021-10-27 PROCEDURE — 6370000000 HC RX 637 (ALT 250 FOR IP): Performed by: STUDENT IN AN ORGANIZED HEALTH CARE EDUCATION/TRAINING PROGRAM

## 2021-10-27 PROCEDURE — 94761 N-INVAS EAR/PLS OXIMETRY MLT: CPT

## 2021-10-27 PROCEDURE — 97116 GAIT TRAINING THERAPY: CPT

## 2021-10-27 PROCEDURE — 96372 THER/PROPH/DIAG INJ SC/IM: CPT

## 2021-10-27 PROCEDURE — 97110 THERAPEUTIC EXERCISES: CPT

## 2021-10-27 PROCEDURE — 97535 SELF CARE MNGMENT TRAINING: CPT

## 2021-10-27 PROCEDURE — 6370000000 HC RX 637 (ALT 250 FOR IP): Performed by: NURSE PRACTITIONER

## 2021-10-27 PROCEDURE — 2700000000 HC OXYGEN THERAPY PER DAY

## 2021-10-27 PROCEDURE — 74176 CT ABD & PELVIS W/O CONTRAST: CPT

## 2021-10-27 PROCEDURE — 6360000002 HC RX W HCPCS: Performed by: STUDENT IN AN ORGANIZED HEALTH CARE EDUCATION/TRAINING PROGRAM

## 2021-10-27 RX ORDER — NITROFURANTOIN 25; 75 MG/1; MG/1
100 CAPSULE ORAL 2 TIMES DAILY
Qty: 8 CAPSULE | Refills: 0 | Status: SHIPPED | OUTPATIENT
Start: 2021-10-27 | End: 2021-10-31

## 2021-10-27 RX ORDER — TRAZODONE HYDROCHLORIDE 150 MG/1
150 TABLET ORAL NIGHTLY
Qty: 10 TABLET | Refills: 0 | Status: SHIPPED | OUTPATIENT
Start: 2021-10-27 | End: 2021-11-22 | Stop reason: SDUPTHER

## 2021-10-27 RX ORDER — LISINOPRIL 10 MG/1
10 TABLET ORAL DAILY
Qty: 20 TABLET | Refills: 0 | Status: SHIPPED | OUTPATIENT
Start: 2021-10-27 | End: 2021-11-22 | Stop reason: SDUPTHER

## 2021-10-27 RX ORDER — POLYETHYLENE GLYCOL 3350 17 G/17G
17 POWDER, FOR SOLUTION ORAL DAILY
Qty: 289 G | Refills: 0 | Status: SHIPPED | OUTPATIENT
Start: 2021-10-27 | End: 2021-11-13

## 2021-10-27 RX ORDER — HYDROCODONE BITARTRATE AND ACETAMINOPHEN 5; 325 MG/1; MG/1
1 TABLET ORAL EVERY 6 HOURS PRN
Qty: 12 TABLET | Refills: 0 | Status: SHIPPED | OUTPATIENT
Start: 2021-10-27 | End: 2021-10-30

## 2021-10-27 RX ADMIN — HYDROCODONE BITARTRATE AND ACETAMINOPHEN 2 TABLET: 5; 325 TABLET ORAL at 06:35

## 2021-10-27 RX ADMIN — ENOXAPARIN SODIUM 40 MG: 40 INJECTION SUBCUTANEOUS at 10:00

## 2021-10-27 RX ADMIN — LISINOPRIL 10 MG: 10 TABLET ORAL at 09:59

## 2021-10-27 RX ADMIN — FLUCONAZOLE 150 MG: 100 TABLET ORAL at 16:49

## 2021-10-27 RX ADMIN — NITROFURANTOIN MONOHYDRATE/MACROCRYSTALLINE 100 MG: 25; 75 CAPSULE ORAL at 09:59

## 2021-10-27 RX ADMIN — QUETIAPINE FUMARATE 300 MG: 300 TABLET ORAL at 09:59

## 2021-10-27 ASSESSMENT — PAIN DESCRIPTION - LOCATION
LOCATION: RIB CAGE
LOCATION: RIB CAGE

## 2021-10-27 ASSESSMENT — PAIN SCALES - GENERAL
PAINLEVEL_OUTOF10: 9
PAINLEVEL_OUTOF10: 0
PAINLEVEL_OUTOF10: 8
PAINLEVEL_OUTOF10: 9

## 2021-10-27 ASSESSMENT — PAIN DESCRIPTION - ORIENTATION
ORIENTATION: LEFT
ORIENTATION: LEFT

## 2021-10-27 ASSESSMENT — PAIN DESCRIPTION - PAIN TYPE: TYPE: ACUTE PAIN

## 2021-10-27 NOTE — PROGRESS NOTES
[address] [facility]  CDU / OBSERVATION ENCOUNTER  ATTENDING NOTE       I performed a history and physical examination of the patient and discussed management with the resident or midlevel provider. I reviewed the resident or midlevel provider's note and agree with the documented findings and plan of care. Any areas of disagreement are noted on the chart. I was personally present for the key portions of any procedures. I have documented in the chart those procedures where I was not present during the key portions. I have reviewed the nurses notes. I agree with the chief complaint, past medical history, past surgical history, allergies, medications, social and family history as documented unless otherwise noted below. The Family history, social history, and ROS are effectively unchanged since admission unless noted elsewhere in the chart. Patient with history of falls at home. Patient has been seen by PT and OT. They do not feel she is entirely steady on her feet and made it clear to her that she is at risk for falls further. Patient is, however, unwilling to consider any thing but home management. Patient states that she has a son in [place] so she will move in with. Unfortunate she has not talked to the son anytime recently. We have tried to reach him and have been having difficulty doing so. See also social work note from today. Patient with significant difficulty in placement due to functional status. Patient will be referred to home care. Patient understands that she needs better function but is unwilling to pursue anything but outpatient care. Patient encouraged to follow-up with PCP and consider move with son.     Moe Marin MD  Attending Emergency  Physician

## 2021-10-27 NOTE — PROGRESS NOTES
Occupational Therapy  Facility/Department: [address] MED SURG  Daily Treatment Note  NAME: Diana Sam  : 1955  MRN: [id number]    Date of Service: 10/27/2021    Discharge Recommendations: Pt. Would benefit from further skilled OT services prior to returning home to enhance functional outcomes. Pt. Is a high fall risk. Patient would benefit from continued therapy after discharge  OT Equipment Recommendations  Equipment Needed: Yes  Mobility Devices: [place] Rear: Rolling  Other: Shower chair with back    Assessment   Performance deficits / Impairments: Decreased functional mobility ; Decreased endurance;Decreased ADL status; Decreased balance;Decreased high-level IADLs;Decreased safe awareness;Decreased cognition;Decreased strength  Prognosis: Good  Decision Making: Medium Complexity  OT Education: OT Role;Plan of Care;ADL Adaptive Strategies;Transfer Training  Patient Education: Cindra Romberg return from pt  REQUIRES OT FOLLOW UP: Yes  Activity Tolerance  Activity Tolerance: Patient limited by fatigue;Patient Tolerated treatment well  Safety Devices  Safety Devices in place: Yes  Type of devices: Call light within reach;Gait belt;Left in bed;Bed alarm in place; Patient at risk for falls  Restraints  Initially in place: No         Patient Diagnosis(es): The primary encounter diagnosis was Fall, initial encounter. A diagnosis of Primary osteoarthritis of both knees was also pertinent to this visit. has a past medical history of CHRIS (acute kidney injury) ([address] Bipolar 1 disorder ([address] Breast lump, Chronic obstructive pulmonary disease ([address] Depression, GERD (gastroesophageal reflux disease), Hyperlipidemia, Hypertension, Osteoarthritis, Type 2 diabetes mellitus without complication, with long-term current use of insulin ([address] and Type II or unspecified type diabetes mellitus without mention of complication, not stated as uncontrolled.    has a past surgical history that includes Ectopic pregnancy surgery; knee surgery (Right); Dilation and curettage of uterus; Nerve Block (Left, 12/8/14); Knee Arthroplasty (2017); and Tonsillectomy and adenoidectomy. Restrictions  Restrictions/Precautions  Restrictions/Precautions: General Precautions, Fall Risk, Up as Tolerated  Required Braces or Orthoses?: No  Position Activity Restriction  Other position/activity restrictions: Negative imaging for acute fractures  Subjective   General  Patient assessed for rehabilitation services?: Yes  Family / Caregiver Present: No  Diagnosis: Fall on L side, AMS< B/L hip osteoarthritis, poor living conditions  Pain Assessment  Pain Level: 9  Pain Location: Rib cage  Pain Orientation: Left  Non-Pharmaceutical Pain Intervention(s): Emotional support; Ambulation/Increased Activity;Repositioned  Vital Signs  Patient Currently in Pain: Yes   Orientation  Orientation  Overall Orientation Status: Impaired  Orientation Level: Oriented to person;Oriented to situation;Oriented to place; Disoriented to time  Objective    ADL  Grooming: Increased time to complete;Contact guard assistance;Setup  LE Dressing: Increased time to complete;Stand by assistance  Toileting: Increased time to complete;Contact guard assistance  Additional Comments: Pt. donned B socks in supine position with verbal cues to use 4 figure tech at [facility] level. Toilet transfer CGA + verbal cues for safety, frontal hygiene (I sitting). Stood at sink for grooming (oral care/face washing), pt. needing to sit down on toilet to finish d/t fatigue. Pt. sat down on toilet unsafely while engaged in grooming, swinging bottom over hoping to land on toilet. Pt. educated on proper transfer tech. Balance  Sitting Balance: Supervision (S sitting EOB/toilet.)  Standing Balance: Contact guard assistance  Standing Balance  Time: ~10 minutes with 2 sitting rest breaks  Activity: functional mobility, stood at sink for grooming, toileting.   Comment: No LOB, however pt. impulsive and needing lots of cues for positioning of RW and B hands/feet during transfers. Functional Mobility  Functional - Mobility Device: Rolling Walker  Activity: To/from bathroom  Assist Level: Minimal assistance  Functional Mobility Comments: Slow pace, fatigues quickly, neuropathy in feet when standing  Bed mobility  Supine to Sit: Stand by assistance  Sit to Supine: Stand by assistance  Scooting: Stand by assistance  Comment: Increased time and effort, bed rail. Transfers  Sit to stand: Contact guard assistance  Stand to sit: Contact guard assistance  Transfer Comments: EOB->toilet->stood at sink->toilet->stood at sink->EOB. P safety, CGA for stability. Cognition  Overall Cognitive Status: Exceptions  Following Commands: Follows multistep commands with repitition; Follows multistep commands with increased time  Memory: Decreased recall of recent events;Decreased short term memory  Safety Judgement: Decreased awareness of need for assistance;Decreased awareness of need for safety  Problem Solving: Assistance required to generate solutions;Assistance required to implement solutions;Assistance required to correct errors made;Assistance required to identify errors made  Insights: Decreased awareness of deficits  Initiation: Requires cues for some  Sequencing: Requires cues for some  Cognition Comment: Pt. demo P safety with RW and ADL transfers, plopping down hard on all surfaces.                                          Plan   Plan  Times per week: 3-5x                                               AM-PAC Score        AM-PAC Inpatient Daily Activity Raw Score: 19 (10/27/21 1519)  AM-PAC Inpatient ADL T-Scale Score : 40.22 (10/27/21 1519)  ADL Inpatient CMS 0-100% Score: 42.8 (10/27/21 1519)  ADL Inpatient CMS G-Code Modifier : CK (10/27/21 1519)    Goals  Short term goals  Time Frame for Short term goals: Pt will by discharge  Short term goal 1: demo good safety awareness during func mob around room using RW and SUP  Short term goal 2: demo standing during func activity for 7 min+ during func activity with SUP and no seated rest break  Short term goal 3: demo ADL UB bathing/dressing activity with mod I and increased time  Short term goal 4: demo ADL LB bathing/dressing activity with CGA, sock-aid/reacher, and increased time  Short term goal 5: demo mod I for all bed mob/transfers using railings/LRD PRN       Therapy Time   Individual Concurrent Group Co-treatment   Time In 1438         Time Out 1502         Minutes 24         Timed Code Treatment Minutes: [address] [facility], LPOEZ/L

## 2021-10-27 NOTE — PROGRESS NOTES
OBS/CDU   RESIDENT NOTE      Patients PCP is:  Jake Lockett, APRN - CNP    SUBJECTIVE      Patient continues to have complaints of left knee and hip pain. She also have some complaints of left sided back pain in the thoracic and lumbar spine, she does not complain of midline tenderness. No other complaints at this time. PHYSICAL EXAM      General: NAD, AO X 3  Heent: EMOI, PERRL  Neck: SUPPLE, NO JVD  Cardiovascular: RRR, S1S2  Pulmonary: CTAB, NO SOB  Abdomen: SOFT, NTTP, ND, +BS  Extremities: tenderness with palpation of bilateral knees. No edema of bilateral lower extremities. Negative francesco's sign bilaterally  Back: Tenderness with palpation of left paraspinal musculature in thoracic and lumbar spine. No midline tenderness. Neuro / Psych: NO NUMBNESS OR TINGLING, MENTATION AT BASELINE    PERTINENT TEST /EXAMS      I have reviewed all available laboratory results. MEDICATIONS CURRENT   nitrofurantoin (macrocrystal-monohydrate) (MACROBID) capsule 100 mg, 2 times per day  QUEtiapine (SEROQUEL) tablet 300 mg, BID  QUEtiapine (SEROQUEL) tablet 400 mg, Nightly  HYDROcodone-acetaminophen (NORCO) 5-325 MG per tablet 1 tablet, Q6H PRN   Or  HYDROcodone-acetaminophen (NORCO) 5-325 MG per tablet 2 tablet, Q6H PRN  lisinopril (PRINIVIL;ZESTRIL) tablet 10 mg, Daily  traZODone (DESYREL) tablet 150 mg, Nightly  sodium chloride flush 0.9 % injection 5-40 mL, 2 times per day  sodium chloride flush 0.9 % injection 5-40 mL, PRN  0.9 % sodium chloride infusion, PRN  enoxaparin (LOVENOX) injection 40 mg, Daily  acetaminophen (TYLENOL) tablet 650 mg, Q4H PRN  ondansetron (ZOFRAN-ODT) disintegrating tablet 4 mg, Q8H PRN   Or  ondansetron (ZOFRAN) injection 4 mg, Q6H PRN      All medication charted and reviewed.     CONSULTS      IP CONSULT TO SOCIAL WORK    ASSESSMENT/PLAN       Cj Gerardo is a 77 y.o. female who presents with with fall, unsteadiness.     · Recent admission for similar event  · Admitted to psychiatric facility after medical clearance  · PT/OT eval  · SW consult appreciated- Concern for current housing situation  · Patient refusing placement at this time  · States she has plans to move in with her son in [place] and just needs to arrange her apartment. She states she does not have help to do this, but states she may be able to ask a friend  · /social work consult  · Urinalysis likely contaminant and not clean catch as culture showed no growth  · Continue home medications and pain control  · Monitor vitals, labs, and imaging  · DISPO: pending consults and clinical improvement    --  Dalton Flanagan MD  Emergency Medicine Resident Physician     This dictation was generated by voice recognition computer software. Although all attempts are made to edit the dictation for accuracy, there may be errors in the transcription that are not intended.

## 2021-10-27 NOTE — DISCHARGE SUMMARY
CDU Discharge Summary        Patient:  Mirtha Cade  YOB: 1955    MRN: [id number]   Acct: [de-identified]    Primary Care Physician: JEANNETTE Moralez CNP    Admit date:  10/25/2021  5:16 PM  Discharge date: 10/27/2021    Discharge Diagnoses:     Acute on chronic left-sided knee and hip pain due to osteoarthritis  Improved with Norco, rest    Unsteadiness, fall, multifactorial in nature due to pain, living conditions  Recommended further therapy and placement however patient declining    Follow-up:  Call today/tomorrow for a follow up appointment with JEANNETTE Moralez CNP , or return to the Emergency Room with worsening symptoms    Stressed to patient the importance of following up with primary care doctor for further workup/management of symptoms. Pt verbalizes understanding and agrees with plan. Discharge Medications:  Changes to medications        Ish[facility] Hamman Home Medication Instructions OVQ:[id number]    Printed on:10/27/21 9060   Medication Information                      acetaminophen (TYLENOL) 500 MG tablet  Take 2 tablets by mouth every 6 hours as needed for Pain             albuterol (PROVENTIL HFA;VENTOLIN HFA) 108 (90 BASE) MCG/ACT inhaler  Inhale 1 puff into the lungs 4 times daily as needed for Wheezing              aspirin 81 MG EC tablet  Take 1 tablet by mouth daily             HYDROcodone-acetaminophen (NORCO) 5-325 MG per tablet  Take 1 tablet by mouth every 6 hours as needed for Pain for up to 3 days. insulin glargine (LANTUS SOLOSTAR) 100 UNIT/ML injection pen  INJECT 20 UNITS INTO THE SKIN BID             lisinopril (PRINIVIL;ZESTRIL) 10 MG tablet  Take 1 tablet by mouth daily             nystatin (MYCOSTATIN) 425386 UNIT/GM cream  Apply topically 2 times daily.              QUEtiapine (SEROQUEL) 200 MG tablet  Take 400 mg by mouth nightly             QUEtiapine (SEROQUEL) 300 MG tablet  Take 1 tablet by mouth 2 times daily             traZODone (DESYREL) k/uL    Absolute Eos # 0.13 0.00 - 0.44 k/uL    Basophils Absolute 0.04 0.00 - 0.20 k/uL    Absolute Immature Granulocyte 0.03 0.00 - 0.30 k/uL    WBC Morphology NOT REPORTED     RBC Morphology ANISOCYTOSIS PRESENT     Platelet Estimate NOT REPORTED    Troponin   Result Value Ref Range    Troponin, High Sensitivity 8 0 - 14 ng/L    Troponin T NOT REPORTED <0.03 ng/mL    Troponin Interp NOT REPORTED    Brain Natriuretic Peptide   Result Value Ref Range    Pro-BNP 62 <300 pg/mL    BNP Interpretation NOT REPORTED    Urinalysis Reflex to Culture    Specimen: Urine, clean catch   Result Value Ref Range    Color, UA Yellow Yellow    Turbidity UA Clear Clear    Glucose, Ur NEGATIVE NEGATIVE    Bilirubin Urine NEGATIVE NEGATIVE    Ketones, Urine NEGATIVE NEGATIVE    Specific Gravity, UA 1.010 1.005 - 1.030    Urine Hgb NEGATIVE NEGATIVE    pH, UA 6.0 5.0 - 8.0    Protein, UA NEGATIVE NEGATIVE    Urobilinogen, Urine Normal Normal    Nitrite, Urine NEGATIVE NEGATIVE    Leukocyte Esterase, Urine SMALL (A) NEGATIVE    Urinalysis Comments NOT REPORTED    Microscopic Urinalysis   Result Value Ref Range    -          WBC, UA 20 TO 50 0 - 5 /HPF    RBC, UA 0 TO 2 0 - 2 /HPF    Casts UA 2 TO 5 0 - 2 /LPF    Casts UA HYALINE 0 - 2 /LPF    Crystals, UA NOT REPORTED None /HPF    Epithelial Cells UA 0 TO 2 0 - 5 /HPF    Renal Epithelial, UA NOT REPORTED 0 /HPF    Bacteria, UA MODERATE (A) None    Mucus, UA NOT REPORTED None    Trichomonas, UA NOT REPORTED None    Amorphous, UA NOT REPORTED None    Other Observations UA NOT REPORTED NOT REQ.     Yeast, UA MODERATE (A) None   EKG 12 Lead   Result Value Ref Range    Ventricular Rate 91 BPM    Atrial Rate 91 BPM    P-R Interval 152 ms    QRS Duration 68 ms    Q-T Interval 374 ms    QTc Calculation (Bazett) 460 ms    P Axis 55 degrees    R Axis 39 degrees    T Axis 43 degrees     CT ABDOMEN PELVIS WO CONTRAST Additional Contrast? None    Result Date: 10/27/2021  EXAMINATION: CT OF THE ABDOMEN AND PELVIS WITHOUT CONTRAST 10/27/2021 1:58 pm TECHNIQUE: CT of the abdomen and pelvis was performed without the administration of intravenous contrast. Multiplanar reformatted images are provided for review. Dose modulation, iterative reconstruction, and/or weight based adjustment of the mA/kV was utilized to reduce the radiation dose to as low as reasonably achievable. COMPARISON: None. HISTORY: ORDERING SYSTEM PROVIDED HISTORY: abdominal paim TECHNOLOGIST PROVIDED HISTORY: abdominal paim Reason for Exam: ABDOMINAL PAIN FINDINGS: Lower Chest: Small consolidations in the lung bases are unchanged in favored to be atelectasis or scarring. Organs: Liver: Normal appearance of the liver. Gallbladder: Unremarkable gallbladder. No biliary ductal dilatation Spleen: Unremarkable spleen. Pancreas: No peripancreatic inflammatory changes. Adrenal Glands: No focal adrenal abnormalities identified. Kidneys: No hydronephrosis. GI/Bowel: Stomach: The stomach is distended with food material. Small bowel: No small bowel obstruction. Colon: No signficant pericolonic inflammatory changes. Appendix: Appendix not visualized, but there are no secondary findings of acute appendicitis. Pelvis: No free fluid in the pelvis. Unremarkable pelvic organs. Peritoneum/Retroperitoneum: Normal caliber abdominal aorta. No retroperitoneal lymphadenopathy by CT criteria. Atherosclerosis of the aorta redemonstrated. Bones/Soft Tissues: No acute findings within the subcutaneous soft tissues. Spondylosis of the visualized spine. No acute findings or significant change from prior study. XR KNEE LEFT (3 VIEWS)    Result Date: 10/25/2021  EXAMINATION: THREE XRAY VIEWS OF THE LEFT KNEE 10/25/2021 6:16 pm COMPARISON: None. HISTORY: ORDERING SYSTEM PROVIDED HISTORY: fall TECHNOLOGIST PROVIDED HISTORY: fall FINDINGS: Frontal, lateral, and oblique view radiographs of the left knee were obtained.  Bone mineralization is normal.  The osseous structures are intact without acute fracture or destructive abnormality. Joint relationships are maintained. There is mild osteoarthritis involving the medial and patellofemoral compartments, noting subchondral sclerosis, productive change, and joint space narrowing, with relative preservation of the lateral compartment. No joint effusion. No appreciable soft tissue abnormality. Mild left knee osteoarthritis, primarily involving the medial and patellofemoral compartments. No acute fracture or dislocation. No effusion. CT Head WO Contrast    Result Date: 10/25/2021  EXAMINATION: CT OF THE HEAD WITHOUT CONTRAST  10/25/2021 12:10 pm TECHNIQUE: CT of the head was performed without the administration of intravenous contrast. Dose modulation, iterative reconstruction, and/or weight based adjustment of the mA/kV was utilized to reduce the radiation dose to as low as reasonably achievable. COMPARISON: CT scan dated April 30, 2013 HISTORY: ORDERING SYSTEM PROVIDED HISTORY: fall TECHNOLOGIST PROVIDED HISTORY: fall Decision Support Exception - unselect if not a suspected or confirmed emergency medical condition->Emergency Medical Condition (MA) Reason for Exam: fall FINDINGS: BRAIN/VENTRICLES: There is no acute intracranial hemorrhage, mass effect or midline shift. No abnormal extra-axial fluid collection. The gray-white differentiation is maintained without evidence of an acute infarct. There is no evidence of hydrocephalus. Scattered senescent white matter changes are present. Old areas of infarct are present within the basal ganglia region bilaterally. ORBITS: The visualized portion of the orbits demonstrate no acute abnormality. SINUSES: The visualized paranasal sinuses and mastoid air cells demonstrate no acute abnormality. SOFT TISSUES/SKULL:  No acute abnormality of the visualized skull or soft tissues. No acute intracranial abnormality.      CT Cervical Spine WO Contrast    Result Date: 10/25/2021  EXAMINATION: CT OF THE CERVICAL SPINE WITHOUT CONTRAST 10/25/2021 12:10 pm TECHNIQUE: CT of the cervical spine was performed without the administration of intravenous contrast. Multiplanar reformatted images are provided for review. Dose modulation, iterative reconstruction, and/or weight based adjustment of the mA/kV was utilized to reduce the radiation dose to as low as reasonably achievable. COMPARISON: CT scan dated April 32,013 HISTORY: ORDERING SYSTEM PROVIDED HISTORY: fall TECHNOLOGIST PROVIDED HISTORY: fall Decision Support Exception - unselect if not a suspected or confirmed emergency medical condition->Emergency Medical Condition (MA) Reason for Exam: fall FINDINGS: BONES/ALIGNMENT: There is no acute fracture or traumatic malalignment. DEGENERATIVE CHANGES: Multilevel degenerative changes are present, most prominent at the C4-5 C5-6 and C6-7 disc levels. Facet arthropathy is present throughout the cervical spine. SOFT TISSUES: There is no prevertebral soft tissue swelling. Heavily calcified plaque formation is present within the right carotid body, and at the origin of the right internal carotid artery. No evidence of an acute fracture or traumatic malalignment involving the cervical spine     XR CHEST PORTABLE    Result Date: 10/25/2021  EXAMINATION: ONE XRAY VIEW OF THE CHEST 10/25/2021 6:16 pm COMPARISON: None. HISTORY: ORDERING SYSTEM PROVIDED HISTORY: eval for cardiomeg TECHNOLOGIST PROVIDED HISTORY: eval for cardiomeg FINDINGS: A portable upright frontal view chest radiograph was obtained. The heart is enlarged. The mediastinal contours and pleural spaces are otherwise within normal limits. Minimal atelectasis is present at the right lung base. The lungs are otherwise clear. There is no focal consolidation or pneumothorax. The pulmonary vascular pattern is within normal limits. No significant thoracic osseous abnormality. Minimal right basilar atelectasis. Otherwise, clear lungs. Cardiomegaly. XR HIP 2-3 VW W PELVIS LEFT    Result Date: 10/25/2021  EXAMINATION: ONE XRAY VIEW OF THE PELVIS AND TWO XRAY VIEWS LEFT HIP 10/25/2021 6:16 pm COMPARISON: None. HISTORY: ORDERING SYSTEM PROVIDED HISTORY: fall TECHNOLOGIST PROVIDED HISTORY: fall FINDINGS: A frontal view pelvic radiograph was obtained, along with frontal and lateral view radiographs of the left hip. Bone mineralization is normal.  There is no evidence of acute fracture or dislocation. Joint relationships are maintained. There is a mild degree of arthritic change involving both hips in a relatively symmetric, superolateral distribution, noting subchondral sclerosis with minimal productive change and joint space narrowing. No appreciable soft tissue abnormality. Mild bilateral hip osteoarthritis. No acute fracture or hip dislocation. XR HIP 2-3 VW W PELVIS LEFT    Result Date: 10/9/2021  EXAMINATION: ONE XRAY VIEW OF THE PELVIS AND TWO XRAY VIEWS LEFT HIP 10/9/2021 3:23 pm COMPARISON: 06/14/2021 HISTORY: ORDERING SYSTEM PROVIDED HISTORY: left hip pain after fall TECHNOLOGIST PROVIDED HISTORY: left hip pain after fall FINDINGS: There is no evidence of acute fracture. There is normal alignment. No acute joint abnormality. No focal osseous lesion. No focal soft tissue abnormality. No acute osseous abnormality. Physical Exam:    General appearance - NAD, AOx 3   Lungs -CTAB, no R/R/R  Heart - RRR, no M/R/G  Abdomen - Soft, NT/ND  Neurological:  MAEx4, No focal motor deficit, sensory loss  Extremities -tenderness throughout left hip and knee without edema cap refil <2 sec in all ext., no edema  Skin -warm, dry      Hospital Course:  Clinical course has improved, labs and imaging reviewed. Liban Lantigua originally presented to the hospital on 10/25/2021  5:16 PM. with unsteadiness and falls at home. At that time it was determined that She required further observation, PT/OT evaluation and possible placement.  She was admitted and labs and imaging were followed daily. Imaging results as above. Multiple conversations were had with patient regarding living situation and risk for further falls in the future. Patient is adamant that she is not to home with sound however multiple unsuccessful attempts to contact him. Patient is not considering rehab nor placement at this time. She is medically stable to be discharged. Disposition: Home    Patient stated that they will not drive themselves home from the hospital if they have gotten pain killers/ narcotics earlier that day and that they will arrange for transportation on their own or work with the  for a ride. Patient counseled NOT to drive while under the influence of narcotics/ pain killers. Condition: Good    Patient stable and ready for discharge home. I have discussed plan of care with patient and they are in understanding. They were instructed to read discharge paperwork. All of their questions and concerns were addressed. Time Spent: 0 day      --  Farida Freedman MD  Emergency Medicine Resident Physician    This dictation was generated by voice recognition computer software. Although all attempts are made to edit the dictation for accuracy, there may be errors in the transcription that are not intended.

## 2021-10-27 NOTE — PROGRESS NOTES
Physical Therapy  Facility/Department: [address] MED SURG  Daily Treatment Note  NAME: Darren Workman  : 1955  MRN: [id number]    Date of Service: 10/27/2021    Discharge Recommendations:  Patient would benefit from continued therapy after discharge   PT Equipment Recommendations  Equipment Needed: Yes  Mobility Devices: Silva Ganser: Rolling    Assessment   Body structures, Functions, Activity limitations: Decreased functional mobility ; Decreased strength;Decreased safe awareness;Decreased endurance;Decreased balance  Assessment: Pt with impaired mobility requiring min to mod A for functional transfers and gait training this date. Pt with impaired overall mobility and would be unsafe to ambulate without physical assistance at this time. Pt would be unsafe to return to her prior living arrangements and will benefit from further therapy at discharge. Prognosis: Good  PT Education: Goals;PT Role;Plan of Care;Transfer Training;Functional Mobility Training;Gait Training  REQUIRES PT FOLLOW UP: Yes  Activity Tolerance  Activity Tolerance: Patient limited by endurance; Patient limited by fatigue  Activity Tolerance: Cooperative and motivated during session     Patient Diagnosis(es): The primary encounter diagnosis was Fall, initial encounter. A diagnosis of Primary osteoarthritis of both knees was also pertinent to this visit. has a past medical history of CHRIS (acute kidney injury) ([address] Bipolar 1 disorder ([address] Breast lump, Chronic obstructive pulmonary disease ([address] Depression, GERD (gastroesophageal reflux disease), Hyperlipidemia, Hypertension, Osteoarthritis, Type 2 diabetes mellitus without complication, with long-term current use of insulin ([address] and Type II or unspecified type diabetes mellitus without mention of complication, not stated as uncontrolled. has a past surgical history that includes Ectopic pregnancy surgery; knee surgery (Right); Dilation and curettage of uterus;  Nerve Block (Left, 12/8/14); Knee Arthroplasty (2017); and Tonsillectomy and adenoidectomy. Restrictions  Restrictions/Precautions  Restrictions/Precautions: General Precautions, Fall Risk, Up as Tolerated  Required Braces or Orthoses?: No  Position Activity Restriction  Other position/activity restrictions: Negative imaging for acute fractures  Subjective   General  Chart Reviewed: Yes  Response To Previous Treatment: Patient with no complaints from previous session. Family / Caregiver Present: No  Subjective  Subjective: Pt supine in bed and agreeable to therapy RN agreeable to therapy. Pt reports pain up to 9/10 on her left side. General Comment  Comments: Pt retired to bed, given [place]  Pain Screening  Patient Currently in Pain: Yes  Pain Assessment  Pain Assessment: 0-10  Pain Level: 9  Patient's Stated Pain Goal: No pain  Pain Type: Acute pain  Pain Location: Rib cage  Pain Orientation: Left  Vital Signs  Patient Currently in Pain: Yes       Orientation  Orientation  Overall Orientation Status: Within Functional Limits  Cognition   Cognition  Overall Cognitive Status: Exceptions  Following Commands: Follows multistep commands with repitition; Follows multistep commands with increased time  Memory: Decreased recall of recent events;Decreased short term memory  Safety Judgement: Decreased awareness of need for assistance;Decreased awareness of need for safety  Problem Solving: Assistance required to generate solutions;Assistance required to implement solutions;Assistance required to correct errors made;Assistance required to identify errors made  Insights: Decreased awareness of deficits  Initiation: Requires cues for some  Sequencing: Requires cues for some  Cognition Comment: Pt. rosalio LÓPEZ safety with RW and ADL transfers, ploping down hard on all surfaces.   Objective   Bed mobility  Supine to Sit: Stand by assistance  Sit to Supine: Stand by assistance  Scooting: Stand by assistance  Comment: Pt took increased time and effort to perform withoiut physocal assist, HOB elevated and bed rails utalized  Transfers  Sit to Stand: Contact guard assistance  Stand to sit: Contact guard assistance  Comment: Pt cued for correct BUE hand placement, good return   Ambulation : Pt ambulated 20 ft with RW and CGA/MIN assist.    Quality of gait: unsteady, forward flexion, high reliance on UE support on RW, decreased walker safety/negotiation, minor buckling of knees as she fatigues. Gait deviations : slow oscar, decreased step length and height  No stairs  Balance  Posture: Fair  Sitting - Dynamic: Fair;+  Standing - Static: Fair  Standing - Dynamic: Fair  Comments: standing balance assessed with RW  Exercises  Comments: Seated LE exercise program: Long Arc Quads, hip abduction/adduction, heel/toe raises, and marches.  Reps: 20       AM-PAC Score  AM-PAC Inpatient Mobility Raw Score : 18 (10/27/21 1529)  AM-PAC Inpatient T-Scale Score : 43.63 (10/27/21 1529)  Mobility Inpatient CMS 0-100% Score: 46.58 (10/27/21 1529)  Mobility Inpatient CMS G-Code Modifier : CK (10/27/21 1529)          Goals  Short term goals  Time Frame for Short term goals: 14 visits  Short term goal 1: Pt to ambulate 300ft modified independently with RW  Short term goal 2: Pt to sit <> stand transfer independently  Short term goal 3: Pt to demonstrate independent bed mobility  Short term goal 4: Pt to tolerate 30 minutes of therapy for endurance  Short term goal 5: Pt to demonstrate good seated and standing balance to decrease risk of falls    Plan    Plan  Times per week: 5-6x  Times per day: Daily  Current Treatment Recommendations: Strengthening, Balance Training, Functional Mobility Training, Transfer Training, Endurance Training, Gait Training, Stair training, Home Exercise Program, Safety Education & Training, Patient/Caregiver Education & Training  Safety Devices  Type of devices: Call light within reach, Gait belt, Nurse notified, Left in bed, Bed alarm in place  Restraints  Initially in place: No     Therapy Time   Individual Concurrent Group Co-treatment   Time In 1500         Time Out 1523         Minutes 23         Timed Code Treatment Minutes: [address], PTA

## 2021-10-27 NOTE — PROGRESS NOTES
CLINICAL PHARMACY NOTE: MEDS TO BEDS    Total # of Prescriptions Filled: 2   The following medications were delivered to the patient:  · Norco 5-325mg  · Nirofurantoin monohydrate 100mg    Additional Documentation: delivered to patient in room 350 10/27 at 5:37pm. No co-pay.

## 2021-10-27 NOTE — DISCHARGE INSTR - COC
Continuity of Care Form    Patient Name: Lashay Reyes   :  1955  MRN:  [id number]    Admit date:  10/25/2021  Discharge date:  ***    Code Status Order: Full Code   Advance Directives:      Admitting Physician:  Twan Loredo MD  PCP: German Cantor, APRN - CNP    Discharging Nurse: [facility] Unit/Room#: 7278/5850-89  Discharging Unit Phone Number: ***    Emergency Contact:   Extended Emergency Contact Information  Primary Emergency Contact: Amarjit Mosqueda  Address: N/A   [address] Phone: [phone]  Relation: Other  Secondary Emergency Contact: [place] Dany Rendonmogeraldine Phone: [phone]  Relation: Child    Past Surgical History:  Past Surgical History:   Procedure Laterality Date    DILATION AND CURETTAGE OF UTERUS      ECTOPIC PREGNANCY SURGERY      KNEE ARTHROPLASTY  2017    KNEE SURGERY Right     NERVE BLOCK Left 14    left knee    TONSILLECTOMY AND ADENOIDECTOMY         Immunization History:   Immunization History   Administered Date(s) Administered    Influenza, Quadv, IM, PF (6 mo and older Fluzone, Flulaval, Fluarix, and 3 yrs and older Afluria) 10/05/2017, 2020    Pneumococcal Polysaccharide ([id number]) 2017    Tdap (Boostrix, Adacel) 2017, 2021    Zoster Live (Zostavax) 10/09/2017       Active Problems:  Patient Active Problem List   Diagnosis Code    Arthritis of knee, left M17.12    Arthritis of knee, right M17.11    Pain due to total right knee replacement ([facility] T84.84XA, Z96.651    Paranoid schizophrenia ([facility] F20.0    S/P total knee arthroplasty Z96.659    Chronic pain syndrome G89.4    Osteoarthritis, knee M17.10    Arthritis of knee M17.10    Lumbar facet arthropathy M47.816    Essential hypertension I10    Hallucination, visual R44.1    Chronic obstructive pulmonary disease ([facility] J44.9    Type 2 diabetes mellitus without complication, with long-term current use of insulin (HCC) E11.9, Z79.4    Tobacco abuse disorder Z72.0 Schizoaffective disorder ([facility] F25.9    COPD with acute exacerbation ([facility] J44.1    COPD exacerbation ([facility] J44.1    CHRIS (acute kidney injury) ([facility] N17.9    Hypokalemia E87.6    Hyponatremia E87.1    Hallucination R44.3    Fall at home, initial encounter [address], Y92.009       Isolation/Infection:   Isolation            Contact          Patient Infection Status       Infection Onset Added Last Indicated Last Indicated By Review Planned Expiration Resolved Resolved By    MDRO (multi-drug resistant organism)  06/17/21 06/17/21 Terri Cox RN        Enterobacter Cloacae Urine 6/2021    Resolved    COVID-19 Rule Out 04/05/20 04/05/20 04/05/20 COVID-19 (Ordered)   04/06/20 Rule-Out Test Resulted            Nurse Assessment:  Last Vital Signs: /70   Pulse 81   Temp 97.8 °F (36.6 °C) (Oral)   Resp 18   Ht 5' 6\" (1.676 m)   Wt 200 lb (90.7 kg)   LMP  (LMP Unknown)   SpO2 97%   BMI 32.28 kg/m²     Last documented pain score (0-10 scale): Pain Level: 0  Last Weight:   Wt Readings from Last 1 Encounters:   10/25/21 200 lb (90.7 kg)     Mental Status:  oriented, alert, coherent, logical, thought processes intact, and able to concentrate and follow conversation    IV Access:  - None    Nursing Mobility/ADLs:  Walking   Assisted  Transfer  Assisted  Bathing  Assisted  Dressing  Assisted  Toileting  Assisted  Feeding  Assisted  Med Admin  Assisted  Med Delivery   whole    Wound Care Documentation and Therapy:        Elimination:  Continence: Bowel: Yes  Bladder: Yes  Urinary Catheter: None   Colostomy/Ileostomy/Ileal Conduit: No       Date of Last BM: 10/26/21      No intake or output data in the 24 hours ending 10/27/21 1413  No intake/output data recorded.     Safety Concerns:     History of Falls (last 30 days) and At Risk for Falls    Impairments/Disabilities:      None    Nutrition Therapy:  Current Nutrition Therapy:   - Oral Diet:  General    Routes of Feeding: None  Liquids: No Restrictions  Daily Fluid Restriction: no  Last Modified Barium Swallow with Video (Video Swallowing Test): not done    Treatments at the Time of Hospital Discharge:   Respiratory Treatments: ***  Oxygen Therapy:  {Therapy; copd oxygen:98108:::0}  Ventilator:    - No ventilator support    Rehab Therapies: Physical Therapy and Occupational Therapy  Weight Bearing Status/Restrictions: No weight bearing restirctions  Other Medical Equipment (for information only, NOT a DME order):  walker  Other Treatments: ***    Patient's personal belongings (please select all that are sent with patient):  Glasses    RN SIGNATURE:  Electronically signed by Rosana Kendrick RN on 10/27/21 at 4:25 PM EDT    CASE MANAGEMENT/SOCIAL WORK SECTION    Inpatient Status Date: ***    Readmission Risk Assessment Score:  Readmission Risk              Risk of Unplanned Readmission:  0           Discharging to Facility/ Agency   Name:   Address:  Phone:  Fax:    Dialysis Facility (if applicable)   Name:  Address:  Dialysis Schedule:  Phone:  Fax:    / signature: {Esignature:[id number]:::0}    PHYSICIAN SECTION    Prognosis: Good    Condition at Discharge: Stable    Rehab Potential (if transferring to Rehab): Fair    Recommended Labs or Other Treatments After Discharge: Home care for wellness checks and periodic vital signs    Physician Certification: I certify the above information and transfer of Orville John  is necessary for the continuing treatment of the diagnosis listed and that she requires Home Care for greater 30 days.      Update Admission H&P: No change in H&P    PHYSICIAN SIGNATURE:  Electronically signed by Samara Aguilar MD on 10/27/21 at 2:14 PM EDT

## 2021-11-03 NOTE — CARE COORDINATION
10/26/21 1653   Readmission Assessment   Number of Days since last admission? 8-30 days   Previous Disposition Home Alone   Who is being Interviewed Patient   What was the patient's/caregiver's perception as to why they think they needed to return back to the hospital? Other (Comment)  (fell at home)   Did you visit your Primary Care Physician after you left the hospital, before you returned this time? No   Why weren't you able to visit your PCP? Did not have an appointment   Did you see a specialist, such as Cardiac, Pulmonary, Orthopedic Physician, etc. after you left the hospital? No   Who advised the patient to return to the hospital? Self-referral   Does the patient report anything that got in the way of taking their medications? No   In our efforts to provide the best possible care to you and others like you, can you think of anything that we could have done to help you after you left the hospital the first time, so that you might not have needed to return so soon?  Other (Comment)  (\"these were 2 different problems\")
Received a call from [facility] attempted to open for home care services on 11/2. [facility]. nurse arrived to home and patient refused nursing services, stating she only wanted an aid. RN observed living conditions uninhabitable. Garbage observed throughout the home with knats swarming the area. Unable to open due to refusal & safety conditions of the home. HC comp[any made referral to APS.
Saray from [facility] is notified of discharge tonight and accepted the patient
Gino       Educated patient  on transitional options, provided freedom of choice and are agreeable with plan      Discharge Plan: Home with [facility]          Electronically signed by Mary Anne Lockett RN on 10/26/21 at 4:55 PM EDT

## 2021-11-06 ENCOUNTER — HOSPITAL ENCOUNTER (EMERGENCY)
Age: 66
Discharge: HOME | End: 2021-11-06
Attending: EMERGENCY MEDICINE
Payer: MEDICARE

## 2021-11-06 ENCOUNTER — APPOINTMENT (OUTPATIENT)
Dept: CT IMAGING | Age: 66
End: 2021-11-06
Payer: MEDICARE

## 2021-11-06 ENCOUNTER — APPOINTMENT (OUTPATIENT)
Dept: GENERAL RADIOLOGY | Age: 66
End: 2021-11-06
Payer: MEDICARE

## 2021-11-06 VITALS
DIASTOLIC BLOOD PRESSURE: 64 MMHG | OXYGEN SATURATION: 90 % | RESPIRATION RATE: 18 BRPM | HEART RATE: 88 BPM | SYSTOLIC BLOOD PRESSURE: 120 MMHG | TEMPERATURE: 99 F

## 2021-11-06 DIAGNOSIS — M47.816 LUMBAR FACET ARTHROPATHY: ICD-10-CM

## 2021-11-06 DIAGNOSIS — S39.012A STRAIN OF LUMBAR REGION, INITIAL ENCOUNTER: Primary | ICD-10-CM
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
## 2021-11-06 DIAGNOSIS — M17.12 ARTHRITIS OF KNEE, LEFT: ICD-10-CM
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
## 2021-11-06 DIAGNOSIS — M17.11 ARTHRITIS OF KNEE, RIGHT: ICD-10-CM

## 2021-11-06 PROCEDURE — 72100 X-RAY EXAM L-S SPINE 2/3 VWS: CPT

## 2021-11-06 PROCEDURE — 6370000000 HC RX 637 (ALT 250 FOR IP): Performed by: PODIATRIST

## 2021-11-06 PROCEDURE — 73610 X-RAY EXAM OF ANKLE: CPT

## 2021-11-06 PROCEDURE — 73562 X-RAY EXAM OF KNEE 3: CPT

## 2021-11-06 PROCEDURE — 99285 EMERGENCY DEPT VISIT HI MDM: CPT

## 2021-11-06 PROCEDURE — 72170 X-RAY EXAM OF PELVIS: CPT

## 2021-11-06 PROCEDURE — 72131 CT LUMBAR SPINE W/O DYE: CPT

## 2021-11-06 RX ORDER — HYDROCODONE BITARTRATE AND ACETAMINOPHEN 5; 325 MG/1; MG/1
1 TABLET ORAL EVERY 6 HOURS PRN
Qty: 10 TABLET | Refills: 0 | Status: SHIPPED | OUTPATIENT
Start: 2021-11-06 | End: 2021-11-09

## 2021-11-06 RX ORDER — NAPROXEN 500 MG/1
500 TABLET ORAL 2 TIMES DAILY PRN
Qty: 60 TABLET | Refills: 0 | Status: SHIPPED | OUTPATIENT
Start: 2021-11-06

## 2021-11-06 RX ORDER — HYDROCODONE BITARTRATE AND ACETAMINOPHEN 5; 325 MG/1; MG/1
1 TABLET ORAL EVERY 6 HOURS PRN
Status: DISCONTINUED | OUTPATIENT
Start: 2021-11-06 | End: 2021-11-06 | Stop reason: HOSPADM

## 2021-11-06 RX ADMIN — HYDROCODONE BITARTRATE AND ACETAMINOPHEN 1 TABLET: 5; 325 TABLET ORAL at 12:34

## 2021-11-06 ASSESSMENT — ENCOUNTER SYMPTOMS
VOMITING: 0
CHEST TIGHTNESS: 0
ABDOMINAL PAIN: 0
FACIAL SWELLING: 0
SINUS PAIN: 0
WHEEZING: 0
COUGH: 0
BACK PAIN: 1
COLOR CHANGE: 0
DIARRHEA: 0
NAUSEA: 0

## 2021-11-06 ASSESSMENT — PAIN SCALES - GENERAL: PAINLEVEL_OUTOF10: 9

## 2021-11-06 NOTE — ED NOTES
Bed: 27  Expected date:   Expected time:   Means of arrival:   Comments:  [address] Ankit Ramirez RN  11/06/21 3525

## 2021-11-06 NOTE — ED PROVIDER NOTES
[address] ED     Emergency Department     Faculty Attestation        I performed a history and physical examination of the patient and discussed management with the resident. I reviewed the residents note and agree with the documented findings and plan of care. Any areas of disagreement are noted on the chart. I was personally present for the key portions of any procedures. I have documented in the chart those procedures where I was not present during the key portions. I have reviewed the emergency nurses triage note. I agree with the chief complaint, past medical history, past surgical history, allergies, medications, social and family history as documented unless otherwise noted below. For Physician Assistant/ Nurse Practitioner cases/documentation I have personally evaluated this patient and have completed at least one if not all key elements of the E/M (history, physical exam, and MDM). Additional findings are as noted. Vital Signs: BP: 120/64  Pulse: 88  Resp: 18  Temp: 99 °F (37.2 °C) SpO2: 90 %  PCP:  Melissa GOLDBERG    Pertinent Comments:     Patient is a 75-year-old female with history of diabetes as well as hypertension and bipolar disease. Patient has had a few falls at home secondary to wheelchair not working which is still the case. She has been admitted and discharged home without apparently good plan for mobilization. Had yet another fall today after trip landing on her left hip and complaining of pain in her left lumbar region as well as left hip and some in the left knee. Is neurovascular intact distal strength 5/5 and sensation light touch intact. Strong DP pulses palpated and capillary refill is brisk and less than 2 seconds. No abdominal pain or chest pain/shortness of breath there is no head injury or neck pain. C-spine is nontender to palpation at this time with excellent range of motion.    Assessment/plan: Patient with mechanical fall difficulty ambulating at home with still currently broken wheelchair. Will obtain a CT of the lumbar spine as well as pelvis and hip. X-ray of the left knee as well as attempted pain control and reevaluate after. Possible readmission for placement versus better plan for home    Critical Care  None    This patient was evaluated in the Emergency Department for symptoms described in the history of present illness. He/she was evaluated in the context of the global COVID-19 pandemic, which necessitated consideration that the patient might be at risk for infection with the SARS-CoV-2 virus that causes COVID-19. Institutional protocols and algorithms that pertain to the evaluation of patients at risk for COVID-19 are in a state of rapid change based on information released by regulatory bodies including the CDC and federal and state organizations. These policies and algorithms were followed during the patient's care in the ED. (Please note that portions of this note were completed with a voice recognition program. Efforts were made to edit the dictations but occasionally words are mis-transcribed.  Whenever words are used in this note in any gender, they shall be construed as though they were used in the gender appropriate to the circumstances; and whenever words are used in this note in the singular or plural form, they shall be construed as though they were used in the form appropriate to the circumstances.)    MD Seth Corona  Attending Emergency Medicine Physician             Jing Hatch MD  11/06/21 43 Juventino Patel MD  11/06/21 [id number]

## 2021-11-06 NOTE — ED NOTES
Pt desating to 88% on RA, O2 placed per NC @2L, pt @ 96% at this time. Resident aware.      Ricardo Pereyra RN  11/06/21 0213

## 2021-11-06 NOTE — ED NOTES
Resident requested SW see patient as she states she needs a new motorized scooter among other things. SW familiar with patient and she has had a recent psych admission at [facility], as well as, a recent medical admit to [address]. Patient seen by PT/OT/SW/CM at her discharge less than a week ago. Patient had been evaluated for a SNF and discharged with [facility] which she declined when they arrived. Patient has a motorized scooter which was broken previously and this SW referred her back to the repairman that comes to her subsidized housing at [employer]. Patient tells CM she has no other needs. Resident updated. Char Muniz.  [facility] Cent[facility]aji     [facility]  11/06/21 5461

## 2021-11-06 NOTE — ED NOTES
Patient requesting a cab home and some clothes. Patient is in a housedress that is extremely stained. SW provided patient with shorts and a t-shirt. Patient has [employer] which does not provide medical cabs, so SW set up a [employer] with [address]. SW suggested patient apply for [facility] Cab services through [address] [employer] and [employer]. Sylvia Lee.  [place], [place]  11/06/21 0817

## 2021-11-06 NOTE — ED PROVIDER NOTES
[facility] ED  Emergency Department Encounter  EmergencyMedicine Resident     Pt Gil Martinez  MRN: [id number]  [place] 1955  Date of evaluation: 11/6/21  PCP:  Shannon Ko Dr       Chief Complaint   Patient presents with    Hip Pain    Back Pain       HISTORY OF PRESENT ILLNESS  (Location/Symptom, Timing/Onset, Context/Setting, Quality, Duration, Modifying Factors, Severity.)      Orville John is a 77 y.o. female who presents with lower back pain and right lower extremity pain secondary to fall. Patient was brought in via EMS. Patient reports that a big dog rushed into her while she was waiting for the elevator in her apartment building yesterday. She got scared and then fell backward on her butt. Patient usually ambulates on an electric wheelchair but states it is broken today. Today, she reports pain is 9/10. Mainly on her buttock, tail bone, lower back, and left lower extremity. Patient reports taking OTC Advil at home for pain relief. Patient is in no acute distress. She denies any SOB/nausua/vomitting/abdominal pain. Denies neck pain and headache. Denies loss of consciousness during the fall. Patient has a psychiatric history. She is status post right knee replacement 2012. She was admitted to observation unit on 10/25/21 secondary to fall as well. PAST MEDICAL / SURGICAL / SOCIAL / FAMILY HISTORY      has a past medical history of CHRIS (acute kidney injury) ([address] Bipolar 1 disorder ([address] Breast lump, Chronic obstructive pulmonary disease ([address] Depression, GERD (gastroesophageal reflux disease), Hyperlipidemia, Hypertension, Osteoarthritis, Type 2 diabetes mellitus without complication, with long-term current use of insulin ([address] and Type II or unspecified type diabetes mellitus without mention of complication, not stated as uncontrolled.      has a past surgical history that includes Ectopic pregnancy surgery; knee surgery (Right); Dilation and curettage of uterus; Nerve Block (Left, 12/8/14); Knee Arthroplasty (2017); and Tonsillectomy and adenoidectomy. Social History     Socioeconomic History    Marital status:      Spouse name: Not on file    Number of children: Not on file    Years of education: Not on file    Highest education level: Not on file   Occupational History     Employer: N/A   Tobacco Use    Smoking status: Current Every Day Smoker     Packs/day: 0.25     Years: 10.00     Pack years: 2.50     Types: Cigarettes    Smokeless tobacco: Never Used    Tobacco comment: 3 cigarettes per day; Patient refused counseling   Vaping Use    Vaping Use: Never used   Substance and Sexual Activity    Alcohol use: Yes     Comment: rarely    Drug use: No    Sexual activity: Not Currently   Other Topics Concern    Not on file   Social History Narrative    Not on file     Social Determinants of Health     Financial Resource Strain: Low Risk     Difficulty of Paying Living Expenses: Not hard at all   Food Insecurity: No Food Insecurity    Worried About Running Out of Food in the Last Year: Never true    Rocio of Food in the Last Year: Never true   Transportation Needs:     Lack of Transportation (Medical): Not on file    Lack of Transportation (Non-Medical):  Not on file   Physical Activity:     Days of Exercise per Week: Not on file    Minutes of Exercise per Session: Not on file   Stress:     Feeling of Stress : Not on file   Social Connections:     Frequency of Communication with Friends and Family: Not on file    Frequency of Social Gatherings with Friends and Family: Not on file    Attends Rastafarian Services: Not on file    Active Member of Clubs or Organizations: Not on file    Attends Club or Organization Meetings: Not on file    Marital Status: Not on file   Intimate Partner Violence:     Fear of Current or Ex-Partner: Not on file    Emotionally Abused: Not on file    Physically Abused: Not on file   Kirstin Garner Sexually Abused: Not on file   Housing Stability:     Unable to Pay for Housing in the Last Year: Not on file    Number of Places Lived in the Last Year: Not on file    Unstable Housing in the Last Year: Not on file       Family History   Problem Relation Age of Onset    Diabetes Father     Stroke Father     High Blood Pressure Father        Allergies:  Chlorpromazine, Cyclobenzaprine, Gabapentin, Prochlorperazine, Prochlorperazine edisylate, Promethazine, and Chantix [varenicline tartrate]    Home Medications:  Prior to Admission medications    Medication Sig Start Date End Date Taking? Authorizing Provider   naproxen (NAPROSYN) 500 MG tablet Take 1 tablet by mouth 2 times daily as needed for Pain 11/6/21  Yes Tenny Eisenmenger, DPM   HYDROcodone-acetaminophen (NORCO) 5-325 MG per tablet Take 1 tablet by mouth every 6 hours as needed for Pain for up to 3 days. Intended supply: 3 days. Take lowest dose possible to manage pain 11/6/21 11/9/21 Yes Tenny Eisenmenger, DPM   traZODone (DESYREL) 150 MG tablet Take 1 tablet by mouth nightly for 10 days 10/27/21 11/6/21  Jacklyn Jaimes MD   lisinopril (PRINIVIL;ZESTRIL) 10 MG tablet Take 1 tablet by mouth daily for 20 days 10/27/21 11/16/21  Jacklyn Jaimes MD   polyethylene glycol (MIRALAX) 17 GM/SCOOP powder Take 17 g by mouth daily for 17 days PRN constipation 10/27/21 11/13/21  Jacklyn Jaimes MD   QUEtiapine (SEROQUEL) 200 MG tablet Take 400 mg by mouth nightly    Historical Provider, MD   nystatin (MYCOSTATIN) 237968 UNIT/GM cream Apply topically 2 times daily.  10/15/21   Kendy Jones MD   QUEtiapine (SEROQUEL) 300 MG tablet Take 1 tablet by mouth 2 times daily 10/15/21   Kendy Jones MD   acetaminophen (TYLENOL) 500 MG tablet Take 2 tablets by mouth every 6 hours as needed for Pain 7/16/21   Kristi Spence PA-C   insulin glargine (LANTUS SOLOSTAR) 100 UNIT/ML injection pen INJECT 20 UNITS INTO THE SKIN BID 5/25/21   Driss Briones, APRN - CNP   nystatin (MYCOSTATIN) 786648 UNIT/GM cream Apply topically 2 times daily. 5/25/21   Gabriel Reyes, APRN - CNP   ibuprofen (ADVIL;MOTRIN) 600 MG tablet Take 1 tablet by mouth every 6 hours as needed for Pain  Patient not taking: Reported on 5/25/2021 3/22/21 6/14/21  Laine Kerr DO   aspirin 81 MG EC tablet Take 1 tablet by mouth daily 4/14/20   Baljinder Damon MD   albuterol (PROVENTIL HFA;VENTOLIN HFA) 108 (90 BASE) MCG/ACT inhaler Inhale 1 puff into the lungs 4 times daily as needed for Wheezing     Historical Provider, MD       REVIEW OF SYSTEMS    (2-9 systems for level 4, 10 or more for level 5)      Review of Systems   Constitutional: Negative for chills and fever. HENT: Negative for ear pain, facial swelling and sinus pain. Eyes: Negative for visual disturbance. Respiratory: Negative for cough, chest tightness and wheezing. Cardiovascular: Negative for chest pain and palpitations. Gastrointestinal: Negative for abdominal pain, diarrhea, nausea and vomiting. Musculoskeletal: Positive for arthralgias (left knee and ankle), back pain, gait problem and myalgias (buttock). Negative for neck pain and neck stiffness. Skin: Negative for color change, rash and wound. Neurological: Negative for dizziness, weakness, numbness and headaches. Psychiatric/Behavioral: Negative for behavioral problems and confusion. PHYSICAL EXAM   (up to 7 for level 4, 8 or more for level 5)      INITIAL VITALS:   /64   Pulse 88   Temp 99 °F (37.2 °C) (Oral)   Resp 18   LMP  (LMP Unknown)   SpO2 90%     Physical Exam  Constitutional:       Appearance: Normal appearance. HENT:      Head: Normocephalic and atraumatic. Right Ear: External ear normal.      Left Ear: External ear normal.      Nose: Nose normal.      Mouth/Throat:      Mouth: Mucous membranes are moist.      Pharynx: Oropharynx is clear. Eyes:      Conjunctiva/sclera: Conjunctivae normal.      Pupils: Pupils are equal, round, and reactive to light. Cardiovascular:      Rate and Rhythm: Normal rate and regular rhythm. Pulses: Normal pulses. Heart sounds: Normal heart sounds. Pulmonary:      Effort: Pulmonary effort is normal.      Breath sounds: Normal breath sounds. Abdominal:      General: Abdomen is flat. Palpations: Abdomen is soft. Musculoskeletal:         General: Tenderness (left knee, left ankle, coccyx, lower back) present. Normal range of motion. Cervical back: Normal range of motion and neck supple. Skin:     General: Skin is warm and dry. Neurological:      Mental Status: She is alert and oriented to person, place, and time. Psychiatric:         Mood and Affect: Mood normal.         Behavior: Behavior normal.         DIFFERENTIAL  DIAGNOSIS     PLAN (LABS / IMAGING / EKG):  Orders Placed This Encounter   Procedures    XR LUMBAR SPINE (2-3 VIEWS)    XR ANKLE LEFT (MIN 3 VIEWS)    XR KNEE LEFT (3 VIEWS)    XR PELVIS (1-2 VIEWS)    CT Lumbar Spine WO Contrast       MEDICATIONS ORDERED:  Orders Placed This Encounter   Medications    HYDROcodone-acetaminophen (NORCO) 5-325 MG per tablet 1 tablet    naproxen (NAPROSYN) 500 MG tablet     Sig: Take 1 tablet by mouth 2 times daily as needed for Pain     Dispense:  60 tablet     Refill:  0    HYDROcodone-acetaminophen (NORCO) 5-325 MG per tablet     Sig: Take 1 tablet by mouth every 6 hours as needed for Pain for up to 3 days. Intended supply: 3 days. Take lowest dose possible to manage pain     Dispense:  10 tablet     Refill:  0       DDX: Osteoarthritis of left knee,Osteoarthritis of left hip, femur fracture, pelvic fracture, Osteoarthritis of left ankle, ankle fracture,  . Lower back sprain, lumbar fracture due to fall,  Coccyx fracture    DIAGNOSTIC RESULTS / EMERGENCY DEPARTMENT COURSE / MDM     LABS:  No results found for this visit on 11/06/21. IMPRESSION: Arthritis of left knee, arthritis of left ankle, back sprain due to fall.      RADIOLOGY:  X-ray left knee: no acute bony deformity  X-ray left ankle: no  Acute bony deformity  X-ray pelvic: no acute displaced fracture identified  X-ray lumbar: no evidence of an acute compression deformity or traumatic malalignment. Grade 1 anterolisthesis of L4 and L5. Multilevel endplate and facet degnerative changes with advanced L5-S1 disc height loss and advanced T12-L1 disc height loss. CT lumbar w/o contrast: multilevel lumbar spondylosis, facet arthropathy and degenerative disc disease as described above. Further evaluation may be obtained with MRI if clinically indicated. EKG  None    All EKG's are interpreted by the Emergency Department Physician who either signs or Co-signs this chart in the absence of a cardiologist.    EMERGENCY DEPARTMENT COURSE:    Patient presented today after falling yesterday when a big dog rushed into her. Patient denies loss of consciousness. Admits lower back pain, buttock pain, tail bone pain, and left lower extremity pain. Patient is a poor historian. She has a psychiatry history. She was admitted on 10/25/21 to [facility] due to fall as well. Social work and physical therapy were consulted. Patient was evaluated by PT/OT and she was able to perform ADLs. Patient was discharged home with home physical therapy. However, per case management note, patient refused home therapy. Case management re-evaluate today. Patient denies new equipment for ambulation. Denies home health care. Reports she is fine going home. Norco and naproxen prescription was dispensed to patient. PROCEDURES:  None    CONSULTS:  None    CRITICAL CARE:  None    FINAL IMPRESSION      1. Strain of lumbar region, initial encounter    2. Arthritis of knee, left    3. Lumbar facet arthropathy    4. Fall at home, initial encounter    5. Arthritis of knee, right          DISPOSITION / PLAN     DISPOSITION        PATIENT REFERRED TO:  No follow-up provider specified.     DISCHARGE MEDICATIONS:  New Prescriptions HYDROCODONE-ACETAMINOPHEN (NORCO) 5-325 MG PER TABLET    Take 1 tablet by mouth every 6 hours as needed for Pain for up to 3 days. Intended supply: 3 days.  Take lowest dose possible to manage pain    NAPROXEN (NAPROSYN) 500 MG TABLET    Take 1 tablet by mouth 2 times daily as needed for Pain       Rich Nance DPM  Emergency Medicine Resident    (Please note that portions of thisnote were completed with a voice recognition program.  Efforts were made to edit the dictations but occasionally words are mis-transcribed.)     Rich Nance DPM  Resident  11/06/21 [id number]

## 2021-11-22 ENCOUNTER — HOSPITAL ENCOUNTER (EMERGENCY)
Age: 66
Discharge: HOME | End: 2021-11-22
Attending: EMERGENCY MEDICINE
Payer: MEDICARE

## 2021-11-22 VITALS
HEIGHT: 66 IN | TEMPERATURE: 98.4 F | SYSTOLIC BLOOD PRESSURE: 149 MMHG | RESPIRATION RATE: 20 BRPM | HEART RATE: 91 BPM | WEIGHT: 200 LBS | OXYGEN SATURATION: 92 % | BODY MASS INDEX: 32.14 KG/M2 | DIASTOLIC BLOOD PRESSURE: 76 MMHG

## 2021-11-22 DIAGNOSIS — E11.9 TYPE 2 DIABETES MELLITUS WITHOUT COMPLICATION, WITH LONG-TERM CURRENT USE OF INSULIN (HCC): ICD-10-CM

## 2021-11-22 DIAGNOSIS — Z79.4 TYPE 2 DIABETES MELLITUS WITHOUT COMPLICATION, WITH LONG-TERM CURRENT USE OF INSULIN (HCC): ICD-10-CM

## 2021-11-22 DIAGNOSIS — Z76.0 ENCOUNTER FOR MEDICATION REFILL: Primary | ICD-10-CM

## 2021-11-22 DIAGNOSIS — I10 ESSENTIAL HYPERTENSION: ICD-10-CM

## 2021-11-22 PROCEDURE — 99282 EMERGENCY DEPT VISIT SF MDM: CPT

## 2021-11-22 RX ORDER — QUETIAPINE FUMARATE 200 MG/1
400 TABLET, FILM COATED ORAL NIGHTLY
Qty: 14 TABLET | Refills: 0 | Status: SHIPPED | OUTPATIENT
Start: 2021-11-22 | End: 2022-01-19

## 2021-11-22 RX ORDER — TRAZODONE HYDROCHLORIDE 150 MG/1
150 TABLET ORAL NIGHTLY
Qty: 7 TABLET | Refills: 0 | Status: SHIPPED | OUTPATIENT
Start: 2021-11-22 | End: 2022-01-19

## 2021-11-22 RX ORDER — QUETIAPINE FUMARATE 300 MG/1
300 TABLET, FILM COATED ORAL 2 TIMES DAILY
Qty: 14 TABLET | Refills: 0 | Status: SHIPPED | OUTPATIENT
Start: 2021-11-22 | End: 2022-01-19

## 2021-11-22 RX ORDER — LISINOPRIL 10 MG/1
10 TABLET ORAL DAILY
Qty: 20 TABLET | Refills: 0 | Status: SHIPPED | OUTPATIENT
Start: 2021-11-22 | End: 2022-01-19

## 2021-11-22 RX ORDER — ESCITALOPRAM OXALATE 20 MG/1
30 TABLET ORAL DAILY
COMMUNITY

## 2021-11-22 ASSESSMENT — ENCOUNTER SYMPTOMS
DIARRHEA: 0
CONSTIPATION: 0
NAUSEA: 0
VOMITING: 0
COUGH: 0
SHORTNESS OF BREATH: 0
ABDOMINAL PAIN: 0

## 2021-11-23 NOTE — ED PROVIDER NOTES
[facility] ED     Emergency Department     Faculty Attestation    I performed a history and physical examination of the patient and discussed management with the resident. I reviewed the residents note and agree with the documented findings and plan of care. Any areas of disagreement are noted on the chart. I was personally present for the key portions of any procedures. I have documented in the chart those procedures where I was not present during the key portions. I have reviewed the emergency nurses triage note. I agree with the chief complaint, past medical history, past surgical history, allergies, medications, social and family history as documented unless otherwise noted below. For Physician Assistant/ Nurse Practitioner cases/documentation I have personally evaluated this patient and have completed at least one if not all key elements of the E/M (history, physical exam, and MDM). Additional findings are as noted. Patient presents stating that she has been out of all of her medications for the past 1 week. She has no complaints other than saying that she has had loose stools due to not having her Seroquel. She denies any fever, headache, chest pain, shortness of breath. On exam, patient is resting comfortably in the bed and appears well. Lungs are clear to auscultation bilaterally and heart sounds are normal.  Will refill patient's hypertensive medications and have social work see patient for follow-up for her psychiatric meds.       Char Major MD  Attending Emergency  Physician              Evaristo Joshi MD  11/22/21 1525

## 2021-11-23 NOTE — ED NOTES
Discharges instructions given. Verbalized understanding. All questions answered.        Tara Luis LPN  04/03/26 [id number]

## 2021-11-23 NOTE — ED TRIAGE NOTES
Pt been out of her medication for 1 week  Has an appointment with her PCP on Dec 6    Needs refills     Is covid vaccinated

## 2021-11-23 NOTE — ED PROVIDER NOTES
[facility] ED  Emergency Department Encounter  Emergency Medicine Resident     Pt Name: Stephen Angulo  AHY:[id number]  [place] 1955  Date of evaluation: 11/22/21  PCP:  Shannon Ko Dr       Chief Complaint   Patient presents with    Other     out of anxiety meds      HISTORY OF PRESENT ILLNESS  (Location/Symptom, Timing/Onset, Context/Setting, Quality, Duration, ModifyingFactors, Severity.)      Stephen Angulo is a 74 y.o. female with PMH of COPD, diabetes, hypertension, schizoaffective disorder, bipolar disorder who presents for evaluation of medication refill. Patient states that she has been out of her medication x1 week. Usually has her medications refilled by [facility], called today but only was able to get the answering machine. Her next appointment is 12/6/2021. Requesting refills of lisinopril, Seroquel, trazodone. Patient is able to give me doses of medications that manage those listed in epic. No acute medical complaints currently. No suicidal or homicidal ideation. Does occasionally have hallucinations but none currently. PAST MEDICAL / SURGICAL / SOCIAL /FAMILY HISTORY      has a past medical history of CHRIS (acute kidney injury) ([address] Bipolar 1 disorder ([address] Breast lump, Chronic obstructive pulmonary disease ([address] Depression, GERD (gastroesophageal reflux disease), Hyperlipidemia, Hypertension, Osteoarthritis, Type 2 diabetes mellitus without complication, with long-term current use of insulin ([address] and Type II or unspecified type diabetes mellitus without mention of complication, not stated as uncontrolled. No other pertinent PMH on review with patient/guardian. has a past surgical history that includes Ectopic pregnancy surgery; knee surgery (Right); Dilation and curettage of uterus; Nerve Block (Left, 12/8/14); Knee Arthroplasty (2017); and Tonsillectomy and adenoidectomy.   No other pertinent PSH on review with patient/guardian. Social History     Socioeconomic History    Marital status:      Spouse name: Not on file    Number of children: Not on file    Years of education: Not on file    Highest education level: Not on file   Occupational History     Employer: N/A   Tobacco Use    Smoking status: Current Every Day Smoker     Packs/day: 0.25     Years: 10.00     Pack years: 2.50     Types: Cigarettes    Smokeless tobacco: Never Used    Tobacco comment: 3 cigarettes per day; Patient refused counseling   Vaping Use    Vaping Use: Never used   Substance and Sexual Activity    Alcohol use: Yes     Comment: rarely    Drug use: No    Sexual activity: Not Currently   Other Topics Concern    Not on file   Social History Narrative    Not on file     Social Determinants of Health     Financial Resource Strain: Low Risk     Difficulty of Paying Living Expenses: Not hard at all   Food Insecurity: No Food Insecurity    Worried About Running Out of Food in the Last Year: Never true    Rocio of Food in the Last Year: Never true   Transportation Needs:     Lack of Transportation (Medical): Not on file    Lack of Transportation (Non-Medical):  Not on file   Physical Activity:     Days of Exercise per Week: Not on file    Minutes of Exercise per Session: Not on file   Stress:     Feeling of Stress : Not on file   Social Connections:     Frequency of Communication with Friends and Family: Not on file    Frequency of Social Gatherings with Friends and Family: Not on file    Attends Nondenominational Services: Not on file    Active Member of Clubs or Organizations: Not on file    Attends Club or Organization Meetings: Not on file    Marital Status: Not on file   Intimate Partner Violence:     Fear of Current or Ex-Partner: Not on file    Emotionally Abused: Not on file    Physically Abused: Not on file    Sexually Abused: Not on file   Housing Stability:     Unable to Pay for Housing in the Last Year: Not on tablet by mouth every 6 hours as needed for Pain  Patient not taking: Reported on 5/25/2021 3/22/21 6/14/21  Marly Sensor, DO   albuterol (PROVENTIL HFA;VENTOLIN HFA) 108 (90 BASE) MCG/ACT inhaler Inhale 1 puff into the lungs 4 times daily as needed for Wheezing     Historical Provider, MD       REVIEW OF SYSTEMS    (2-9 systems for level 4, 10 ormore for level 5)      Review of Systems   Constitutional: Negative for fever. Eyes: Negative for visual disturbance. Respiratory: Negative for cough and shortness of breath. Cardiovascular: Negative for chest pain. Gastrointestinal: Negative for abdominal pain, constipation, diarrhea, nausea and vomiting. Skin: Negative for rash. Allergic/Immunologic: Negative for immunocompromised state. Neurological: Negative for headaches. Hematological: Does not bruise/bleed easily. PHYSICAL EXAM   (up to 7 for level 4, 8 or more for level 5)      INITIAL VITALS:   BP (!) 149/76   Pulse 91   Temp 98.4 °F (36.9 °C) (Oral)   Resp 20   Ht 5' 6\" (1.676 m)   Wt 200 lb (90.7 kg)   LMP  (LMP Unknown)   SpO2 92%   BMI 32.28 kg/m²     Physical Exam  Constitutional:       General: She is not in acute distress. Appearance: Normal appearance. She is not ill-appearing, toxic-appearing or diaphoretic. HENT:      Head: Normocephalic and atraumatic. Right Ear: External ear normal.      Left Ear: External ear normal.   Eyes:      General:         Right eye: No discharge. Left eye: No discharge. Cardiovascular:      Rate and Rhythm: Normal rate and regular rhythm. Pulses: Normal pulses. Heart sounds: No murmur heard. Pulmonary:      Effort: Pulmonary effort is normal. No respiratory distress. Breath sounds: Normal breath sounds. No wheezing, rhonchi or rales. Abdominal:      Palpations: Abdomen is soft. Tenderness: There is no abdominal tenderness. Skin:     Capillary Refill: Capillary refill takes less than 2 seconds. Neurological:      General: No focal deficit present. Mental Status: She is alert. Psychiatric:         Mood and Affect: Mood normal.         Behavior: Behavior normal.         Thought Content: Thought content normal.         Judgment: Judgment normal.       DIFFERENTIAL  DIAGNOSIS     PLAN (LABS / IMAGING / EKG):  No orders of the defined types were placed in this encounter. MEDICATIONS ORDERED:  Orders Placed This Encounter   Medications    lisinopril (PRINIVIL;ZESTRIL) 10 MG tablet     Sig: Take 1 tablet by mouth daily for 20 days     Dispense:  20 tablet     Refill:  0     Change in dose, cancel 5mg    QUEtiapine (SEROQUEL) 300 MG tablet     Sig: Take 1 tablet by mouth 2 times daily for 7 days     Dispense:  14 tablet     Refill:  0    traZODone (DESYREL) 150 MG tablet     Sig: Take 1 tablet by mouth nightly for 7 days     Dispense:  7 tablet     Refill:  0    QUEtiapine (SEROQUEL) 200 MG tablet     Sig: Take 2 tablets by mouth nightly for 7 days     Dispense:  14 tablet     Refill:  0       DIAGNOSTIC RESULTS / EMERGENCY DEPARTMENT COURSE / MDM     LABS:  No results found for this visit on 11/22/21. IMPRESSION/MDM/ED COURSE:  74 y.o. female presented for medication refill. No acute medical complaints. Patient slightly hypertensive on arrival however has been out of her antihypertensive medication x1 week. SPO2 documented as 92% however 99% on room air during my evaluation. No SI/HI or hallucinations currently. Spoke to social work however [facility] no longer has walk-in appointments. Patient encouraged to call tomorrow or go in person if unable to speak to somebody on the phone. Will prescribe 1 week of refills. I discussed signs and symptoms that would require reevaluation in the ED. The patient expressed understanding and agreement with plan. All questions answered.          RADIOLOGY:  No orders to display       EKG  None    All EKG's are interpreted by the Emergency Department Physician who either signs or Co-signs this chart in the absence of a cardiologist.    PROCEDURES:  None    CONSULTS:  None    FINAL IMPRESSION      1. Encounter for medication refill    2. Type 2 diabetes mellitus without complication, with long-term current use of insulin ([facility] [address]    3. Essential hypertension          DISPOSITION / PLAN     DISPOSITION Decision To Discharge 11/22/2021 07:51:16 PM      PATIENT REFERREDTO:  No follow-up provider specified.     DISCHARGE MEDICATIONS:  Discharge Medication List as of 11/22/2021  8:01 PM          Lakesha Patel DO  PGY 2  Resident Physician Emergency Medicine  11/23/21 6:01 PM    (Please note that portions of this note were completed with a voice recognition program.Efforts were made to edit the dictations but occasionally words are mis-transcribed.)       Elfida Brittle, DO  Resident  11/22/21 [id number]       Elfida Brittle, DO  Resident  11/23/21 [id number]

## 2022-04-15 ENCOUNTER — HOSPITAL ENCOUNTER (OUTPATIENT)
Age: 67
Setting detail: SPECIMEN
Discharge: HOME | End: 2022-04-15
Payer: MEDICARE

## 2022-04-15 PROCEDURE — 87086 URINE CULTURE/COLONY COUNT: CPT

## 2022-04-15 PROCEDURE — 81003 URINALYSIS AUTO W/O SCOPE: CPT

## 2022-04-16 LAB
BILIRUBIN URINE: NEGATIVE
COLOR: YELLOW
COMMENT UA: NORMAL
GLUCOSE URINE: NEGATIVE
KETONES, URINE: NEGATIVE
LEUKOCYTE ESTERASE, URINE: NEGATIVE
NITRITE, URINE: NEGATIVE
PH UA: 6.5 (ref 5–8)
PROTEIN UA: NEGATIVE
SPECIFIC GRAVITY UA: 1.02 (ref 1–1.03)
TURBIDITY: CLEAR
URINE HGB: NEGATIVE
UROBILINOGEN, URINE: NORMAL

## 2022-04-17 LAB
CULTURE: NORMAL
SPECIMEN DESCRIPTION: NORMAL

## 2023-09-08 NOTE — ED PROVIDER NOTES
D[facility] Corcoran [address]     Emergency Department     Faculty Attestation    I performed a history and physical examination of the patient and discussed management with the resident. I reviewed the residents note and agree with the documented findings including all diagnostic interpretations and plan of care. Any areas of disagreement are noted on the chart. I was personally present for the key portions of any procedures. I have documented in the chart those procedures where I was not present during the key portions. I have reviewed the emergency nurses triage note. I agree with the chief complaint, past medical history, past surgical history, allergies, medications, social and family history as documented unless otherwise noted below. Documentation of the HPI, Physical Exam and Medical Decision Making performed by [facility] is based on my personal performance of the HPI, PE and MDM. For Physician Assistant/ Nurse Practitioner cases/documentation I have personally evaluated this patient and have completed at least one if not all key elements of the E/M (history, physical exam, and MDM). Additional findings are as noted. Primary Care Physician: EJANNETTE Medrano - CNP    History: This is a 70 y.o. female who presents to the Emergency Department with complaint of mouth lesions, oral discomfort. Started 2 days ago. Does wear dentures. No fevers, no difficulty of swallowing no change in voice. She did take her dentures out. Occasionally described as a burning sensation    Physical:     oral temperature is 98.8 °F (37.1 °C). Her blood pressure is 142/76 (abnormal) and her pulse is 78. Her respiration is 19 and oxygen saturation is 96%.    70 y.o. female no acute distress, patient is edentulous, there is a small ulceration at the inflection point of the lip and the buccal gingiva.   There is no evidence of abscess, there is no drooling    Impression: Oral lesion, essentially secondary to denture discomfort    Plan: Magic mouthwash, follow-up with orthodontist      Abeba Venegas MD  Attending Emergency Physician        Sarah Jiang MD  09/02/19 [phone] Consent 1/Introductory Paragraph: The rationale for Mohs was explained to the patient and consent was obtained. The risks, benefits and alternatives to therapy were discussed in detail. Specifically, the risks of infection, scarring, bleeding, prolonged wound healing, incomplete removal, allergy to anesthesia, nerve injury and recurrence were addressed. Prior to the procedure, the treatment site was clearly identified and confirmed by the patient. All components of Universal Protocol/PAUSE Rule completed.